# Patient Record
Sex: MALE | Race: WHITE | NOT HISPANIC OR LATINO | Employment: OTHER | URBAN - NONMETROPOLITAN AREA
[De-identification: names, ages, dates, MRNs, and addresses within clinical notes are randomized per-mention and may not be internally consistent; named-entity substitution may affect disease eponyms.]

---

## 2017-01-23 ENCOUNTER — TRANSCRIBE ORDERS (OUTPATIENT)
Dept: ADMINISTRATIVE | Facility: HOSPITAL | Age: 77
End: 2017-01-23

## 2017-01-23 ENCOUNTER — OFFICE VISIT (OUTPATIENT)
Dept: PULMONOLOGY | Facility: CLINIC | Age: 77
End: 2017-01-23

## 2017-01-23 ENCOUNTER — ANTICOAGULATION VISIT (OUTPATIENT)
Dept: CARDIOLOGY | Facility: CLINIC | Age: 77
End: 2017-01-23

## 2017-01-23 ENCOUNTER — LAB (OUTPATIENT)
Dept: LAB | Facility: HOSPITAL | Age: 77
End: 2017-01-23
Attending: INTERNAL MEDICINE

## 2017-01-23 VITALS
DIASTOLIC BLOOD PRESSURE: 60 MMHG | HEART RATE: 62 BPM | BODY MASS INDEX: 25.9 KG/M2 | OXYGEN SATURATION: 87 % | RESPIRATION RATE: 20 BRPM | SYSTOLIC BLOOD PRESSURE: 124 MMHG | WEIGHT: 185 LBS | HEIGHT: 71 IN

## 2017-01-23 DIAGNOSIS — I25.119 ATHEROSCLEROSIS OF NATIVE CORONARY ARTERY OF NATIVE HEART WITH ANGINA PECTORIS (HCC): Primary | ICD-10-CM

## 2017-01-23 DIAGNOSIS — I25.119 ATHEROSCLEROSIS OF NATIVE CORONARY ARTERY OF NATIVE HEART WITH ANGINA PECTORIS (HCC): ICD-10-CM

## 2017-01-23 DIAGNOSIS — I71.20 THORACIC AORTIC ANEURYSM WITHOUT RUPTURE (HCC): ICD-10-CM

## 2017-01-23 DIAGNOSIS — R06.02 SHORTNESS OF BREATH: Primary | ICD-10-CM

## 2017-01-23 DIAGNOSIS — J44.1 ACUTE EXACERBATION OF CHRONIC OBSTRUCTIVE PULMONARY DISEASE (COPD) (HCC): ICD-10-CM

## 2017-01-23 DIAGNOSIS — I48.0 PAROXYSMAL ATRIAL FIBRILLATION (HCC): ICD-10-CM

## 2017-01-23 DIAGNOSIS — J44.9 CHRONIC OBSTRUCTIVE PULMONARY DISEASE, UNSPECIFIED COPD TYPE (HCC): ICD-10-CM

## 2017-01-23 DIAGNOSIS — R09.02 HYPOXIA: ICD-10-CM

## 2017-01-23 LAB
INR PPP: 3.04 (ref 0.9–1.1)
PROTHROMBIN TIME: 33.3 SECONDS (ref 9.3–12.1)

## 2017-01-23 PROCEDURE — 96372 THER/PROPH/DIAG INJ SC/IM: CPT | Performed by: INTERNAL MEDICINE

## 2017-01-23 PROCEDURE — 36415 COLL VENOUS BLD VENIPUNCTURE: CPT

## 2017-01-23 PROCEDURE — 85610 PROTHROMBIN TIME: CPT | Performed by: INTERNAL MEDICINE

## 2017-01-23 PROCEDURE — 99214 OFFICE O/P EST MOD 30 MIN: CPT | Performed by: INTERNAL MEDICINE

## 2017-01-23 RX ORDER — METHYLPREDNISOLONE ACETATE 80 MG/ML
80 INJECTION, SUSPENSION INTRA-ARTICULAR; INTRALESIONAL; INTRAMUSCULAR; SOFT TISSUE ONCE
Status: COMPLETED | OUTPATIENT
Start: 2017-01-23 | End: 2017-01-23

## 2017-01-23 RX ORDER — AMOXICILLIN AND CLAVULANATE POTASSIUM 875; 125 MG/1; MG/1
1 TABLET, FILM COATED ORAL 2 TIMES DAILY
Qty: 10 TABLET | Refills: 0 | Status: SHIPPED | OUTPATIENT
Start: 2017-01-23 | End: 2017-01-28

## 2017-01-23 RX ORDER — ALBUTEROL SULFATE 1.25 MG/3ML
1 SOLUTION RESPIRATORY (INHALATION) EVERY 6 HOURS PRN
Qty: 120 VIAL | Refills: 5 | Status: SHIPPED | OUTPATIENT
Start: 2017-01-23 | End: 2017-08-09

## 2017-01-23 RX ORDER — BUDESONIDE AND FORMOTEROL FUMARATE DIHYDRATE 80; 4.5 UG/1; UG/1
2 AEROSOL RESPIRATORY (INHALATION)
Qty: 1 INHALER | Refills: 3 | Status: SHIPPED | OUTPATIENT
Start: 2017-01-23 | End: 2017-03-28 | Stop reason: SDUPTHER

## 2017-01-23 RX ADMIN — METHYLPREDNISOLONE ACETATE 80 MG: 80 INJECTION, SUSPENSION INTRA-ARTICULAR; INTRALESIONAL; INTRAMUSCULAR; SOFT TISSUE at 15:59

## 2017-01-23 NOTE — MR AVS SNAPSHOT
Alessandro Mendiola   1/23/2017 2:30 PM   Office Visit    Dept Phone:  [phone]   Encounter #:  [id number]    Provider:  Anna Wilson MD   Department:  [facility] PULMONARY CRITICAL CARE AND SLEEP                Your Full Care Plan              Today's Medication Changes          These changes are accurate as of: 1/23/17  3:22 PM.  If you have any questions, ask your nurse or doctor.               New Medication(s)Ordered:     amoxicillin-clavulanate 875-125 MG per tablet   Commonly known as:  AUGMENTIN   Take 1 tablet by mouth 2 (Two) Times a Day for 5 days.   Started by:  Anna Wilson MD       ipratropium-albuterol  MCG/ACT inhaler   Commonly known as:  COMBIVENT RESPIMAT   Inhale 1 puff 4 (Four) Times a Day As Needed for wheezing or shortness of air.   Started by:  Anna Wilson MD         Medication(s)that have changed:     * VENTOLIN  (90 BASE) MCG/ACT inhaler   Generic drug:  albuterol   INHALE TWO PUFFS BY MOUTH EVERY 4 TO 6 HOURS AS NEEDED   What changed:  Another medication with the same name was changed. Make sure you understand how and when to take each.   Changed by:  Rio Acuna DO       * albuterol 108 (90 BASE) MCG/ACT inhaler   Commonly known as:  VENTOLIN HFA   Inhale 2 puffs every 4 (four) hours as needed for wheezing.   What changed:  Another medication with the same name was changed. Make sure you understand how and when to take each.   Changed by:  Karen Oates MA       * albuterol 1.25 MG/3ML nebulizer solution   Commonly known as:  ACCUNEB   Take 3 mL by nebulization Every 6 (Six) Hours As Needed for wheezing.   What changed:  See the new instructions.   Changed by:  Anna Wilson MD       * Notice:  This list has 3 medication(s) that are the same as other medications prescribed for you. Read the directions carefully, and ask your doctor or other care provider to review them with you.      Stop taking  medication(s)listed here:     cefuroxime 500 MG tablet   Commonly known as:  CEFTIN   Stopped by:  Anna Wilson MD                Where to Get Your Medications      These medications were sent to [facility] [address] [facility], [place] - [address] - [phone]  - [phone] FX  [address] [place] [address]     Phone:  [phone]     aclidinium bromide 400 MCG/ACT aerosol powder  powder for inhalation    albuterol 1.25 MG/3ML nebulizer solution    amoxicillin-clavulanate 875-125 MG per tablet    budesonide-formoterol 80-4.5 MCG/ACT inhaler         You can get these medications from any pharmacy     Bring a paper prescription for each of these medications     ipratropium-albuterol  MCG/ACT inhaler                  Your Updated Medication List          This list is accurate as of: 1/23/17  3:22 PM.  Always use your most recent med list.                aclidinium bromide 400 MCG/ACT aerosol powder  powder for inhalation   Commonly known as:  TUDORZA PRESSAIR   Inhale 1 puff 2 (Two) Times a Day.       amoxicillin-clavulanate 875-125 MG per tablet   Commonly known as:  AUGMENTIN   Take 1 tablet by mouth 2 (Two) Times a Day for 5 days.       aspirin 81 MG tablet   Take 1 tablet by mouth daily.       azelastine 0.1 % nasal spray   Commonly known as:  ASTELIN   2 sprays into each nostril 2 (Two) Times a Day. Use in each nostril as directed       budesonide-formoterol 80-4.5 MCG/ACT inhaler   Commonly known as:  SYMBICORT   Inhale 2 puffs 2 (Two) Times a Day.       diltiaZEM  MG 24 hr capsule   Commonly known as:  CARTIA XT   Take 1 capsule by mouth Daily.       dronedarone 400 MG tablet   Commonly known as:  MULTAQ   Take 1 tablet by mouth 2 (two) times a day.       ipratropium-albuterol  MCG/ACT inhaler   Commonly known as:  COMBIVENT RESPIMAT   Inhale 1 puff 4 (Four) Times a Day As Needed for wheezing or shortness of air.       MUCINEX 600 MG 12 hr tablet   Generic drug:  guaiFENesin        MULTIPLE VITAMIN PO       niacin 500 MG tablet       OXYGEN-HELIUM IN       PredniSONE 10 MG (21) tablet pack   Commonly known as:  DELTASONE   TAKE AS DIRECTED       sertraline 50 MG tablet   Commonly known as:  ZOLOFT   Take 1 tablet by mouth Daily.       * VENTOLIN  (90 BASE) MCG/ACT inhaler   Generic drug:  albuterol   INHALE TWO PUFFS BY MOUTH EVERY 4 TO 6 HOURS AS NEEDED       * albuterol 108 (90 BASE) MCG/ACT inhaler   Commonly known as:  VENTOLIN HFA   Inhale 2 puffs every 4 (four) hours as needed for wheezing.       * albuterol 1.25 MG/3ML nebulizer solution   Commonly known as:  ACCUNEB   Take 3 mL by nebulization Every 6 (Six) Hours As Needed for wheezing.       vitamin D 65136 UNITS capsule capsule   Commonly known as:  ERGOCALCIFEROL   Take 1 capsule by mouth every 7 days.       * warfarin 4 MG tablet   Commonly known as:  COUMADIN       * warfarin 1 MG tablet   Commonly known as:  COUMADIN   Take 1 tablet by mouth daily.       * warfarin 5 MG tablet   Commonly known as:  COUMADIN   Take 1 tablet by mouth daily.       * Notice:  This list has 6 medication(s) that are the same as other medications prescribed for you. Read the directions carefully, and ask your doctor or other care provider to review them with you.            We Performed the Following     Oxygen Therapy       You Were Diagnosed With        Codes Comments    Shortness of breath    -  Primary ICD-10-CM: R06.02  ICD-9-CM: 786.05     Chronic obstructive pulmonary disease, unspecified COPD type     ICD-10-CM: J44.9  ICD-9-CM: 496     Hypoxia     ICD-10-CM: R09.02  ICD-9-CM: 799.02     Acute exacerbation of chronic obstructive pulmonary disease (COPD)     ICD-10-CM: J44.1  ICD-9-CM: 491.21       Medications to be Given to You by a Medical Professional     Due       Frequency    1/23/2017 methylPREDNISolone acetate (DEPO-medrol) injection 80 mg  Once      Instructions     None    Patient Instructions History      Upcoming Appointments      Visit Type Date Time Department    FOLLOW UP 2017  2:30 PM [facility]    PFT 2017  1:00 PM  [facility]    LAB 2017  2:15 PM  [facility]    FOLLOW UP 3/28/2017  2:00 PM [facility]    PFT 3/28/2017  1:00 PM [facility]    FOLLOW UP 2017  2:00 PM [facility]    FOLLOW UP 2017  1:15 PM [facility]      [employer] Signup     [facility] [employer] allows you to send messages to your doctor, view your test results, renew your prescriptions, schedule appointments, and more. To sign up, go to [employer] and click on the Sign Up Now link in the New User? box. Enter your [employer] Activation Code exactly as it appears below along with the last four digits of your Social Security Number and your Date of Birth () to complete the sign-up process. If you do not sign up before the expiration date, you must request a new code.    [employer] Activation Code: [email or web address]  Expires: 2017  3:22 PM    If you have questions, you can email [facility] [facility], [email or web address] or call [phone] to talk to our [employer] staff. Remember, [employer] is NOT to be used for urgent needs. For medical emergencies, dial 911.               Other Info from Your Visit           Your Appointments     Mar 28, 2017  1:00 PM EDT   PFT with [facility] - PFT [facility] PULMONARY CRITICAL CARE AND SLEEP (--)    [address] [facility] [address]  [address]  [facility] [place] [address] [phone]            Mar 28, 2017  2:00 PM EDT   Follow Up with DAVID Domingo   [facility] PULMONARY CRITICAL CARE AND SLEEP (--)    [address]  [address]  [facility] [place] [address]   [phone]           Arrive 15 minutes prior to appointment.            2017  2:00 PM EDT   Follow Up with Mikey Obrien MD   [facility] CARDIOLOGY (--)    [address]  [id number] [phone]           Arrive 15 minutes prior to appointment.            Jun 05, 2017  1:15 PM EDT   Follow Up with Rio Acuna DO   [facility] PRIMARY CARE (--)    [address] [place] [address] [phone]           Arrive 15 minutes prior to appointment.              Allergies     No Known Allergies      Reason for Visit     Follow-up           Vital Signs     Blood Pressure Pulse Respirations Height Weight Oxygen Saturation    124/60 62 20 71\" (180.3 cm) 185 lb (83.9 kg) 87%    Body Mass Index Smoking Status                25.8 kg/m2 Former Smoker          Problems and Diagnoses Noted     Chronic airway obstruction    Shortness of breath    Decreased oxygen supply        Acute exacerbation of chronic obstructive pulmonary disease (COPD)            "

## 2017-01-23 NOTE — PROGRESS NOTES
"Chief Complaint   Patient presents with   • Follow-up         Subjective   Alessandro Mendiola is a 76 y.o. male.     History of Present Illness     Patient here for follow-up of COPD.     He complains of productive cough of green mucus for the last 2 weeks. He denies fever or chills. He is using the rescue inhaler more often.    He continues Tudorza and Symbicort. He feels like the Tudorza has helped a lot.     He can not tell a difference in symptoms with the nasal spray.       The following portions of the patient's history were reviewed and updated as appropriate: allergies, current medications, past family history, past medical history, past social history and past surgical history.    Review of Systems   HENT: Negative for sinus pressure, sneezing and sore throat.    Respiratory: Positive for cough, shortness of breath and wheezing. Negative for chest tightness.        Objective   Visit Vitals   • /60   • Pulse 62   • Resp 20   • Ht 71\" (180.3 cm)   • Wt 185 lb (83.9 kg)   • SpO2 (!) 87%  Comment: on room air at rest   • BMI 25.8 kg/m2   O2 sat: 94% on 2 LPM.    Physical Exam   Constitutional: He is oriented to person, place, and time. He appears well-developed and well-nourished.   HENT:   Head: Normocephalic and atraumatic.   Dentures.   Eyes: EOM are normal.   Neck: Neck supple.   Cardiovascular: Normal rate and regular rhythm.    Pulmonary/Chest:   Effort was somewhat labored  Somewhat hyperresonant to percussion  Somewhat decreased A/E without wheezing noted.  On oxygen.   Musculoskeletal: He exhibits no edema.   Neurological: He is alert and oriented to person, place, and time.   Skin: Skin is warm and dry.   Psychiatric: He has a normal mood and affect.   Vitals reviewed.        Assessment/Plan   Alessandro was seen today for follow-up.    Diagnoses and all orders for this visit:    Shortness of breath  -     Pulmonary Function Test; Future    Chronic obstructive pulmonary disease, unspecified COPD " type  -     Pulmonary Function Test; Future    Hypoxia  -     Oxygen Therapy    Acute exacerbation of chronic obstructive pulmonary disease (COPD)  -     methylPREDNISolone acetate (DEPO-medrol) injection 80 mg; Inject 1 mL into the shoulder, thigh, or buttocks 1 (One) Time.    Other orders  -     amoxicillin-clavulanate (AUGMENTIN) 875-125 MG per tablet; Take 1 tablet by mouth 2 (Two) Times a Day for 5 days.  -     aclidinium bromide (TUDORZA PRESSAIR) 400 MCG/ACT aerosol powder  powder for inhalation; Inhale 1 puff 2 (Two) Times a Day.  -     albuterol (ACCUNEB) 1.25 MG/3ML nebulizer solution; Take 3 mL by nebulization Every 6 (Six) Hours As Needed for wheezing.  -     ipratropium-albuterol (COMBIVENT RESPIMAT)  MCG/ACT inhaler; Inhale 1 puff 4 (Four) Times a Day As Needed for wheezing or shortness of air.  -     budesonide-formoterol (SYMBICORT) 80-4.5 MCG/ACT inhaler; Inhale 2 puffs 2 (Two) Times a Day.         Return in about 2 months (around 3/23/2017) for Recheck, For Katia.    DISCUSSION (if any):  For the symptoms of acute exacerbation of COPD, I have prescribed intramuscular steroids.    Patient will be prescribed Augmentin.    Side effects have been discussed in detail.    Patient was asked to report to the emergency room if symptoms do not improve.    All other medications will remain the same.    Will try to arrange for POC, as he is having trouble with his portable tanks not lasting as long, since he needs O2 24/7.    Reviewed ABG, he does not need bipap yet.    PFT's at follow-up.    Errors in dictation may reflect use of voice recognition software and not all errors in transcription may have been detected prior to signing    This document was electronically signed by Anna Wilson MD January 23, 2017  3:17 PM

## 2017-01-23 NOTE — LETTER
"January 25, 2017     Rio Acuna DO  [address]  [place] [address]    Patient: Alessandro Mendiola   YOB: 1940   Date of Visit: 1/23/2017       Dear Dr. Armand DO:    Alessandro Mendiola was in my office today. Below is a copy of my note.    If you have questions, please do not hesitate to call me. I look forward to following Alessandro along with you.         Sincerely,        Anna Wilson MD        CC: No Recipients    Chief Complaint   Patient presents with   • Follow-up         Subjective   Alessandro Mendiola is a 76 y.o. male.     History of Present Illness     Patient here for follow-up of COPD.     He complains of productive cough of green mucus for the last 2 weeks. He denies fever or chills. He is using the rescue inhaler more often.    He continues Tudorza and Symbicort. He feels like the Tudorza has helped a lot.     He can not tell a difference in symptoms with the nasal spray.       The following portions of the patient's history were reviewed and updated as appropriate: allergies, current medications, past family history, past medical history, past social history and past surgical history.    Review of Systems   HENT: Negative for sinus pressure, sneezing and sore throat.    Respiratory: Positive for cough, shortness of breath and wheezing. Negative for chest tightness.        Objective   Visit Vitals   • /60   • Pulse 62   • Resp 20   • Ht 71\" (180.3 cm)   • Wt 185 lb (83.9 kg)   • SpO2 (!) 87%  Comment: on room air at rest   • BMI 25.8 kg/m2   O2 sat: 94% on 2 LPM.    Physical Exam   Constitutional: He is oriented to person, place, and time. He appears well-developed and well-nourished.   HENT:   Head: Normocephalic and atraumatic.   Dentures.   Eyes: EOM are normal.   Neck: Neck supple.   Cardiovascular: Normal rate and regular rhythm.    Pulmonary/Chest:   Effort was somewhat labored  Somewhat hyperresonant to percussion  Somewhat decreased A/E without wheezing " noted.  On oxygen.   Musculoskeletal: He exhibits no edema.   Neurological: He is alert and oriented to person, place, and time.   Skin: Skin is warm and dry.   Psychiatric: He has a normal mood and affect.   Vitals reviewed.        Assessment/Plan   Alessandro was seen today for follow-up.    Diagnoses and all orders for this visit:    Shortness of breath  -     Pulmonary Function Test; Future    Chronic obstructive pulmonary disease, unspecified COPD type  -     Pulmonary Function Test; Future    Hypoxia  -     Oxygen Therapy    Acute exacerbation of chronic obstructive pulmonary disease (COPD)  -     methylPREDNISolone acetate (DEPO-medrol) injection 80 mg; Inject 1 mL into the shoulder, thigh, or buttocks 1 (One) Time.    Other orders  -     amoxicillin-clavulanate (AUGMENTIN) 875-125 MG per tablet; Take 1 tablet by mouth 2 (Two) Times a Day for 5 days.  -     aclidinium bromide (TUDORZA PRESSAIR) 400 MCG/ACT aerosol powder  powder for inhalation; Inhale 1 puff 2 (Two) Times a Day.  -     albuterol (ACCUNEB) 1.25 MG/3ML nebulizer solution; Take 3 mL by nebulization Every 6 (Six) Hours As Needed for wheezing.  -     ipratropium-albuterol (COMBIVENT RESPIMAT)  MCG/ACT inhaler; Inhale 1 puff 4 (Four) Times a Day As Needed for wheezing or shortness of air.  -     budesonide-formoterol (SYMBICORT) 80-4.5 MCG/ACT inhaler; Inhale 2 puffs 2 (Two) Times a Day.         Return in about 2 months (around 3/23/2017) for Recheck, For Katia.    DISCUSSION (if any):  For the symptoms of acute exacerbation of COPD, I have prescribed intramuscular steroids.    Patient will be prescribed Augmentin.    Side effects have been discussed in detail.    Patient was asked to report to the emergency room if symptoms do not improve.    All other medications will remain the same.    Will try to arrange for POC, as he is having trouble with his portable tanks not lasting as long, since he needs O2 24/7.    Reviewed ABG, he does not need  bipap yet.    PFT's at follow-up.    Errors in dictation may reflect use of voice recognition software and not all errors in transcription may have been detected prior to signing    This document was electronically signed by Anna Wilson MD January 23, 2017  3:17 PM

## 2017-02-02 ENCOUNTER — LAB (OUTPATIENT)
Dept: LAB | Facility: HOSPITAL | Age: 77
End: 2017-02-02
Attending: INTERNAL MEDICINE

## 2017-02-02 ENCOUNTER — ANTICOAGULATION VISIT (OUTPATIENT)
Dept: CARDIOLOGY | Facility: CLINIC | Age: 77
End: 2017-02-02

## 2017-02-02 DIAGNOSIS — I71.20 THORACIC AORTIC ANEURYSM WITHOUT RUPTURE (HCC): ICD-10-CM

## 2017-02-02 DIAGNOSIS — I48.0 PAROXYSMAL ATRIAL FIBRILLATION (HCC): ICD-10-CM

## 2017-02-02 DIAGNOSIS — I25.119 ATHEROSCLEROSIS OF NATIVE CORONARY ARTERY OF NATIVE HEART WITH ANGINA PECTORIS (HCC): ICD-10-CM

## 2017-02-02 LAB
INR PPP: 2.66 (ref 0.9–1.1)
PROTHROMBIN TIME: 29.2 SECONDS (ref 9.3–12.1)

## 2017-02-02 PROCEDURE — 36415 COLL VENOUS BLD VENIPUNCTURE: CPT

## 2017-02-02 PROCEDURE — 85610 PROTHROMBIN TIME: CPT | Performed by: INTERNAL MEDICINE

## 2017-03-07 ENCOUNTER — LAB (OUTPATIENT)
Dept: LAB | Facility: HOSPITAL | Age: 77
End: 2017-03-07
Attending: INTERNAL MEDICINE

## 2017-03-07 ENCOUNTER — ANTICOAGULATION VISIT (OUTPATIENT)
Dept: CARDIOLOGY | Facility: CLINIC | Age: 77
End: 2017-03-07

## 2017-03-07 DIAGNOSIS — I25.119 ATHEROSCLEROSIS OF NATIVE CORONARY ARTERY OF NATIVE HEART WITH ANGINA PECTORIS (HCC): ICD-10-CM

## 2017-03-07 DIAGNOSIS — I48.0 PAROXYSMAL ATRIAL FIBRILLATION (HCC): ICD-10-CM

## 2017-03-07 DIAGNOSIS — I71.20 THORACIC AORTIC ANEURYSM WITHOUT RUPTURE (HCC): ICD-10-CM

## 2017-03-07 LAB
INR PPP: 2.68 (ref 0.9–1.1)
PROTHROMBIN TIME: 29.4 SECONDS (ref 9.3–12.1)

## 2017-03-07 PROCEDURE — 85610 PROTHROMBIN TIME: CPT | Performed by: INTERNAL MEDICINE

## 2017-03-07 PROCEDURE — 36415 COLL VENOUS BLD VENIPUNCTURE: CPT

## 2017-03-08 NOTE — TELEPHONE ENCOUNTER
NDC# needed to be changed instead of needing a PA for the medication.    copay $60.00 for 90 day supply.

## 2017-03-10 RX ORDER — WARFARIN SODIUM 1 MG/1
TABLET ORAL
Qty: 30 TABLET | Refills: 0 | OUTPATIENT
Start: 2017-03-10

## 2017-03-10 RX ORDER — WARFARIN SODIUM 5 MG/1
5 TABLET ORAL
Qty: 90 TABLET | Refills: 3 | Status: SHIPPED | OUTPATIENT
Start: 2017-03-10 | End: 2017-10-10 | Stop reason: SDUPTHER

## 2017-03-10 RX ORDER — WARFARIN SODIUM 5 MG/1
TABLET ORAL
Qty: 90 TABLET | Refills: 1 | Status: SHIPPED | OUTPATIENT
Start: 2017-03-10 | End: 2017-03-10 | Stop reason: SDUPTHER

## 2017-03-10 RX ORDER — WARFARIN SODIUM 1 MG/1
1 TABLET ORAL
Qty: 30 TABLET | Refills: 11 | OUTPATIENT
Start: 2017-03-10 | End: 2018-03-16

## 2017-03-28 ENCOUNTER — PROCEDURE VISIT (OUTPATIENT)
Dept: PULMONOLOGY | Facility: CLINIC | Age: 77
End: 2017-03-28

## 2017-03-28 ENCOUNTER — OFFICE VISIT (OUTPATIENT)
Dept: PULMONOLOGY | Facility: CLINIC | Age: 77
End: 2017-03-28

## 2017-03-28 VITALS
HEART RATE: 61 BPM | BODY MASS INDEX: 24.78 KG/M2 | HEIGHT: 71 IN | RESPIRATION RATE: 18 BRPM | WEIGHT: 177 LBS | SYSTOLIC BLOOD PRESSURE: 130 MMHG | DIASTOLIC BLOOD PRESSURE: 64 MMHG | OXYGEN SATURATION: 95 %

## 2017-03-28 DIAGNOSIS — J30.9 ALLERGIC RHINITIS, UNSPECIFIED ALLERGIC RHINITIS TRIGGER, UNSPECIFIED RHINITIS SEASONALITY: ICD-10-CM

## 2017-03-28 DIAGNOSIS — J44.9 CHRONIC OBSTRUCTIVE PULMONARY DISEASE, UNSPECIFIED COPD TYPE (HCC): ICD-10-CM

## 2017-03-28 DIAGNOSIS — R06.02 SHORTNESS OF BREATH: ICD-10-CM

## 2017-03-28 DIAGNOSIS — J44.9 CHRONIC OBSTRUCTIVE PULMONARY DISEASE, UNSPECIFIED COPD TYPE (HCC): Primary | ICD-10-CM

## 2017-03-28 DIAGNOSIS — R09.02 HYPOXIA: ICD-10-CM

## 2017-03-28 PROCEDURE — 99214 OFFICE O/P EST MOD 30 MIN: CPT | Performed by: NURSE PRACTITIONER

## 2017-03-28 PROCEDURE — 94060 EVALUATION OF WHEEZING: CPT | Performed by: INTERNAL MEDICINE

## 2017-03-28 PROCEDURE — 94729 DIFFUSING CAPACITY: CPT | Performed by: INTERNAL MEDICINE

## 2017-03-28 PROCEDURE — 94726 PLETHYSMOGRAPHY LUNG VOLUMES: CPT | Performed by: INTERNAL MEDICINE

## 2017-03-28 RX ORDER — BUDESONIDE AND FORMOTEROL FUMARATE DIHYDRATE 80; 4.5 UG/1; UG/1
2 AEROSOL RESPIRATORY (INHALATION)
Qty: 1 INHALER | Refills: 3 | Status: SHIPPED | OUTPATIENT
Start: 2017-03-28 | End: 2017-07-03 | Stop reason: SDUPTHER

## 2017-03-28 RX ORDER — ALBUTEROL SULFATE 90 UG/1
2 AEROSOL, METERED RESPIRATORY (INHALATION) EVERY 4 HOURS PRN
Qty: 1 INHALER | Refills: 5 | Status: SHIPPED | OUTPATIENT
Start: 2017-03-28 | End: 2017-08-09 | Stop reason: SDUPTHER

## 2017-03-28 NOTE — PROGRESS NOTES
"Chief Complaint   Patient presents with   • Follow-up   • COPD         Subjective   Alessandro Mendiola is a 77 y.o. male.     History of Present Illness   The patient is here for follow-up of COPD, hypoxia, allergic rhinitis, and PFT's today.    His breathing is \"okay\" at this time. He has an occasional productive cough.    He continues to use Tudorza and feels like it helps greatly as well as Symbicort BID.   He continues to have exertional shortness of breath. He uses the albuterol inhaler 4-5 times a day sometimes. His wife states any exertion makes him short of breath.    He is on oxygen continuous and would complains that the portable tanks run out when he is traveling.    The following portions of the patient's history were reviewed and updated as appropriate: allergies, current medications, past family history, past medical history, past social history and past surgical history.    Review of Systems   HENT: Positive for congestion and sinus pressure.    Respiratory: Positive for shortness of breath.    All other systems reviewed and are negative.      Objective   Visit Vitals   • /64   • Pulse 61   • Resp 18   • Ht 71\" (180.3 cm)   • Wt 177 lb (80.3 kg)   • SpO2 95%   • BMI 24.69 kg/m2   87%  On room air  95% on 2-3 LPM    Physical Exam   Constitutional: He is oriented to person, place, and time. He appears well-developed.   HENT:   Head: Normocephalic and atraumatic.   Dentures.   Eyes: EOM are normal.   Neck: Neck supple.   Cardiovascular: Normal rate and regular rhythm.    Pulmonary/Chest: Effort normal. No respiratory distress.   Somewhat decreased A/E without wheezing noted.  On oxygen.   Musculoskeletal: He exhibits no edema.   Gait normal.   Neurological: He is alert and oriented to person, place, and time.   Skin: Skin is warm and dry.   Psychiatric: He has a normal mood and affect.   Vitals reviewed.          Assessment/Plan   Alessandro was seen today for follow-up and copd.    Diagnoses and all " orders for this visit:    Chronic obstructive pulmonary disease, unspecified COPD type  Comments:  Severe   Orders:  -     Oxygen Therapy    Shortness of breath    Hypoxia  -     Oxygen Therapy    Allergic rhinitis, unspecified allergic rhinitis trigger, unspecified rhinitis seasonality    Other orders  -     aclidinium bromide (TUDORZA PRESSAIR) 400 MCG/ACT aerosol powder  powder for inhalation; Inhale 1 puff 2 (Two) Times a Day.  -     budesonide-formoterol (SYMBICORT) 80-4.5 MCG/ACT inhaler; Inhale 2 puffs 2 (Two) Times a Day.  -     albuterol (VENTOLIN HFA) 108 (90 BASE) MCG/ACT inhaler; Inhale 2 puffs Every 4 (Four) Hours As Needed for Wheezing.           Return in about 3 months (around 6/28/2017) for Recheck, For Dr. Wilson.    DISCUSSION (if any):  Reviewed and discussed PFT's obtained today which show severe obstruction with air trapping and a decreased diffusion capacity.     Continue Tudorza and Symbicort as well as albuterol inhaler as needed.     I will send as order for a portable oxygen concentrator since he is on oxygen continuously and he runs out on the portable tanks.     Continue to use Astelin as needed.     Errors in dictation may reflect use of voice recognition software and not all errors in transcription may have been detected prior to signing    This document was electronically signed by DAVID Domingo   March 28, 2017  4:24 PM

## 2017-04-13 ENCOUNTER — LAB (OUTPATIENT)
Dept: LAB | Facility: HOSPITAL | Age: 77
End: 2017-04-13
Attending: INTERNAL MEDICINE

## 2017-04-13 DIAGNOSIS — I71.20 THORACIC AORTIC ANEURYSM WITHOUT RUPTURE (HCC): ICD-10-CM

## 2017-04-13 DIAGNOSIS — I25.119 ATHEROSCLEROSIS OF NATIVE CORONARY ARTERY OF NATIVE HEART WITH ANGINA PECTORIS (HCC): ICD-10-CM

## 2017-04-13 LAB
INR PPP: 2.68 (ref 0.9–1.1)
PROTHROMBIN TIME: 29.4 SECONDS (ref 9.3–12.1)

## 2017-04-13 PROCEDURE — 36415 COLL VENOUS BLD VENIPUNCTURE: CPT

## 2017-04-13 PROCEDURE — 85610 PROTHROMBIN TIME: CPT

## 2017-04-14 ENCOUNTER — ANTICOAGULATION VISIT (OUTPATIENT)
Dept: CARDIOLOGY | Facility: CLINIC | Age: 77
End: 2017-04-14

## 2017-04-14 DIAGNOSIS — F39 MOOD DISORDER (HCC): ICD-10-CM

## 2017-04-14 DIAGNOSIS — I48.0 PAROXYSMAL ATRIAL FIBRILLATION (HCC): ICD-10-CM

## 2017-04-27 ENCOUNTER — OFFICE VISIT (OUTPATIENT)
Dept: CARDIOLOGY | Facility: CLINIC | Age: 77
End: 2017-04-27

## 2017-04-27 VITALS
OXYGEN SATURATION: 93 % | WEIGHT: 191 LBS | HEART RATE: 63 BPM | SYSTOLIC BLOOD PRESSURE: 132 MMHG | HEIGHT: 71 IN | RESPIRATION RATE: 18 BRPM | BODY MASS INDEX: 26.74 KG/M2 | DIASTOLIC BLOOD PRESSURE: 58 MMHG

## 2017-04-27 DIAGNOSIS — E78.2 MIXED HYPERLIPIDEMIA: ICD-10-CM

## 2017-04-27 DIAGNOSIS — I48.0 PAROXYSMAL ATRIAL FIBRILLATION (HCC): ICD-10-CM

## 2017-04-27 DIAGNOSIS — J40 BRONCHITIS: Primary | ICD-10-CM

## 2017-04-27 DIAGNOSIS — I10 ESSENTIAL HYPERTENSION: ICD-10-CM

## 2017-04-27 DIAGNOSIS — I25.10 CORONARY ARTERY DISEASE INVOLVING NATIVE CORONARY ARTERY OF NATIVE HEART WITHOUT ANGINA PECTORIS: ICD-10-CM

## 2017-04-27 PROCEDURE — 99214 OFFICE O/P EST MOD 30 MIN: CPT | Performed by: INTERNAL MEDICINE

## 2017-04-27 PROCEDURE — 93288 INTERROG EVL PM/LDLS PM IP: CPT | Performed by: INTERNAL MEDICINE

## 2017-04-27 RX ORDER — AMOXICILLIN AND CLAVULANATE POTASSIUM 875; 125 MG/1; MG/1
1 TABLET, FILM COATED ORAL EVERY 12 HOURS SCHEDULED
Status: DISCONTINUED | OUTPATIENT
Start: 2017-04-27 | End: 2017-04-28

## 2017-04-27 NOTE — PROGRESS NOTES
[facility] at [facility]  Office Progress Note  Alessandro Mendiola  1940  [phone]      Visit Date: 10/27/2016    PCP: Rio Acuna,   [address] [address]    IDENTIFICATION: A 77 y.o. male from [facility].    Chief Complaint   Patient presents with   • Follow-up     6 MONTH    • Pacemaker Check   • Atrial Fibrillation   • Dizziness   • Palpitations       PROBLEM LIST:   CAD      2008 BMS to RCA- Charisse      2012 SE wnl  HL      statin intolerant  PAF/ sss      st joan ppm gen change 3/15 MGR  COPD- occas steroids  ASC Ao Aneurysm T Rushing follows      2012 4.8 CM  HTN    Allergies  No Known Allergies    Current Medications    Current Outpatient Prescriptions:   •  aclidinium bromide (TUDORZA PRESSAIR) 400 MCG/ACT aerosol powder  powder for inhalation, Inhale 1 puff 2 (Two) Times a Day., Disp: 1 each, Rfl: 5  •  albuterol (ACCUNEB) 1.25 MG/3ML nebulizer solution, Take 3 mL by nebulization Every 6 (Six) Hours As Needed for wheezing., Disp: 120 vial, Rfl: 5  •  albuterol (VENTOLIN HFA) 108 (90 BASE) MCG/ACT inhaler, Inhale 2 puffs Every 4 (Four) Hours As Needed for Wheezing., Disp: 1 inhaler, Rfl: 5  •  aspirin 81 MG tablet, Take 1 tablet by mouth daily., Disp: 30 tablet, Rfl: 5  •  azelastine (ASTELIN) 0.1 % nasal spray, 2 sprays into each nostril 2 (Two) Times a Day. Use in each nostril as directed, Disp: 30 mL, Rfl: 12  •  budesonide-formoterol (SYMBICORT) 80-4.5 MCG/ACT inhaler, Inhale 2 puffs 2 (Two) Times a Day., Disp: 1 inhaler, Rfl: 3  •  diltiaZEM CD (CARTIA XT) 240 MG 24 hr capsule, Take 1 capsule by mouth Daily., Disp: 90 capsule, Rfl: 3  •  dronedarone (MULTAQ) 400 MG tablet, Take 1 tablet by mouth 2 (two) times a day., Disp: 180 tablet, Rfl: 3  •  guaiFENesin (MUCINEX) 600 MG 12 hr tablet, Take  by mouth every 12 (twelve) hours., Disp: , Rfl:   •  ipratropium-albuterol (COMBIVENT RESPIMAT)  MCG/ACT inhaler, Inhale 1 puff 4 (Four) Times a Day As  "Needed for wheezing or shortness of air., Disp: 1 g, Rfl: 5  •  MULTIPLE VITAMIN PO, Take  by mouth., Disp: , Rfl:   •  niacin 500 MG tablet, Take  by mouth 2 (two) times a day., Disp: , Rfl:   •  OXYGEN-HELIUM IN, Oxygen; Patient Sig: Oxygen patient is to get home oxygen through  company of his choice at 2L/Min, as well as portable oxygen at the same rate.; 1; 0; 22-Mar-2016; Active, Disp: , Rfl:   •  sertraline (ZOLOFT) 50 MG tablet, Take 1 tablet by mouth Daily., Disp: 90 tablet, Rfl: 3  •  VENTOLIN  (90 BASE) MCG/ACT inhaler, INHALE TWO PUFFS BY MOUTH EVERY 4 TO 6 HOURS AS NEEDED, Disp: 1 inhaler, Rfl: 5  •  vitamin D (ERGOCALCIFEROL) 52514 UNITS capsule capsule, Take 1 capsule by mouth every 7 days., Disp: 12 capsule, Rfl: 3  •  warfarin (COUMADIN) 1 MG tablet, Take 1 tablet by mouth Daily., Disp: 30 tablet, Rfl: 11  •  warfarin (COUMADIN) 4 MG tablet, Take  by mouth daily., Disp: , Rfl:   •  warfarin (COUMADIN) 5 MG tablet, Take 1 tablet by mouth Daily., Disp: 90 tablet, Rfl: 3      History of Present Illness   Patient presents in routine follow up. Has been doing well with the exception of shortness of breath and lack of energy. Fatigued all the time and activities worsen.Pt denies any chest pain,   , orthopnea, PND, palpitations, lower extremity edema.  Increased dyspnea w productive cough and low grade temp.  He is utilizing O2 more over the last week.    ROS:  All systems have been reviewed and are negative with the exception of those mentioned in the HPI.    OBJECTIVE:  Vitals:    04/27/17 1409 04/27/17 1411   BP: 140/52 132/58   BP Location: Right arm Right arm   Patient Position: Sitting Standing   Cuff Size: Adult Adult   Pulse: 63    Resp: 18    SpO2: 93%    Weight: 191 lb (86.6 kg)    Height: 71\" (180.3 cm)      Physical Exam   Constitutional: He appears well-developed and well-nourished.   Neck: Normal range of motion. Neck supple. No hepatojugular reflux and no JVD present. Carotid bruit is " not present. No tracheal deviation present. No thyromegaly present.   Cardiovascular: Normal rate, regular rhythm, S1 normal, S2 normal, intact distal pulses and normal pulses.  PMI is not displaced.  Exam reveals no gallop, no distant heart sounds, no friction rub, no midsystolic click and no opening snap.    No murmur heard.  Pulses:       Radial pulses are 2+ on the right side, and 2+ on the left side.        Dorsalis pedis pulses are 2+ on the right side, and 2+ on the left side.        Posterior tibial pulses are 2+ on the right side, and 2+ on the left side.   Pulmonary/Chest: Effort normal. He has no wheezes. He has rales.   Abdominal: Soft. Bowel sounds are normal. He exhibits no mass. There is no tenderness. There is no guarding.   Musculoskeletal: He exhibits edema.       Diagnostic Data:  Procedures  Device interrogation in office today: Dual-chamber PPM with acceptable threshold and impedance with a estimated longevity of 9.8 years and battery voltage of 2.99 V.  Atrial pacing 96% the time, ventricular pacing 1.5%.  Less than 1% AMS which longest was 6 hours on 09/15/2016.  One episode of nonsustained ventricular tachycardia with a duration of 15 beats.  PAT ×8 less than 5 seconds.  Normal functioning no changes.    ASSESSMENT:   Diagnosis Plan   1. Bronchitis     2. Coronary artery disease involving native coronary artery of native heart without angina pectoris     3. Mixed hyperlipidemia     4. Paroxysmal atrial fibrillation     5. Essential hypertension         PLAN:  Dyspnea with rhonchi will empirically treat with Augmentin if no improvement prepped following weekend he is to contact Dr. Acuna to discuss potential need for Dosepak of steroids.    CAD post remote PCI with no anginal equivalent preserved LV function continued medical management    Paroxysmal A. fib anticoagulated and rhythm controlled with multiple back samples afforded in the office today    Hypertension controlled on current  regimen

## 2017-04-28 RX ORDER — AMOXICILLIN AND CLAVULANATE POTASSIUM 875; 125 MG/1; MG/1
1 TABLET, FILM COATED ORAL 2 TIMES DAILY
Qty: 14 TABLET | Refills: 0 | Status: SHIPPED | OUTPATIENT
Start: 2017-04-28 | End: 2017-07-03

## 2017-05-23 ENCOUNTER — ANTICOAGULATION VISIT (OUTPATIENT)
Dept: CARDIOLOGY | Facility: CLINIC | Age: 77
End: 2017-05-23

## 2017-05-23 ENCOUNTER — LAB (OUTPATIENT)
Dept: LAB | Facility: HOSPITAL | Age: 77
End: 2017-05-23
Attending: INTERNAL MEDICINE

## 2017-05-23 DIAGNOSIS — I71.20 THORACIC AORTIC ANEURYSM WITHOUT RUPTURE (HCC): ICD-10-CM

## 2017-05-23 DIAGNOSIS — I25.119 ATHEROSCLEROSIS OF NATIVE CORONARY ARTERY OF NATIVE HEART WITH ANGINA PECTORIS (HCC): ICD-10-CM

## 2017-05-23 DIAGNOSIS — I48.0 PAROXYSMAL ATRIAL FIBRILLATION (HCC): ICD-10-CM

## 2017-05-23 LAB
INR PPP: 3.42 (ref 0.9–1.1)
PROTHROMBIN TIME: 37.5 SECONDS (ref 9.3–12.1)

## 2017-05-23 PROCEDURE — 85610 PROTHROMBIN TIME: CPT | Performed by: INTERNAL MEDICINE

## 2017-05-23 PROCEDURE — 36415 COLL VENOUS BLD VENIPUNCTURE: CPT

## 2017-06-05 ENCOUNTER — OFFICE VISIT (OUTPATIENT)
Dept: INTERNAL MEDICINE | Facility: CLINIC | Age: 77
End: 2017-06-05

## 2017-06-05 VITALS
OXYGEN SATURATION: 91 % | WEIGHT: 187 LBS | DIASTOLIC BLOOD PRESSURE: 70 MMHG | HEART RATE: 63 BPM | BODY MASS INDEX: 26.18 KG/M2 | HEIGHT: 71 IN | SYSTOLIC BLOOD PRESSURE: 140 MMHG

## 2017-06-05 DIAGNOSIS — N39.3 SI (STRESS INCONTINENCE), MALE: ICD-10-CM

## 2017-06-05 DIAGNOSIS — J41.8 MIXED SIMPLE AND MUCOPURULENT CHRONIC BRONCHITIS (HCC): ICD-10-CM

## 2017-06-05 DIAGNOSIS — B35.4 TINEA CORPORIS: Primary | ICD-10-CM

## 2017-06-05 PROCEDURE — 99213 OFFICE O/P EST LOW 20 MIN: CPT | Performed by: FAMILY MEDICINE

## 2017-06-05 RX ORDER — TAMSULOSIN HYDROCHLORIDE 0.4 MG/1
1 CAPSULE ORAL NIGHTLY
Qty: 30 CAPSULE | Refills: 11 | Status: SHIPPED | OUTPATIENT
Start: 2017-06-05

## 2017-06-05 RX ORDER — CEFUROXIME AXETIL 500 MG/1
500 TABLET ORAL 2 TIMES DAILY
Qty: 20 TABLET | Refills: 0 | Status: SHIPPED | OUTPATIENT
Start: 2017-06-05 | End: 2017-07-03

## 2017-06-05 RX ORDER — CLOTRIMAZOLE 1 %
CREAM (GRAM) TOPICAL 2 TIMES DAILY
Qty: 45 G | Refills: 3 | Status: SHIPPED | OUTPATIENT
Start: 2017-06-05 | End: 2018-08-01 | Stop reason: HOSPADM

## 2017-06-05 RX ORDER — FUROSEMIDE 20 MG/1
20 TABLET ORAL DAILY
Qty: 3 TABLET | Refills: 0 | Status: SHIPPED | OUTPATIENT
Start: 2017-06-05 | End: 2017-06-08

## 2017-06-05 RX ORDER — PREDNISONE 10 MG/1
TABLET ORAL
Qty: 21 TABLET | Refills: 0 | Status: SHIPPED | OUTPATIENT
Start: 2017-06-05 | End: 2017-07-17 | Stop reason: SDUPTHER

## 2017-06-05 NOTE — PROGRESS NOTES
Subjective   Aspen Mendiola is a 77 y.o. male.     History of Present Illness   Aspen HAS had a spot on his right shoulder that has been red, flaky anditches a lot.  He has tried all the OTC anti-itch and hydrocortisone creams.  He has noticed it off and on for about a year.    His breathing has been getting worse over the past 2 weeks.  He's fine around the house lethargic.  He has his SOA get much worse, and gets very tired easily with any activity.    The following portions of the patient's history were reviewed and updated as appropriate: allergies, current medications, past social history and problem list.    Review of Systems   Constitutional: Negative for appetite change, chills, fatigue, fever and unexpected weight change.   HENT: Negative for congestion, ear pain, hearing loss, nosebleeds, postnasal drip, rhinorrhea, sore throat, tinnitus and trouble swallowing.    Eyes: Negative for photophobia, discharge and visual disturbance.   Respiratory: Positive for cough and shortness of breath. Negative for chest tightness and wheezing.    Cardiovascular: Negative for chest pain, palpitations and leg swelling.   Gastrointestinal: Negative for abdominal distention, abdominal pain, blood in stool, constipation, diarrhea, nausea and vomiting.   Endocrine: Negative for cold intolerance, heat intolerance, polydipsia, polyphagia and polyuria.   Musculoskeletal: Negative for arthralgias, back pain, joint swelling, myalgias, neck pain and neck stiffness.   Skin: Negative for color change, pallor, rash and wound.        Lesion on back.     Allergic/Immunologic: Negative for environmental allergies, food allergies and immunocompromised state.   Neurological: Negative for dizziness, tremors, seizures, weakness, numbness and headaches.   Hematological: Negative for adenopathy. Does not bruise/bleed easily.   Psychiatric/Behavioral: Negative for agitation, behavioral problems, confusion, hallucinations, self-injury and  "suicidal ideas. The patient is not nervous/anxious.        Objective   /70  Pulse 63  Ht 71\" (180.3 cm)  Wt 187 lb (84.8 kg)  SpO2 91% Comment: 2LPM - AFTER WAITING A FEW MIN  BMI 26.08 kg/m2  Physical Exam   Constitutional: He is oriented to person, place, and time. He appears well-developed and well-nourished. No distress.   HENT:   Head: Normocephalic and atraumatic.   Right Ear: External ear normal.   Left Ear: External ear normal.   Nose: Nose normal.   Mouth/Throat: Oropharynx is clear and moist.   Eyes: Conjunctivae and EOM are normal. Pupils are equal, round, and reactive to light. Right eye exhibits no discharge. Left eye exhibits no discharge. No scleral icterus.   Neck: Normal range of motion. Neck supple. No JVD present. No tracheal deviation present. No thyromegaly present.   Cardiovascular: Normal rate, regular rhythm, normal heart sounds and intact distal pulses.  Exam reveals no gallop and no friction rub.    No murmur heard.  Pulmonary/Chest: Effort normal and breath sounds normal. No stridor. No respiratory distress. He has no wheezes. He has no rales. He exhibits no tenderness.   Decreased flow throughout. No crackles noted. Expiratory wheezes are noted.   Abdominal: Soft. Bowel sounds are normal. He exhibits no distension and no mass. There is no tenderness. There is no rebound and no guarding. No hernia.   Musculoskeletal: Normal range of motion. He exhibits no edema, tenderness or deformity.   Lymphadenopathy:     He has no cervical adenopathy.   Neurological: He is alert and oriented to person, place, and time. He has normal reflexes. He displays normal reflexes. No cranial nerve deficit. He exhibits normal muscle tone.   Skin: Skin is warm and dry. No rash noted. No erythema. No pallor.   Tinea corporis on back   Psychiatric: He has a normal mood and affect. His behavior is normal. Judgment normal.       Assessment/Plan   Problem List Items Addressed This Visit        Respiratory "    COPD (chronic obstructive pulmonary disease)    Relevant Medications    predniSONE (DELTASONE) 10 MG tablet    cefuroxime (CEFTIN) 500 MG tablet    furosemide (LASIX) 20 MG tablet       Musculoskeletal and Integument    Tinea corporis - Primary    Relevant Medications    clotrimazole (LOTRIMIN) 1 % cream        Fu in a month. Sooner if needed.

## 2017-07-03 ENCOUNTER — HOSPITAL ENCOUNTER (OUTPATIENT)
Dept: GENERAL RADIOLOGY | Facility: HOSPITAL | Age: 77
Discharge: HOME | End: 2017-07-03
Attending: INTERNAL MEDICINE | Admitting: INTERNAL MEDICINE

## 2017-07-03 ENCOUNTER — LAB (OUTPATIENT)
Dept: LAB | Facility: HOSPITAL | Age: 77
End: 2017-07-03
Attending: INTERNAL MEDICINE

## 2017-07-03 ENCOUNTER — ANTICOAGULATION VISIT (OUTPATIENT)
Dept: CARDIOLOGY | Facility: CLINIC | Age: 77
End: 2017-07-03

## 2017-07-03 ENCOUNTER — OFFICE VISIT (OUTPATIENT)
Dept: PULMONOLOGY | Facility: CLINIC | Age: 77
End: 2017-07-03

## 2017-07-03 VITALS
WEIGHT: 194.4 LBS | SYSTOLIC BLOOD PRESSURE: 98 MMHG | HEART RATE: 66 BPM | RESPIRATION RATE: 18 BRPM | BODY MASS INDEX: 27.22 KG/M2 | OXYGEN SATURATION: 82 % | HEIGHT: 71 IN | DIASTOLIC BLOOD PRESSURE: 68 MMHG

## 2017-07-03 DIAGNOSIS — J44.9 CHRONIC OBSTRUCTIVE PULMONARY DISEASE, UNSPECIFIED COPD TYPE (HCC): ICD-10-CM

## 2017-07-03 DIAGNOSIS — I48.0 PAROXYSMAL ATRIAL FIBRILLATION (HCC): ICD-10-CM

## 2017-07-03 DIAGNOSIS — R09.02 HYPOXIA: ICD-10-CM

## 2017-07-03 DIAGNOSIS — I25.119 ATHEROSCLEROSIS OF NATIVE CORONARY ARTERY OF NATIVE HEART WITH ANGINA PECTORIS (HCC): ICD-10-CM

## 2017-07-03 DIAGNOSIS — R06.02 SHORTNESS OF BREATH: Primary | ICD-10-CM

## 2017-07-03 DIAGNOSIS — R06.02 SHORTNESS OF BREATH: ICD-10-CM

## 2017-07-03 DIAGNOSIS — I71.20 THORACIC AORTIC ANEURYSM WITHOUT RUPTURE (HCC): ICD-10-CM

## 2017-07-03 DIAGNOSIS — R25.1 OCCASIONAL TREMORS: ICD-10-CM

## 2017-07-03 LAB
INR PPP: 2.8 (ref 0.9–1.1)
PROTHROMBIN TIME: 30.7 SECONDS (ref 9.3–12.1)

## 2017-07-03 PROCEDURE — 71020 HC CHEST PA AND LATERAL: CPT

## 2017-07-03 PROCEDURE — 85610 PROTHROMBIN TIME: CPT | Performed by: INTERNAL MEDICINE

## 2017-07-03 PROCEDURE — 99214 OFFICE O/P EST MOD 30 MIN: CPT | Performed by: INTERNAL MEDICINE

## 2017-07-03 PROCEDURE — 36415 COLL VENOUS BLD VENIPUNCTURE: CPT

## 2017-07-03 RX ORDER — PREDNISONE 20 MG/1
TABLET ORAL
Qty: 10 TABLET | Refills: 0 | Status: SHIPPED | OUTPATIENT
Start: 2017-07-03 | End: 2017-07-17

## 2017-07-03 RX ORDER — PRIMIDONE 50 MG/1
25 TABLET ORAL NIGHTLY
Qty: 30 TABLET | Refills: 2 | Status: SHIPPED | OUTPATIENT
Start: 2017-07-03 | End: 2018-03-14 | Stop reason: SDUPTHER

## 2017-07-03 RX ORDER — PRIMIDONE 250 MG/1
250 TABLET ORAL 2 TIMES DAILY
Qty: 60 TABLET | Refills: 5 | Status: SHIPPED | OUTPATIENT
Start: 2017-07-03 | End: 2017-07-03 | Stop reason: SDUPTHER

## 2017-07-03 RX ORDER — BUDESONIDE AND FORMOTEROL FUMARATE DIHYDRATE 80; 4.5 UG/1; UG/1
2 AEROSOL RESPIRATORY (INHALATION)
Qty: 1 INHALER | Refills: 5 | Status: SHIPPED | OUTPATIENT
Start: 2017-07-03 | End: 2017-08-09 | Stop reason: SDUPTHER

## 2017-07-03 NOTE — PROGRESS NOTES
"Chief Complaint   Patient presents with   • Follow-up   • Shortness of Breath         Subjective   Alessandro Mendiola is a 77 y.o. male.     History of Present Illness   Patient comes today for follow up of shortness of breath. Patient says that the symptoms have been worsening since the last clinic visit.     Patient does not report any emergency room visits or recent exacerbations.  He was however, recently treated by his primary care provider with antibiotics and steroid for possible COPD exacerbation.    He reported only marginal improvement in symptoms with that treatment.    Patient is not using Tudorza, as prescribed. Exercise tolerance has also progressively worsened.    He is using oxygen as prescribed, 24/7 to good effect.  He does however, struggle to keep his tanks last long.    He is also complaining of essential tremors which are especially bothersome when he tries to do any activity including taking a bath.    The following portions of the patient's history were reviewed and updated as appropriate: allergies, current medications, past family history, past medical history, past social history and past surgical history.    Review of Systems   HENT: Positive for rhinorrhea. Negative for sinus pressure, sneezing and sore throat.    Respiratory: Positive for cough, shortness of breath and wheezing.        Objective   Visit Vitals   • BP 98/68   • Pulse 66   • Resp 18   • Ht 71\" (180.3 cm)   • Wt 194 lb 6.4 oz (88.2 kg)   • SpO2 (!) 82%  Comment: ON ROOM AIR   • BMI 27.11 kg/m2   O2: 96 ON 3LPM    Physical Exam   Constitutional: He is oriented to person, place, and time. He appears well-developed and well-nourished.   HENT:   Head: Atraumatic.   Dentures noted.   Eyes: EOM are normal.   Neck: Neck supple.   Cardiovascular: Normal rate and regular rhythm.    PPM noted.   Pulmonary/Chest: Effort normal. No respiratory distress.   Somewhat hyperresonant to percussion  Somewhat decreased A/E with out wheezing " noted.   Musculoskeletal: He exhibits no edema.   Neurological: He is alert and oriented to person, place, and time.   Skin: Skin is warm and dry.   Psychiatric: He has a normal mood and affect.   Vitals reviewed.      Assessment/Plan   Alessandro was seen today for follow-up and shortness of breath.    Diagnoses and all orders for this visit:    Shortness of breath  -     XR Chest PA & Lateral; Future    Chronic obstructive pulmonary disease, unspecified COPD type  -     XR Chest PA & Lateral; Future  -     Oxygen Therapy    Hypoxia  -     Oxygen Therapy    Occasional tremors    Other orders  -     Discontinue: primidone (MYSOLINE) 250 MG tablet; Take 1 tablet by mouth 2 (Two) Times a Day.  -     aclidinium bromide (TUDORZA PRESSAIR) 400 MCG/ACT aerosol powder  powder for inhalation; Inhale 1 puff 2 (Two) Times a Day.  -     budesonide-formoterol (SYMBICORT) 80-4.5 MCG/ACT inhaler; Inhale 2 puffs 2 (Two) Times a Day.  -     predniSONE (DELTASONE) 20 MG tablet; Take 2 tablets daily for 5 days.  -     primidone (MYSOLINE) 50 MG tablet; Take 0.5 tablets by mouth Every Night.         Return in about 5 weeks (around 8/7/2017) for Recheck, For Katia.    DISCUSSION (if any):  I once again reviewed his PFTs with him and told him that he definitely has severe COPD.    For his occasional tremors have started him on primidone 25 mg daily at bedtime it can be increased later on.  I also asked him to inform his primary care provider.    As far as his recent worsening is concerned, it could be multifactorial including the fact that he is not using his Tudorza as prescribed.  There also seems to be ongoing exacerbation which has not completely responded and therefore I have started him on prednisone 40 mg for 5 days.    I will also go ahead and order a chest x-ray to rule out any possibility of pneumonia or other etiologies.    Purse lip breathing techniques were also discussed.    The patient was advised to continue oxygen, since  there has been a good response since the patient is using it as prescribed.    He may benefit from portable oxygen concentrator, especially since he is having trouble with his cylinders not lasting as long.      Errors in dictation may reflect use of voice recognition software and not all errors in transcription may have been detected prior to signing    This document was electronically signed by Anna Wilson MD July 3, 2017  4:22 PM

## 2017-07-17 ENCOUNTER — OFFICE VISIT (OUTPATIENT)
Dept: INTERNAL MEDICINE | Facility: CLINIC | Age: 77
End: 2017-07-17

## 2017-07-17 VITALS
DIASTOLIC BLOOD PRESSURE: 60 MMHG | OXYGEN SATURATION: 91 % | WEIGHT: 189 LBS | SYSTOLIC BLOOD PRESSURE: 110 MMHG | BODY MASS INDEX: 26.46 KG/M2 | HEART RATE: 60 BPM | HEIGHT: 71 IN

## 2017-07-17 DIAGNOSIS — L98.9 SKIN LESION OF BACK: Primary | ICD-10-CM

## 2017-07-17 DIAGNOSIS — J41.8 MIXED SIMPLE AND MUCOPURULENT CHRONIC BRONCHITIS (HCC): ICD-10-CM

## 2017-07-17 PROCEDURE — 99213 OFFICE O/P EST LOW 20 MIN: CPT | Performed by: FAMILY MEDICINE

## 2017-07-17 RX ORDER — PREDNISONE 10 MG/1
10 TABLET ORAL DAILY
Qty: 30 TABLET | Refills: 1 | Status: SHIPPED | OUTPATIENT
Start: 2017-07-17 | End: 2017-07-18 | Stop reason: SDUPTHER

## 2017-07-17 NOTE — PROGRESS NOTES
Subjective   Alessandro Mendiola is a 77 y.o. male.     History of Present Illness   After last month, Alessandro took lasix for 3 days.  He then stopped it.  He's not currently taking any prednisone now.  He stopped all prednisone from me or Dr. Wilson about 4-5 days ago.  He's doing well with resting, using oxygen ,and not exhausting his efforts.  He was started on primidone for his tremors.  He states that it helped his tremors a lot.        The following portions of the patient's history were reviewed and updated as appropriate: allergies, current medications, past social history and problem list.    Review of Systems   Constitutional: Negative for appetite change, chills, fatigue, fever and unexpected weight change.   HENT: Negative for congestion, ear pain, hearing loss, nosebleeds, postnasal drip, rhinorrhea, sore throat, tinnitus and trouble swallowing.    Eyes: Negative for photophobia, discharge and visual disturbance.   Respiratory: Positive for shortness of breath and wheezing. Negative for cough and chest tightness.    Cardiovascular: Negative for chest pain, palpitations and leg swelling.   Gastrointestinal: Negative for abdominal distention, abdominal pain, blood in stool, constipation, diarrhea, nausea and vomiting.   Endocrine: Negative for cold intolerance, heat intolerance, polydipsia, polyphagia and polyuria.   Musculoskeletal: Negative for arthralgias, back pain, joint swelling, myalgias, neck pain and neck stiffness.   Skin: Positive for wound. Negative for color change, pallor and rash.   Allergic/Immunologic: Negative for environmental allergies, food allergies and immunocompromised state.   Neurological: Negative for dizziness, tremors, seizures, weakness, numbness and headaches.   Hematological: Negative for adenopathy. Does not bruise/bleed easily.   Psychiatric/Behavioral: Negative for agitation, behavioral problems, confusion, hallucinations, self-injury and suicidal ideas. The patient is not  "nervous/anxious.        Objective   /60  Pulse 60  Ht 71\" (180.3 cm)  Wt 189 lb (85.7 kg)  SpO2 91% Comment: 3LPM  BMI 26.36 kg/m2  Physical Exam   Constitutional: He is oriented to person, place, and time. He appears well-developed and well-nourished. No distress.   HENT:   Head: Normocephalic and atraumatic.   Right Ear: External ear normal.   Left Ear: External ear normal.   Nose: Nose normal.   Mouth/Throat: Oropharynx is clear and moist.   Eyes: Conjunctivae and EOM are normal. Pupils are equal, round, and reactive to light. Right eye exhibits no discharge. Left eye exhibits no discharge. No scleral icterus.   Neck: Normal range of motion. Neck supple. No JVD present. No tracheal deviation present. No thyromegaly present.   Cardiovascular: Normal rate, regular rhythm, normal heart sounds and intact distal pulses.  Exam reveals no gallop and no friction rub.    No murmur heard.  Pulmonary/Chest: Effort normal and breath sounds normal. No stridor. No respiratory distress. He has no wheezes. He has no rales. He exhibits no tenderness.   Abdominal: Soft. Bowel sounds are normal. He exhibits no distension and no mass. There is no tenderness. There is no rebound and no guarding. No hernia.   Musculoskeletal: Normal range of motion. He exhibits no edema, tenderness or deformity.   Lymphadenopathy:     He has no cervical adenopathy.   Neurological: He is alert and oriented to person, place, and time. He has normal reflexes. He displays normal reflexes. No cranial nerve deficit. He exhibits normal muscle tone.   Skin: Skin is warm and dry. No rash noted. No erythema. No pallor.   AK vs other on right shoulder blade.   Psychiatric: He has a normal mood and affect. His behavior is normal. Judgment normal.       Assessment/Plan   Problem List Items Addressed This Visit        Respiratory    COPD (chronic obstructive pulmonary disease)    Relevant Medications    predniSONE (DELTASONE) 10 MG tablet      Other " Visit Diagnoses     Skin lesion of back    -  Primary    Relevant Orders    Ambulatory Referral to Dermatology        Fu in a month to see how he's doing on the prednisone.

## 2017-07-18 ENCOUNTER — TELEPHONE (OUTPATIENT)
Dept: INTERNAL MEDICINE | Facility: CLINIC | Age: 77
End: 2017-07-18

## 2017-07-18 DIAGNOSIS — J41.8 MIXED SIMPLE AND MUCOPURULENT CHRONIC BRONCHITIS (HCC): ICD-10-CM

## 2017-07-18 RX ORDER — PREDNISONE 10 MG/1
10 TABLET ORAL DAILY
Qty: 30 TABLET | Refills: 1 | Status: SHIPPED | OUTPATIENT
Start: 2017-07-18 | End: 2017-07-26 | Stop reason: SDUPTHER

## 2017-07-18 NOTE — TELEPHONE ENCOUNTER
BRIAN IS UNDER THE IMPRESSION THAT HE WAS TO TAKE 10 MG PREDNISONE EVERYDAY.    IS THAT HOW YOU WANT HIM TO TAKE IT OR DOES HE NEED THE TAPER DOSE

## 2017-07-26 ENCOUNTER — OFFICE VISIT (OUTPATIENT)
Dept: INTERNAL MEDICINE | Facility: CLINIC | Age: 77
End: 2017-07-26

## 2017-07-26 VITALS
OXYGEN SATURATION: 88 % | SYSTOLIC BLOOD PRESSURE: 110 MMHG | WEIGHT: 190 LBS | HEART RATE: 60 BPM | HEIGHT: 71 IN | BODY MASS INDEX: 26.6 KG/M2 | DIASTOLIC BLOOD PRESSURE: 60 MMHG

## 2017-07-26 DIAGNOSIS — E55.9 VITAMIN D DEFICIENCY: Primary | ICD-10-CM

## 2017-07-26 DIAGNOSIS — J41.8 MIXED SIMPLE AND MUCOPURULENT CHRONIC BRONCHITIS (HCC): ICD-10-CM

## 2017-07-26 DIAGNOSIS — Z79.899 ENCOUNTER FOR LONG-TERM CURRENT USE OF MEDICATION: ICD-10-CM

## 2017-07-26 LAB — 25(OH)D3+25(OH)D2 SERPL-MCNC: 33.1 NG/ML

## 2017-07-26 PROCEDURE — G0439 PPPS, SUBSEQ VISIT: HCPCS | Performed by: FAMILY MEDICINE

## 2017-07-26 PROCEDURE — 96160 PT-FOCUSED HLTH RISK ASSMT: CPT | Performed by: FAMILY MEDICINE

## 2017-07-26 RX ORDER — PREDNISONE 10 MG/1
10 TABLET ORAL DAILY
Qty: 30 TABLET | Refills: 5 | Status: SHIPPED | OUTPATIENT
Start: 2017-07-26 | End: 2017-11-15 | Stop reason: SDUPTHER

## 2017-07-26 NOTE — PROGRESS NOTES
QUICK REFERENCE INFORMATION:  The ABCs of the Annual Wellness Visit    Subsequent Medicare Wellness Visit    HEALTH RISK ASSESSMENT    1940    Recent Hospitalizations:  No hospitalization(s) within the last year..        Current Medical Providers:  Patient Care Team:  Rio Acuna DO as PCP - General  Rio Acuna DO as PCP - Family Medicine        Smoking Status:  History   Smoking Status   • Former Smoker   • Packs/day: 3.00   • Years: 45.00   • Quit date: 1/1/2008   Smokeless Tobacco   • Never Used       Alcohol Consumption:  History   Alcohol Use No       Depression Screen:   PHQ-9 Depression Screening 7/26/2017   Little interest or pleasure in doing things 0   Feeling down, depressed, or hopeless (No Data)   PHQ-9 Total Score 0       Health Habits and Functional and Cognitive Screening:  Functional & Cognitive Status 7/26/2017   Do you have difficulty preparing food and eating? No   Do you have difficulty bathing yourself? No   Do you have difficulty getting dressed? No   Do you have difficulty using the toilet? No   Do you have difficulty moving around from place to place? No   In the past year have you fallen or experienced a near fall? No   Do you need help using the phone?  No   Are you deaf or do you have serious difficulty hearing?  No   Do you need help with transportation? No   Do you need help shopping? No   Do you need help preparing meals?  No   Do you need help with housework?  No   Do you need help with laundry? No   Do you need help taking your medications? No       Health Habits  Current Diet: Well Balanced Diet  Dental Exam: Not up to date (DENTURE )  Eye Exam: Not up to date  Exercise (times per week): 0 times per week  Current Exercise Activities Include: None (FARM / YARD WORK )      Does the patient have evidence of cognitive impairment? Yes    Aspirin use counseling: Taking ASA appropriately as indicated      Recent Lab Results:  CMP:  Lab Results   Component Value Date    GLU  139 (H) 08/31/2015    BUN 11 11/15/2016    CREATININE 0.80 11/15/2016    EGFRIFNONA 94 11/15/2016    BCR 13.8 11/15/2016     11/15/2016    K 3.9 11/15/2016    CO2 39.0 (H) 11/15/2016    CALCIUM 9.1 11/15/2016    ALBUMIN 4.10 11/15/2016    LABIL2 1.4 11/15/2016    BILITOT 0.4 11/15/2016    ALKPHOS 120 (H) 11/15/2016    AST 28 11/15/2016    ALT 24 11/15/2016     Lipid Panel:  Lab Results   Component Value Date    CHOL 160 11/15/2016    TRIG 101 11/15/2016    HDL 49 11/15/2016    LDLDIRECT 94 11/15/2016     HbA1c:  Lab Results   Component Value Date    HGBA1C 5.8 03/24/2015       Visual Acuity:  No exam data present    Age-appropriate Screening Schedule:  Refer to the list below for future screening recommendations based on patient's age, sex and/or medical conditions. Orders for these recommended tests are listed in the plan section. The patient has been provided with a written plan.    Health Maintenance   Topic Date Due   • TDAP/TD VACCINES (1 - Tdap) 03/19/1959   • DIABETIC FOOT EXAM  05/06/2016   • DIABETIC EYE EXAM  05/06/2016   • URINE MICROALBUMIN  05/06/2016   • INFLUENZA VACCINE  09/01/2017   • PNEUMOCOCCAL VACCINES (65+ LOW/MEDIUM RISK) (2 of 2 - PPSV23) 11/07/2017   • LIPID PANEL  11/15/2017   • COLONOSCOPY  12/18/2023   • ZOSTER VACCINE  Completed        Subjective   History of Present Illness    Alessandro Mendiola is a 77 y.o. male who presents for an Subsequent Wellness Visit.    Alessandro had labs in November that showed elevated vitamin D at 84.  He is now only taking the supplement every 2 weeks.  He was placed on 10mg daily of prednisone last month. He has been breathing better since starting that, and states he's felt a bit better.  He is not diabetic, and he doesn't need the eye exam/foot exam.      The following portions of the patient's history were reviewed and updated as appropriate: allergies, current medications, past family history, past medical history, past social history, past surgical  history and problem list.    Outpatient Medications Prior to Visit   Medication Sig Dispense Refill   • albuterol (ACCUNEB) 1.25 MG/3ML nebulizer solution Take 3 mL by nebulization Every 6 (Six) Hours As Needed for wheezing. 120 vial 5   • albuterol (VENTOLIN HFA) 108 (90 BASE) MCG/ACT inhaler Inhale 2 puffs Every 4 (Four) Hours As Needed for Wheezing. 1 inhaler 5   • aspirin 81 MG tablet Take 1 tablet by mouth daily. 30 tablet 5   • budesonide-formoterol (SYMBICORT) 80-4.5 MCG/ACT inhaler Inhale 2 puffs 2 (Two) Times a Day. 1 inhaler 5   • clotrimazole (LOTRIMIN) 1 % cream Apply  topically 2 (Two) Times a Day. 45 g 3   • diltiaZEM CD (CARTIA XT) 240 MG 24 hr capsule Take 1 capsule by mouth Daily. 90 capsule 3   • dronedarone (MULTAQ) 400 MG tablet Take 1 tablet by mouth 2 (Two) Times a Day. 180 tablet 1   • guaiFENesin (MUCINEX) 600 MG 12 hr tablet Take  by mouth every 12 (twelve) hours.     • MULTIPLE VITAMIN PO Take  by mouth.     • niacin 500 MG tablet Take  by mouth 2 (two) times a day.     • OXYGEN-HELIUM IN Oxygen; Patient Sig: Oxygen patient is to get home oxygen through  company of his choice at 2L/Min, as well as portable oxygen at the same rate.; 1; 0; 22-Mar-2016; Active     • primidone (MYSOLINE) 50 MG tablet Take 0.5 tablets by mouth Every Night. 30 tablet 2   • sertraline (ZOLOFT) 50 MG tablet Take 1 tablet by mouth Daily. 90 tablet 3   • tamsulosin (FLOMAX) 0.4 MG capsule 24 hr capsule Take 1 capsule by mouth Every Night. 30 capsule 11   • Umeclidinium Bromide 62.5 MCG/INH aerosol powder  Inhale 1 puff Daily. 30 each 5   • VENTOLIN  (90 BASE) MCG/ACT inhaler INHALE TWO PUFFS BY MOUTH EVERY 4 TO 6 HOURS AS NEEDED 1 inhaler 5   • vitamin D (ERGOCALCIFEROL) 89326 UNITS capsule capsule Take 1 capsule by mouth every 7 days. 12 capsule 3   • warfarin (COUMADIN) 1 MG tablet Take 1 tablet by mouth Daily. 30 tablet 11   • warfarin (COUMADIN) 4 MG tablet Take  by mouth daily.     • warfarin (COUMADIN) 5  MG tablet Take 1 tablet by mouth Daily. 90 tablet 3   • predniSONE (DELTASONE) 10 MG tablet Take 1 tablet by mouth Daily. 30 tablet 1     No facility-administered medications prior to visit.        Patient Active Problem List   Diagnosis   • Acute upper respiratory infection   • Aneurysm of thoracic aorta   • Coronary arteriosclerosis in native artery   • Paroxysmal atrial fibrillation   • Cellulitis   • Chronic obstructive pulmonary disease   • Difficulty breathing   • Traumatic amputation of fingertip   • Hypercholesterolemia   • Hyperlipidemia   • Hypertension   • Left ventricular hypertrophy   • Mitral and aortic valve disease   • Nonvenomous insect bite of multiple sites   • Onychomycosis   • Pneumonia   • Pre-syncope   • Rib pain   • Shortness of breath   • Sick sinus syndrome   • Vitamin D deficiency   • On supplemental oxygen therapy   • Atherosclerotic heart disease   • Upper respiratory infection   • HX: long term anticoagulant use   • Body mass index (BMI) 20.0-20.9, adult   • COPD (chronic obstructive pulmonary disease)   • HTN (hypertension)   • Pulmonary nodule   • Blood thinned due to long-term anticoagulant use   • Mood disorder   • Need for vaccination   • Encounter for special screening examination for neoplasm of prostate   • Encounter for long-term current use of medication   • Health care maintenance   • Encounter for vitamin deficiency screening   • Post-nasal drip   • Tinea corporis       Advance Care Planning:  has NO advance directive - not interested in additional information    Identification of Risk Factors:  Risk factors include: polypharmacy.    Review of Systems   Constitutional: Negative for appetite change, chills, fatigue, fever and unexpected weight change.   HENT: Negative for congestion, ear pain, hearing loss, nosebleeds, postnasal drip, rhinorrhea, sore throat, tinnitus and trouble swallowing.    Eyes: Negative for photophobia, discharge and visual disturbance.   Respiratory:  Positive for shortness of breath. Negative for cough, chest tightness and wheezing.    Cardiovascular: Negative for chest pain, palpitations and leg swelling.   Gastrointestinal: Negative for abdominal distention, abdominal pain, blood in stool, constipation, diarrhea, nausea and vomiting.   Endocrine: Negative for cold intolerance, heat intolerance, polydipsia, polyphagia and polyuria.   Musculoskeletal: Negative for arthralgias, back pain, joint swelling, myalgias, neck pain and neck stiffness.   Skin: Negative for color change, pallor, rash and wound.   Allergic/Immunologic: Negative for environmental allergies, food allergies and immunocompromised state.   Neurological: Negative for dizziness, tremors, seizures, weakness, numbness and headaches.   Hematological: Negative for adenopathy. Does not bruise/bleed easily.   Psychiatric/Behavioral: Negative for agitation, behavioral problems, confusion, hallucinations, self-injury and suicidal ideas. The patient is not nervous/anxious.        Compared to one year ago, the patient feels his physical health is the same.  Compared to one year ago, the patient feels his mental health is the same.    Objective     Physical Exam   Constitutional: He is oriented to person, place, and time. He appears well-developed and well-nourished. No distress.   HENT:   Head: Normocephalic and atraumatic.   Right Ear: External ear normal.   Left Ear: External ear normal.   Nose: Nose normal.   Mouth/Throat: Oropharynx is clear and moist.   Eyes: Conjunctivae and EOM are normal. Pupils are equal, round, and reactive to light. Right eye exhibits no discharge. Left eye exhibits no discharge. No scleral icterus.   Neck: Normal range of motion. Neck supple. No JVD present. No tracheal deviation present. No thyromegaly present.   Cardiovascular: Normal rate, regular rhythm, normal heart sounds and intact distal pulses.  Exam reveals no gallop and no friction rub.    No murmur  "heard.  Pulmonary/Chest: Effort normal and breath sounds normal. No stridor. No respiratory distress. He has no wheezes. He has no rales. He exhibits no tenderness.   Abdominal: Soft. Bowel sounds are normal. He exhibits no distension and no mass. There is no tenderness. There is no rebound and no guarding. No hernia.   Musculoskeletal: Normal range of motion. He exhibits no edema, tenderness or deformity.   Lymphadenopathy:     He has no cervical adenopathy.   Neurological: He is alert and oriented to person, place, and time. He has normal reflexes. He displays normal reflexes. No cranial nerve deficit. He exhibits normal muscle tone.   Skin: Skin is warm and dry. No rash noted. No erythema. No pallor.   Psychiatric: He has a normal mood and affect. His behavior is normal. Judgment normal.       Vitals:    07/26/17 1020   BP: 110/60   Pulse: 60   SpO2: (!) 88%  Comment: 3LPM   Weight: 190 lb (86.2 kg)   Height: 71\" (180.3 cm)       Body mass index is 26.5 kg/(m^2).  Discussed the patient's BMI with him. The BMI is in the acceptable range.    Assessment/Plan   Patient Self-Management and Personalized Health Advice  The patient has been provided with information about: designing advance directives and preventive services including:   · Advance directive.    Visit Diagnoses:    ICD-10-CM ICD-9-CM   1. Vitamin D deficiency E55.9 268.9   2. Encounter for long-term current use of medication Z79.899 V58.69   3. Mixed simple and mucopurulent chronic bronchitis J41.8 491.1       Orders Placed This Encounter   Procedures   • Vitamin D 25 Hydroxy     Order Specific Question:   [facility] Has the patient fasted?     Answer:   Yes       Outpatient Encounter Prescriptions as of 7/26/2017   Medication Sig Dispense Refill   • albuterol (ACCUNEB) 1.25 MG/3ML nebulizer solution Take 3 mL by nebulization Every 6 (Six) Hours As Needed for wheezing. 120 vial 5   • albuterol (VENTOLIN HFA) 108 (90 BASE) MCG/ACT inhaler Inhale 2 puffs Every " 4 (Four) Hours As Needed for Wheezing. 1 inhaler 5   • aspirin 81 MG tablet Take 1 tablet by mouth daily. 30 tablet 5   • budesonide-formoterol (SYMBICORT) 80-4.5 MCG/ACT inhaler Inhale 2 puffs 2 (Two) Times a Day. 1 inhaler 5   • clotrimazole (LOTRIMIN) 1 % cream Apply  topically 2 (Two) Times a Day. 45 g 3   • diltiaZEM CD (CARTIA XT) 240 MG 24 hr capsule Take 1 capsule by mouth Daily. 90 capsule 3   • dronedarone (MULTAQ) 400 MG tablet Take 1 tablet by mouth 2 (Two) Times a Day. 180 tablet 1   • guaiFENesin (MUCINEX) 600 MG 12 hr tablet Take  by mouth every 12 (twelve) hours.     • MULTIPLE VITAMIN PO Take  by mouth.     • niacin 500 MG tablet Take  by mouth 2 (two) times a day.     • OXYGEN-HELIUM IN Oxygen; Patient Sig: Oxygen patient is to get home oxygen through  company of his choice at 2L/Min, as well as portable oxygen at the same rate.; 1; 0; 22-Mar-2016; Active     • predniSONE (DELTASONE) 10 MG tablet Take 1 tablet by mouth Daily. 30 tablet 5   • primidone (MYSOLINE) 50 MG tablet Take 0.5 tablets by mouth Every Night. 30 tablet 2   • sertraline (ZOLOFT) 50 MG tablet Take 1 tablet by mouth Daily. 90 tablet 3   • tamsulosin (FLOMAX) 0.4 MG capsule 24 hr capsule Take 1 capsule by mouth Every Night. 30 capsule 11   • Umeclidinium Bromide 62.5 MCG/INH aerosol powder  Inhale 1 puff Daily. 30 each 5   • VENTOLIN  (90 BASE) MCG/ACT inhaler INHALE TWO PUFFS BY MOUTH EVERY 4 TO 6 HOURS AS NEEDED 1 inhaler 5   • vitamin D (ERGOCALCIFEROL) 55193 UNITS capsule capsule Take 1 capsule by mouth every 7 days. 12 capsule 3   • warfarin (COUMADIN) 1 MG tablet Take 1 tablet by mouth Daily. 30 tablet 11   • warfarin (COUMADIN) 4 MG tablet Take  by mouth daily.     • warfarin (COUMADIN) 5 MG tablet Take 1 tablet by mouth Daily. 90 tablet 3   • [DISCONTINUED] predniSONE (DELTASONE) 10 MG tablet Take 1 tablet by mouth Daily. 30 tablet 1     No facility-administered encounter medications on file as of 7/26/2017.         Reviewed use of high risk medication in the elderly: yes  Reviewed for potential of harmful drug interactions in the elderly: yes    Follow Up:  Return in about 3 months (around 10/26/2017).     An After Visit Summary and PPPS with all of these plans were given to the patient.

## 2017-08-09 ENCOUNTER — OFFICE VISIT (OUTPATIENT)
Dept: SURGERY | Facility: CLINIC | Age: 77
End: 2017-08-09

## 2017-08-09 ENCOUNTER — OFFICE VISIT (OUTPATIENT)
Dept: PULMONOLOGY | Facility: CLINIC | Age: 77
End: 2017-08-09

## 2017-08-09 ENCOUNTER — CLINICAL SUPPORT NO REQUIREMENTS (OUTPATIENT)
Dept: CARDIOLOGY | Facility: CLINIC | Age: 77
End: 2017-08-09

## 2017-08-09 VITALS
HEIGHT: 71 IN | OXYGEN SATURATION: 83 % | SYSTOLIC BLOOD PRESSURE: 140 MMHG | DIASTOLIC BLOOD PRESSURE: 78 MMHG | WEIGHT: 192 LBS | TEMPERATURE: 97.7 F | HEART RATE: 66 BPM | BODY MASS INDEX: 26.88 KG/M2

## 2017-08-09 VITALS
SYSTOLIC BLOOD PRESSURE: 120 MMHG | HEART RATE: 63 BPM | OXYGEN SATURATION: 81 % | RESPIRATION RATE: 16 BRPM | DIASTOLIC BLOOD PRESSURE: 60 MMHG | WEIGHT: 182 LBS | BODY MASS INDEX: 25.48 KG/M2 | HEIGHT: 71 IN

## 2017-08-09 DIAGNOSIS — R25.1 OCCASIONAL TREMORS: ICD-10-CM

## 2017-08-09 DIAGNOSIS — D49.2 SKIN NEOPLASM: Primary | ICD-10-CM

## 2017-08-09 DIAGNOSIS — R06.02 SHORTNESS OF BREATH: Primary | ICD-10-CM

## 2017-08-09 DIAGNOSIS — R09.02 HYPOXIA: ICD-10-CM

## 2017-08-09 DIAGNOSIS — J44.9 CHRONIC OBSTRUCTIVE PULMONARY DISEASE, UNSPECIFIED COPD TYPE (HCC): ICD-10-CM

## 2017-08-09 DIAGNOSIS — I48.0 PAROXYSMAL ATRIAL FIBRILLATION (HCC): Primary | ICD-10-CM

## 2017-08-09 PROCEDURE — 99213 OFFICE O/P EST LOW 20 MIN: CPT | Performed by: SURGERY

## 2017-08-09 PROCEDURE — 93296 REM INTERROG EVL PM/IDS: CPT | Performed by: INTERNAL MEDICINE

## 2017-08-09 PROCEDURE — 99214 OFFICE O/P EST MOD 30 MIN: CPT | Performed by: NURSE PRACTITIONER

## 2017-08-09 PROCEDURE — 93294 REM INTERROG EVL PM/LDLS PM: CPT | Performed by: INTERNAL MEDICINE

## 2017-08-09 RX ORDER — FLUOROURACIL 50 MG/G
CREAM TOPICAL
Refills: 0 | Status: ON HOLD | COMMUNITY
Start: 2017-08-01 | End: 2018-07-31

## 2017-08-09 RX ORDER — BUDESONIDE AND FORMOTEROL FUMARATE DIHYDRATE 80; 4.5 UG/1; UG/1
2 AEROSOL RESPIRATORY (INHALATION)
Qty: 1 INHALER | Refills: 5 | Status: SHIPPED | OUTPATIENT
Start: 2017-08-09 | End: 2018-03-15 | Stop reason: SDUPTHER

## 2017-08-09 NOTE — PROGRESS NOTES
Patient: Alessandro Mendiola    YOB: 1940    Date: 08/09/2017    Primary Care Provider: Rio Acuna DO    Reason for Consultation: Lesion on his right shoulder    Chief complaint:   Chief Complaint   Patient presents with   • Skin Lesion     Evaluation of skin lesion on right shoulder       Subjective .     History of present illness:  I saw the patient in the office  today as a consultation for evaluation and treatment of a skin lesion on his right shoulder.  It has been there for 1-2 years and has gotten bigger recently.  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      Review of Systems   Constitutional: Negative for chills, fever and unexpected weight change.   HENT: Negative for trouble swallowing and voice change.    Eyes: Negative for visual disturbance.   Respiratory: Negative for apnea, cough, chest tightness, shortness of breath and wheezing.    Cardiovascular: Negative for chest pain, palpitations and leg swelling.   Gastrointestinal: Negative for abdominal distention, abdominal pain, anal bleeding, blood in stool, constipation, diarrhea, nausea, rectal pain and vomiting.   Endocrine: Negative for cold intolerance and heat intolerance.   Genitourinary: Negative for difficulty urinating, dysuria, flank pain, scrotal swelling and testicular pain.   Musculoskeletal: Negative for back pain, gait problem and joint swelling.   Skin: Negative for color change, rash and wound.   Neurological: Negative for dizziness, syncope, speech difficulty, weakness, numbness and headaches.   Hematological: Negative for adenopathy. Does not bruise/bleed easily.   Psychiatric/Behavioral: Negative for confusion. The patient is not nervous/anxious.        History:  Past Medical History:   Diagnosis Date   • Alcohol abuse    • Aneurysm of thoracic aorta    • Anxiety    • Ascending aortic aneurysm     4.8 to 4.9 cm    • Atherosclerotic heart disease    • Atrial fibrillation    •  Blood thinned due to long-term anticoagulant use    • Body mass index (BMI) 20.0-20.9, adult    • CAD (coronary artery disease)    • Cardiac pacemaker    • Cellulitis    • Chronic bronchitis    • Chronic cough    • COPD (chronic obstructive pulmonary disease)    • Depression    • Difficulty breathing    • Emphysema lung    • Encounter for long-term current use of medication    • Encounter for screening for malignant neoplasm of prostate    • Fatigue    • Fingertip amputation    • TAMIKO (generalized anxiety disorder)    • History of alcohol abuse    • History of cardiac catheterization    • History of chest x-ray 02/11/2014    Chronic interstitial changes seen throughout the lung fields w/ no radiographic evidence of acute parenchymal disease.No definite LT-sided rib fracture present.   • History of chest x-ray 01/10/2014    No acute cardiopulmonary process.Dual lead LT subclavian pacemaker is in place.Aortic ectasia. Hyperinflation of lungs, suggesting COPD   • History of chest x-ray 10/01/2012    Ill-defined RT middle lobe opacity which likley reflects a pneumonia. FU to radiographic resolution is recommended.   • History of Doppler echocardiogram    • History of echocardiogram 09/26/2013    EF 55-60%. Mod concentric LVH. Abnormal LT VT diastolic filling is observed, consistent w/ impaired relaxation.Mild MR/TR. Trace SD. RVSP 44 mmHg.   • History of stress test     ECG performed   • HTN (hypertension)    • HX: long term anticoagulant use     Blood thinned due to long-term anticoagulant use (V58.61) (Z79.01)   • Hypercholesterolemia    • LVH (left ventricular hypertrophy)    • Mitral and aortic valve disease    • Myocardial infarction    • Nonvenomous insect bite of multiple sites    • Onychomycosis    • Pacemaker at end of battery life    • Pneumonia    • Pneumothorax    • Pre-syncope    • Pulmonary nodule    • Rib pain on left side    • Sick sinus syndrome    • SOB (shortness of breath)    • Upper respiratory  infection    • Vitamin D deficiency        Past Surgical History:   Procedure Laterality Date   • AMPUTATION  08/31/2015    metacarpal and index finger   • CARDIAC PACEMAKER PLACEMENT  06/01/2008   • CATARACT EXTRACTION     • CORONARY STENT PLACEMENT      History of Cath Placement Of Stent 1  · Coronary stents       Family History   Problem Relation Age of Onset   • Coronary artery disease Other    • Diabetes Other    • Hypertension Other        Social History   Substance Use Topics   • Smoking status: Former Smoker     Packs/day: 3.00     Years: 45.00     Quit date: 1/1/2008   • Smokeless tobacco: Never Used   • Alcohol use No        Allergies:  No Known Allergies    Medications:    Current Outpatient Prescriptions:   •  aclidinium bromide (TUDORZA PRESSAIR) 400 MCG/ACT aerosol powder  powder for inhalation, Inhale 1 puff 2 (Two) Times a Day., Disp: 1 each, Rfl: 5  •  aspirin 81 MG tablet, Take 1 tablet by mouth daily., Disp: 30 tablet, Rfl: 5  •  budesonide-formoterol (SYMBICORT) 80-4.5 MCG/ACT inhaler, Inhale 2 puffs 2 (Two) Times a Day., Disp: 1 inhaler, Rfl: 5  •  clotrimazole (LOTRIMIN) 1 % cream, Apply  topically 2 (Two) Times a Day., Disp: 45 g, Rfl: 3  •  diltiaZEM CD (CARTIA XT) 240 MG 24 hr capsule, Take 1 capsule by mouth Daily., Disp: 90 capsule, Rfl: 3  •  dronedarone (MULTAQ) 400 MG tablet, Take 1 tablet by mouth 2 (Two) Times a Day., Disp: 180 tablet, Rfl: 1  •  fluorouracil (EFUDEX) 5 % cream, apply to affected area twice a day, Disp: , Rfl: 0  •  guaiFENesin (MUCINEX) 600 MG 12 hr tablet, Take  by mouth every 12 (twelve) hours., Disp: , Rfl:   •  ipratropium (ATROVENT) 0.02 % nebulizer solution, Take 2.5 mL by nebulization 4 (Four) Times a Day As Needed for Wheezing or Shortness of Air., Disp: 300 mL, Rfl: 11  •  MULTIPLE VITAMIN PO, Take  by mouth., Disp: , Rfl:   •  niacin 500 MG tablet, Take  by mouth 2 (two) times a day., Disp: , Rfl:   •  OXYGEN-HELIUM IN, Oxygen; Patient Sig: Oxygen patient  is to get home oxygen through  company of his choice at 2L/Min, as well as portable oxygen at the same rate.; 1; 0; 22-Mar-2016; Active, Disp: , Rfl:   •  predniSONE (DELTASONE) 10 MG tablet, Take 1 tablet by mouth Daily., Disp: 30 tablet, Rfl: 5  •  primidone (MYSOLINE) 50 MG tablet, Take 0.5 tablets by mouth Every Night., Disp: 30 tablet, Rfl: 2  •  sertraline (ZOLOFT) 50 MG tablet, Take 1 tablet by mouth Daily., Disp: 90 tablet, Rfl: 3  •  tamsulosin (FLOMAX) 0.4 MG capsule 24 hr capsule, Take 1 capsule by mouth Every Night., Disp: 30 capsule, Rfl: 11  •  VENTOLIN  (90 BASE) MCG/ACT inhaler, INHALE TWO PUFFS BY MOUTH EVERY 4 TO 6 HOURS AS NEEDED, Disp: 1 inhaler, Rfl: 5  •  vitamin D (ERGOCALCIFEROL) 85806 UNITS capsule capsule, Take 1 capsule by mouth every 7 days., Disp: 12 capsule, Rfl: 3  •  warfarin (COUMADIN) 1 MG tablet, Take 1 tablet by mouth Daily., Disp: 30 tablet, Rfl: 11  •  warfarin (COUMADIN) 4 MG tablet, Take  by mouth daily., Disp: , Rfl:   •  warfarin (COUMADIN) 5 MG tablet, Take 1 tablet by mouth Daily., Disp: 90 tablet, Rfl: 3    Objective     Vital Signs:   Temp:  [97.7 °F (36.5 °C)] 97.7 °F (36.5 °C)  Heart Rate:  [63-66] 66  Resp:  [16] 16  BP: (120-140)/(60-78) 140/78    Physical Exam:   General Appearance:    Alert, cooperative, in no acute distress   Head:    Normocephalic, without obvious abnormality, atraumatic   Eyes:            Lids and lashes normal, conjunctivae and sclerae normal, no   icterus, no pallor, corneas clear.   Ears:    Ears appear intact with no abnormalities noted   Throat:   No oral lesions, no thrush, oral mucosa moist   Neck:   No adenopathy, supple, trachea midline, no thyromegaly, no   carotid bruit.   Extremities:   Moves all extremities well, no edema, no cyanosis, no             Redness.   Pulses:   Pulses palpable and equal bilaterally   Skin:   No bleeding, bruising or rash. Lesion present posterior right shoulder, irregular in shape and color and  border, lesion present left temple region also irregular in shape and color and border   Lymph nodes:   No palpable adenopathy   Neurologic:   Cranial nerves 2 - 12 grossly intact, sensation intact.     Results Review:   None    Assessment/Plan     1. Skin neoplasm        I did have a detailed and extensive discussion with the patient in the hospital and they understand that they need to undergo excision of skin lesion right posterior shoulder and left temple. Full risks and benefits of operative versus nonoperative intervention were discussed with the patient and these include bleeding, infection and reccurrence. The patient understands, agrees, and wishes to proceed with the surgical treatment plan as mentioned above. The patient had no questions for me at the end of the discussion.       I discussed the patients findings and my recommendations with patient and consulting provider.    Electronically signed by Dagoberto Rasmussen MD  08/09/17  3:50 PM    Scribed for Dagoberto Rasmussen MD by Michelle Milligan. 8/9/2017  3:50 PM

## 2017-08-09 NOTE — PROGRESS NOTES
"Chief Complaint   Patient presents with   • Follow-up   • COPD   • Shortness of Breath         Subjective   Alessandro Mendiola is a 77 y.o. male.     History of Present Illness   The patient is here for follow-up of shortness of breath, COPD, and hypoxia.    He was having some increased shortness of breath and he was given prednisone for 1 week by his primary care provider. Prior to this he had taken a round of steroids as well.  He does not feel like the steroids helped his breathing very much.    He has increased shortness of breath with any activity. He does not use the nebulizers because they make him jittery. He has an albuterol inhaler that he uses often. This does not make him as jittery. He has some hand tremors anyway so the medication makes this worse.     He continues to use Symbicort twice a day. He has been using Tudorza and Incruse. His PCP had given him samples of Tudorza.     He continues to use oxygen 24/7.     The following portions of the patient's history were reviewed and updated as appropriate: allergies, current medications, past family history, past medical history, past social history and past surgical history.    Review of Systems   HENT: Negative for sinus pressure, sneezing and sore throat.    Respiratory: Positive for cough, shortness of breath and wheezing.        Objective   Visit Vitals   • /60   • Pulse 63   • Resp 16   • Ht 71\" (180.3 cm)   • Wt 182 lb (82.6 kg)   • SpO2 (!) 81%  Comment: on room air   • BMI 25.38 kg/m2   O2: 96 ON 3LPM  Physical Exam   Constitutional: He is oriented to person, place, and time. He appears well-developed.   HENT:   Head: Normocephalic and atraumatic.   Dentures.   Eyes: EOM are normal.   Neck: Neck supple.   Cardiovascular: Normal rate and regular rhythm.    Pulmonary/Chest: Effort normal. No respiratory distress.   Somewhat hyperresonant to percussion  Somewhat decreased A/E without wheezing noted.   Musculoskeletal: He exhibits no edema. "   Neurological: He is alert and oriented to person, place, and time.   Skin: Skin is warm and dry.   Psychiatric: He has a normal mood and affect.   Vitals reviewed.          Assessment/Plan   Alessandro was seen today for follow-up, copd and shortness of breath.    Diagnoses and all orders for this visit:    Shortness of breath    Chronic obstructive pulmonary disease, unspecified COPD type    Hypoxia    Occasional tremors    Other orders  -     Discontinue: ipratropium (ATROVENT) 0.02 % nebulizer solution; Take 2.5 mL by nebulization 4 (Four) Times a Day As Needed for Wheezing or Shortness of Air.  -     budesonide-formoterol (SYMBICORT) 80-4.5 MCG/ACT inhaler; Inhale 2 puffs 2 (Two) Times a Day.  -     aclidinium bromide (TUDORZA PRESSAIR) 400 MCG/ACT aerosol powder  powder for inhalation; Inhale 1 puff 2 (Two) Times a Day.           Return in about 3 months (around 11/9/2017) for Recheck, For Dr. Wilson.    DISCUSSION (if any):  I reviewed his PFT with him again. His FEV1 is 31% and diffusion capapcity is 29%.  Daliresp is an option since he is having worsening shortness of breath, however he is currently taking primidone and Daliresp is contraindicated in conjuction with Primidone. He has had 2 exacerbations with steroids and the steriods did not help his breathing much.     I reviewed his chest x-ray from July which shows bibasilar streak-like densities likely secondary to atelectasis versus scarring.    I will discontinue Incruse and he may continue Tudorza. Continue Symbicort and nebulized treatment.     I will change the nebulizer treatments to Atrovent secondary to the tremors.    Continue oxygen 24/7.     Errors in dictation may reflect use of voice recognition software and not all errors in transcription may have been detected prior to signing    This document was electronically signed by DAVID Slater August 17, 2017  2:57 PM

## 2017-08-16 ENCOUNTER — PROCEDURE VISIT (OUTPATIENT)
Dept: SURGERY | Facility: CLINIC | Age: 77
End: 2017-08-16

## 2017-08-16 VITALS
HEIGHT: 71 IN | DIASTOLIC BLOOD PRESSURE: 76 MMHG | TEMPERATURE: 98.4 F | SYSTOLIC BLOOD PRESSURE: 138 MMHG | OXYGEN SATURATION: 87 % | WEIGHT: 192 LBS | BODY MASS INDEX: 26.88 KG/M2 | HEART RATE: 68 BPM

## 2017-08-16 DIAGNOSIS — D49.2 SKIN NEOPLASM: Primary | ICD-10-CM

## 2017-08-16 PROCEDURE — 11424 EXC H-F-NK-SP B9+MARG 3.1-4: CPT | Performed by: SURGERY

## 2017-08-16 PROCEDURE — 12032 INTMD RPR S/A/T/EXT 2.6-7.5: CPT | Performed by: SURGERY

## 2017-08-16 NOTE — PROGRESS NOTES
Procedure:    I recommend excision. Procedure and the risks and benefits were explained including bleeding and infection. The patient understands these and wishes to proceed.     The patient was brought to the procedure room. Consent and time out were performed. The area was prepped and draped in the usual fashion. 1% lidocaine with epinephrine was infused locally. An ellyptical incision was made around the lesion. Full thickness excision was performed. The lesion size was 3.5 cm. The wound was closed in layers with interrupted simple vicryl and Nylon for the skin. Wound closure size was 4 cm. There were no complications and the patient tolerated the procedure well. Hemostasis was well controlled with pressure and there was minimal blood loss. Wound instructions were given.

## 2017-08-23 ENCOUNTER — OFFICE VISIT (OUTPATIENT)
Dept: SURGERY | Facility: CLINIC | Age: 77
End: 2017-08-23

## 2017-08-23 ENCOUNTER — TELEPHONE (OUTPATIENT)
Dept: INTERNAL MEDICINE | Facility: CLINIC | Age: 77
End: 2017-08-23

## 2017-08-23 VITALS
HEART RATE: 70 BPM | TEMPERATURE: 98.2 F | BODY MASS INDEX: 26.88 KG/M2 | HEIGHT: 71 IN | SYSTOLIC BLOOD PRESSURE: 136 MMHG | DIASTOLIC BLOOD PRESSURE: 76 MMHG | WEIGHT: 192 LBS

## 2017-08-23 DIAGNOSIS — D49.2 SKIN NEOPLASM: ICD-10-CM

## 2017-08-23 DIAGNOSIS — Z98.890 POST-OPERATIVE STATE: Primary | ICD-10-CM

## 2017-08-23 PROCEDURE — 12032 INTMD RPR S/A/T/EXT 2.6-7.5: CPT | Performed by: SURGERY

## 2017-08-23 PROCEDURE — 11604 EXC TR-EXT MAL+MARG 3.1-4 CM: CPT | Performed by: SURGERY

## 2017-08-23 NOTE — TELEPHONE ENCOUNTER
BISHNU GUERRIER [facility] LIZBETH MITCHELL CALLED ON BEHALF OF THE INS[employer]ANCE [employer]. HEART DISEASE WITH NO STATIN THERAPY. PLEASE ADVISE ON ANY MEDICATION CHANGE.

## 2017-08-23 NOTE — PROGRESS NOTES
Patient: Alessandro Mendiola    YOB: 1940    Date: 08/23/2017    Primary Care Provider: Rio Acuna DO    Reason for Consultation: Follow-up lesion excision    Chief Complaint:   Chief Complaint   Patient presents with   • Post-op     Post op scalp excision       History of present illness:  I saw the patient in the office today as a followup from their recent lesion excision, the pathology report did show seborrheic keratosis.  They state that they have done well and are having no problems.      Vital Signs   Temp:  [98.2 °F (36.8 °C)] 98.2 °F (36.8 °C)  Heart Rate:  [70] 70  BP: (136)/(76) 136/76    Physical Exam:   General Appearance:    Alert, cooperative, in no acute distress   Abdomen:     no masses, no organomegaly, soft non-tender, non-distended, no guarding, wounds are well healed   Chest:      Clear to ausculation       Assessment / Plan:    1. Post-operative state    2. Skin neoplasm        I did discuss the situation with the patient today in the office and they have done well from their recent lesion excision, I don't think that the patient needs any further intervention and I need to see them back only if they have further problems. Pathology report was reviewed with the patient in the office.    He also has a lesion on the right posterior shoulder and will need this excised.    Procedure:    I recommend excision. Procedure and the risks and benefits were explained including bleeding and infection. The patient understands these and wishes to proceed.     The patient was brought to the procedure room. Consent and time out were performed. The area was prepped and draped in the usual fashion. 1% lidocaine with epinephrine was infused locally. An ellyptical incision was made around the lesion. Full thickness excision was performed. The lesion size was 3.4 cm. The wound was closed in layers with interrupted simple vicryl and Nylon for the skin. Wound closure size was 4 cm. There were no  complications and the patient tolerated the procedure well. Hemostasis was well controlled with pressure and there was minimal blood loss. Wound instructions were given.     Electronically signed by Dagoberto Rasmussen MD  08/23/17      Scribed for Dagoberto Rasmussen MD by Michelle Milligan. 8/23/2017  1:30 PM

## 2017-08-31 ENCOUNTER — OFFICE VISIT (OUTPATIENT)
Dept: SURGERY | Facility: CLINIC | Age: 77
End: 2017-08-31

## 2017-08-31 VITALS
BODY MASS INDEX: 26.88 KG/M2 | HEART RATE: 78 BPM | WEIGHT: 192 LBS | TEMPERATURE: 98 F | DIASTOLIC BLOOD PRESSURE: 80 MMHG | HEIGHT: 71 IN | OXYGEN SATURATION: 92 % | SYSTOLIC BLOOD PRESSURE: 138 MMHG

## 2017-08-31 DIAGNOSIS — D49.2 SKIN NEOPLASM: Primary | ICD-10-CM

## 2017-08-31 PROCEDURE — 99024 POSTOP FOLLOW-UP VISIT: CPT | Performed by: SURGERY

## 2017-09-05 ENCOUNTER — OFFICE VISIT (OUTPATIENT)
Dept: SURGERY | Facility: CLINIC | Age: 77
End: 2017-09-05

## 2017-09-05 ENCOUNTER — ANTICOAGULATION VISIT (OUTPATIENT)
Dept: PHARMACY | Facility: HOSPITAL | Age: 77
End: 2017-09-05

## 2017-09-05 ENCOUNTER — LAB (OUTPATIENT)
Dept: LAB | Facility: HOSPITAL | Age: 77
End: 2017-09-05
Attending: INTERNAL MEDICINE

## 2017-09-05 ENCOUNTER — TELEPHONE (OUTPATIENT)
Dept: INTERNAL MEDICINE | Facility: CLINIC | Age: 77
End: 2017-09-05

## 2017-09-05 VITALS
TEMPERATURE: 98.2 F | HEART RATE: 80 BPM | OXYGEN SATURATION: 92 % | WEIGHT: 192 LBS | DIASTOLIC BLOOD PRESSURE: 86 MMHG | HEIGHT: 71 IN | SYSTOLIC BLOOD PRESSURE: 140 MMHG | BODY MASS INDEX: 26.88 KG/M2

## 2017-09-05 DIAGNOSIS — I71.20 THORACIC AORTIC ANEURYSM WITHOUT RUPTURE (HCC): ICD-10-CM

## 2017-09-05 DIAGNOSIS — D49.2 SKIN NEOPLASM: Primary | ICD-10-CM

## 2017-09-05 DIAGNOSIS — I48.0 PAROXYSMAL ATRIAL FIBRILLATION (HCC): ICD-10-CM

## 2017-09-05 DIAGNOSIS — I25.119 ATHEROSCLEROSIS OF NATIVE CORONARY ARTERY OF NATIVE HEART WITH ANGINA PECTORIS (HCC): ICD-10-CM

## 2017-09-05 DIAGNOSIS — J06.9 UPPER RESPIRATORY TRACT INFECTION, UNSPECIFIED TYPE: Primary | ICD-10-CM

## 2017-09-05 LAB
INR PPP: 4.32 (ref 0.9–1.1)
PROTHROMBIN TIME: 47.3 SECONDS (ref 9.3–12.1)

## 2017-09-05 PROCEDURE — 36415 COLL VENOUS BLD VENIPUNCTURE: CPT

## 2017-09-05 PROCEDURE — 99024 POSTOP FOLLOW-UP VISIT: CPT | Performed by: SURGERY

## 2017-09-05 PROCEDURE — 85610 PROTHROMBIN TIME: CPT | Performed by: INTERNAL MEDICINE

## 2017-09-05 RX ORDER — CEFUROXIME AXETIL 500 MG/1
500 TABLET ORAL 2 TIMES DAILY
Qty: 20 TABLET | Refills: 0 | Status: SHIPPED | OUTPATIENT
Start: 2017-09-05 | End: 2017-10-10

## 2017-09-05 NOTE — TELEPHONE ENCOUNTER
Patient's wife is calling to see if Dr. Acuna is willing to call in a prescription for Alessandro for a sinus infection. She said he's really congested and has some eye discharge.    She'd like a callback.

## 2017-09-05 NOTE — PROGRESS NOTES
Anticoagulation Clinic - Remote Progress Note  REMOTE LAB    Indication: afib  Referring Provider: Camille  Initial Warfarin Start Date: 7/1/2008  Goal INR: 2-3  Current Drug Interactions: aspirin, niacin, prednisone, dronedarone, sertraline,   CHADS-VASc: 3 (age, HTN)    Diet: stays away from [facility]  Alcohol: none  Tobacco: none  OTC Pain Medication: APAP PM most nights      INR History:  Date 9/5           Total Weekly Dose 35 mg           INR 4.32           Notes pred; start cefurox               Phone Interview:  Tablet Strength: pt has 5mg tablets and 1mg tablets  Current Maintenance Dose: 5mg daily  Patient Findings      Positives Change in medications, Hospital admission     Negatives Signs/symptoms of thrombosis, Signs/symptoms of bleeding, Laboratory test error suspected, Change in health, Change in alcohol use, Change in activity, Upcoming invasive procedure, Emergency department visit, Upcoming dental procedure, Missed doses, Extra doses, Change in diet/appetite, Bruising, Other complaints     Comments Mr. Mendiola had a recent surgery (precancerous spot taken off his back) -- was off his warfarin for a total of about 2 weeks, but he has been back on it for at least a week now. He started on cefuroxime on 9/5 x10 day course for chest congestion / sinus infection. He is also on prednisone 10mg daily (30 day supply) for COPD.        Patient Contact Info: [phone]  Lab Contact Info: TERA Ospina    Plan:  1. INR is supratherapeutic today. Given recent initiation of steroid + abx, instructed pt's wife to HOLD his warfarin tonight, then lower his dose ~11% to warfarin 5mg daily except 2.5mg MonFri.  2. Repeat INR on Tuesday 9/12.   3. Verbal information provided over the phone to Mrs. Mendiola. She RBV dosing instructions, expresses understanding, and has no further questions at this time.      Cynthia Ovalles RPH  9/5/2017  4:01 PM

## 2017-09-05 NOTE — PROGRESS NOTES
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  They state the drainage has improved.       Vital Signs        Physical Exam:   General Appearance:    Alert, cooperative, in no acute distress, wound clean dry without infection   Abdomen:     no masses, no organomegaly, soft non-tender, non-distended, no guarding, wounds are well healed   Chest:      Clear to ausculation       Assessment / Plan:    1. Skin neoplasm        I did discuss the situation with the patient today in the office and they have done well from their recent lesion excision, I don't think that the patient needs any further intervention and I need to see them back only if they have further problems. Pathology report was reviewed with the patient in the office.    Electronically signed by Dagoberto Rasmussen MD  09/06/17          Scribed for Dagoberto Rasmussen MD by Rhonda Cantu. 9/6/2017  5:17 PM

## 2017-09-06 ENCOUNTER — TELEPHONE (OUTPATIENT)
Dept: PHARMACY | Facility: HOSPITAL | Age: 77
End: 2017-09-06

## 2017-09-07 RX ORDER — ACETAMINOPHEN,DIPHENHYDRAMINE HCL 500; 25 MG/1; MG/1
1 TABLET, FILM COATED ORAL NIGHTLY PRN
COMMUNITY
End: 2018-08-01 | Stop reason: HOSPADM

## 2017-09-12 ENCOUNTER — LAB (OUTPATIENT)
Dept: LAB | Facility: HOSPITAL | Age: 77
End: 2017-09-12
Attending: INTERNAL MEDICINE

## 2017-09-12 ENCOUNTER — ANTICOAGULATION VISIT (OUTPATIENT)
Dept: PHARMACY | Facility: HOSPITAL | Age: 77
End: 2017-09-12

## 2017-09-12 ENCOUNTER — TELEPHONE (OUTPATIENT)
Dept: PHARMACY | Facility: HOSPITAL | Age: 77
End: 2017-09-12

## 2017-09-12 DIAGNOSIS — I71.20 THORACIC AORTIC ANEURYSM WITHOUT RUPTURE (HCC): ICD-10-CM

## 2017-09-12 DIAGNOSIS — I48.0 PAROXYSMAL ATRIAL FIBRILLATION (HCC): ICD-10-CM

## 2017-09-12 DIAGNOSIS — I25.119 ATHEROSCLEROSIS OF NATIVE CORONARY ARTERY OF NATIVE HEART WITH ANGINA PECTORIS (HCC): ICD-10-CM

## 2017-09-12 LAB
INR PPP: 2.6 (ref 0.9–1.1)
PROTHROMBIN TIME: 28.5 SECONDS (ref 9.3–12.1)

## 2017-09-12 PROCEDURE — 36415 COLL VENOUS BLD VENIPUNCTURE: CPT

## 2017-09-12 PROCEDURE — 85610 PROTHROMBIN TIME: CPT | Performed by: INTERNAL MEDICINE

## 2017-09-12 NOTE — PROGRESS NOTES
Anticoagulation Clinic - Remote Progress Note  REMOTE LAB    Indication: afib  Referring Provider: Camille  Initial Warfarin Start Date: 7/1/2008  Goal INR: 2-3  Current Drug Interactions: aspirin, niacin, prednisone, dronedarone, sertraline  CHADS-VASc: 3 (age, HTN)    Diet: stays away from [facility]  Alcohol: none  Tobacco: none  OTC Pain Medication: APAP PM most nights    INR History:  Date 9/5 9/12          Total Weekly Dose 35 mg 25 mg 30 mg         INR 4.32 2.6          Notes pred; start cefurox 1 held dose; pred, cef.              Phone Interview:  Tablet Strength: pt has 5mg tablets and 1mg tablets  Current Maintenance Dose: 5mg daily  Patient Findings      Negatives Signs/symptoms of thrombosis, Signs/symptoms of bleeding, Laboratory test error suspected, Change in health, Change in alcohol use, Change in activity, Upcoming invasive procedure, Emergency department visit, Upcoming dental procedure, Missed doses, Extra doses, Change in medications, Change in diet/appetite, Hospital admission, Bruising, Other complaints     Comments Mr. Mendiola has been taking his antibiotic once daily instead of twice daily 2/2 upset stomach -- he is taking it with food, has about 6 tablets left. Continues on the steroid for one month. Mrs. Mendiola held her 's warfarin dose last Thursday, as instructed.     Patient Contact Info: [phone]  Lab Contact Info: TERA Ospina    Plan:  1. INR is therapeutic following dose adjustment last week. Instructed Mrs. Mendiola to have her  continue warfarin 5mg daily except 2.5mg MonFri for now (steroid + finishing abx).  2. Repeat INR in 1 week.  3. Verbal information provided over the phone to Mrs. Mendiola. She RBV dosing instructions, expresses understanding, and has no further questions at this time.    Cynthia Ovalles RPH  9/12/2017  6:29 PM

## 2017-09-19 ENCOUNTER — LAB (OUTPATIENT)
Dept: LAB | Facility: HOSPITAL | Age: 77
End: 2017-09-19
Attending: INTERNAL MEDICINE

## 2017-09-19 ENCOUNTER — ANTICOAGULATION VISIT (OUTPATIENT)
Dept: PHARMACY | Facility: HOSPITAL | Age: 77
End: 2017-09-19

## 2017-09-19 DIAGNOSIS — I71.20 THORACIC AORTIC ANEURYSM WITHOUT RUPTURE (HCC): ICD-10-CM

## 2017-09-19 DIAGNOSIS — I25.119 ATHEROSCLEROSIS OF NATIVE CORONARY ARTERY OF NATIVE HEART WITH ANGINA PECTORIS (HCC): ICD-10-CM

## 2017-09-19 DIAGNOSIS — I48.0 PAROXYSMAL ATRIAL FIBRILLATION (HCC): ICD-10-CM

## 2017-09-19 LAB
INR PPP: 2.32 (ref 0.9–1.1)
PROTHROMBIN TIME: 25.4 SECONDS (ref 9.3–12.1)

## 2017-09-19 PROCEDURE — 85610 PROTHROMBIN TIME: CPT | Performed by: INTERNAL MEDICINE

## 2017-09-19 PROCEDURE — 36415 COLL VENOUS BLD VENIPUNCTURE: CPT

## 2017-09-19 NOTE — PROGRESS NOTES
Anticoagulation Clinic - Remote Progress Note  REMOTE LAB    Indication: afib  Referring Provider: Camille  Initial Warfarin Start Date: 7/1/2008  Goal INR: 2-3  Current Drug Interactions: aspirin, niacin, prednisone, dronedarone, sertraline  CHADS-VASc: 3 (age, HTN)    Diet: stays away from [facility]  Alcohol: none  Tobacco: none  OTC Pain Medication: APAP PM most nights    INR History:  Date 9/5 9/12 9/19         Total Weekly Dose 35 mg 25 mg 30 mg         INR 4.32 2.6 2.32         Notes pred; start cefurox 1 held dose; pred, cef. pred             Phone Interview:  Tablet Strength: pt has 5mg tablets and 1mg tablets  Current Maintenance Dose: 5mg daily  Patient Findings      Positives Change in medications, Change in diet/appetite     Negatives Signs/symptoms of thrombosis, Signs/symptoms of bleeding, Laboratory test error suspected, Change in health, Change in alcohol use, Change in activity, Upcoming invasive procedure, Emergency department visit, Upcoming dental procedure, Missed doses, Extra doses, Hospital admission, Bruising, Other complaints     Comments Patient finished antibiotics and is feeling better. Patient's appetite has decreased. Patient is still taking prednisone.      Patient Contact Info: [phone]  Lab Contact Info: TERA Ospina    Plan:  1. INR is therapeutic following dose adjustment last week. Given patient is still taking prednisone and appetite has decreased, instructed Mrs. Mendiola to have her  continue warfarin 5mg daily except 2.5mg MonFri for now.  2. Repeat INR in 1 week. Patient's wife voiced understanding for close monitoring- will likely need to increase patient's dose back to 5mg daily.  3. Verbal information provided over the phone to patient's wife. She RBV dosing instructions, expresses understanding, and has no further questions at this time.      Katharina Rios, PharmD  9/19/2017  3:56 PM       .

## 2017-09-27 ENCOUNTER — ANTICOAGULATION VISIT (OUTPATIENT)
Dept: PHARMACY | Facility: HOSPITAL | Age: 77
End: 2017-09-27

## 2017-09-27 ENCOUNTER — LAB (OUTPATIENT)
Dept: LAB | Facility: HOSPITAL | Age: 77
End: 2017-09-27
Attending: INTERNAL MEDICINE

## 2017-09-27 DIAGNOSIS — I25.119 ATHEROSCLEROSIS OF NATIVE CORONARY ARTERY OF NATIVE HEART WITH ANGINA PECTORIS (HCC): ICD-10-CM

## 2017-09-27 DIAGNOSIS — I48.0 PAROXYSMAL ATRIAL FIBRILLATION (HCC): ICD-10-CM

## 2017-09-27 DIAGNOSIS — I71.20 THORACIC AORTIC ANEURYSM WITHOUT RUPTURE (HCC): ICD-10-CM

## 2017-09-27 LAB
INR PPP: 2.38
INR PPP: 2.38 (ref 0.9–1.1)
PROTHROMBIN TIME: 26.1 SECONDS (ref 9.3–12.1)

## 2017-09-27 PROCEDURE — 85610 PROTHROMBIN TIME: CPT | Performed by: INTERNAL MEDICINE

## 2017-09-27 PROCEDURE — 36415 COLL VENOUS BLD VENIPUNCTURE: CPT

## 2017-09-27 NOTE — PROGRESS NOTES
Anticoagulation Clinic - Remote Progress Note  REMOTE LAB    Indication: afib  Referring Provider: Camille  Initial Warfarin Start Date: 7/1/2008  Goal INR: 2-3  Current Drug Interactions: aspirin, niacin, prednisone, dronedarone, sertraline  CHADS-VASc: 3 (age, HTN)    Diet: stays away from [facility]  Alcohol: none  Tobacco: none  OTC Pain Medication: APAP PM most nights    INR History:  Date 9/5 9/12 9/19 9/27        Total Weekly Dose 35 mg 25 mg 30 mg 30mg        INR 4.32 2.6 2.32 2.38        Notes pred; start cefurox 1 held dose; pred, cef. pred pred            Phone Interview:  Tablet Strength: pt has 5mg tablets and 1mg tablets  Current Maintenance Dose: 5mg daily  Patient Findings      Positives Change in medications     Negatives Signs/symptoms of thrombosis, Signs/symptoms of bleeding, Laboratory test error suspected, Change in health, Change in alcohol use, Change in activity, Upcoming invasive procedure, Emergency department visit, Upcoming dental procedure, Missed doses, Extra doses, Change in diet/appetite, Hospital admission, Bruising, Other complaints     Comments Spoke with patient's wife. Patient is still taking prednisone; will d/c on 10/11. Mrs. Mendiola states that his appetite is still decreased. Mrs. Mendiola has bought Ensure to help with decreased appetite. Discussed vit K content could cause INR to decrease. The patient hasn't started to drink it yet. Advised patient's wife to drink 1/2 cup- 1 cup daily 3-4 days prior to recheck INR.        Patient Contact Info: [phone]  Lab Contact Info: TERA Ospina    Plan:  1. INR is therapeutic. Given patient is still taking prednisone and appetite has decreased, instructed Mrs. Mendiola to have her  continue warfarin 5mg daily except 2.5mg MonFri for now.  2. Would like to repeat INR in one week, however patient's wife is not able and would like to recheck on 10/9. Patient's wife voiced understanding for close monitoring- will likely need to increase  patient's dose back to 5mg daily.  3. Verbal information provided over the phone to patient's wife. She RBV dosing instructions, expresses understanding, and has no further questions at this time.      Katharina Rios PharmMILTON  9/27/2017  2:07 PM       .

## 2017-10-10 ENCOUNTER — LAB (OUTPATIENT)
Dept: LAB | Facility: HOSPITAL | Age: 77
End: 2017-10-10
Attending: INTERNAL MEDICINE

## 2017-10-10 ENCOUNTER — ANTICOAGULATION VISIT (OUTPATIENT)
Dept: PHARMACY | Facility: HOSPITAL | Age: 77
End: 2017-10-10

## 2017-10-10 DIAGNOSIS — I48.0 PAROXYSMAL ATRIAL FIBRILLATION (HCC): ICD-10-CM

## 2017-10-10 DIAGNOSIS — I71.20 THORACIC AORTIC ANEURYSM WITHOUT RUPTURE (HCC): ICD-10-CM

## 2017-10-10 DIAGNOSIS — I48.91 ATRIAL FIBRILLATION, UNSPECIFIED TYPE (HCC): Primary | ICD-10-CM

## 2017-10-10 DIAGNOSIS — I25.119 ATHEROSCLEROSIS OF NATIVE CORONARY ARTERY OF NATIVE HEART WITH ANGINA PECTORIS (HCC): ICD-10-CM

## 2017-10-10 LAB
INR PPP: 1.66 (ref 0.9–1.1)
PROTHROMBIN TIME: 18.2 SECONDS (ref 9.3–12.1)

## 2017-10-10 PROCEDURE — 85610 PROTHROMBIN TIME: CPT | Performed by: INTERNAL MEDICINE

## 2017-10-10 PROCEDURE — 36415 COLL VENOUS BLD VENIPUNCTURE: CPT

## 2017-10-10 RX ORDER — WARFARIN SODIUM 5 MG/1
TABLET ORAL
Qty: 90 TABLET | Refills: 1 | OUTPATIENT
Start: 2017-10-10 | End: 2017-11-09 | Stop reason: SDDI

## 2017-10-10 RX ORDER — WARFARIN SODIUM 5 MG/1
TABLET ORAL
Qty: 90 TABLET | Refills: 3 | OUTPATIENT
Start: 2017-10-10 | End: 2017-10-10 | Stop reason: SDUPTHER

## 2017-10-10 NOTE — PROGRESS NOTES
Anticoagulation Clinic - Remote Progress Note  REMOTE LAB    Indication: afib  Referring Provider: Camille  Initial Warfarin Start Date: 7/1/2008  Goal INR: 2-3  Current Drug Interactions: aspirin, niacin, prednisone, dronedarone, sertraline  CHADS-VASc: 3 (age, HTN)    Diet: stays away from [facility]  Alcohol: none  Tobacco: none  OTC Pain Medication: APAP PM most nights    INR History:  Date 9/5 9/12 9/19 9/27 10/10       Total Weekly Dose 35 mg 25 mg 30 mg 30mg 30mg       INR 4.32 2.6 2.32 2.38 1.66       Notes pred; start cefurox 1 held dose; pred, cef. pred pred Ensure, pred         Phone Interview:  Tablet Strength: pt has 5mg tablets and 1mg tablets  Current Maintenance Dose: 5mg daily except 2.5mg MonFri  Patient Findings      Positives Change in diet/appetite     Negatives Signs/symptoms of thrombosis, Signs/symptoms of bleeding, Laboratory test error suspected, Change in health, Change in alcohol use, Change in activity, Upcoming invasive procedure, Emergency department visit, Upcoming dental procedure, Missed doses, Extra doses, Change in medications, Hospital admission, Bruising, Other complaints     Comments Drinking 1 Ensure per day. Continues steroid until tomorrow.      Patient Contact Info: [phone]  Lab Contact Info: TERA Ospina    Plan:  1. INR is subtherapeutic today, possibly related to added Ensure, and delayed effect of steroid. For now, instructed Mrs. Mendiola to increase her 's dose back to warfarin 5mg daily.  2. Repeat INR in 2 weeks. If INR changes significantly in this time, will consider 2.5mg one day per week thereafter.  3. Verbal information provided over the phone to patient's wife. She RBV dosing instructions, expresses understanding, and has no further questions at this time.    Ann Cowden Mayer, PharmD  10/10/2017  2:35 PM

## 2017-10-23 RX ORDER — ERGOCALCIFEROL 1.25 MG/1
CAPSULE ORAL
Qty: 12 CAPSULE | Refills: 3 | Status: SHIPPED | OUTPATIENT
Start: 2017-10-23 | End: 2019-01-01

## 2017-10-25 RX ORDER — ALBUTEROL SULFATE 90 UG/1
AEROSOL, METERED RESPIRATORY (INHALATION)
Qty: 1 INHALER | Refills: 2 | Status: SHIPPED | OUTPATIENT
Start: 2017-10-25 | End: 2018-02-09 | Stop reason: SDUPTHER

## 2017-10-27 ENCOUNTER — ANTICOAGULATION VISIT (OUTPATIENT)
Dept: PHARMACY | Facility: HOSPITAL | Age: 77
End: 2017-10-27

## 2017-10-27 ENCOUNTER — OFFICE VISIT (OUTPATIENT)
Dept: INTERNAL MEDICINE | Facility: CLINIC | Age: 77
End: 2017-10-27

## 2017-10-27 ENCOUNTER — LAB (OUTPATIENT)
Dept: LAB | Facility: HOSPITAL | Age: 77
End: 2017-10-27
Attending: INTERNAL MEDICINE

## 2017-10-27 VITALS
SYSTOLIC BLOOD PRESSURE: 100 MMHG | OXYGEN SATURATION: 93 % | BODY MASS INDEX: 26.18 KG/M2 | DIASTOLIC BLOOD PRESSURE: 60 MMHG | WEIGHT: 187 LBS | HEIGHT: 71 IN | HEART RATE: 59 BPM

## 2017-10-27 DIAGNOSIS — J41.8 MIXED SIMPLE AND MUCOPURULENT CHRONIC BRONCHITIS (HCC): ICD-10-CM

## 2017-10-27 DIAGNOSIS — I10 ESSENTIAL HYPERTENSION: ICD-10-CM

## 2017-10-27 DIAGNOSIS — E78.00 PURE HYPERCHOLESTEROLEMIA: ICD-10-CM

## 2017-10-27 DIAGNOSIS — Z99.81 ON SUPPLEMENTAL OXYGEN THERAPY: ICD-10-CM

## 2017-10-27 DIAGNOSIS — I48.0 PAROXYSMAL ATRIAL FIBRILLATION (HCC): ICD-10-CM

## 2017-10-27 DIAGNOSIS — E55.9 VITAMIN D DEFICIENCY: Primary | ICD-10-CM

## 2017-10-27 DIAGNOSIS — R35.1 NOCTURIA: ICD-10-CM

## 2017-10-27 DIAGNOSIS — Z79.899 ENCOUNTER FOR LONG-TERM CURRENT USE OF MEDICATION: ICD-10-CM

## 2017-10-27 DIAGNOSIS — I25.119 ATHEROSCLEROSIS OF NATIVE CORONARY ARTERY OF NATIVE HEART WITH ANGINA PECTORIS (HCC): ICD-10-CM

## 2017-10-27 DIAGNOSIS — I71.20 THORACIC AORTIC ANEURYSM WITHOUT RUPTURE (HCC): ICD-10-CM

## 2017-10-27 DIAGNOSIS — H10.9 CONJUNCTIVITIS OF RIGHT EYE, UNSPECIFIED CONJUNCTIVITIS TYPE: ICD-10-CM

## 2017-10-27 LAB
25(OH)D3 SERPL-MCNC: 52.2 NG/ML
ALBUMIN SERPL-MCNC: 4 G/DL (ref 3.5–5)
ALBUMIN/GLOB SERPL: 1.4 G/DL (ref 1–2)
ALP SERPL-CCNC: 108 U/L (ref 38–126)
ALT SERPL W P-5'-P-CCNC: 32 U/L (ref 13–69)
ANION GAP SERPL CALCULATED.3IONS-SCNC: 13.4 MMOL/L
AST SERPL-CCNC: 23 U/L (ref 15–46)
BACTERIA UR QL AUTO: ABNORMAL /HPF
BASOPHILS # BLD AUTO: 0.03 10*3/MM3 (ref 0–0.2)
BASOPHILS NFR BLD AUTO: 0.5 % (ref 0–2.5)
BILIRUB SERPL-MCNC: 0.2 MG/DL (ref 0.2–1.3)
BILIRUB UR QL STRIP: ABNORMAL
BUN BLD-MCNC: 9 MG/DL (ref 7–20)
BUN/CREAT SERPL: 10 (ref 6.3–21.9)
CALCIUM SPEC-SCNC: 8.8 MG/DL (ref 8.4–10.2)
CHLORIDE SERPL-SCNC: 101 MMOL/L (ref 98–107)
CHOLEST SERPL-MCNC: 145 MG/DL (ref 0–199)
CLARITY UR: CLEAR
CO2 SERPL-SCNC: 36 MMOL/L (ref 26–30)
COLOR UR: YELLOW
CREAT BLD-MCNC: 0.9 MG/DL (ref 0.6–1.3)
DEPRECATED RDW RBC AUTO: 43 FL (ref 37–54)
EOSINOPHIL # BLD AUTO: 0.09 10*3/MM3 (ref 0–0.7)
EOSINOPHIL NFR BLD AUTO: 1.5 % (ref 0–7)
ERYTHROCYTE [DISTWIDTH] IN BLOOD BY AUTOMATED COUNT: 13.7 % (ref 11.5–14.5)
GFR SERPL CREATININE-BSD FRML MDRD: 82 ML/MIN/1.73
GLOBULIN UR ELPH-MCNC: 2.8 GM/DL
GLUCOSE BLD-MCNC: 100 MG/DL (ref 74–98)
GLUCOSE UR STRIP-MCNC: NEGATIVE MG/DL
HBA1C MFR BLD: 6.1 % (ref 3–6)
HCT VFR BLD AUTO: 41.9 % (ref 42–52)
HDLC SERPL-MCNC: 40 MG/DL (ref 40–60)
HGB BLD-MCNC: 12.9 G/DL (ref 14–18)
HGB UR QL STRIP.AUTO: ABNORMAL
HYALINE CASTS UR QL AUTO: ABNORMAL /LPF
IMM GRANULOCYTES # BLD: 0.01 10*3/MM3 (ref 0–0.06)
IMM GRANULOCYTES NFR BLD: 0.2 % (ref 0–0.6)
INR PPP: 4.97 (ref 0.9–1.1)
KETONES UR QL STRIP: ABNORMAL
LDLC SERPL CALC-MCNC: 77 MG/DL (ref 0–99)
LDLC/HDLC SERPL: 1.92 {RATIO}
LEUKOCYTE ESTERASE UR QL STRIP.AUTO: NEGATIVE
LYMPHOCYTES # BLD AUTO: 1.29 10*3/MM3 (ref 0.6–3.4)
LYMPHOCYTES NFR BLD AUTO: 21.8 % (ref 10–50)
MCH RBC QN AUTO: 26.6 PG (ref 27–31)
MCHC RBC AUTO-ENTMCNC: 30.8 G/DL (ref 30–37)
MCV RBC AUTO: 86.4 FL (ref 80–94)
MONOCYTES # BLD AUTO: 0.47 10*3/MM3 (ref 0–0.9)
MONOCYTES NFR BLD AUTO: 7.9 % (ref 0–12)
MUCOUS THREADS URNS QL MICRO: ABNORMAL /HPF
NEUTROPHILS # BLD AUTO: 4.04 10*3/MM3 (ref 2–6.9)
NEUTROPHILS NFR BLD AUTO: 68.1 % (ref 37–80)
NITRITE UR QL STRIP: NEGATIVE
NRBC BLD MANUAL-RTO: 0 /100 WBC (ref 0–0)
PH UR STRIP.AUTO: 5.5 [PH] (ref 5–8)
PLATELET # BLD AUTO: 187 10*3/MM3 (ref 130–400)
PMV BLD AUTO: 9.5 FL (ref 6–12)
POTASSIUM BLD-SCNC: 4.4 MMOL/L (ref 3.5–5.1)
PROT SERPL-MCNC: 6.8 G/DL (ref 6.3–8.2)
PROT UR QL STRIP: ABNORMAL
PROTHROMBIN TIME: 54.5 SECONDS (ref 9.3–12.1)
PSA SERPL-MCNC: 2.6 NG/ML (ref 0.06–4)
RBC # BLD AUTO: 4.85 10*6/MM3 (ref 4.7–6.1)
RBC # UR: ABNORMAL /HPF
REF LAB TEST METHOD: ABNORMAL
SODIUM BLD-SCNC: 146 MMOL/L (ref 137–145)
SP GR UR STRIP: 1.02 (ref 1–1.03)
SQUAMOUS #/AREA URNS HPF: ABNORMAL /HPF
T4 FREE SERPL-MCNC: 0.88 NG/DL (ref 0.78–2.19)
TRIGL SERPL-MCNC: 141 MG/DL
TSH SERPL DL<=0.05 MIU/L-ACNC: 1.63 MIU/ML (ref 0.47–4.68)
UROBILINOGEN UR QL STRIP: ABNORMAL
VLDLC SERPL-MCNC: 28.2 MG/DL
WBC NRBC COR # BLD: 5.93 10*3/MM3 (ref 4.8–10.8)
WBC UR QL AUTO: ABNORMAL /HPF

## 2017-10-27 PROCEDURE — 36415 COLL VENOUS BLD VENIPUNCTURE: CPT | Performed by: FAMILY MEDICINE

## 2017-10-27 PROCEDURE — 85025 COMPLETE CBC W/AUTO DIFF WBC: CPT | Performed by: FAMILY MEDICINE

## 2017-10-27 PROCEDURE — 83036 HEMOGLOBIN GLYCOSYLATED A1C: CPT | Performed by: FAMILY MEDICINE

## 2017-10-27 PROCEDURE — 81001 URINALYSIS AUTO W/SCOPE: CPT | Performed by: FAMILY MEDICINE

## 2017-10-27 PROCEDURE — 84439 ASSAY OF FREE THYROXINE: CPT | Performed by: FAMILY MEDICINE

## 2017-10-27 PROCEDURE — 99214 OFFICE O/P EST MOD 30 MIN: CPT | Performed by: FAMILY MEDICINE

## 2017-10-27 PROCEDURE — 80061 LIPID PANEL: CPT | Performed by: FAMILY MEDICINE

## 2017-10-27 PROCEDURE — 82306 VITAMIN D 25 HYDROXY: CPT | Performed by: FAMILY MEDICINE

## 2017-10-27 PROCEDURE — 84443 ASSAY THYROID STIM HORMONE: CPT | Performed by: FAMILY MEDICINE

## 2017-10-27 PROCEDURE — 84153 ASSAY OF PSA TOTAL: CPT | Performed by: FAMILY MEDICINE

## 2017-10-27 PROCEDURE — 85610 PROTHROMBIN TIME: CPT | Performed by: INTERNAL MEDICINE

## 2017-10-27 PROCEDURE — 80053 COMPREHEN METABOLIC PANEL: CPT | Performed by: FAMILY MEDICINE

## 2017-10-27 RX ORDER — INFLUENZA A VIRUS A/MICHIGAN/45/2015 X-275 (H1N1) ANTIGEN (FORMALDEHYDE INACTIVATED), INFLUENZA A VIRUS A/SINGAPORE/INFIMH-16-0019/2016 IVR-186 (H3N2) ANTIGEN (FORMALDEHYDE INACTIVATED), AND INFLUENZA B VIRUS B/MARYLAND/15/2016 BX-69A (A B/COLORADO/6/2017-LIKE VIRUS) ANTIGEN (FORMALDEHYDE INACTIVATED) 60; 60; 60 UG/.5ML; UG/.5ML; UG/.5ML
INJECTION, SUSPENSION INTRAMUSCULAR
COMMUNITY
Start: 2017-10-08 | End: 2017-10-30

## 2017-10-27 RX ORDER — DILTIAZEM HYDROCHLORIDE 180 MG/1
180 CAPSULE, COATED, EXTENDED RELEASE ORAL DAILY
Qty: 90 CAPSULE | Refills: 3 | Status: ON HOLD | OUTPATIENT
Start: 2017-10-27 | End: 2019-01-01

## 2017-10-27 RX ORDER — GENTAMICIN SULFATE 3 MG/ML
2 SOLUTION/ DROPS OPHTHALMIC EVERY 4 HOURS
Qty: 15 ML | Refills: 2 | Status: SHIPPED | OUTPATIENT
Start: 2017-10-27 | End: 2017-11-17

## 2017-10-27 NOTE — PROGRESS NOTES
Subjective   Alessandro Mendiola is a 77 y.o. male.     History of Present Illness   Alessandro is due to have yearly labs.  He's on coumadin and his INR's are monitored by Dr. Obrien.  He takes vitamin D supplements that needs monitored as well. He asks for samples today of any of his meds, as he is in the donut hole.  He was given samples of symbicort and spiriva to use for home.  VSS. NAD.  He is not fasting today.  His BP has been dropping too low. He's on 240 mg daily of cartia.  He is fine with trying 180mg daily and seeing if allowing his BP to increase a bit would improve his fatigue and dizzyness and vertigo with ambulation.    He has a lot of watering in his right eye, with redness as well.        The following portions of the patient's history were reviewed and updated as appropriate: allergies, current medications, past social history and problem list.    Review of Systems   Constitutional: Negative for appetite change, chills, fatigue, fever and unexpected weight change.   HENT: Negative for congestion, ear pain, hearing loss, nosebleeds, postnasal drip, rhinorrhea, sore throat, tinnitus and trouble swallowing.    Eyes: Positive for discharge. Negative for photophobia and visual disturbance.   Respiratory: Negative for cough, chest tightness, shortness of breath and wheezing.    Cardiovascular: Negative for chest pain, palpitations and leg swelling.   Gastrointestinal: Negative for abdominal distention, abdominal pain, blood in stool, constipation, diarrhea, nausea and vomiting.   Endocrine: Negative for cold intolerance, heat intolerance, polydipsia, polyphagia and polyuria.   Musculoskeletal: Negative for arthralgias, back pain, joint swelling, myalgias, neck pain and neck stiffness.   Skin: Negative for color change, pallor, rash and wound.   Allergic/Immunologic: Negative for environmental allergies, food allergies and immunocompromised state.   Neurological: Negative for dizziness, tremors, seizures,  "weakness, numbness and headaches.   Hematological: Negative for adenopathy. Does not bruise/bleed easily.   Psychiatric/Behavioral: Negative for agitation, behavioral problems, confusion, hallucinations, self-injury and suicidal ideas. The patient is not nervous/anxious.        Objective   /60  Pulse 59  Ht 71\" (180.3 cm)  Wt 187 lb (84.8 kg)  SpO2 93% Comment: 3LPM  BMI 26.08 kg/m2  Physical Exam   Constitutional: He is oriented to person, place, and time. He appears well-developed and well-nourished. No distress.   HENT:   Head: Normocephalic and atraumatic.   Right Ear: External ear normal.   Left Ear: External ear normal.   Nose: Nose normal.   Mouth/Throat: Oropharynx is clear and moist.   Eyes: EOM are normal. Pupils are equal, round, and reactive to light. Right eye exhibits no discharge. Left eye exhibits no discharge. No scleral icterus.   Right eye with conjunctivitis.   Neck: Normal range of motion. Neck supple. No JVD present. No tracheal deviation present. No thyromegaly present.   Cardiovascular: Normal rate, regular rhythm, normal heart sounds and intact distal pulses.  Exam reveals no gallop and no friction rub.    No murmur heard.  Pulmonary/Chest: Effort normal and breath sounds normal. No stridor. No respiratory distress. He has no wheezes. He has no rales. He exhibits no tenderness.   Abdominal: Soft. Bowel sounds are normal. He exhibits no distension and no mass. There is no tenderness. There is no rebound and no guarding. No hernia.   Musculoskeletal: Normal range of motion. He exhibits no edema, tenderness or deformity.   Lymphadenopathy:     He has no cervical adenopathy.   Neurological: He is alert and oriented to person, place, and time. He has normal reflexes. He displays normal reflexes. No cranial nerve deficit. He exhibits normal muscle tone.   Skin: Skin is warm and dry. No rash noted. No erythema. No pallor.   Psychiatric: He has a normal mood and affect. His behavior is " normal. Judgment normal.       Assessment/Plan   Problem List Items Addressed This Visit        Cardiovascular and Mediastinum    Aneurysm of thoracic aorta    Relevant Orders    CT Chest With & Without Contrast    Paroxysmal atrial fibrillation    Relevant Medications    diltiaZEM CD (CARDIZEM CD) 180 MG 24 hr capsule    Other Relevant Orders    Lipid Panel    Hemoglobin A1c    Comprehensive Metabolic Panel    CBC & Differential    Urinalysis With / Microscopic If Indicated - Urine, Clean Catch    T4, Free    TSH    Vitamin D 25 Hydroxy    PSA    Hyperlipidemia    Relevant Orders    Lipid Panel    Hemoglobin A1c    Comprehensive Metabolic Panel    CBC & Differential    Urinalysis With / Microscopic If Indicated - Urine, Clean Catch    T4, Free    TSH    Vitamin D 25 Hydroxy    PSA    Hypertension    Relevant Medications    diltiaZEM CD (CARDIZEM CD) 180 MG 24 hr capsule    Other Relevant Orders    Lipid Panel    Hemoglobin A1c    Comprehensive Metabolic Panel    CBC & Differential    Urinalysis With / Microscopic If Indicated - Urine, Clean Catch    T4, Free    TSH    Vitamin D 25 Hydroxy    PSA       Respiratory    COPD (chronic obstructive pulmonary disease)    Relevant Orders    Lipid Panel    Hemoglobin A1c    Comprehensive Metabolic Panel    CBC & Differential    Urinalysis With / Microscopic If Indicated - Urine, Clean Catch    T4, Free    TSH    Vitamin D 25 Hydroxy    PSA       Digestive    Vitamin D deficiency - Primary    Relevant Orders    Lipid Panel    Hemoglobin A1c    Comprehensive Metabolic Panel    CBC & Differential    Urinalysis With / Microscopic If Indicated - Urine, Clean Catch    T4, Free    TSH    Vitamin D 25 Hydroxy    PSA       Genitourinary    Nocturia    Relevant Orders    Lipid Panel    Hemoglobin A1c    Comprehensive Metabolic Panel    CBC & Differential    Urinalysis With / Microscopic If Indicated - Urine, Clean Catch    T4, Free    TSH    Vitamin D 25 Hydroxy    PSA       Other     On supplemental oxygen therapy    Relevant Orders    Lipid Panel    Hemoglobin A1c    Comprehensive Metabolic Panel    CBC & Differential    Urinalysis With / Microscopic If Indicated - Urine, Clean Catch    T4, Free    TSH    Vitamin D 25 Hydroxy    PSA    Encounter for long-term current use of medication    Relevant Orders    Lipid Panel    Hemoglobin A1c    Comprehensive Metabolic Panel    CBC & Differential    Urinalysis With / Microscopic If Indicated - Urine, Clean Catch    T4, Free    TSH    Vitamin D 25 Hydroxy    PSA      Other Visit Diagnoses     Conjunctivitis of right eye, unspecified conjunctivitis type        Relevant Medications    gentamicin (GARAMYCIN) 0.3 % ophthalmic solution           fu in 3 months. Sooner if needed.

## 2017-10-27 NOTE — PROGRESS NOTES
Anticoagulation Clinic - Remote Progress Note  REMOTE LAB    Indication: afib  Referring Provider: Camille  Initial Warfarin Start Date: 7/1/2008  Goal INR: 2-3  Current Drug Interactions: aspirin, niacin, prednisone, dronedarone, sertraline  CHADS-VASc: 3 (age, HTN)    Diet: stays away from [facility]  Alcohol: none  Tobacco: none  OTC Pain Medication: APAP PM most nights    INR History:  Date 9/5 9/12 9/19 9/27 10/10 10/27      Total Weekly Dose 35 mg 25 mg 30 mg 30mg 30mg 35mg      INR 4.32 2.6 2.32 2.38 1.66 4.97      Notes pred; start cefurox 1 held dose; pred, cef. pred pred Ensure, pred Off pred and primidone        Phone Interview:  Tablet Strength: pt has 5mg tablets and 1mg tablets  Current Maintenance Dose: 5mg daily  Patient Findings      Positives Change in medications     Negatives Signs/symptoms of thrombosis, Signs/symptoms of bleeding, Laboratory test error suspected, Change in health, Change in alcohol use, Change in activity, Upcoming invasive procedure, Emergency department visit, Upcoming dental procedure, Missed doses, Extra doses, Change in diet/appetite, Hospital admission, Bruising, Other complaints     Comments Mr. Mendiola has not had prednisone or primidone in a couple weeks. Discussed with pt's wife potential DDIs with these medications and warfarin (primidone can decr. warfarin effectiveness, while prednisone can incr. or decr. effectiveness). He also started a new MVI (One A Day) last Monday -- again discussed DDIs (contains vitamin K + niacin). His wife denies recent episodes of bleeding or abnormal bruising.      Patient Contact Info: [phone]  Lab Contact Info:  Bhavesh    Plan:  1. INR was supratherapeutic on Friday. Pt received VM and held his warfarin dose FriSat, as instructed. For now, his wife will lower his dose back to warfarin 5mg daily except 2.5mg MonFri (14% decr.).  2. Repeat INR on 11/9 with cardiology appt in [place]. May consider transitioning Mr. Mendiola to Eliquis /  Amy, if Dr. Obrien is agreeable / if insurance will cover. Will follow up.  3. Verbal information provided over the phone to Mrs. Mendiola. She RBV dosing instructions, expresses understanding, and has no further questions at this time.    Ann Cowden Mayer, PharmD  10/27/2017  3:47 PM

## 2017-11-01 ENCOUNTER — TELEPHONE (OUTPATIENT)
Dept: INTERNAL MEDICINE | Facility: CLINIC | Age: 77
End: 2017-11-01

## 2017-11-01 NOTE — TELEPHONE ENCOUNTER
Madelyn called requesting a call back to discuss getting an excuse letter for patient to not have to go to jury duty.

## 2017-11-07 DIAGNOSIS — I71.60 THORACOABDOMINAL AORTIC ANEURYSM (TAAA) WITHOUT RUPTURE (HCC): Primary | ICD-10-CM

## 2017-11-09 ENCOUNTER — OFFICE VISIT (OUTPATIENT)
Dept: CARDIOLOGY | Facility: CLINIC | Age: 77
End: 2017-11-09

## 2017-11-09 ENCOUNTER — HOSPITAL ENCOUNTER (OUTPATIENT)
Dept: CT IMAGING | Facility: HOSPITAL | Age: 77
Discharge: HOME | End: 2017-11-09
Attending: FAMILY MEDICINE | Admitting: FAMILY MEDICINE

## 2017-11-09 VITALS
DIASTOLIC BLOOD PRESSURE: 52 MMHG | BODY MASS INDEX: 26.38 KG/M2 | SYSTOLIC BLOOD PRESSURE: 100 MMHG | HEIGHT: 71 IN | WEIGHT: 188.4 LBS | HEART RATE: 66 BPM

## 2017-11-09 DIAGNOSIS — I10 ESSENTIAL HYPERTENSION: ICD-10-CM

## 2017-11-09 DIAGNOSIS — I48.0 PAROXYSMAL ATRIAL FIBRILLATION (HCC): ICD-10-CM

## 2017-11-09 DIAGNOSIS — J43.1 PANLOBULAR EMPHYSEMA (HCC): ICD-10-CM

## 2017-11-09 DIAGNOSIS — I71.60 THORACOABDOMINAL AORTIC ANEURYSM (TAAA) WITHOUT RUPTURE (HCC): ICD-10-CM

## 2017-11-09 DIAGNOSIS — I25.10 CORONARY ARTERY DISEASE INVOLVING NATIVE CORONARY ARTERY OF NATIVE HEART WITHOUT ANGINA PECTORIS: Primary | ICD-10-CM

## 2017-11-09 PROCEDURE — 99214 OFFICE O/P EST MOD 30 MIN: CPT | Performed by: INTERNAL MEDICINE

## 2017-11-09 PROCEDURE — 71275 CT ANGIOGRAPHY CHEST: CPT

## 2017-11-09 PROCEDURE — 0 IOPAMIDOL PER 1 ML: Performed by: FAMILY MEDICINE

## 2017-11-09 RX ADMIN — IOPAMIDOL 95 ML: 755 INJECTION, SOLUTION INTRAVENOUS at 11:15

## 2017-11-09 NOTE — PROGRESS NOTES
[facility]  Office Progress Note  Alessandro Mendiola  1940  [phone]      Visit Date: 10/27/2016    PCP: Rio Acuna,   [address] [address]    IDENTIFICATION: A 77 y.o. male from [facility].    Chief Complaint   Patient presents with   • Follow-up   • Shortness of Breath   • Palpitations       PROBLEM LIST:   CAD      2008 BMS to RCA- Charisse      2012 SE wnl  HL      statin intolerant  PAF/ sss      st joan ppm gen change 3/15 MGR  COPD- occas steroids  ASC Ao Aneurysm T Rushing follows      2012 4.8 CM  HTN    Allergies  No Known Allergies    Current Medications    Current Outpatient Prescriptions:   •  aclidinium bromide (TUDORZA PRESSAIR) 400 MCG/ACT aerosol powder  powder for inhalation, Inhale 1 puff 2 (Two) Times a Day., Disp: 1 each, Rfl: 5  •  budesonide-formoterol (SYMBICORT) 80-4.5 MCG/ACT inhaler, Inhale 2 puffs 2 (Two) Times a Day., Disp: 1 inhaler, Rfl: 5  •  clotrimazole (LOTRIMIN) 1 % cream, Apply  topically 2 (Two) Times a Day., Disp: 45 g, Rfl: 3  •  diltiaZEM CD (CARDIZEM CD) 180 MG 24 hr capsule, Take 1 capsule by mouth Daily., Disp: 90 capsule, Rfl: 3  •  DiphenhydrAMINE HCl, Sleep, (ZZZQUIL PO), Take  by mouth At Night As Needed (if he does not take APAP PM)., Disp: , Rfl:   •  diphenhydrAMINE-acetaminophen (TYLENOL PM)  MG tablet per tablet, Take 1 tablet by mouth Every Night., Disp: , Rfl:   •  dronedarone (MULTAQ) 400 MG tablet, Take 1 tablet by mouth 2 (Two) Times a Day., Disp: 180 tablet, Rfl: 1  •  fluorouracil (EFUDEX) 5 % cream, apply to affected area twice a day, Disp: , Rfl: 0  •  gentamicin (GARAMYCIN) 0.3 % ophthalmic solution, Administer 2 drops to the right eye Every 4 (Four) Hours., Disp: 15 mL, Rfl: 2  •  guaiFENesin (MUCINEX) 600 MG 12 hr tablet, Take  by mouth every 12 (twelve) hours., Disp: , Rfl:   •  ipratropium (ATROVENT) 0.02 % nebulizer solution, Take 2.5 mL by nebulization 4 (Four) Times a Day As  Needed for Wheezing or Shortness of Air., Disp: 300 mL, Rfl: 11  •  MULTIPLE VITAMIN PO, Take  by mouth., Disp: , Rfl:   •  Multiple Vitamins-Minerals (ONE-A-DAY MENS HEALTH FORMULA PO), Take 1 tablet by mouth Daily., Disp: , Rfl:   •  niacin 500 MG tablet, Take  by mouth 2 (two) times a day., Disp: , Rfl:   •  OXYGEN-HELIUM IN, Oxygen; Patient Sig: Oxygen patient is to get home oxygen through  company of his choice at 2L/Min, as well as portable oxygen at the same rate.; 1; 0; 22-Mar-2016; Active, Disp: , Rfl:   •  predniSONE (DELTASONE) 10 MG tablet, Take 1 tablet by mouth Daily. (Patient taking differently: Take 10 mg by mouth Daily As Needed.), Disp: 30 tablet, Rfl: 5  •  primidone (MYSOLINE) 50 MG tablet, Take 0.5 tablets by mouth Every Night., Disp: 30 tablet, Rfl: 2  •  sertraline (ZOLOFT) 50 MG tablet, Take 1 tablet by mouth Daily., Disp: 90 tablet, Rfl: 3  •  tamsulosin (FLOMAX) 0.4 MG capsule 24 hr capsule, Take 1 capsule by mouth Every Night., Disp: 30 capsule, Rfl: 11  •  VENTOLIN  (90 Base) MCG/ACT inhaler, INHALE TWO PUFFS BY MOUTH EVERY 4 HOURS AS NEEDED FOR WHEEZING, Disp: 1 inhaler, Rfl: 2  •  vitamin D (ERGOCALCIFEROL) 16393 units capsule capsule, TAKE ONE CAPSULE BY MOUTH ONCE A WEEK, Disp: 12 capsule, Rfl: 3  •  warfarin (COUMADIN) 1 MG tablet, Take 1 tablet by mouth Daily., Disp: 30 tablet, Rfl: 11  •  warfarin (COUMADIN) 5 MG tablet, Take 1/2 to 1 tablet by mouth daily, as directed by the anticoagulation pharmacist, Disp: 90 tablet, Rfl: 1  No current facility-administered medications for this visit.       History of Present Illness   Patient presents in routine follow up.  Having significant issues with being compliant with his anticoagulation due to geographic inconvenience.  He is interested in transitioning to a Noac.   Fatigued all the time and activities worsen. Pt denies any chest pain,   , orthopnea, PND, palpitations, lower extremity .    ROS:  All systems have been reviewed and  "are negative with the exception of those mentioned in the HPI.    OBJECTIVE:  Vitals:    11/09/17 1329   BP: 100/52   BP Location: Right arm   Patient Position: Sitting   Pulse: 66   Weight: 188 lb 6.4 oz (85.5 kg)   Height: 71\" (180.3 cm)     Physical Exam   Constitutional: He appears well-developed and well-nourished.   Neck: Normal range of motion. Neck supple. No hepatojugular reflux and no JVD present. Carotid bruit is not present. No tracheal deviation present. No thyromegaly present.   Cardiovascular: Normal rate, regular rhythm, S1 normal, S2 normal, intact distal pulses and normal pulses.  PMI is not displaced.  Exam reveals no gallop, no distant heart sounds, no friction rub, no midsystolic click and no opening snap.    No murmur heard.  Pulses:       Radial pulses are 2+ on the right side, and 2+ on the left side.        Dorsalis pedis pulses are 2+ on the right side, and 2+ on the left side.        Posterior tibial pulses are 2+ on the right side, and 2+ on the left side.   Pulmonary/Chest: Effort normal. He has no wheezes. He has rales.   Abdominal: Soft. Bowel sounds are normal. He exhibits no mass. There is no tenderness. There is no guarding.   Musculoskeletal: He exhibits edema.       Diagnostic Data:  Procedures  Device interrogation in office today: Dual-chamber PPM with acceptable threshold and impedance with a estimated longevity of 9.8 years and battery voltage of 2.99 V.  Atrial pacing 96% the time, ventricular pacing 1.5%.  Less than 1% AMS which longest was 6 hours on 09/15/2016.  One episode of nonsustained ventricular tachycardia with a duration of 15 beats.  PAT ×8 less than 5 seconds.  Normal functioning no changes.    ASSESSMENT:   Diagnosis Plan   1. Coronary artery disease involving native coronary artery of native heart without angina pectoris     2. Essential hypertension     3. Panlobular emphysema     4. Paroxysmal atrial fibrillation         PLAN:  Dyspnea with rhonchi will " empirically treat with Augmentin if no improvement prepped following weekend he is to contact Dr. Acuna to discuss potential need for Dosepak of steroids.    CAD post remote PCI with no anginal equivalent preserved LV function continued medical management    Paroxysmal A. fib anticoagulated and rhythm controlled with multiplesamples afforded in the office today.  We'll transition the Xarelto 20 and discontinue warfarin hopefully this will not be cost prohibitive.  He was suggested to discontinue warfarin today and initiate Xarelto Saturday    Hypertension controlled on current regimen

## 2017-11-13 ENCOUNTER — TELEPHONE (OUTPATIENT)
Dept: CARDIOLOGY | Facility: CLINIC | Age: 77
End: 2017-11-13

## 2017-11-13 ENCOUNTER — TELEPHONE (OUTPATIENT)
Dept: INTERNAL MEDICINE | Facility: CLINIC | Age: 77
End: 2017-11-13

## 2017-11-13 NOTE — TELEPHONE ENCOUNTER
Patients wife called and stated the pharmacy informed them that the medication primidone can decrease the effectives of xarelto. Informed them I spoke with Dr Obrien and it is fine for patient to continue taking xarelto. Patient wife verbalized understanding.

## 2017-11-15 ENCOUNTER — TELEPHONE (OUTPATIENT)
Dept: INTERNAL MEDICINE | Facility: CLINIC | Age: 77
End: 2017-11-15

## 2017-11-15 ENCOUNTER — OFFICE VISIT (OUTPATIENT)
Dept: PULMONOLOGY | Facility: CLINIC | Age: 77
End: 2017-11-15

## 2017-11-15 VITALS
OXYGEN SATURATION: 92 % | HEART RATE: 73 BPM | WEIGHT: 187 LBS | HEIGHT: 71 IN | BODY MASS INDEX: 26.18 KG/M2 | SYSTOLIC BLOOD PRESSURE: 110 MMHG | RESPIRATION RATE: 19 BRPM | DIASTOLIC BLOOD PRESSURE: 60 MMHG

## 2017-11-15 DIAGNOSIS — R09.02 HYPOXIA: ICD-10-CM

## 2017-11-15 DIAGNOSIS — R06.02 SHORTNESS OF BREATH: Primary | ICD-10-CM

## 2017-11-15 DIAGNOSIS — R93.89 ABNORMAL CT OF THE CHEST: ICD-10-CM

## 2017-11-15 DIAGNOSIS — J44.1 ACUTE EXACERBATION OF CHRONIC OBSTRUCTIVE PULMONARY DISEASE (COPD) (HCC): ICD-10-CM

## 2017-11-15 DIAGNOSIS — J41.8 MIXED SIMPLE AND MUCOPURULENT CHRONIC BRONCHITIS (HCC): ICD-10-CM

## 2017-11-15 DIAGNOSIS — J44.9 CHRONIC OBSTRUCTIVE PULMONARY DISEASE, UNSPECIFIED COPD TYPE (HCC): ICD-10-CM

## 2017-11-15 PROCEDURE — 99214 OFFICE O/P EST MOD 30 MIN: CPT | Performed by: INTERNAL MEDICINE

## 2017-11-15 PROCEDURE — 96372 THER/PROPH/DIAG INJ SC/IM: CPT | Performed by: INTERNAL MEDICINE

## 2017-11-15 RX ORDER — METHYLPREDNISOLONE ACETATE 80 MG/ML
80 INJECTION, SUSPENSION INTRA-ARTICULAR; INTRALESIONAL; INTRAMUSCULAR; SOFT TISSUE ONCE
Status: COMPLETED | OUTPATIENT
Start: 2017-11-15 | End: 2017-11-15

## 2017-11-15 RX ORDER — AMOXICILLIN AND CLAVULANATE POTASSIUM 875; 125 MG/1; MG/1
1 TABLET, FILM COATED ORAL 2 TIMES DAILY
Qty: 20 TABLET | Refills: 0 | Status: SHIPPED | OUTPATIENT
Start: 2017-11-15 | End: 2017-11-25

## 2017-11-15 RX ORDER — PREDNISONE 10 MG/1
10 TABLET ORAL EVERY OTHER DAY
Qty: 15 TABLET | Refills: 3 | Status: ON HOLD | OUTPATIENT
Start: 2017-11-15 | End: 2018-08-01

## 2017-11-15 RX ADMIN — METHYLPREDNISOLONE ACETATE 80 MG: 80 INJECTION, SUSPENSION INTRA-ARTICULAR; INTRALESIONAL; INTRAMUSCULAR; SOFT TISSUE at 15:31

## 2017-11-15 NOTE — PROGRESS NOTES
"Chief Complaint   Patient presents with   • Follow-up   • Shortness of Breath         Subjective   Alessandro Mendiola is a 77 y.o. male.     History of Present Illness   Patient comes in today complaining of shortness of breath, cough and phlegm production which has been worse for the past few weeks. Patient is not complaining of subjective chills.     Patient is using rescue inhaler/nebulizer more than usual with some relief.    The patient is using oxygen on a regular basis.  He has noticed slight worsening in his allergies over the past 2 weeks as well.    The patient's exercise capacity remained limited and even at baseline, he continues to have occasional cough and phlegm production.  The patient says that this phlegm has remained brownish in color although it becomes thicker when his symptoms worsen.    The following portions of the patient's history were reviewed and updated as appropriate: allergies, current medications, past family history, past medical history, past social history and past surgical history.    Review of Systems   Constitutional: Positive for fatigue. Negative for chills and fever.   HENT: Positive for postnasal drip, rhinorrhea and sinus pressure. Negative for sneezing and sore throat.    Respiratory: Positive for cough and shortness of breath. Negative for chest tightness and wheezing.    Psychiatric/Behavioral: Positive for sleep disturbance.       Objective   Visit Vitals   • /60   • Pulse 73   • Resp 19   • Ht 71\" (180.3 cm)   • Wt 187 lb (84.8 kg)   • SpO2 92%   • BMI 26.08 kg/m2       Physical Exam   Constitutional: He is oriented to person, place, and time. He appears well-developed and well-nourished.   HENT:   Head: Normocephalic and atraumatic.   Dentures noted.    Eyes: EOM are normal.   Neck: Neck supple.   Cardiovascular: Normal rate and regular rhythm.    Pulmonary/Chest: Effort normal. No respiratory distress.   Somewhat hyperresonant to percussion  Decreased A/E with " wheezing noted.   Musculoskeletal: He exhibits no edema.   Neurological: He is alert and oriented to person, place, and time.   Skin: Skin is warm and dry.   Psychiatric: He has a normal mood and affect.   Vitals reviewed.          Assessment/Plan   Alessandro was seen today for follow-up and shortness of breath.    Diagnoses and all orders for this visit:    Shortness of breath  -     XR Chest PA & Lateral; Future    Chronic obstructive pulmonary disease, unspecified COPD type    Hypoxia    Acute exacerbation of chronic obstructive pulmonary disease (COPD)  -     methylPREDNISolone acetate (DEPO-medrol) injection 80 mg; Inject 1 mL into the shoulder, thigh, or buttocks 1 (One) Time.  -     XR Chest PA & Lateral; Future    Mixed simple and mucopurulent chronic bronchitis  -     predniSONE (DELTASONE) 10 MG tablet; Take 1 tablet by mouth Every Other Day.    Abnormal CT of the chest  -     XR Chest PA & Lateral; Future    Other orders  -     amoxicillin-clavulanate (AUGMENTIN) 875-125 MG per tablet; Take 1 tablet by mouth 2 (Two) Times a Day for 10 days.           Return in about 4 months (around 3/15/2018) for Recheck, Imaging study, For Katia.    DISCUSSION (if any):  For the symptoms of acute exacerbation of chronic bronchitis, I have prescribed intramuscular steroids.    Patient will be prescribed Augmentin.    Side effects have been discussed in detail    Patient was asked to report to the emergency room if symptoms do not improve    All other medications will remain the same    I have reviewed his last CT scan, which was done a few days ago as requested by his primary care provider. It definitely shows streaky opacity in the right middle lobe along with scattered nodular density.    I told the patient did could be from at least mild radiological pneumonia.    I will schedule the patient to come back and do a chest x-ray upon his follow-up visit, especially since he is extremely hesitant travel during  wintertime.    If his chest x-ray continues to be abnormal, then a CT of the chest will be considered.    Due to baseline symptoms and the fact that he continues to have issues, I have decided to put him on a trial of prednisone 10 mg every other day.    The patient was advised to continue oxygen, since there has been at least somewhat decent response since the patient is using it as prescribed.      Dictated utilizing Dragon dictation.    This document was electronically signed by Anna Wilson MD November 15, 2017  2:38 PM

## 2017-11-17 RX ORDER — CIPROFLOXACIN HYDROCHLORIDE 3.5 MG/ML
1 SOLUTION/ DROPS TOPICAL
Qty: 10 ML | Refills: 5 | Status: SHIPPED | OUTPATIENT
Start: 2017-11-17 | End: 2018-08-01 | Stop reason: HOSPADM

## 2017-11-30 ENCOUNTER — TELEPHONE (OUTPATIENT)
Dept: CARDIOLOGY | Facility: CLINIC | Age: 77
End: 2017-11-30

## 2017-11-30 NOTE — TELEPHONE ENCOUNTER
Patient wife called about CT scan informed her that Dr Acuna ordered that test and to f/u with his office about test results. She verbalized understanding.

## 2017-12-21 ENCOUNTER — APPOINTMENT (OUTPATIENT)
Dept: CT IMAGING | Facility: HOSPITAL | Age: 77
End: 2017-12-21
Attending: EMERGENCY MEDICINE

## 2017-12-21 ENCOUNTER — APPOINTMENT (OUTPATIENT)
Dept: GENERAL RADIOLOGY | Facility: HOSPITAL | Age: 77
End: 2017-12-21

## 2017-12-21 ENCOUNTER — HOSPITAL ENCOUNTER (EMERGENCY)
Facility: HOSPITAL | Age: 77
Discharge: HOME | End: 2017-12-21
Attending: EMERGENCY MEDICINE | Admitting: EMERGENCY MEDICINE

## 2017-12-21 VITALS
HEART RATE: 60 BPM | BODY MASS INDEX: 26.48 KG/M2 | OXYGEN SATURATION: 93 % | DIASTOLIC BLOOD PRESSURE: 53 MMHG | HEIGHT: 70 IN | RESPIRATION RATE: 19 BRPM | SYSTOLIC BLOOD PRESSURE: 119 MMHG | TEMPERATURE: 98 F | WEIGHT: 185 LBS

## 2017-12-21 DIAGNOSIS — J18.9 PNEUMONIA OF RIGHT LUNG DUE TO INFECTIOUS ORGANISM, UNSPECIFIED PART OF LUNG: Primary | ICD-10-CM

## 2017-12-21 LAB
ALBUMIN SERPL-MCNC: 4.1 G/DL (ref 3.5–5)
ALBUMIN/GLOB SERPL: 1.4 G/DL (ref 1–2)
ALP SERPL-CCNC: 129 U/L (ref 38–126)
ALT SERPL W P-5'-P-CCNC: 26 U/L (ref 13–69)
ANION GAP SERPL CALCULATED.3IONS-SCNC: 14.9 MMOL/L
ARTERIAL PATENCY WRIST A: POSITIVE
AST SERPL-CCNC: 17 U/L (ref 15–46)
BASE EXCESS BLDA CALC-SCNC: 3.6 MMOL/L
BASOPHILS # BLD AUTO: 0.02 10*3/MM3 (ref 0–0.2)
BASOPHILS NFR BLD AUTO: 0.2 % (ref 0–2.5)
BDY SITE: ABNORMAL
BILIRUB SERPL-MCNC: 0.6 MG/DL (ref 0.2–1.3)
BUN BLD-MCNC: 19 MG/DL (ref 7–20)
BUN/CREAT SERPL: 19 (ref 6.3–21.9)
CALCIUM SPEC-SCNC: 9.1 MG/DL (ref 8.4–10.2)
CHLORIDE SERPL-SCNC: 97 MMOL/L (ref 98–107)
CO2 SERPL-SCNC: 30 MMOL/L (ref 26–30)
COHGB MFR BLD: 1.1 %
CREAT BLD-MCNC: 1 MG/DL (ref 0.6–1.3)
DEPRECATED RDW RBC AUTO: 46.4 FL (ref 37–54)
EOSINOPHIL # BLD AUTO: 0 10*3/MM3 (ref 0–0.7)
EOSINOPHIL NFR BLD AUTO: 0 % (ref 0–7)
ERYTHROCYTE [DISTWIDTH] IN BLOOD BY AUTOMATED COUNT: 14.5 % (ref 11.5–14.5)
FLUAV AG NPH QL: NEGATIVE
FLUBV AG NPH QL IA: NEGATIVE
GFR SERPL CREATININE-BSD FRML MDRD: 73 ML/MIN/1.73
GLOBULIN UR ELPH-MCNC: 2.9 GM/DL
GLUCOSE BLD-MCNC: 365 MG/DL (ref 74–98)
HCO3 BLDA-SCNC: 28.4 MMOL/L (ref 22–28)
HCT VFR BLD AUTO: 42.1 % (ref 42–52)
HGB BLD-MCNC: 13.2 G/DL (ref 14–18)
HGB BLDA-MCNC: 12.5 G/DL (ref 12–18)
HOLD SPECIMEN: NORMAL
HOLD SPECIMEN: NORMAL
HOROWITZ INDEX BLD+IHG-RTO: 40 %
IMM GRANULOCYTES # BLD: 0.07 10*3/MM3 (ref 0–0.06)
IMM GRANULOCYTES NFR BLD: 0.6 % (ref 0–0.6)
LYMPHOCYTES # BLD AUTO: 0.41 10*3/MM3 (ref 0.6–3.4)
LYMPHOCYTES NFR BLD AUTO: 3.6 % (ref 10–50)
MCH RBC QN AUTO: 27.5 PG (ref 27–31)
MCHC RBC AUTO-ENTMCNC: 31.4 G/DL (ref 30–37)
MCV RBC AUTO: 87.7 FL (ref 80–94)
METHGB BLD QL: 1 %
MODALITY: ABNORMAL
MONOCYTES # BLD AUTO: 0.71 10*3/MM3 (ref 0–0.9)
MONOCYTES NFR BLD AUTO: 6.2 % (ref 0–12)
NEUTROPHILS # BLD AUTO: 10.22 10*3/MM3 (ref 2–6.9)
NEUTROPHILS NFR BLD AUTO: 89.4 % (ref 37–80)
NRBC BLD MANUAL-RTO: 0 /100 WBC (ref 0–0)
NT-PROBNP SERPL-MCNC: 241 PG/ML (ref 0–450)
OXYHGB MFR BLDV: 95 % (ref 94–99)
PCO2 BLDA: 45.8 MM HG (ref 35–45)
PH BLDA: 7.4 PH UNITS (ref 7.3–7.5)
PLATELET # BLD AUTO: 156 10*3/MM3 (ref 130–400)
PMV BLD AUTO: 10.1 FL (ref 6–12)
PO2 BLDA: 86.7 MM HG (ref 75–100)
POTASSIUM BLD-SCNC: 3.9 MMOL/L (ref 3.5–5.1)
PROT SERPL-MCNC: 7 G/DL (ref 6.3–8.2)
RBC # BLD AUTO: 4.8 10*6/MM3 (ref 4.7–6.1)
SAO2 % BLDCOA: 97 %
SODIUM BLD-SCNC: 138 MMOL/L (ref 137–145)
TROPONIN I SERPL-MCNC: <0.012 NG/ML (ref 0–0.03)
WBC NRBC COR # BLD: 11.43 10*3/MM3 (ref 4.8–10.8)
WHOLE BLOOD HOLD SPECIMEN: NORMAL
WHOLE BLOOD HOLD SPECIMEN: NORMAL

## 2017-12-21 PROCEDURE — 25010000002 METHYLPREDNISOLONE PER 125 MG: Performed by: EMERGENCY MEDICINE

## 2017-12-21 PROCEDURE — 82805 BLOOD GASES W/O2 SATURATION: CPT

## 2017-12-21 PROCEDURE — 25010000002 AZITHROMYCIN: Performed by: EMERGENCY MEDICINE

## 2017-12-21 PROCEDURE — 83050 HGB METHEMOGLOBIN QUAN: CPT

## 2017-12-21 PROCEDURE — 94640 AIRWAY INHALATION TREATMENT: CPT

## 2017-12-21 PROCEDURE — 96375 TX/PRO/DX INJ NEW DRUG ADDON: CPT

## 2017-12-21 PROCEDURE — 82375 ASSAY CARBOXYHB QUANT: CPT

## 2017-12-21 PROCEDURE — 87804 INFLUENZA ASSAY W/OPTIC: CPT

## 2017-12-21 PROCEDURE — 96365 THER/PROPH/DIAG IV INF INIT: CPT

## 2017-12-21 PROCEDURE — 80053 COMPREHEN METABOLIC PANEL: CPT

## 2017-12-21 PROCEDURE — 71020 HC CHEST PA AND LATERAL: CPT

## 2017-12-21 PROCEDURE — 36600 WITHDRAWAL OF ARTERIAL BLOOD: CPT

## 2017-12-21 PROCEDURE — 85025 COMPLETE CBC W/AUTO DIFF WBC: CPT

## 2017-12-21 PROCEDURE — 84484 ASSAY OF TROPONIN QUANT: CPT

## 2017-12-21 PROCEDURE — 93005 ELECTROCARDIOGRAM TRACING: CPT

## 2017-12-21 PROCEDURE — 25010000002 CEFTRIAXONE IN SWFI 1 GRAM/10ML IV PUSH SYRINGE (SIMPLE): Performed by: EMERGENCY MEDICINE

## 2017-12-21 PROCEDURE — 99284 EMERGENCY DEPT VISIT MOD MDM: CPT

## 2017-12-21 PROCEDURE — 83880 ASSAY OF NATRIURETIC PEPTIDE: CPT

## 2017-12-21 RX ORDER — PREDNISONE 20 MG/1
60 TABLET ORAL DAILY
Qty: 15 TABLET | Refills: 0 | Status: SHIPPED | OUTPATIENT
Start: 2017-12-21 | End: 2017-12-26

## 2017-12-21 RX ORDER — DOXYCYCLINE 100 MG/1
100 CAPSULE ORAL 2 TIMES DAILY
Qty: 20 CAPSULE | Refills: 0 | Status: SHIPPED | OUTPATIENT
Start: 2017-12-21 | End: 2017-12-31

## 2017-12-21 RX ORDER — CEFUROXIME AXETIL 500 MG/1
500 TABLET ORAL 2 TIMES DAILY
Qty: 20 TABLET | Refills: 0 | Status: SHIPPED | OUTPATIENT
Start: 2017-12-21 | End: 2017-12-31

## 2017-12-21 RX ORDER — METHYLPREDNISOLONE SODIUM SUCCINATE 125 MG/2ML
125 INJECTION, POWDER, LYOPHILIZED, FOR SOLUTION INTRAMUSCULAR; INTRAVENOUS ONCE
Status: COMPLETED | OUTPATIENT
Start: 2017-12-21 | End: 2017-12-21

## 2017-12-21 RX ORDER — SODIUM CHLORIDE 0.9 % (FLUSH) 0.9 %
10 SYRINGE (ML) INJECTION AS NEEDED
Status: DISCONTINUED | OUTPATIENT
Start: 2017-12-21 | End: 2017-12-21 | Stop reason: HOSPADM

## 2017-12-21 RX ORDER — HYDROCODONE BITARTRATE AND ACETAMINOPHEN 5; 325 MG/1; MG/1
1 TABLET ORAL EVERY 6 HOURS PRN
Qty: 8 TABLET | Refills: 0 | Status: SHIPPED | OUTPATIENT
Start: 2017-12-21 | End: 2018-12-12

## 2017-12-21 RX ORDER — IPRATROPIUM BROMIDE AND ALBUTEROL SULFATE 2.5; .5 MG/3ML; MG/3ML
9 SOLUTION RESPIRATORY (INHALATION) ONCE
Status: COMPLETED | OUTPATIENT
Start: 2017-12-21 | End: 2017-12-21

## 2017-12-21 RX ADMIN — Medication 10 ML: at 11:28

## 2017-12-21 RX ADMIN — AZITHROMYCIN 500 MG: 500 INJECTION, POWDER, LYOPHILIZED, FOR SOLUTION INTRAVENOUS at 13:23

## 2017-12-21 RX ADMIN — CEFTRIAXONE SODIUM 1 G: 1 INJECTION, POWDER, FOR SOLUTION INTRAMUSCULAR; INTRAVENOUS at 13:15

## 2017-12-21 RX ADMIN — METHYLPREDNISOLONE SODIUM SUCCINATE 125 MG: 125 INJECTION, POWDER, FOR SOLUTION INTRAMUSCULAR; INTRAVENOUS at 11:28

## 2017-12-21 RX ADMIN — IPRATROPIUM BROMIDE AND ALBUTEROL SULFATE 9 ML: .5; 3 SOLUTION RESPIRATORY (INHALATION) at 11:30

## 2017-12-21 NOTE — ED PROVIDER NOTES
Subjective   HPI Comments: 77-year-old male presenting with multiple complaints.  He states that several weeks ago he strained his right shoulder all starting a chainsaw.  Has had ongoing pain from that.  The last few days the pain has been radiating into his chest and right back.  The last couple days he has had increased shortness of breath and cough productive of thick sputum that is blood streaked.  There've been no fevers, nausea, vomiting, abdominal pain.  No sick contacts that he knows of.  Has been a few months since he has been on steroids or antibiotics.  He is on 5 L of oxygen via nasal cannula.      Review of Systems   Constitutional: Negative for chills and fever.   HENT: Negative for congestion, rhinorrhea and sore throat.    Eyes: Negative for pain.   Respiratory: Positive for cough and shortness of breath.    Cardiovascular: Negative for chest pain, palpitations and leg swelling.   Gastrointestinal: Negative for abdominal pain, diarrhea, nausea and vomiting.   Genitourinary: Negative for dysuria.   Musculoskeletal: Positive for arthralgias.   Skin: Negative for rash.   Neurological: Negative for weakness and numbness.   Psychiatric/Behavioral: Negative for behavioral problems.       Past Medical History:   Diagnosis Date   • Alcohol abuse    • Aneurysm of thoracic aorta    • Anxiety    • Ascending aortic aneurysm     4.8 to 4.9 cm    • Atherosclerotic heart disease    • Atrial fibrillation    • Blood thinned due to long-term anticoagulant use    • Body mass index (BMI) 20.0-20.9, adult    • CAD (coronary artery disease)    • Cancer     skin   • Cardiac pacemaker    • Cellulitis    • Chronic bronchitis    • Chronic cough    • COPD (chronic obstructive pulmonary disease)    • Depression    • Difficulty breathing    • Emphysema lung    • Encounter for long-term current use of medication    • Encounter for screening for malignant neoplasm of prostate    • Fatigue    • Fingertip amputation    • TAMIKO  (generalized anxiety disorder)    • History of alcohol abuse    • History of cardiac catheterization    • History of chest x-ray 02/11/2014    Chronic interstitial changes seen throughout the lung fields w/ no radiographic evidence of acute parenchymal disease.No definite LT-sided rib fracture present.   • History of chest x-ray 01/10/2014    No acute cardiopulmonary process.Dual lead LT subclavian pacemaker is in place.Aortic ectasia. Hyperinflation of lungs, suggesting COPD   • History of chest x-ray 10/01/2012    Ill-defined RT middle lobe opacity which likley reflects a pneumonia. FU to radiographic resolution is recommended.   • History of Doppler echocardiogram    • History of echocardiogram 09/26/2013    EF 55-60%. Mod concentric LVH. Abnormal LT VT diastolic filling is observed, consistent w/ impaired relaxation.Mild MR/TR. Trace NM. RVSP 44 mmHg.   • History of stress test     ECG performed   • HTN (hypertension)    • HX: long term anticoagulant use     Blood thinned due to long-term anticoagulant use (V58.61) (Z79.01)   • Hypercholesterolemia    • LVH (left ventricular hypertrophy)    • Mitral and aortic valve disease    • Myocardial infarction    • Nonvenomous insect bite of multiple sites    • Onychomycosis    • Pacemaker at end of battery life    • Pneumonia    • Pneumothorax    • Pre-syncope    • Pulmonary nodule    • Rib pain on left side    • Sick sinus syndrome    • SOB (shortness of breath)    • Upper respiratory infection    • Vitamin D deficiency        No Known Allergies    Past Surgical History:   Procedure Laterality Date   • AMPUTATION  08/31/2015    metacarpal and index finger   • CARDIAC PACEMAKER PLACEMENT  06/01/2008   • CATARACT EXTRACTION     • CORONARY STENT PLACEMENT      History of Cath Placement Of Stent 1  · Coronary stents   • SKIN CANCER EXCISION         Family History   Problem Relation Age of Onset   • Coronary artery disease Other    • Diabetes Other    • Hypertension Other         Social History     Social History   • Marital status:      Spouse name: N/A   • Number of children: N/A   • Years of education: N/A     Social History Main Topics   • Smoking status: Former Smoker     Packs/day: 3.00     Years: 45.00     Quit date: 1/1/2008   • Smokeless tobacco: Never Used   • Alcohol use No   • Drug use: No   • Sexual activity: Defer     Other Topics Concern   • None     Social History Narrative           Objective   Physical Exam   Constitutional: He is oriented to person, place, and time. No distress.   Chronically ill-appearing   HENT:   Head: Normocephalic and atraumatic.   Right Ear: External ear normal.   Left Ear: External ear normal.   Nose: Nose normal.   Mouth/Throat: Oropharynx is clear and moist.   Eyes: Conjunctivae and EOM are normal. Pupils are equal, round, and reactive to light.   Neck: Normal range of motion. Neck supple.   Cardiovascular: Normal rate, regular rhythm, normal heart sounds and intact distal pulses.    Pulmonary/Chest:   Mild tachypnea, prolonged expiration, scattered wheezes   Abdominal: Soft. Bowel sounds are normal. He exhibits no distension. There is no tenderness. There is no rebound and no guarding.   Musculoskeletal: Normal range of motion. He exhibits no edema, tenderness or deformity.   Neurological: He is alert and oriented to person, place, and time.   Skin: Skin is warm and dry. No rash noted.   Psychiatric: He has a normal mood and affect. His behavior is normal.   Nursing note and vitals reviewed.      Procedures         ED Course  ED Course                  MDM  Number of Diagnoses or Management Options  Pneumonia of right lung due to infectious organism, unspecified part of lung:   Diagnosis management comments: 77-year-old male with multiple complaints.  Chronically ill-appearing elderly man in no distress with exam as above.  He does have some wheezing, tachypnea.  He is likely having a flareup of his COPD.  Given the pain on his right  side as well as the hemoptysis concern for PE versus malignancy.  We'll check labs, CT scan, treat with nebulizer, steroids.  Disposition pending workup.    DDX: COPD, pneumonia, bronchitis, PE, malignancy, ACS    EKG: Paced rhythm    Lab work is unremarkable.  Blood gas is reassuring.  Chest x-ray shows right-sided pneumonia, I think this would explain all of his symptoms.  He is remained stable in his home oxygen, and there have been some desaturations documented though these were all with poor pleth.  His PSI is risk class III, I have offered and recommended admission, he would really like to be home for the holidays.  Given he has remained stable and his workup is otherwise unremarkable this is a reasonable plan.  We will treat with antibiotics, steroids.  Encouraged him to return immediately for any new or worsening symptoms.  Patient and wife are comfortable with and understanding of the plan.       Amount and/or Complexity of Data Reviewed  Decide to obtain previous medical records or to obtain history from someone other than the patient: yes        Final diagnoses:   Pneumonia of right lung due to infectious organism, unspecified part of lung            Hossein Starks MD  12/21/17 [id number]

## 2018-01-09 RX ORDER — DRONEDARONE 400 MG/1
TABLET, FILM COATED ORAL
Qty: 180 TABLET | Refills: 1 | Status: SHIPPED | OUTPATIENT
Start: 2018-01-09 | End: 2018-07-17 | Stop reason: SDUPTHER

## 2018-02-09 RX ORDER — ALBUTEROL SULFATE 90 UG/1
AEROSOL, METERED RESPIRATORY (INHALATION)
Qty: 18 G | Refills: 2 | Status: SHIPPED | OUTPATIENT
Start: 2018-02-09 | End: 2018-08-30 | Stop reason: SDUPTHER

## 2018-03-13 ENCOUNTER — CLINICAL SUPPORT NO REQUIREMENTS (OUTPATIENT)
Dept: CARDIOLOGY | Facility: CLINIC | Age: 78
End: 2018-03-13

## 2018-03-13 DIAGNOSIS — I48.0 PAROXYSMAL ATRIAL FIBRILLATION (HCC): ICD-10-CM

## 2018-03-13 DIAGNOSIS — I49.5 SICK SINUS SYNDROME (HCC): ICD-10-CM

## 2018-03-13 PROCEDURE — 93294 REM INTERROG EVL PM/LDLS PM: CPT | Performed by: INTERNAL MEDICINE

## 2018-03-13 PROCEDURE — 93296 REM INTERROG EVL PM/IDS: CPT | Performed by: INTERNAL MEDICINE

## 2018-03-14 RX ORDER — PRIMIDONE 50 MG/1
TABLET ORAL
Qty: 30 TABLET | Refills: 2 | Status: SHIPPED | OUTPATIENT
Start: 2018-03-14 | End: 2018-03-15

## 2018-03-15 ENCOUNTER — OFFICE VISIT (OUTPATIENT)
Dept: PULMONOLOGY | Facility: CLINIC | Age: 78
End: 2018-03-15

## 2018-03-15 ENCOUNTER — HOSPITAL ENCOUNTER (OUTPATIENT)
Dept: GENERAL RADIOLOGY | Facility: HOSPITAL | Age: 78
Discharge: HOME | End: 2018-03-15
Attending: INTERNAL MEDICINE | Admitting: INTERNAL MEDICINE

## 2018-03-15 VITALS
OXYGEN SATURATION: 86 % | RESPIRATION RATE: 18 BRPM | DIASTOLIC BLOOD PRESSURE: 74 MMHG | BODY MASS INDEX: 26.63 KG/M2 | SYSTOLIC BLOOD PRESSURE: 116 MMHG | HEIGHT: 70 IN | HEART RATE: 53 BPM | WEIGHT: 186 LBS

## 2018-03-15 DIAGNOSIS — R93.89 ABNORMAL CT OF THE CHEST: ICD-10-CM

## 2018-03-15 DIAGNOSIS — J44.1 ACUTE EXACERBATION OF CHRONIC OBSTRUCTIVE PULMONARY DISEASE (COPD) (HCC): ICD-10-CM

## 2018-03-15 DIAGNOSIS — R09.02 HYPOXIA: ICD-10-CM

## 2018-03-15 DIAGNOSIS — J44.9 CHRONIC OBSTRUCTIVE PULMONARY DISEASE, UNSPECIFIED COPD TYPE (HCC): ICD-10-CM

## 2018-03-15 DIAGNOSIS — R06.02 SHORTNESS OF BREATH: ICD-10-CM

## 2018-03-15 DIAGNOSIS — R06.02 SHORTNESS OF BREATH: Primary | ICD-10-CM

## 2018-03-15 PROCEDURE — 71046 X-RAY EXAM CHEST 2 VIEWS: CPT

## 2018-03-15 PROCEDURE — 99214 OFFICE O/P EST MOD 30 MIN: CPT | Performed by: NURSE PRACTITIONER

## 2018-03-15 RX ORDER — PRIMIDONE 50 MG/1
50 TABLET ORAL DAILY
Qty: 0.5 TABLET | Refills: 4 | Status: SHIPPED | OUTPATIENT
Start: 2018-03-15 | End: 2018-06-25

## 2018-03-15 RX ORDER — BUDESONIDE AND FORMOTEROL FUMARATE DIHYDRATE 80; 4.5 UG/1; UG/1
2 AEROSOL RESPIRATORY (INHALATION)
Qty: 1 INHALER | Refills: 5 | Status: SHIPPED | OUTPATIENT
Start: 2018-03-15 | End: 2018-06-25 | Stop reason: SDUPTHER

## 2018-03-15 NOTE — PROGRESS NOTES
"Chief Complaint   Patient presents with   • Follow-up   • COPD   • Shortness of Breath         Subjective   Alessandro Mendiola is a 77 y.o. male.     History of Present Illness   The patient comes in today for follow-up of shortness of breath, COPD, and hypoxia.    He continues to be somewhat short of breath and he has a nonproductive cough.  He finished a round of antibiotics about 2 weeks ago by his PCP for congestion.  He was also given oral steroids which he is still taking but he states he is not sure if he can tell a difference.    He reports his PCP gave him a prescription of 10 mg prednisone and instructed him to take this medication every day.    He continues to use Symbicort and Tudorza as directed.  He uses his rescue inhaler 5-6 times a day and he has a nebulizer at home but does not use the nebulizer.    He also had a chest x-ray just prior to his visit today.    The following portions of the patient's history were reviewed and updated as appropriate: allergies, current medications, past family history, past medical history, past social history and past surgical history.    Review of Systems   HENT: Positive for rhinorrhea. Negative for sinus pressure, sneezing and sore throat.    Respiratory: Positive for shortness of breath. Negative for cough and wheezing.        Objective   Visit Vitals  /74   Pulse 53   Resp 18   Ht 177.8 cm (70\")   Wt 84.4 kg (186 lb)   SpO2 (!) 86% Comment: room air (rest)   BMI 26.69 kg/m²   O2:  95 % on 4LPM  Physical Exam   Constitutional: He is oriented to person, place, and time. He appears well-developed and well-nourished.   HENT:   Head: Normocephalic and atraumatic.   Dentures   Eyes: EOM are normal.   Neck: Neck supple.   Cardiovascular: Normal rate and regular rhythm.    Pulmonary/Chest: Effort normal. No respiratory distress.   Somewhat hyperresonant to percussion  Somewhat decreased A/E without wheezing noted.   Musculoskeletal: He exhibits no edema.   Gait slowed. "   Neurological: He is alert and oriented to person, place, and time.   Skin: Skin is warm and dry.   Psychiatric: He has a normal mood and affect.   Vitals reviewed.          Assessment/Plan   Alessandro was seen today for follow-up, copd and shortness of breath.    Diagnoses and all orders for this visit:    Shortness of breath    Chronic obstructive pulmonary disease, unspecified COPD type    Hypoxia    Abnormal CT of the chest    Other orders  -     budesonide-formoterol (SYMBICORT) 80-4.5 MCG/ACT inhaler; Inhale 2 puffs 2 (Two) Times a Day.  -     aclidinium bromide (TUDORZA PRESSAIR) 400 MCG/ACT aerosol powder  powder for inhalation; Inhale 1 puff 2 (Two) Times a Day.  -     primidone (MYSOLINE) 50 MG tablet; Take 1 tablet by mouth Daily.           Return in about 3 months (around 6/15/2018) for Recheck, For Dr. Wilson.    DISCUSSION (if any):   I reviewed the chest x-ray images from today with the patient.  It appears that the right basilar pneumonia from December 21, 2017 has resolved and there is currently no acute process.  He does have underlying emphysema.    Since this chest x-ray did not show any unresolved issues I will not reorder a CT of the chest at this time.      I have asked him to continue Symbicort and Tudorza as directed.     I did not make any changes to the 10 mg prednisone that he is taking on a daily basis that was prescribed by his PCP.  He has only been taking this regimen for a couple of weeks so we will see if he feels any benefit from the steroids.      Dictated utilizing Dragon dictation.    This document was electronically signed by DAVID Slater March 15, 2018  1:51 PM

## 2018-03-21 ENCOUNTER — CLINICAL SUPPORT NO REQUIREMENTS (OUTPATIENT)
Dept: CARDIOLOGY | Facility: CLINIC | Age: 78
End: 2018-03-21

## 2018-03-21 DIAGNOSIS — I49.5 SICK SINUS SYNDROME (HCC): Primary | ICD-10-CM

## 2018-04-06 ENCOUNTER — TELEPHONE (OUTPATIENT)
Dept: CARDIOLOGY | Facility: CLINIC | Age: 78
End: 2018-04-06

## 2018-04-06 NOTE — TELEPHONE ENCOUNTER
Patient wife called and stated that patient was not feeling well and is sob and feels like he could collapse. Advised he if he is feeling as if he is going to collapse he needs to go to ER. Patients wife stated he has felt this way for a couple weeks. Informed her if he doesn't want to go to ER see if he can see PCP today as he may be out of rhythm or have increased fluid. She will call pcp and call back if they can not get patient into see him.

## 2018-04-16 RX ORDER — IPRATROPIUM/ALBUTEROL SULFATE 20-100 MCG
MIST INHALER (GRAM) INHALATION
Qty: 4 G | Refills: 5 | Status: SHIPPED | OUTPATIENT
Start: 2018-04-16 | End: 2019-01-01

## 2018-04-28 ENCOUNTER — HOSPITAL ENCOUNTER (EMERGENCY)
Facility: HOSPITAL | Age: 78
Discharge: HOME | End: 2018-04-28
Attending: EMERGENCY MEDICINE | Admitting: EMERGENCY MEDICINE

## 2018-04-28 ENCOUNTER — APPOINTMENT (OUTPATIENT)
Dept: GENERAL RADIOLOGY | Facility: HOSPITAL | Age: 78
End: 2018-04-28

## 2018-04-28 VITALS
HEART RATE: 60 BPM | BODY MASS INDEX: 25.2 KG/M2 | DIASTOLIC BLOOD PRESSURE: 45 MMHG | WEIGHT: 180 LBS | OXYGEN SATURATION: 97 % | TEMPERATURE: 98 F | RESPIRATION RATE: 18 BRPM | HEIGHT: 71 IN | SYSTOLIC BLOOD PRESSURE: 114 MMHG

## 2018-04-28 DIAGNOSIS — J18.9 PNEUMONIA OF LEFT LOWER LOBE DUE TO INFECTIOUS ORGANISM: Primary | ICD-10-CM

## 2018-04-28 LAB
ALBUMIN SERPL-MCNC: 3.7 G/DL (ref 3.5–5)
ALBUMIN/GLOB SERPL: 1.2 G/DL (ref 1–2)
ALP SERPL-CCNC: 103 U/L (ref 38–126)
ALT SERPL W P-5'-P-CCNC: 27 U/L (ref 13–69)
ANION GAP SERPL CALCULATED.3IONS-SCNC: 13.7 MMOL/L (ref 10–20)
ARTERIAL PATENCY WRIST A: POSITIVE
AST SERPL-CCNC: 17 U/L (ref 15–46)
BASE EXCESS BLDA CALC-SCNC: 8 MMOL/L
BASOPHILS # BLD AUTO: 0.04 10*3/MM3 (ref 0–0.2)
BASOPHILS NFR BLD AUTO: 0.3 % (ref 0–2.5)
BDY SITE: ABNORMAL
BILIRUB SERPL-MCNC: 0.5 MG/DL (ref 0.2–1.3)
BUN BLD-MCNC: 13 MG/DL (ref 7–20)
BUN/CREAT SERPL: 16.3 (ref 6.3–21.9)
CALCIUM SPEC-SCNC: 8.9 MG/DL (ref 8.4–10.2)
CHLORIDE SERPL-SCNC: 99 MMOL/L (ref 98–107)
CO2 SERPL-SCNC: 36 MMOL/L (ref 26–30)
COHGB MFR BLD: 0.6 %
CREAT BLD-MCNC: 0.8 MG/DL (ref 0.6–1.3)
DEPRECATED RDW RBC AUTO: 42.2 FL (ref 37–54)
EOSINOPHIL # BLD AUTO: 0.04 10*3/MM3 (ref 0–0.7)
EOSINOPHIL NFR BLD AUTO: 0.3 % (ref 0–7)
ERYTHROCYTE [DISTWIDTH] IN BLOOD BY AUTOMATED COUNT: 13.1 % (ref 11.5–14.5)
GFR SERPL CREATININE-BSD FRML MDRD: 94 ML/MIN/1.73
GLOBULIN UR ELPH-MCNC: 3.1 GM/DL
GLUCOSE BLD-MCNC: 124 MG/DL (ref 74–98)
HCO3 BLDA-SCNC: 32.9 MMOL/L (ref 22–28)
HCT VFR BLD AUTO: 38.6 % (ref 42–52)
HGB BLD-MCNC: 11.9 G/DL (ref 14–18)
HGB BLDA-MCNC: 11.7 G/DL (ref 12–18)
HOLD SPECIMEN: NORMAL
HOLD SPECIMEN: NORMAL
HOROWITZ INDEX BLD+IHG-RTO: 40 %
IMM GRANULOCYTES # BLD: 0.05 10*3/MM3 (ref 0–0.06)
IMM GRANULOCYTES NFR BLD: 0.4 % (ref 0–0.6)
LYMPHOCYTES # BLD AUTO: 1.17 10*3/MM3 (ref 0.6–3.4)
LYMPHOCYTES NFR BLD AUTO: 9.6 % (ref 10–50)
MCH RBC QN AUTO: 27 PG (ref 27–31)
MCHC RBC AUTO-ENTMCNC: 30.8 G/DL (ref 30–37)
MCV RBC AUTO: 87.7 FL (ref 80–94)
METHGB BLD QL: 1.1 %
MODALITY: ABNORMAL
MONOCYTES # BLD AUTO: 1.14 10*3/MM3 (ref 0–0.9)
MONOCYTES NFR BLD AUTO: 9.4 % (ref 0–12)
NEUTROPHILS # BLD AUTO: 9.73 10*3/MM3 (ref 2–6.9)
NEUTROPHILS NFR BLD AUTO: 80 % (ref 37–80)
NRBC BLD MANUAL-RTO: 0 /100 WBC (ref 0–0)
NT-PROBNP SERPL-MCNC: 270 PG/ML (ref 0–450)
OXYHGB MFR BLDV: 97.6 % (ref 94–99)
PCO2 BLDA: 53.7 MM HG (ref 35–45)
PCO2 TEMP ADJ BLD: ABNORMAL MM HG (ref 35–48)
PH BLDA: 7.4 PH UNITS (ref 7.3–7.5)
PH, TEMP CORRECTED: ABNORMAL PH UNITS
PLATELET # BLD AUTO: 155 10*3/MM3 (ref 130–400)
PMV BLD AUTO: 9.5 FL (ref 6–12)
PO2 BLDA: 137 MM HG (ref 75–100)
PO2 TEMP ADJ BLD: ABNORMAL MM HG (ref 83–108)
POTASSIUM BLD-SCNC: 3.7 MMOL/L (ref 3.5–5.1)
PROT SERPL-MCNC: 6.8 G/DL (ref 6.3–8.2)
RBC # BLD AUTO: 4.4 10*6/MM3 (ref 4.7–6.1)
SAO2 % BLDCOA: 99.3 %
SODIUM BLD-SCNC: 145 MMOL/L (ref 137–145)
TROPONIN I SERPL-MCNC: <0.012 NG/ML (ref 0–0.03)
WBC NRBC COR # BLD: 12.17 10*3/MM3 (ref 4.8–10.8)
WHOLE BLOOD HOLD SPECIMEN: NORMAL
WHOLE BLOOD HOLD SPECIMEN: NORMAL

## 2018-04-28 PROCEDURE — 94640 AIRWAY INHALATION TREATMENT: CPT

## 2018-04-28 PROCEDURE — 85025 COMPLETE CBC W/AUTO DIFF WBC: CPT

## 2018-04-28 PROCEDURE — 80053 COMPREHEN METABOLIC PANEL: CPT

## 2018-04-28 PROCEDURE — 99283 EMERGENCY DEPT VISIT LOW MDM: CPT

## 2018-04-28 PROCEDURE — 82375 ASSAY CARBOXYHB QUANT: CPT

## 2018-04-28 PROCEDURE — 25010000002 METHYLPREDNISOLONE PER 125 MG: Performed by: EMERGENCY MEDICINE

## 2018-04-28 PROCEDURE — 36600 WITHDRAWAL OF ARTERIAL BLOOD: CPT

## 2018-04-28 PROCEDURE — 96365 THER/PROPH/DIAG IV INF INIT: CPT

## 2018-04-28 PROCEDURE — 25010000002 MAGNESIUM SULFATE 2 GM/50ML SOLUTION: Performed by: EMERGENCY MEDICINE

## 2018-04-28 PROCEDURE — 84484 ASSAY OF TROPONIN QUANT: CPT

## 2018-04-28 PROCEDURE — 93005 ELECTROCARDIOGRAM TRACING: CPT

## 2018-04-28 PROCEDURE — 83050 HGB METHEMOGLOBIN QUAN: CPT

## 2018-04-28 PROCEDURE — 96375 TX/PRO/DX INJ NEW DRUG ADDON: CPT

## 2018-04-28 PROCEDURE — 71046 X-RAY EXAM CHEST 2 VIEWS: CPT

## 2018-04-28 PROCEDURE — 83880 ASSAY OF NATRIURETIC PEPTIDE: CPT

## 2018-04-28 PROCEDURE — 94799 UNLISTED PULMONARY SVC/PX: CPT

## 2018-04-28 PROCEDURE — 82805 BLOOD GASES W/O2 SATURATION: CPT

## 2018-04-28 RX ORDER — MAGNESIUM SULFATE HEPTAHYDRATE 40 MG/ML
2 INJECTION, SOLUTION INTRAVENOUS ONCE
Status: COMPLETED | OUTPATIENT
Start: 2018-04-28 | End: 2018-04-28

## 2018-04-28 RX ORDER — METHYLPREDNISOLONE SODIUM SUCCINATE 125 MG/2ML
125 INJECTION, POWDER, LYOPHILIZED, FOR SOLUTION INTRAMUSCULAR; INTRAVENOUS ONCE
Status: COMPLETED | OUTPATIENT
Start: 2018-04-28 | End: 2018-04-28

## 2018-04-28 RX ORDER — LEVOFLOXACIN 750 MG/1
750 TABLET ORAL DAILY
Qty: 5 TABLET | Refills: 0 | Status: SHIPPED | OUTPATIENT
Start: 2018-04-28 | End: 2018-05-03

## 2018-04-28 RX ORDER — IPRATROPIUM BROMIDE AND ALBUTEROL SULFATE 2.5; .5 MG/3ML; MG/3ML
3 SOLUTION RESPIRATORY (INHALATION) ONCE
Status: COMPLETED | OUTPATIENT
Start: 2018-04-28 | End: 2018-04-28

## 2018-04-28 RX ORDER — SODIUM CHLORIDE 0.9 % (FLUSH) 0.9 %
10 SYRINGE (ML) INJECTION AS NEEDED
Status: DISCONTINUED | OUTPATIENT
Start: 2018-04-28 | End: 2018-04-28 | Stop reason: HOSPADM

## 2018-04-28 RX ADMIN — IPRATROPIUM BROMIDE AND ALBUTEROL SULFATE 6 ML: .5; 3 SOLUTION RESPIRATORY (INHALATION) at 12:56

## 2018-04-28 RX ADMIN — MAGNESIUM SULFATE IN WATER 2 G: 40 INJECTION, SOLUTION INTRAVENOUS at 13:12

## 2018-04-28 RX ADMIN — METHYLPREDNISOLONE SODIUM SUCCINATE 125 MG: 125 INJECTION, POWDER, FOR SOLUTION INTRAMUSCULAR; INTRAVENOUS at 13:11

## 2018-05-14 RX ORDER — ALBUTEROL SULFATE 90 UG/1
AEROSOL, METERED RESPIRATORY (INHALATION)
Qty: 18 G | Refills: 2 | Status: SHIPPED | OUTPATIENT
Start: 2018-05-14 | End: 2018-08-23 | Stop reason: SDUPTHER

## 2018-06-25 ENCOUNTER — OFFICE VISIT (OUTPATIENT)
Dept: PULMONOLOGY | Facility: CLINIC | Age: 78
End: 2018-06-25

## 2018-06-25 VITALS
BODY MASS INDEX: 25.23 KG/M2 | HEART RATE: 67 BPM | SYSTOLIC BLOOD PRESSURE: 116 MMHG | RESPIRATION RATE: 16 BRPM | WEIGHT: 180.2 LBS | DIASTOLIC BLOOD PRESSURE: 70 MMHG | HEIGHT: 71 IN | OXYGEN SATURATION: 85 %

## 2018-06-25 DIAGNOSIS — J44.9 CHRONIC OBSTRUCTIVE PULMONARY DISEASE, UNSPECIFIED COPD TYPE (HCC): ICD-10-CM

## 2018-06-25 DIAGNOSIS — R25.1 OCCASIONAL TREMORS: ICD-10-CM

## 2018-06-25 DIAGNOSIS — J96.11 CHRONIC RESPIRATORY FAILURE WITH HYPOXIA AND HYPERCAPNIA (HCC): ICD-10-CM

## 2018-06-25 DIAGNOSIS — J96.10 CHRONIC RESPIRATORY FAILURE (HCC): ICD-10-CM

## 2018-06-25 DIAGNOSIS — R06.02 SHORTNESS OF BREATH: Primary | ICD-10-CM

## 2018-06-25 DIAGNOSIS — R09.02 HYPOXIA: ICD-10-CM

## 2018-06-25 DIAGNOSIS — J96.12 CHRONIC RESPIRATORY FAILURE WITH HYPOXIA AND HYPERCAPNIA (HCC): ICD-10-CM

## 2018-06-25 PROCEDURE — 99214 OFFICE O/P EST MOD 30 MIN: CPT | Performed by: INTERNAL MEDICINE

## 2018-06-25 RX ORDER — BUDESONIDE AND FORMOTEROL FUMARATE DIHYDRATE 80; 4.5 UG/1; UG/1
2 AEROSOL RESPIRATORY (INHALATION)
Qty: 1 INHALER | Refills: 5 | Status: SHIPPED | OUTPATIENT
Start: 2018-06-25 | End: 2018-08-30 | Stop reason: SDUPTHER

## 2018-06-25 RX ORDER — ROFLUMILAST 250 UG/1
1 TABLET ORAL DAILY
Qty: 30 TABLET | Refills: 5 | Status: SHIPPED | OUTPATIENT
Start: 2018-06-25 | End: 2018-08-30

## 2018-06-25 RX ORDER — PRIMIDONE 50 MG/1
50 TABLET ORAL 2 TIMES DAILY
Qty: 60 TABLET | Refills: 5 | Status: SHIPPED | OUTPATIENT
Start: 2018-06-25 | End: 2019-01-01 | Stop reason: SDUPTHER

## 2018-06-25 NOTE — PROGRESS NOTES
"Chief Complaint   Patient presents with   • Follow-up   • Shortness of Breath         Subjective   Alessandro Mendiola is a 78 y.o. male.     History of Present Illness   Patient comes today for follow up of shortness of breath. Patient says that the symptoms have been worsening since the last clinic visit.     Patient does report emergency room visits and 2 other recent exacerbations.     Patient is using the inhalers and nebulizers, mostly as prescribed. Exercise tolerance has also progressively worsened. He is using Tudorza only 3-4 times per week but uses Symbicort upto 3-4 times per day.     The patient is using oxygen as prescribed, 24/7.    He continues to complain of tremors and was only using primidone once a day?.    The following portions of the patient's history were reviewed and updated as appropriate: allergies, current medications, past family history, past medical history, past social history and past surgical history.    Review of Systems   HENT: Negative for sinus pressure, sneezing and sore throat.    Respiratory: Positive for cough and shortness of breath. Negative for wheezing.        Objective   Visit Vitals  /70   Pulse 67   Resp 16   Ht 180.3 cm (70.98\")   Wt 81.7 kg (180 lb 3.2 oz)   SpO2 (!) 85% Comment: on room air (rest)   BMI 25.14 kg/m²   O2: 95 % on  3 lpm     Physical Exam   Constitutional: He is oriented to person, place, and time. He appears well-developed and well-nourished.   HENT:   Head: Atraumatic.   Dentures noted.    Eyes: EOM are normal.   Neck: Neck supple.   Cardiovascular: Normal rate and regular rhythm.    Pulmonary/Chest: Effort normal. No respiratory distress.   Somewhat hyperresonant to percussion  Somewhat decreased A/E with out wheezing noted.   Musculoskeletal: He exhibits no edema.   Neurological: He is alert and oriented to person, place, and time.   Skin: Skin is warm and dry.   Psychiatric: He has a normal mood and affect.   Vitals " reviewed.      Assessment/Plan   Alessandro was seen today for follow-up and shortness of breath.    Diagnoses and all orders for this visit:    Shortness of breath    Chronic obstructive pulmonary disease, unspecified COPD type    Occasional tremors    Hypoxia    Chronic respiratory failure with hypoxia and hypercapnia    Chronic respiratory failure    Other orders  -     primidone (MYSOLINE) 50 MG tablet; Take 1 tablet by mouth 2 (Two) Times a Day.  -     budesonide-formoterol (SYMBICORT) 80-4.5 MCG/ACT inhaler; Inhale 2 puffs 2 (Two) Times a Day.  -     aclidinium bromide (TUDORZA PRESSAIR) 400 MCG/ACT aerosol powder  powder for inhalation; Inhale 1 puff 2 (Two) Times a Day.  -     Roflumilast (DALIRESP) 250 MCG tablet; Take 1 tablet by mouth Daily.  -     DME NIPPV           Return in about 3 months (around 9/25/2018) for Recheck, For Katia.    DISCUSSION (if any):  I reviewed the patient's last PFTs from March 2017 which confirms severe COPD.  I also reviewed his last emergency room records that listed pneumonia and prescription of Levaquin.  ABG performed at that time showed 7.40/54/137    The patient is not using his Tudorza and I have asked him to start using it twice a day rather then 3-4 times a week?    He was advised to use Symbicort twice a day only and not more than that as it can cause increased side effects if more than twice a day dosing is used.    Since he has had at least 2-3 recent exacerbations of severe COPD, along with one to 2 episodes of pneumonia requiring antibiotics, I have added Daliresp 250 µg once daily.  Side effects were discussed in great detail.    Daliresp can be increased to 500, if he tolerates to 250 mg dose.    The patient will need to use the noninvasive ventilator for nocturnal and daytime use. Due to severity of the condition, BiPAP alone would be insufficient.  Severe COPD seems to be the primary cause of chronic respiratory failure in this patient requiring noninvasive  ventilator. There maybe serious detriment to the patient's health, including the possibility of recurrent exacerbations, worsening symptoms/respiratory status etc., if noninvasive ventilator is not used.    Vt: 450 ml; Rate: 14/min; PS min: 10; PS Max: 25; EPAP min: 6; EPAP Max: 14.  Max Pressure: 25.  3LPM oxygen bled in.  Heated humidifier.  Use during sleep.    Please send me compliance in 4-5 weeks  Adjustment can be made for patient comfort.  Minimum target tidal volume is 450 ml. This can be further adjusted for patient's comfort, but I would advise against providing less than 400 mls.    Repeat ABG can be considered upon follow-up visit.    I've asked him to use primidone 50 mg twice daily rather than once a day for tremors and also to discuss other treatment options with his primary care provider.        Dictated utilizing Dragon dictation.    This document was electronically signed by Anna Wilson MD June 25, 2018  4:07 PM

## 2018-06-26 ENCOUNTER — CLINICAL SUPPORT NO REQUIREMENTS (OUTPATIENT)
Dept: CARDIOLOGY | Facility: CLINIC | Age: 78
End: 2018-06-26

## 2018-06-26 DIAGNOSIS — I48.0 PAROXYSMAL ATRIAL FIBRILLATION (HCC): ICD-10-CM

## 2018-06-26 DIAGNOSIS — I49.5 SICK SINUS SYNDROME (HCC): ICD-10-CM

## 2018-06-26 PROCEDURE — 93296 REM INTERROG EVL PM/IDS: CPT | Performed by: INTERNAL MEDICINE

## 2018-06-26 PROCEDURE — 93294 REM INTERROG EVL PM/LDLS PM: CPT | Performed by: INTERNAL MEDICINE

## 2018-07-17 RX ORDER — DRONEDARONE 400 MG/1
TABLET, FILM COATED ORAL
Qty: 180 TABLET | Refills: 1 | Status: SHIPPED | OUTPATIENT
Start: 2018-07-17 | End: 2019-01-01 | Stop reason: SDUPTHER

## 2018-07-30 ENCOUNTER — HOSPITAL ENCOUNTER (INPATIENT)
Facility: HOSPITAL | Age: 78
LOS: 2 days | Discharge: HOME | End: 2018-08-01
Attending: STUDENT IN AN ORGANIZED HEALTH CARE EDUCATION/TRAINING PROGRAM | Admitting: INTERNAL MEDICINE

## 2018-07-30 ENCOUNTER — APPOINTMENT (OUTPATIENT)
Dept: GENERAL RADIOLOGY | Facility: HOSPITAL | Age: 78
End: 2018-07-30

## 2018-07-30 DIAGNOSIS — R06.03 RESPIRATORY DISTRESS: ICD-10-CM

## 2018-07-30 DIAGNOSIS — R09.02 HYPOXIA: ICD-10-CM

## 2018-07-30 DIAGNOSIS — J18.9 PNEUMONIA OF RIGHT MIDDLE LOBE DUE TO INFECTIOUS ORGANISM: Primary | ICD-10-CM

## 2018-07-30 DIAGNOSIS — J41.8 MIXED SIMPLE AND MUCOPURULENT CHRONIC BRONCHITIS (HCC): ICD-10-CM

## 2018-07-30 LAB
ALBUMIN SERPL-MCNC: 3.8 G/DL (ref 3.5–5)
ALBUMIN/GLOB SERPL: 1.3 G/DL (ref 1–2)
ALP SERPL-CCNC: 125 U/L (ref 38–126)
ALT SERPL W P-5'-P-CCNC: 28 U/L (ref 13–69)
ANION GAP SERPL CALCULATED.3IONS-SCNC: 11.5 MMOL/L (ref 10–20)
ARTERIAL PATENCY WRIST A: ABNORMAL
AST SERPL-CCNC: 19 U/L (ref 15–46)
ATMOSPHERIC PRESS: 734 MMHG
BASE EXCESS BLDA CALC-SCNC: 6.2 MMOL/L (ref 0–2)
BASOPHILS # BLD AUTO: 0.03 10*3/MM3 (ref 0–0.2)
BASOPHILS NFR BLD AUTO: 0.2 % (ref 0–2.5)
BDY SITE: ABNORMAL
BILIRUB SERPL-MCNC: 0.4 MG/DL (ref 0.2–1.3)
BUN BLD-MCNC: 14 MG/DL (ref 7–20)
BUN/CREAT SERPL: 20 (ref 6.3–21.9)
CALCIUM SPEC-SCNC: 9.2 MG/DL (ref 8.4–10.2)
CHLORIDE SERPL-SCNC: 94 MMOL/L (ref 98–107)
CO2 SERPL-SCNC: 34 MMOL/L (ref 26–30)
COHGB MFR BLD: 0.9 % (ref 0–2)
CREAT BLD-MCNC: 0.7 MG/DL (ref 0.6–1.3)
D-LACTATE SERPL-SCNC: 1.3 MMOL/L (ref 0.5–2)
DEPRECATED RDW RBC AUTO: 42.7 FL (ref 37–54)
EOSINOPHIL # BLD AUTO: 0.01 10*3/MM3 (ref 0–0.7)
EOSINOPHIL NFR BLD AUTO: 0.1 % (ref 0–7)
ERYTHROCYTE [DISTWIDTH] IN BLOOD BY AUTOMATED COUNT: 13.9 % (ref 11.5–14.5)
GFR SERPL CREATININE-BSD FRML MDRD: 110 ML/MIN/1.73
GLOBULIN UR ELPH-MCNC: 2.9 GM/DL
GLUCOSE BLD-MCNC: 358 MG/DL (ref 74–98)
GLUCOSE BLDC GLUCOMTR-MCNC: 310 MG/DL (ref 70–130)
HCO3 BLDA-SCNC: 32.4 MMOL/L (ref 22–28)
HCT VFR BLD AUTO: 38 % (ref 42–52)
HCT VFR BLD CALC: 36 %
HGB BLD-MCNC: 11.6 G/DL (ref 14–18)
HGB BLDA-MCNC: 11.7 G/DL (ref 12–18)
HOLD SPECIMEN: NORMAL
HOLD SPECIMEN: NORMAL
HOROWITZ INDEX BLD+IHG-RTO: 40 %
IMM GRANULOCYTES # BLD: 0.1 10*3/MM3 (ref 0–0.06)
IMM GRANULOCYTES NFR BLD: 0.6 % (ref 0–0.6)
LYMPHOCYTES # BLD AUTO: 0.61 10*3/MM3 (ref 0.6–3.4)
LYMPHOCYTES NFR BLD AUTO: 3.7 % (ref 10–50)
Lab: ABNORMAL
MCH RBC QN AUTO: 25.6 PG (ref 27–31)
MCHC RBC AUTO-ENTMCNC: 30.5 G/DL (ref 30–37)
MCV RBC AUTO: 83.9 FL (ref 80–94)
METHGB BLD QL: 1 % (ref 0–1.5)
MODALITY: ABNORMAL
MONOCYTES # BLD AUTO: 1.39 10*3/MM3 (ref 0–0.9)
MONOCYTES NFR BLD AUTO: 8.5 % (ref 0–12)
NEUTROPHILS # BLD AUTO: 14.14 10*3/MM3 (ref 2–6.9)
NEUTROPHILS NFR BLD AUTO: 86.9 % (ref 37–80)
NRBC BLD MANUAL-RTO: 0 /100 WBC (ref 0–0)
NT-PROBNP SERPL-MCNC: 352 PG/ML (ref 0–450)
OXYHGB MFR BLDV: 93.9 % (ref 94–99)
PCO2 BLDA: 53.3 MM HG (ref 35–45)
PCO2 TEMP ADJ BLD: ABNORMAL MM HG (ref 35–48)
PH BLDA: 7.39 PH UNITS (ref 7.3–7.5)
PH, TEMP CORRECTED: ABNORMAL PH UNITS
PLATELET # BLD AUTO: 228 10*3/MM3 (ref 130–400)
PMV BLD AUTO: 10.8 FL (ref 6–12)
PO2 BLDA: 79.3 MM HG (ref 75–100)
PO2 TEMP ADJ BLD: ABNORMAL MM HG (ref 83–108)
POTASSIUM BLD-SCNC: 3.5 MMOL/L (ref 3.5–5.1)
PROT SERPL-MCNC: 6.7 G/DL (ref 6.3–8.2)
RBC # BLD AUTO: 4.53 10*6/MM3 (ref 4.7–6.1)
SAO2 % BLDCOA: 95.7 % (ref 94–100)
SODIUM BLD-SCNC: 136 MMOL/L (ref 137–145)
TROPONIN I SERPL-MCNC: <0.012 NG/ML (ref 0–0.03)
TROPONIN I SERPL-MCNC: <0.012 NG/ML (ref 0–0.03)
VENTILATOR MODE: ABNORMAL
WBC NRBC COR # BLD: 16.28 10*3/MM3 (ref 4.8–10.8)
WHOLE BLOOD HOLD SPECIMEN: NORMAL
WHOLE BLOOD HOLD SPECIMEN: NORMAL

## 2018-07-30 PROCEDURE — 71045 X-RAY EXAM CHEST 1 VIEW: CPT

## 2018-07-30 PROCEDURE — 84484 ASSAY OF TROPONIN QUANT: CPT | Performed by: INTERNAL MEDICINE

## 2018-07-30 PROCEDURE — 82375 ASSAY CARBOXYHB QUANT: CPT

## 2018-07-30 PROCEDURE — 82805 BLOOD GASES W/O2 SATURATION: CPT

## 2018-07-30 PROCEDURE — 84484 ASSAY OF TROPONIN QUANT: CPT

## 2018-07-30 PROCEDURE — 25810000003 SODIUM CHLORIDE 0.9 % WITH KCL 20 MEQ 20-0.9 MEQ/L-% SOLUTION: Performed by: INTERNAL MEDICINE

## 2018-07-30 PROCEDURE — 94799 UNLISTED PULMONARY SVC/PX: CPT

## 2018-07-30 PROCEDURE — 36600 WITHDRAWAL OF ARTERIAL BLOOD: CPT

## 2018-07-30 PROCEDURE — G0378 HOSPITAL OBSERVATION PER HR: HCPCS

## 2018-07-30 PROCEDURE — 94760 N-INVAS EAR/PLS OXIMETRY 1: CPT

## 2018-07-30 PROCEDURE — 99223 1ST HOSP IP/OBS HIGH 75: CPT | Performed by: INTERNAL MEDICINE

## 2018-07-30 PROCEDURE — 83050 HGB METHEMOGLOBIN QUAN: CPT

## 2018-07-30 PROCEDURE — 80053 COMPREHEN METABOLIC PANEL: CPT

## 2018-07-30 PROCEDURE — 83605 ASSAY OF LACTIC ACID: CPT | Performed by: INTERNAL MEDICINE

## 2018-07-30 PROCEDURE — 83880 ASSAY OF NATRIURETIC PEPTIDE: CPT

## 2018-07-30 PROCEDURE — 25010000002 CEFTRIAXONE PER 250 MG: Performed by: PHYSICIAN ASSISTANT

## 2018-07-30 PROCEDURE — 85025 COMPLETE CBC W/AUTO DIFF WBC: CPT

## 2018-07-30 PROCEDURE — 94640 AIRWAY INHALATION TREATMENT: CPT

## 2018-07-30 PROCEDURE — 93005 ELECTROCARDIOGRAM TRACING: CPT

## 2018-07-30 PROCEDURE — 25010000002 AZITHROMYCIN: Performed by: PHYSICIAN ASSISTANT

## 2018-07-30 PROCEDURE — 82962 GLUCOSE BLOOD TEST: CPT

## 2018-07-30 PROCEDURE — 87040 BLOOD CULTURE FOR BACTERIA: CPT | Performed by: PHYSICIAN ASSISTANT

## 2018-07-30 PROCEDURE — 99284 EMERGENCY DEPT VISIT MOD MDM: CPT

## 2018-07-30 PROCEDURE — 25010000002 METHYLPREDNISOLONE PER 40 MG: Performed by: INTERNAL MEDICINE

## 2018-07-30 RX ORDER — PRIMIDONE 50 MG/1
50 TABLET ORAL 2 TIMES DAILY
Status: DISCONTINUED | OUTPATIENT
Start: 2018-07-30 | End: 2018-08-01 | Stop reason: HOSPADM

## 2018-07-30 RX ORDER — HYDROCODONE BITARTRATE AND ACETAMINOPHEN 5; 325 MG/1; MG/1
1 TABLET ORAL EVERY 6 HOURS PRN
Status: DISCONTINUED | OUTPATIENT
Start: 2018-07-30 | End: 2018-08-01 | Stop reason: HOSPADM

## 2018-07-30 RX ORDER — GUAIFENESIN 600 MG/1
1200 TABLET, EXTENDED RELEASE ORAL EVERY 12 HOURS SCHEDULED
Status: DISCONTINUED | OUTPATIENT
Start: 2018-07-30 | End: 2018-08-01 | Stop reason: HOSPADM

## 2018-07-30 RX ORDER — DILTIAZEM HYDROCHLORIDE 180 MG/1
180 CAPSULE, COATED, EXTENDED RELEASE ORAL DAILY
Status: DISCONTINUED | OUTPATIENT
Start: 2018-07-31 | End: 2018-08-01 | Stop reason: HOSPADM

## 2018-07-30 RX ORDER — SODIUM CHLORIDE 0.9 % (FLUSH) 0.9 %
1-10 SYRINGE (ML) INJECTION AS NEEDED
Status: DISCONTINUED | OUTPATIENT
Start: 2018-07-30 | End: 2018-08-01 | Stop reason: HOSPADM

## 2018-07-30 RX ORDER — IPRATROPIUM BROMIDE AND ALBUTEROL SULFATE 2.5; .5 MG/3ML; MG/3ML
3 SOLUTION RESPIRATORY (INHALATION) ONCE
Status: COMPLETED | OUTPATIENT
Start: 2018-07-30 | End: 2018-07-30

## 2018-07-30 RX ORDER — PROMETHAZINE HYDROCHLORIDE 25 MG/ML
12.5 INJECTION, SOLUTION INTRAMUSCULAR; INTRAVENOUS EVERY 6 HOURS PRN
Status: DISCONTINUED | OUTPATIENT
Start: 2018-07-30 | End: 2018-08-01 | Stop reason: HOSPADM

## 2018-07-30 RX ORDER — DOCUSATE SODIUM 100 MG/1
100 CAPSULE, LIQUID FILLED ORAL 2 TIMES DAILY
Status: DISCONTINUED | OUTPATIENT
Start: 2018-07-30 | End: 2018-08-01 | Stop reason: HOSPADM

## 2018-07-30 RX ORDER — SODIUM CHLORIDE 0.9 % (FLUSH) 0.9 %
10 SYRINGE (ML) INJECTION AS NEEDED
Status: DISCONTINUED | OUTPATIENT
Start: 2018-07-30 | End: 2018-08-01 | Stop reason: HOSPADM

## 2018-07-30 RX ORDER — ACETAMINOPHEN 325 MG/1
650 TABLET ORAL EVERY 4 HOURS PRN
Status: DISCONTINUED | OUTPATIENT
Start: 2018-07-30 | End: 2018-08-01 | Stop reason: HOSPADM

## 2018-07-30 RX ORDER — MUPIROCIN CALCIUM 20 MG/G
CREAM TOPICAL EVERY 12 HOURS SCHEDULED
Status: DISCONTINUED | OUTPATIENT
Start: 2018-07-30 | End: 2018-08-01 | Stop reason: HOSPADM

## 2018-07-30 RX ORDER — SODIUM CHLORIDE AND POTASSIUM CHLORIDE 150; 900 MG/100ML; MG/100ML
100 INJECTION, SOLUTION INTRAVENOUS CONTINUOUS
Status: DISCONTINUED | OUTPATIENT
Start: 2018-07-30 | End: 2018-07-31

## 2018-07-30 RX ORDER — BISACODYL 10 MG
10 SUPPOSITORY, RECTAL RECTAL DAILY PRN
Status: DISCONTINUED | OUTPATIENT
Start: 2018-07-30 | End: 2018-08-01 | Stop reason: HOSPADM

## 2018-07-30 RX ORDER — MULTIPLE VITAMINS W/ MINERALS TAB 9MG-400MCG
1 TAB ORAL DAILY
Status: DISCONTINUED | OUTPATIENT
Start: 2018-07-31 | End: 2018-08-01 | Stop reason: HOSPADM

## 2018-07-30 RX ORDER — IPRATROPIUM BROMIDE AND ALBUTEROL SULFATE 2.5; .5 MG/3ML; MG/3ML
3 SOLUTION RESPIRATORY (INHALATION)
Status: DISCONTINUED | OUTPATIENT
Start: 2018-07-30 | End: 2018-08-01 | Stop reason: HOSPADM

## 2018-07-30 RX ORDER — SODIUM CHLORIDE FOR INHALATION 3 %
VIAL, NEBULIZER (ML) INHALATION
Status: COMPLETED
Start: 2018-07-30 | End: 2018-07-30

## 2018-07-30 RX ORDER — METHYLPREDNISOLONE SODIUM SUCCINATE 40 MG/ML
40 INJECTION, POWDER, LYOPHILIZED, FOR SOLUTION INTRAMUSCULAR; INTRAVENOUS EVERY 8 HOURS
Status: DISCONTINUED | OUTPATIENT
Start: 2018-07-30 | End: 2018-07-31

## 2018-07-30 RX ORDER — TAMSULOSIN HYDROCHLORIDE 0.4 MG/1
0.4 CAPSULE ORAL NIGHTLY
Status: DISCONTINUED | OUTPATIENT
Start: 2018-07-31 | End: 2018-08-01 | Stop reason: HOSPADM

## 2018-07-30 RX ADMIN — GUAIFENESIN 1200 MG: 600 TABLET, EXTENDED RELEASE ORAL at 20:25

## 2018-07-30 RX ADMIN — DRONEDARONE 400 MG: 400 TABLET, FILM COATED ORAL at 20:25

## 2018-07-30 RX ADMIN — IPRATROPIUM BROMIDE AND ALBUTEROL SULFATE 3 ML: .5; 3 SOLUTION RESPIRATORY (INHALATION) at 19:35

## 2018-07-30 RX ADMIN — METHYLPREDNISOLONE SODIUM SUCCINATE 40 MG: 40 INJECTION, POWDER, FOR SOLUTION INTRAMUSCULAR; INTRAVENOUS at 18:16

## 2018-07-30 RX ADMIN — PRIMIDONE 50 MG: 50 TABLET ORAL at 20:25

## 2018-07-30 RX ADMIN — SODIUM CHLORIDE 500 ML: 9 INJECTION, SOLUTION INTRAVENOUS at 16:25

## 2018-07-30 RX ADMIN — IPRATROPIUM BROMIDE AND ALBUTEROL SULFATE 3 ML: .5; 3 SOLUTION RESPIRATORY (INHALATION) at 14:52

## 2018-07-30 RX ADMIN — SODIUM CHLORIDE AND POTASSIUM CHLORIDE 100 ML/HR: 9; 1.49 INJECTION, SOLUTION INTRAVENOUS at 18:16

## 2018-07-30 RX ADMIN — DOXYCYCLINE 100 MG: 100 INJECTION, POWDER, LYOPHILIZED, FOR SOLUTION INTRAVENOUS at 18:16

## 2018-07-30 RX ADMIN — SODIUM CHLORIDE SOLN NEBU 3% 4 ML: 3 NEBU SOLN at 19:35

## 2018-07-30 RX ADMIN — CEFTRIAXONE 2 G: 2 INJECTION, SOLUTION INTRAVENOUS at 16:01

## 2018-07-30 RX ADMIN — RIVAROXABAN 20 MG: 10 TABLET, FILM COATED ORAL at 18:17

## 2018-07-30 RX ADMIN — MUPIROCIN 1 APPLICATION: 2 CREAM TOPICAL at 20:26

## 2018-07-30 RX ADMIN — AZITHROMYCIN 500 MG: 500 INJECTION, POWDER, LYOPHILIZED, FOR SOLUTION INTRAVENOUS at 16:26

## 2018-07-31 LAB
ALBUMIN SERPL-MCNC: 3.4 G/DL (ref 3.5–5)
ALBUMIN/GLOB SERPL: 1.2 G/DL (ref 1–2)
ALP SERPL-CCNC: 111 U/L (ref 38–126)
ALT SERPL W P-5'-P-CCNC: 27 U/L (ref 13–69)
ANION GAP SERPL CALCULATED.3IONS-SCNC: 8.8 MMOL/L (ref 10–20)
ARTERIAL PATENCY WRIST A: ABNORMAL
AST SERPL-CCNC: 18 U/L (ref 15–46)
ATMOSPHERIC PRESS: 732 MMHG
BASE EXCESS BLDA CALC-SCNC: 6.5 MMOL/L (ref 0–2)
BASOPHILS # BLD AUTO: 0.01 10*3/MM3 (ref 0–0.2)
BASOPHILS NFR BLD AUTO: 0.1 % (ref 0–2.5)
BDY SITE: ABNORMAL
BILIRUB SERPL-MCNC: 0.2 MG/DL (ref 0.2–1.3)
BUN BLD-MCNC: 14 MG/DL (ref 7–20)
BUN/CREAT SERPL: 23.3 (ref 6.3–21.9)
CALCIUM SPEC-SCNC: 8.9 MG/DL (ref 8.4–10.2)
CHLORIDE SERPL-SCNC: 100 MMOL/L (ref 98–107)
CO2 SERPL-SCNC: 34 MMOL/L (ref 26–30)
COHGB MFR BLD: 1.2 % (ref 0–2)
CREAT BLD-MCNC: 0.6 MG/DL (ref 0.6–1.3)
DEPRECATED RDW RBC AUTO: 42.6 FL (ref 37–54)
EOSINOPHIL # BLD AUTO: 0 10*3/MM3 (ref 0–0.7)
EOSINOPHIL NFR BLD AUTO: 0 % (ref 0–7)
ERYTHROCYTE [DISTWIDTH] IN BLOOD BY AUTOMATED COUNT: 14 % (ref 11.5–14.5)
GAS FLOW AIRWAY: 5 LPM
GFR SERPL CREATININE-BSD FRML MDRD: 131 ML/MIN/1.73
GLOBULIN UR ELPH-MCNC: 2.9 GM/DL
GLUCOSE BLD-MCNC: 235 MG/DL (ref 74–98)
HCO3 BLDA-SCNC: 32.8 MMOL/L (ref 22–28)
HCT VFR BLD AUTO: 33.8 % (ref 42–52)
HCT VFR BLD CALC: 34 %
HGB BLD-MCNC: 10.5 G/DL (ref 14–18)
HGB BLDA-MCNC: 11.1 G/DL (ref 12–18)
HOROWITZ INDEX BLD+IHG-RTO: 40 %
IMM GRANULOCYTES # BLD: 0.08 10*3/MM3 (ref 0–0.06)
IMM GRANULOCYTES NFR BLD: 0.7 % (ref 0–0.6)
LYMPHOCYTES # BLD AUTO: 0.34 10*3/MM3 (ref 0.6–3.4)
LYMPHOCYTES NFR BLD AUTO: 3 % (ref 10–50)
Lab: ABNORMAL
MAGNESIUM SERPL-MCNC: 1.8 MG/DL (ref 1.6–2.3)
MCH RBC QN AUTO: 25.9 PG (ref 27–31)
MCHC RBC AUTO-ENTMCNC: 31.1 G/DL (ref 30–37)
MCV RBC AUTO: 83.3 FL (ref 80–94)
METHGB BLD QL: 1.2 % (ref 0–1.5)
MODALITY: ABNORMAL
MONOCYTES # BLD AUTO: 0.23 10*3/MM3 (ref 0–0.9)
MONOCYTES NFR BLD AUTO: 2 % (ref 0–12)
NEUTROPHILS # BLD AUTO: 10.74 10*3/MM3 (ref 2–6.9)
NEUTROPHILS NFR BLD AUTO: 94.2 % (ref 37–80)
NRBC BLD MANUAL-RTO: 0 /100 WBC (ref 0–0)
OXYHGB MFR BLDV: 93.4 % (ref 94–99)
PCO2 BLDA: 54.9 MM HG (ref 35–45)
PCO2 TEMP ADJ BLD: ABNORMAL MM HG (ref 35–48)
PH BLDA: 7.38 PH UNITS (ref 7.3–7.5)
PH, TEMP CORRECTED: ABNORMAL PH UNITS
PLATELET # BLD AUTO: 209 10*3/MM3 (ref 130–400)
PMV BLD AUTO: 11.2 FL (ref 6–12)
PO2 BLDA: 77.3 MM HG (ref 75–100)
PO2 TEMP ADJ BLD: ABNORMAL MM HG (ref 83–108)
POTASSIUM BLD-SCNC: 4.8 MMOL/L (ref 3.5–5.1)
PROT SERPL-MCNC: 6.3 G/DL (ref 6.3–8.2)
RBC # BLD AUTO: 4.06 10*6/MM3 (ref 4.7–6.1)
SAO2 % BLDCOA: 95.7 % (ref 94–100)
SODIUM BLD-SCNC: 138 MMOL/L (ref 137–145)
TROPONIN I SERPL-MCNC: <0.012 NG/ML (ref 0–0.03)
TROPONIN I SERPL-MCNC: <0.012 NG/ML (ref 0–0.03)
VENTILATOR MODE: ABNORMAL
WBC NRBC COR # BLD: 11.4 10*3/MM3 (ref 4.8–10.8)

## 2018-07-31 PROCEDURE — 94799 UNLISTED PULMONARY SVC/PX: CPT

## 2018-07-31 PROCEDURE — 80053 COMPREHEN METABOLIC PANEL: CPT | Performed by: INTERNAL MEDICINE

## 2018-07-31 PROCEDURE — 83735 ASSAY OF MAGNESIUM: CPT | Performed by: INTERNAL MEDICINE

## 2018-07-31 PROCEDURE — 25010000002 METHYLPREDNISOLONE PER 40 MG: Performed by: INTERNAL MEDICINE

## 2018-07-31 PROCEDURE — 36600 WITHDRAWAL OF ARTERIAL BLOOD: CPT

## 2018-07-31 PROCEDURE — 63710000001 DIPHENHYDRAMINE PER 50 MG: Performed by: FAMILY MEDICINE

## 2018-07-31 PROCEDURE — 87070 CULTURE OTHR SPECIMN AEROBIC: CPT | Performed by: INTERNAL MEDICINE

## 2018-07-31 PROCEDURE — 25010000002 METHYLPREDNISOLONE PER 80 MG: Performed by: INTERNAL MEDICINE

## 2018-07-31 PROCEDURE — 82375 ASSAY CARBOXYHB QUANT: CPT

## 2018-07-31 PROCEDURE — 87449 NOS EACH ORGANISM AG IA: CPT | Performed by: INTERNAL MEDICINE

## 2018-07-31 PROCEDURE — 84484 ASSAY OF TROPONIN QUANT: CPT | Performed by: INTERNAL MEDICINE

## 2018-07-31 PROCEDURE — 82805 BLOOD GASES W/O2 SATURATION: CPT

## 2018-07-31 PROCEDURE — 83050 HGB METHEMOGLOBIN QUAN: CPT

## 2018-07-31 PROCEDURE — 87205 SMEAR GRAM STAIN: CPT | Performed by: INTERNAL MEDICINE

## 2018-07-31 PROCEDURE — 86738 MYCOPLASMA ANTIBODY: CPT | Performed by: INTERNAL MEDICINE

## 2018-07-31 PROCEDURE — 99233 SBSQ HOSP IP/OBS HIGH 50: CPT | Performed by: INTERNAL MEDICINE

## 2018-07-31 PROCEDURE — 94660 CPAP INITIATION&MGMT: CPT

## 2018-07-31 PROCEDURE — 25010000002 PIPERACILLIN SOD-TAZOBACTAM PER 1 G: Performed by: INTERNAL MEDICINE

## 2018-07-31 PROCEDURE — 87899 AGENT NOS ASSAY W/OPTIC: CPT | Performed by: INTERNAL MEDICINE

## 2018-07-31 PROCEDURE — 99223 1ST HOSP IP/OBS HIGH 75: CPT | Performed by: INTERNAL MEDICINE

## 2018-07-31 PROCEDURE — 85025 COMPLETE CBC W/AUTO DIFF WBC: CPT | Performed by: INTERNAL MEDICINE

## 2018-07-31 RX ORDER — SODIUM CHLORIDE FOR INHALATION 3 %
4 VIAL, NEBULIZER (ML) INHALATION ONCE
Status: DISCONTINUED | OUTPATIENT
Start: 2018-07-30 | End: 2018-08-01 | Stop reason: HOSPADM

## 2018-07-31 RX ORDER — DIPHENHYDRAMINE HCL 25 MG
25 CAPSULE ORAL NIGHTLY PRN
Status: DISCONTINUED | OUTPATIENT
Start: 2018-07-31 | End: 2018-08-01 | Stop reason: HOSPADM

## 2018-07-31 RX ORDER — METHYLPREDNISOLONE ACETATE 80 MG/ML
80 INJECTION, SUSPENSION INTRA-ARTICULAR; INTRALESIONAL; INTRAMUSCULAR; SOFT TISSUE ONCE
Status: COMPLETED | OUTPATIENT
Start: 2018-07-31 | End: 2018-07-31

## 2018-07-31 RX ORDER — CARVEDILOL 6.25 MG/1
6.25 TABLET ORAL EVERY 12 HOURS SCHEDULED
Status: DISCONTINUED | OUTPATIENT
Start: 2018-07-31 | End: 2018-08-01 | Stop reason: HOSPADM

## 2018-07-31 RX ADMIN — METHYLPREDNISOLONE SODIUM SUCCINATE 40 MG: 40 INJECTION, POWDER, FOR SOLUTION INTRAMUSCULAR; INTRAVENOUS at 09:07

## 2018-07-31 RX ADMIN — VITAMIN D, TAB 1000IU (100/BT) 1000 UNITS: 25 TAB at 08:58

## 2018-07-31 RX ADMIN — SERTRALINE HYDROCHLORIDE 50 MG: 50 TABLET ORAL at 08:58

## 2018-07-31 RX ADMIN — MULTIPLE VITAMINS W/ MINERALS TAB 1 TABLET: TAB at 08:58

## 2018-07-31 RX ADMIN — DRONEDARONE 400 MG: 400 TABLET, FILM COATED ORAL at 08:58

## 2018-07-31 RX ADMIN — IPRATROPIUM BROMIDE AND ALBUTEROL SULFATE 3 ML: .5; 3 SOLUTION RESPIRATORY (INHALATION) at 01:08

## 2018-07-31 RX ADMIN — TAZOBACTAM SODIUM AND PIPERACILLIN SODIUM 3.38 G: 375; 3 INJECTION, SOLUTION INTRAVENOUS at 17:00

## 2018-07-31 RX ADMIN — TAZOBACTAM SODIUM AND PIPERACILLIN SODIUM 3.38 G: 375; 3 INJECTION, SOLUTION INTRAVENOUS at 10:04

## 2018-07-31 RX ADMIN — METHYLPREDNISOLONE ACETATE 80 MG: 80 INJECTION, SUSPENSION INTRA-ARTICULAR; INTRALESIONAL; INTRAMUSCULAR; SOFT TISSUE at 11:05

## 2018-07-31 RX ADMIN — ACETAMINOPHEN 650 MG: 325 TABLET, FILM COATED ORAL at 01:16

## 2018-07-31 RX ADMIN — DILTIAZEM HYDROCHLORIDE 180 MG: 180 CAPSULE, COATED, EXTENDED RELEASE ORAL at 08:58

## 2018-07-31 RX ADMIN — GUAIFENESIN 1200 MG: 600 TABLET, EXTENDED RELEASE ORAL at 08:58

## 2018-07-31 RX ADMIN — IPRATROPIUM BROMIDE AND ALBUTEROL SULFATE 3 ML: .5; 3 SOLUTION RESPIRATORY (INHALATION) at 06:55

## 2018-07-31 RX ADMIN — DOXYCYCLINE 100 MG: 100 INJECTION, POWDER, LYOPHILIZED, FOR SOLUTION INTRAVENOUS at 06:26

## 2018-07-31 RX ADMIN — CARVEDILOL 6.25 MG: 6.25 TABLET, FILM COATED ORAL at 21:57

## 2018-07-31 RX ADMIN — CARVEDILOL 6.25 MG: 6.25 TABLET, FILM COATED ORAL at 08:58

## 2018-07-31 RX ADMIN — MUPIROCIN: 2 CREAM TOPICAL at 09:07

## 2018-07-31 RX ADMIN — PRIMIDONE 50 MG: 50 TABLET ORAL at 08:58

## 2018-07-31 RX ADMIN — TAMSULOSIN HYDROCHLORIDE 0.4 MG: 0.4 CAPSULE ORAL at 21:57

## 2018-07-31 RX ADMIN — RIVAROXABAN 20 MG: 10 TABLET, FILM COATED ORAL at 17:07

## 2018-07-31 RX ADMIN — PRIMIDONE 50 MG: 50 TABLET ORAL at 21:57

## 2018-07-31 RX ADMIN — DRONEDARONE 400 MG: 400 TABLET, FILM COATED ORAL at 21:57

## 2018-07-31 RX ADMIN — DIPHENHYDRAMINE HYDROCHLORIDE 25 MG: 25 CAPSULE ORAL at 08:58

## 2018-07-31 RX ADMIN — IPRATROPIUM BROMIDE AND ALBUTEROL SULFATE 3 ML: .5; 3 SOLUTION RESPIRATORY (INHALATION) at 18:59

## 2018-07-31 RX ADMIN — MUPIROCIN: 2 CREAM TOPICAL at 21:56

## 2018-07-31 RX ADMIN — GUAIFENESIN 1200 MG: 600 TABLET, EXTENDED RELEASE ORAL at 21:57

## 2018-07-31 RX ADMIN — METHYLPREDNISOLONE SODIUM SUCCINATE 40 MG: 40 INJECTION, POWDER, FOR SOLUTION INTRAMUSCULAR; INTRAVENOUS at 01:16

## 2018-07-31 RX ADMIN — DIPHENHYDRAMINE HYDROCHLORIDE 25 MG: 25 CAPSULE ORAL at 01:16

## 2018-07-31 RX ADMIN — Medication 10 ML: at 01:17

## 2018-07-31 RX ADMIN — DOCUSATE SODIUM 100 MG: 100 CAPSULE, LIQUID FILLED ORAL at 08:58

## 2018-07-31 RX ADMIN — IPRATROPIUM BROMIDE AND ALBUTEROL SULFATE 3 ML: .5; 3 SOLUTION RESPIRATORY (INHALATION) at 13:15

## 2018-08-01 VITALS
SYSTOLIC BLOOD PRESSURE: 107 MMHG | OXYGEN SATURATION: 96 % | DIASTOLIC BLOOD PRESSURE: 68 MMHG | TEMPERATURE: 98 F | BODY MASS INDEX: 26.28 KG/M2 | WEIGHT: 187.7 LBS | HEIGHT: 71 IN | HEART RATE: 61 BPM | RESPIRATION RATE: 18 BRPM

## 2018-08-01 PROBLEM — J18.9 PNEUMONIA OF RIGHT MIDDLE LOBE DUE TO INFECTIOUS ORGANISM: Status: RESOLVED | Noted: 2018-07-30 | Resolved: 2018-08-01

## 2018-08-01 LAB
ALBUMIN SERPL-MCNC: 3.2 G/DL (ref 3.5–5)
ANION GAP SERPL CALCULATED.3IONS-SCNC: 9.2 MMOL/L (ref 10–20)
BASOPHILS # BLD AUTO: 0.02 10*3/MM3 (ref 0–0.2)
BASOPHILS NFR BLD AUTO: 0.2 % (ref 0–2.5)
BUN BLD-MCNC: 18 MG/DL (ref 7–20)
BUN/CREAT SERPL: 25.7 (ref 6.3–21.9)
CALCIUM SPEC-SCNC: 8.9 MG/DL (ref 8.4–10.2)
CHLORIDE SERPL-SCNC: 99 MMOL/L (ref 98–107)
CO2 SERPL-SCNC: 36 MMOL/L (ref 26–30)
CREAT BLD-MCNC: 0.7 MG/DL (ref 0.6–1.3)
DEPRECATED RDW RBC AUTO: 42.9 FL (ref 37–54)
EOSINOPHIL # BLD AUTO: 0 10*3/MM3 (ref 0–0.7)
EOSINOPHIL NFR BLD AUTO: 0 % (ref 0–7)
ERYTHROCYTE [DISTWIDTH] IN BLOOD BY AUTOMATED COUNT: 13.7 % (ref 11.5–14.5)
GFR SERPL CREATININE-BSD FRML MDRD: 110 ML/MIN/1.73
GLUCOSE BLD-MCNC: 150 MG/DL (ref 74–98)
HCT VFR BLD AUTO: 34.7 % (ref 42–52)
HGB BLD-MCNC: 10.5 G/DL (ref 14–18)
IMM GRANULOCYTES # BLD: 0.13 10*3/MM3 (ref 0–0.06)
IMM GRANULOCYTES NFR BLD: 1.1 % (ref 0–0.6)
LYMPHOCYTES # BLD AUTO: 0.77 10*3/MM3 (ref 0.6–3.4)
LYMPHOCYTES NFR BLD AUTO: 6.5 % (ref 10–50)
MAGNESIUM SERPL-MCNC: 2 MG/DL (ref 1.6–2.3)
MCH RBC QN AUTO: 25.7 PG (ref 27–31)
MCHC RBC AUTO-ENTMCNC: 30.3 G/DL (ref 30–37)
MCV RBC AUTO: 85 FL (ref 80–94)
MONOCYTES # BLD AUTO: 0.95 10*3/MM3 (ref 0–0.9)
MONOCYTES NFR BLD AUTO: 8 % (ref 0–12)
NEUTROPHILS # BLD AUTO: 10.02 10*3/MM3 (ref 2–6.9)
NEUTROPHILS NFR BLD AUTO: 84.2 % (ref 37–80)
NRBC BLD MANUAL-RTO: 0 /100 WBC (ref 0–0)
PHOSPHATE SERPL-MCNC: 3.7 MG/DL (ref 2.5–4.5)
PLATELET # BLD AUTO: 227 10*3/MM3 (ref 130–400)
PMV BLD AUTO: 10.1 FL (ref 6–12)
POTASSIUM BLD-SCNC: 4.2 MMOL/L (ref 3.5–5.1)
RBC # BLD AUTO: 4.08 10*6/MM3 (ref 4.7–6.1)
SODIUM BLD-SCNC: 140 MMOL/L (ref 137–145)
WBC NRBC COR # BLD: 11.89 10*3/MM3 (ref 4.8–10.8)

## 2018-08-01 PROCEDURE — 94799 UNLISTED PULMONARY SVC/PX: CPT

## 2018-08-01 PROCEDURE — 94760 N-INVAS EAR/PLS OXIMETRY 1: CPT

## 2018-08-01 PROCEDURE — 25010000002 PIPERACILLIN SOD-TAZOBACTAM PER 1 G: Performed by: INTERNAL MEDICINE

## 2018-08-01 PROCEDURE — 85025 COMPLETE CBC W/AUTO DIFF WBC: CPT | Performed by: INTERNAL MEDICINE

## 2018-08-01 PROCEDURE — 80069 RENAL FUNCTION PANEL: CPT | Performed by: INTERNAL MEDICINE

## 2018-08-01 PROCEDURE — 83735 ASSAY OF MAGNESIUM: CPT | Performed by: INTERNAL MEDICINE

## 2018-08-01 PROCEDURE — 99238 HOSP IP/OBS DSCHRG MGMT 30/<: CPT | Performed by: INTERNAL MEDICINE

## 2018-08-01 RX ORDER — CARVEDILOL 6.25 MG/1
6.25 TABLET ORAL EVERY 12 HOURS SCHEDULED
Qty: 60 TABLET | Refills: 0 | Status: SHIPPED | OUTPATIENT
Start: 2018-08-01 | End: 2018-12-12

## 2018-08-01 RX ORDER — PREDNISONE 10 MG/1
10 TABLET ORAL DAILY
Qty: 30 TABLET | Refills: 0 | Status: SHIPPED | OUTPATIENT
Start: 2018-08-01 | End: 2018-12-12

## 2018-08-01 RX ORDER — MULTIPLE VITAMINS W/ MINERALS TAB 9MG-400MCG
1 TAB ORAL DAILY
Qty: 30 EACH | Refills: 0 | Status: SHIPPED | OUTPATIENT
Start: 2018-08-02 | End: 2018-08-09

## 2018-08-01 RX ORDER — AMOXICILLIN AND CLAVULANATE POTASSIUM 875; 125 MG/1; MG/1
1 TABLET, FILM COATED ORAL EVERY 12 HOURS SCHEDULED
Qty: 14 TABLET | Refills: 0 | Status: SHIPPED | OUTPATIENT
Start: 2018-08-01 | End: 2018-08-09

## 2018-08-01 RX ORDER — GUAIFENESIN 600 MG/1
600 TABLET, EXTENDED RELEASE ORAL EVERY 12 HOURS SCHEDULED
Qty: 10 TABLET | Refills: 0 | Status: SHIPPED | OUTPATIENT
Start: 2018-08-01

## 2018-08-01 RX ADMIN — IPRATROPIUM BROMIDE AND ALBUTEROL SULFATE 3 ML: .5; 3 SOLUTION RESPIRATORY (INHALATION) at 00:42

## 2018-08-01 RX ADMIN — DRONEDARONE 400 MG: 400 TABLET, FILM COATED ORAL at 08:45

## 2018-08-01 RX ADMIN — TAZOBACTAM SODIUM AND PIPERACILLIN SODIUM 3.38 G: 375; 3 INJECTION, SOLUTION INTRAVENOUS at 08:49

## 2018-08-01 RX ADMIN — CARVEDILOL 6.25 MG: 6.25 TABLET, FILM COATED ORAL at 08:47

## 2018-08-01 RX ADMIN — MUPIROCIN: 2 CREAM TOPICAL at 08:48

## 2018-08-01 RX ADMIN — MULTIPLE VITAMINS W/ MINERALS TAB 1 TABLET: TAB at 08:45

## 2018-08-01 RX ADMIN — DILTIAZEM HYDROCHLORIDE 180 MG: 180 CAPSULE, COATED, EXTENDED RELEASE ORAL at 08:46

## 2018-08-01 RX ADMIN — SERTRALINE HYDROCHLORIDE 50 MG: 50 TABLET ORAL at 08:48

## 2018-08-01 RX ADMIN — PRIMIDONE 50 MG: 50 TABLET ORAL at 08:47

## 2018-08-01 RX ADMIN — VITAMIN D, TAB 1000IU (100/BT) 1000 UNITS: 25 TAB at 08:47

## 2018-08-01 RX ADMIN — IPRATROPIUM BROMIDE AND ALBUTEROL SULFATE 3 ML: .5; 3 SOLUTION RESPIRATORY (INHALATION) at 07:07

## 2018-08-01 RX ADMIN — DOCUSATE SODIUM 100 MG: 100 CAPSULE, LIQUID FILLED ORAL at 08:46

## 2018-08-01 RX ADMIN — TAZOBACTAM SODIUM AND PIPERACILLIN SODIUM 3.38 G: 375; 3 INJECTION, SOLUTION INTRAVENOUS at 00:00

## 2018-08-01 RX ADMIN — GUAIFENESIN 1200 MG: 600 TABLET, EXTENDED RELEASE ORAL at 08:46

## 2018-08-01 NOTE — DISCHARGE SUMMARY
[facility]   DISCHARGE SUMMARY      Name:  Alessandro Mendiola   Age:  78 y.o.  Sex:  male  :  1940  MRN:  [id number]   Visit Number:  [id number]  Primary Care Physician:  Rio Acuna DO  Date of Discharge:  2018  Admission Date:  2018      Discharge Diagnosis:     1.  Acute right-sided pneumonia, present on admission.  2.  COPD exacerbation   3.  Chronic respiratory failure with hypercapnia  4.  Thoracic aortic aneurysm 4.6 cm in size  5.  Paroxysmal atrial fibrillation with pacemaker in place.  On Xarelto.  6.  Essential hypertension  7.  History of coronary artery disease with stent in the past  8.  Abrasion on left upper arm from fall  9.  Possible reaction to doxycycline    Active Hospital Problems    Diagnosis Date Noted   No active problems to display.      Resolved Hospital Problems    Diagnosis Date Noted Date Resolved   • Pneumonia of right middle lobe due to infectious organism (CMS/HCC) [J18.1] 2018         Presenting Problem/History of Present Illness:    Pneumonia of right middle lobe due to infectious organism (CMS/HCC) [J18.1]  Pneumonia of right middle lobe due to infectious organism (CMS/HCC) [J18.1]         Hospital Course:    History of present illness: Patient is a 78-year-old  male with history coronary artery disease, thoracic aortic aneurysm measuring 4.6 cm, paroxysmal A. fib, COPD, hyperlipidemia, essential hypertension who comes in with a two-week history of progressive shortness of breath.  He had been recently prescribed a BiPAP at home for Dr. Wilson which he has been using.  He claims the first time that he used it water poured on his face in the morning, and he has had progressive trouble breathing ever since then.  He did take a steroid Dosepak without any relief.  He has a cough which is productive with yellow sputum.  He denies any fevers or chills.  Denies chest pressure, nausea, vomiting, diarrhea.  He rates  "his shortness of breath is severe to the point where he needed to come in today.  Nothing was making this better.  Movement was making it worse.  He is usually on 5 L of oxygen at home.  Chest x-ray was done in the emergency room showing a right-sided pneumonia.  He was placed on Rocephin and doxycycline and admitted.   His WBC in the emergency room was noted to be 16.28.     Patient states that he did get a dose of doxycycline, and then felt short of breath and tachypnea Like \"he was going to die\".  He does not want to take that medication anymore.  Zithromax is contraindicated due to the fact that the patient is on Multaq.  He was changed over to Zosyn.   Patient states that the shortness of breath and tachypnea went away after about an hour.  He denies any history of previous anxiety attacks.    His WBC count has improved down to 11.40.  He denies any chest pressure, nausea, vomiting, or pain at present.  He still has some dyspnea on exertion.  Respiratory cultures are pending.  Blood cultures have been negative thus far.    Dr. Wilson did see him yesterday, and felt that he could be discharged home today with close follow-up as an outpatient.  Patient claims he is back to baseline today.  We will change him over to Augmentin as well as a prednisone taper.  Offered him to stay another day, patient refuses.  He understands the risk that he could return to the ER with worsening shortness of breath.  We will also recommend that he wear his BiPAP at home.  He'll be discharged home today.  Follow up with his PCP in one week as well as Dr. Wilson in one week.  He should have a follow-up chest x-ray in one month.    Will follow up on sputum culture.    Procedures Performed:           Consults:     Consults     Date and Time Order Name Status Description    7/30/2018 1566 Inpatient Pulmonology Consult            Pertinent Test Results:     Lab Results (And felt like)     Procedure Component Value Units Date/Time    Renal " Function Panel [[id number]]  (Abnormal) Collected:  08/01/18 0528    Specimen:  Blood Updated:  08/01/18 0558     Glucose 150 (H) mg/dL      BUN 18 mg/dL      Creatinine 0.70 mg/dL      Sodium 140 mmol/L      Potassium 4.2 mmol/L      Chloride 99 mmol/L      CO2 36.0 (H) mmol/L      Calcium 8.9 mg/dL      Albumin 3.20 (L) g/dL      Phosphorus 3.7 mg/dL      Anion Gap 9.2 (L) mmol/L      BUN/Creatinine Ratio 25.7 (H)     eGFR Non African Amer 110 mL/min/1.73     Narrative:       The MDRD GFR formula is only valid for adults with stable renal function between ages 18 and 70.    Magnesium [[id number]]  (Normal) Collected:  08/01/18 0528    Specimen:  Blood Updated:  08/01/18 0558     Magnesium 2.0 mg/dL     CBC & Differential [[id number]] Collected:  08/01/18 0528    Specimen:  Blood Updated:  08/01/18 0552    Narrative:       The following orders were created for panel order CBC & Differential.  Procedure                               Abnormality         Status                     ---------                               -----------         ------                     CBC Auto Differential[[id number]]        Abnormal            Final result                 Please view results for these tests on the individual orders.    CBC Auto Differential [[id number]]  (Abnormal) Collected:  08/01/18 0528    Specimen:  Blood Updated:  08/01/18 0552     WBC 11.89 (H) 10*3/mm3      RBC 4.08 (L) 10*6/mm3      Hemoglobin 10.5 (L) g/dL      Hematocrit 34.7 (L) %      MCV 85.0 fL      MCH 25.7 (L) pg      MCHC 30.3 g/dL      RDW 13.7 %      RDW-SD 42.9 fl      MPV 10.1 fL      Platelets 227 10*3/mm3      Neutrophil % 84.2 (H) %      Lymphocyte % 6.5 (L) %      Monocyte % 8.0 %      Eosinophil % 0.0 %      Basophil % 0.2 %      Immature Grans % 1.1 (H) %      Neutrophils, Absolute 10.02 (H) 10*3/mm3      Lymphocytes, Absolute 0.77 10*3/mm3      Monocytes, Absolute 0.95 (H) 10*3/mm3      Eosinophils, Absolute 0.00 10*3/mm3      Basophils,  Absolute 0.02 10*3/mm3      Immature Grans, Absolute 0.13 (H) 10*3/mm3      nRBC 0.0 /100 WBC     Blood Culture - Blood, Blood, Venous Line [[id number]]  (Normal) Collected:  07/30/18 1536    Specimen:  Blood from Arm, Right Updated:  07/31/18 1615     Blood Culture No growth at 24 hours    Blood Culture - Blood, Blood, Venous Line [[id number]]  (Normal) Collected:  07/30/18 1538    Specimen:  Blood from Arm, Left Updated:  07/31/18 1615     Blood Culture No growth at 24 hours    Respiratory Culture - Sputum, Cough [[id number]] Collected:  07/31/18 0814    Specimen:  Sputum from Cough Updated:  07/31/18 1016     Gram Stain Result Greater than 20 WBCs per low power field      Less than 10 Epithelial cells per low power field      Few (2+) Gram positive cocci in pairs, chains and clusters      Few (2+) Gram positive bacilli    S. Pneumo Ag Urine or CSF - Urine, Urine, Clean Catch [[id number]] Collected:  07/31/18 0812    Specimen:  Urine from Urine, Clean Catch Updated:  07/31/18 0829    Legionella Antigen, Urine - Urine, Urine, Clean Catch [[id number]] Collected:  07/31/18 0812    Specimen:  Urine from Urine, Clean Catch Updated:  07/31/18 0829    Mycoplasma Pneu. IgG / IgM Antibodies [[id number]] Collected:  07/31/18 0814    Specimen:  Blood Updated:  07/31/18 0815    Blood Gas, Arterial With Co-Ox [[id number]]  (Abnormal) Collected:  07/31/18 0703    Specimen:  Arterial Blood Updated:  07/31/18 0704     Site Right Brachial     Jerry's Test N/A     pH, Arterial 7.385 pH units      pCO2, Arterial 54.9 (H) mm Hg      pO2, Arterial 77.3 mm Hg      HCO3, Arterial 32.8 (H) mmol/L      Base Excess, Arterial 6.5 (H) mmol/L      O2 Saturation, Arterial 95.7 %      Hemoglobin, Blood Gas 11.1 (L) g/dL      Hematocrit, Blood Gas 34.0 %      Oxyhemoglobin 93.4 (L) %      Methemoglobin 1.20 %      Carboxyhemoglobin 1.2 %      Barometric Pressure for Blood Gas 732 mmHg      Modality Nasal Cannula     FIO2 40 %      Flow Rate 5.0 lpm       Ventilator Mode NA     Collected by bg     pH, Temp Corrected -- pH Units      pCO2, Temperature Corrected -- mm Hg      pO2, Temperature Corrected -- mm Hg     Comprehensive Metabolic Panel [[id number]]  (Abnormal) Collected:  07/31/18 0522    Specimen:  Blood Updated:  07/31/18 0648     Glucose 235 (H) mg/dL      Comment: Glucose >180, Hemoglobin A1C recommended.        BUN 14 mg/dL      Creatinine 0.60 mg/dL      Sodium 138 mmol/L      Potassium 4.8 mmol/L      Chloride 100 mmol/L      CO2 34.0 (H) mmol/L      Calcium 8.9 mg/dL      Total Protein 6.3 g/dL      Albumin 3.40 (L) g/dL      ALT (SGPT) 27 U/L      AST (SGOT) 18 U/L      Alkaline Phosphatase 111 U/L      Total Bilirubin 0.2 mg/dL      eGFR Non African Amer 131 mL/min/1.73      Globulin 2.9 gm/dL      A/G Ratio 1.2 g/dL      BUN/Creatinine Ratio 23.3 (H)     Anion Gap 8.8 (L) mmol/L     Narrative:       The MDRD GFR formula is only valid for adults with stable renal function between ages 18 and 70.    Troponin [[id number]]  (Normal) Collected:  07/31/18 0522    Specimen:  Blood Updated:  07/31/18 0647     Troponin I <0.012 ng/mL     Narrative:       Normal Patient Upper Reference Limit (URL) (99th Percentile)=0.03 ng/mL   Non-AMI Illness Reference Limit=0.03-0.11 ng/mL   AMI Confirmation=0.12 ng/mL and above    Magnesium [[id number]]  (Normal) Collected:  07/31/18 0522    Specimen:  Blood Updated:  07/31/18 0646     Magnesium 1.8 mg/dL     CBC Auto Differential [[id number]]  (Abnormal) Collected:  07/31/18 0522    Specimen:  Blood Updated:  07/31/18 0623     WBC 11.40 (H) 10*3/mm3      RBC 4.06 (L) 10*6/mm3      Hemoglobin 10.5 (L) g/dL      Hematocrit 33.8 (L) %      MCV 83.3 fL      MCH 25.9 (L) pg      MCHC 31.1 g/dL      RDW 14.0 %      RDW-SD 42.6 fl      MPV 11.2 fL      Platelets 209 10*3/mm3      Neutrophil % 94.2 (H) %      Lymphocyte % 3.0 (L) %      Monocyte % 2.0 %      Eosinophil % 0.0 %      Basophil % 0.1 %      Immature Grans % 0.7 (H) %       Neutrophils, Absolute 10.74 (H) 10*3/mm3      Lymphocytes, Absolute 0.34 (L) 10*3/mm3      Monocytes, Absolute 0.23 10*3/mm3      Eosinophils, Absolute 0.00 10*3/mm3      Basophils, Absolute 0.01 10*3/mm3      Immature Grans, Absolute 0.08 (H) 10*3/mm3      nRBC 0.0 /100 WBC     Troponin [[id number]]  (Normal) Collected:  07/30/18 2342    Specimen:  Blood Updated:  07/31/18 0024     Troponin I <0.012 ng/mL     Narrative:       Normal Patient Upper Reference Limit (URL) (99th Percentile)=0.03 ng/mL   Non-AMI Illness Reference Limit=0.03-0.11 ng/mL   AMI Confirmation=0.12 ng/mL and above    POC Glucose Once [[id number]]  (Abnormal) Collected:  07/30/18 1957    Specimen:  Blood Updated:  07/30/18 2021     Glucose 310 (H) mg/dL      Comment: Serial Number: [id number]:  [id number]       Troponin [[id number]]  (Normal) Collected:  07/30/18 1739    Specimen:  Blood Updated:  07/30/18 1814     Troponin I <0.012 ng/mL     Narrative:       Normal Patient Upper Reference Limit (URL) (99th Percentile)=0.03 ng/mL   Non-AMI Illness Reference Limit=0.03-0.11 ng/mL   AMI Confirmation=0.12 ng/mL and above    Lactic Acid, Plasma [[id number]]  (Normal) Collected:  07/30/18 1739    Specimen:  Blood Updated:  07/30/18 1802     Lactate 1.3 mmol/L     [place] Draw [[id number]] Collected:  07/30/18 1435    Specimen:  Blood Updated:  07/30/18 1545    Narrative:       The following orders were created for panel order [place] Draw.  Procedure                               Abnormality         Status                     ---------                               -----------         ------                     Light Blue Top[[id number]]                                   Final result               Lavender Top[[id number]]                                     Final result               Gold Top - SST[[id number]]                                   Final result               Green Top (No Gel)[[id number]]                               Final result                  Please view results for these tests on the individual orders.    Green Top (No Gel) [[id number]] Collected:  07/30/18 1435    Specimen:  Blood Updated:  07/30/18 1545     Extra Tube Hold for add-ons.     Comment: Auto resulted.       Lavender Top [[id number]] Collected:  07/30/18 1435    Specimen:  Blood Updated:  07/30/18 1545     Extra Tube hold for add-on     Comment: Auto resulted       Light Blue Top [[id number]] Collected:  07/30/18 1435    Specimen:  Blood Updated:  07/30/18 1545     Extra Tube hold for add-on     Comment: Auto resulted       Gold Top - SST [[id number]] Collected:  07/30/18 1435    Specimen:  Blood Updated:  07/30/18 1545     Extra Tube Hold for add-ons.     Comment: Auto resulted.       Comprehensive Metabolic Panel [[id number]]  (Abnormal) Collected:  07/30/18 1435    Specimen:  Blood Updated:  07/30/18 1523     Glucose 358 (C) mg/dL      BUN 14 mg/dL      Creatinine 0.70 mg/dL      Sodium 136 (L) mmol/L      Potassium 3.5 mmol/L      Chloride 94 (L) mmol/L      CO2 34.0 (H) mmol/L      Calcium 9.2 mg/dL      Total Protein 6.7 g/dL      Albumin 3.80 g/dL      ALT (SGPT) 28 U/L      AST (SGOT) 19 U/L      Alkaline Phosphatase 125 U/L      Total Bilirubin 0.4 mg/dL      eGFR Non African Amer 110 mL/min/1.73      Globulin 2.9 gm/dL      A/G Ratio 1.3 g/dL      BUN/Creatinine Ratio 20.0     Anion Gap 11.5 mmol/L     Narrative:       The MDRD GFR formula is only valid for adults with stable renal function between ages 18 and 70.    BNP [[id number]]  (Normal) Collected:  07/30/18 1435    Specimen:  Blood Updated:  07/30/18 1521     proBNP 352.0 pg/mL     Troponin [[id number]]  (Normal) Collected:  07/30/18 1435    Specimen:  Blood Updated:  07/30/18 1521     Troponin I <0.012 ng/mL     Narrative:       Normal Patient Upper Reference Limit (URL) (99th Percentile)=0.03 ng/mL   Non-AMI Illness Reference Limit=0.03-0.11 ng/mL   AMI Confirmation=0.12 ng/mL and above    Blood Gas, Arterial With Co-Ox  [[id number]]  (Abnormal) Collected:  07/30/18 1503    Specimen:  Arterial Blood Updated:  07/30/18 1503     Site Left Brachial     Jerry's Test N/A     pH, Arterial 7.393 pH units      pCO2, Arterial 53.3 (H) mm Hg      pO2, Arterial 79.3 mm Hg      HCO3, Arterial 32.4 (H) mmol/L      Base Excess, Arterial 6.2 (H) mmol/L      O2 Saturation, Arterial 95.7 %      Hemoglobin, Blood Gas 11.7 (L) g/dL      Hematocrit, Blood Gas 36.0 %      Oxyhemoglobin 93.9 (L) %      Methemoglobin 1.00 %      Carboxyhemoglobin 0.9 %      Barometric Pressure for Blood Gas 734 mmHg      Modality N/A     FIO2 40 %      Ventilator Mode NA     Collected by [id number]     pH, Temp Corrected -- pH Units      pCO2, Temperature Corrected -- mm Hg      pO2, Temperature Corrected -- mm Hg     CBC & Differential [[id number]] Collected:  07/30/18 1435    Specimen:  Blood Updated:  07/30/18 1452    Narrative:       The following orders were created for panel order CBC & Differential.  Procedure                               Abnormality         Status                     ---------                               -----------         ------                     CBC Auto Differential[[id number]]        Abnormal            Final result                 Please view results for these tests on the individual orders.    CBC Auto Differential [[id number]]  (Abnormal) Collected:  07/30/18 1435    Specimen:  Blood Updated:  07/30/18 1452     WBC 16.28 (H) 10*3/mm3      RBC 4.53 (L) 10*6/mm3      Hemoglobin 11.6 (L) g/dL      Hematocrit 38.0 (L) %      MCV 83.9 fL      MCH 25.6 (L) pg      MCHC 30.5 g/dL      RDW 13.9 %      RDW-SD 42.7 fl      MPV 10.8 fL      Platelets 228 10*3/mm3      Neutrophil % 86.9 (H) %      Lymphocyte % 3.7 (L) %      Monocyte % 8.5 %      Eosinophil % 0.1 %      Basophil % 0.2 %      Immature Grans % 0.6 %      Neutrophils, Absolute 14.14 (H) 10*3/mm3      Lymphocytes, Absolute 0.61 10*3/mm3      Monocytes, Absolute 1.39 (H) 10*3/mm3       "Eosinophils, Absolute 0.01 10*3/mm3      Basophils, Absolute 0.03 10*3/mm3      Immature Grans, Absolute 0.10 (H) 10*3/mm3      nRBC 0.0 /100 WBC           Imaging Results (all)     Procedure Component Value Units Date/Time    XR Chest 1 View [[id number]] Collected:  07/30/18 1532     Updated:  07/30/18 1535    Narrative:       PROCEDURE: XR CHEST 1 VW-     HISTORY: SOA  triage protocol     COMPARISON: April 28, 2018.     FINDINGS: A left subclavian pacemaker is in place. The heart is  enlarged. The mediastinum is unremarkable. There are emphysematous  changes to the lungs. There is airspace disease in the right mid and  lower lung field consistent with a pneumonia. No effusions are evident.  There is no pneumothorax.  There are no acute osseous abnormalities.           Impression:       Right lung airspace disease consistent with a pneumonia.     Continued followup is recommended.     This report was finalized on 7/30/2018 3:33 PM by Leslie Luu M.D..          Condition on Discharge:      Stable    Vital Signs:    /68   Pulse 61   Temp 98 °F (36.7 °C) (Oral)   Resp 18   Ht 180.3 cm (71\")   Wt 85.1 kg (187 lb 11.2 oz)   SpO2 96%   BMI 26.18 kg/m²     Physical Exam:      General Appearance:    Alert, cooperative, in no acute distress   Head:    Normocephalic, without obvious abnormality, atraumatic   Eyes:            Lids and lashes normal, conjunctivae and sclerae normal, no   icterus, no pallor, corneas clear, PERRLA   Ears:    Ears appear intact with no abnormalities noted   Throat:   No oral lesions, no thrush, oral mucosa moist   Neck:   No adenopathy, supple, trachea midline, no thyromegaly, no   carotid bruit, no JVD   Back:     No kyphosis present, no scoliosis present, no skin lesions,      erythema or scars, no tenderness to percussion or                   palpation,   range of motion normal   Lungs:     Mild expiratory wheeze bilaterally.  Mild use of abdominal muscles to breathe.      " Heart:    Regular rhythm and normal rate, normal S1 and S2, no            murmur, no gallop, no rub, no click   Chest Wall:    No abnormalities observed   Abdomen:     Normal bowel sounds, no masses, no organomegaly, soft        non-tender, non-distended, no guarding, no rebound                tenderness   Rectal:     Deferred   Extremities:   Moves all extremities well, no edema, no cyanosis, no             redness   Pulses:   Pulses palpable and equal bilaterally   Skin:   No bleeding, bruising or rash   Lymph nodes:   No palpable adenopathy   Neurologic:   Cranial nerves 2 - 12 grossly intact, sensation intact, DTR       present and equal bilaterally       Discharge Disposition:    Home or Self Care    Discharge Medication:       Discharge Medications      New Medications      Instructions Start Date   amoxicillin-clavulanate 875-125 MG per tablet  Commonly known as:  AUGMENTIN   1 tablet, Oral, Every 12 Hours Scheduled      carvedilol 6.25 MG tablet  Commonly known as:  COREG   6.25 mg, Oral, Every 12 Hours Scheduled      HYDROcod Polst-CPM Polst ER 10-8 MG/5ML ER suspension  Commonly known as:  TUSSIONEX PENNKINETIC   5 mL, Oral, Every 12 Hours PRN      mupirocin 2 % cream  Commonly known as:  BACTROBAN   Topical, Every 12 Hours Scheduled, Apply to left arm bid         Changes to Medications      Instructions Start Date   guaiFENesin 600 MG 12 hr tablet  Commonly known as:  MUCINEX  What changed:  · when to take this  · reasons to take this   600 mg, Oral, Every 12 Hours Scheduled      predniSONE 10 MG tablet  Commonly known as:  DELTASONE  What changed:  · when to take this  · additional instructions   10 mg, Oral, Daily, Take 4 for 3 days, then 3 for 3 days, then 2 for 3 days, then 1 for 3 days, then stop         Continue These Medications      Instructions Start Date   aclidinium bromide 400 MCG/ACT aerosol powder  powder for inhalation  Commonly known as:  TUDORZA PRESSAIR   1 puff, Inhalation, 2 Times  Daily - RT      ALLERGY RELIEF EYE DROPS OP   1 drop, Ophthalmic, 2 Times Daily PRN      budesonide-formoterol 80-4.5 MCG/ACT inhaler  Commonly known as:  SYMBICORT   2 puffs, Inhalation, 2 Times Daily - RT      COMBIVENT RESPIMAT  MCG/ACT inhaler  Generic drug:  ipratropium-albuterol   INHALE ONE PUFF BY MOUTH 4 TIMES DAILY AS NEEDED FOR WHEEZING OR  SHORTNESS  OF  AIR      diltiaZEM  MG 24 hr capsule  Commonly known as:  CARTIA XT   180 mg, Oral, Daily      HYDROcodone-acetaminophen 5-325 MG per tablet  Commonly known as:  NORCO   1 tablet, Oral, Every 6 Hours PRN      ipratropium 0.02 % nebulizer solution  Commonly known as:  ATROVENT   500 mcg, Nebulization, 4 Times Daily PRN      MULTAQ 400 MG tablet  Generic drug:  dronedarone   TAKE ONE TABLET BY MOUTH TWICE DAILY      multivitamin with minerals tablet tablet   1 tablet, Oral, Daily      niacin 500 MG tablet   500 mg, Oral, Daily      OXYGEN-HELIUM IN   Oxygen; Patient Sig: Oxygen patient is to get home oxygen through  company of his choice at 2L/Min, as well as portable oxygen at the same rate.; 1; 0; 22-Mar-2016; Active      primidone 50 MG tablet  Commonly known as:  MYSOLINE   50 mg, Oral, 2 Times Daily      rivaroxaban 20 MG tablet  Commonly known as:  XARELTO   20 mg, Oral, Daily      Roflumilast 250 MCG tablet  Commonly known as:  DALIRESP   1 tablet, Oral, Daily      sertraline 50 MG tablet  Commonly known as:  ZOLOFT   50 mg, Oral, Daily      tamsulosin 0.4 MG capsule 24 hr capsule  Commonly known as:  FLOMAX   1 capsule, Oral, Nightly      VENTOLIN  (90 Base) MCG/ACT inhaler  Generic drug:  albuterol   INHALE TWO PUFFS BY MOUTH EVERY 4 HOURS AS NEEDED FOR WHEEZING      VENTOLIN  (90 Base) MCG/ACT inhaler  Generic drug:  albuterol   INHALE TWO PUFFS BY MOUTH EVERY 4 HOURS AS NEEDED FOR WHEEZING      vitamin D 53479 units capsule capsule  Commonly known as:  ERGOCALCIFEROL   TAKE ONE CAPSULE BY MOUTH ONCE A WEEK      ZZZQUIL  PO   Oral, Nightly PRN         Stop These Medications    ciprofloxacin 0.3 % ophthalmic solution  Commonly known as:  CILOXAN     clotrimazole 1 % cream  Commonly known as:  LOTRIMIN     diphenhydrAMINE-acetaminophen  MG tablet per tablet  Commonly known as:  TYLENOL PM     MEDROL (WILD) PO            Discharge Diet:     Diet Instructions     Diet: Regular       Discharge Diet:  Regular          Activity at Discharge:     Activity Instructions     Activity as Tolerated             Follow-up Appointments:    Future Appointments  Date Time Provider Department Center   8/9/2018 10:15 AM Mikey Obrien MD [facility] None   8/30/2018 2:00 PM DAVID Domingo [facility] None     Additional Instructions for the Follow-ups that You Need to Schedule     Discharge Follow-up with PCP    As directed      Currently Documented PCP:  Rio Acnua DO  PCP Phone Number:  [phone]    Follow Up Details:  1 week         Discharge Follow-up with Specified Provider: dr wilson; 1 Week    As directed      To:  dr wilson    Follow Up:  1 Week         XR Chest 2 View    Aug 06, 2018      Results to Dr. Wilson    Exam reason:  Follow-up pneumonia.         XR Chest PA & Lateral    Aug 27, 2018      To be done on the same day as follow up with our office.    Exam reason:  PNA               Test Results Pending at Discharge:     Order Current Status    Legionella Antigen, Urine - Urine, Urine, Clean Catch In process    Mycoplasma Pneu. IgG / IgM Antibodies In process    S. Pneumo Ag Urine or CSF - Urine, Urine, Clean Catch In process    Blood Culture - Blood, Blood, Venous Line Preliminary result    Blood Culture - Blood, Blood, Venous Line Preliminary result    Respiratory Culture - Sputum, Cough Preliminary result             Garfield Boston DO  08/01/18  10:59 AM

## 2018-08-01 NOTE — PLAN OF CARE
Problem: Patient Care Overview  Goal: Plan of Care Review  Outcome: Ongoing (interventions implemented as appropriate)   08/01/18 0434   Coping/Psychosocial   Plan of Care Reviewed With patient;spouse   Plan of Care Review   Progress no change   OTHER   Outcome Summary No acute events overnight. patient was able to rest more tonight however refused to use the Bipap. Continue to monitor.      Goal: Discharge Needs Assessment  Outcome: Ongoing (interventions implemented as appropriate)      Problem: Pneumonia (Adult)  Goal: Signs and Symptoms of Listed Potential Problems Will be Absent, Minimized or Managed (Pneumonia)  Outcome: Ongoing (interventions implemented as appropriate)      Problem: Hospitalized Acutely Ill Older (Adult)  Goal: Signs and Symptoms of Listed Potential Problems Will be Absent, Minimized or Managed (Hospitalized Acutely Ill Older)  Outcome: Ongoing (interventions implemented as appropriate)      Problem: Fall Risk (Adult)  Goal: Absence of Fall  Outcome: Ongoing (interventions implemented as appropriate)      Problem: Skin Injury Risk (Adult)  Goal: Skin Health and Integrity  Outcome: Ongoing (interventions implemented as appropriate)      Problem: NPPV/CPAP (Adult)  Goal: Signs and Symptoms of Listed Potential Problems Will be Absent, Minimized or Managed (NPPV/CPAP)  Outcome: Ongoing (interventions implemented as appropriate)

## 2018-08-02 ENCOUNTER — READMISSION MANAGEMENT (OUTPATIENT)
Dept: CALL CENTER | Facility: HOSPITAL | Age: 78
End: 2018-08-02

## 2018-08-02 LAB
BACTERIA FLD CULT: NORMAL
BACTERIA SPEC RESP CULT: NORMAL
GRAM STN SPEC: NORMAL
L PNEUMO1 AG UR QL IA: NEGATIVE
Lab: NORMAL
M PNEUMONIAE IGG ABS: <100 U/ML (ref 0–99)
M PNEUMONIAE IGM ABS: <770 U/ML (ref 0–769)
ORGANISM ID: NORMAL
S PNEUM AG SPEC QL LA: NEGATIVE
SPECIMEN SOURCE: NORMAL

## 2018-08-02 NOTE — OUTREACH NOTE
Prep Survey      Responses   Facility patient discharged from?  [facility]   Is patient eligible?  Yes   Discharge diagnosis  Acute right-sided pneumonia, present on admission,   COPD exacerbation    Does the patient have one of the following disease processes/diagnoses(primary or secondary)?  COPD/Pneumonia   Does the patient have Home health ordered?  No   Is there a DME ordered?  No   Prep survey completed?  Yes          Nisa Dias RN

## 2018-08-03 ENCOUNTER — READMISSION MANAGEMENT (OUTPATIENT)
Dept: CALL CENTER | Facility: HOSPITAL | Age: 78
End: 2018-08-03

## 2018-08-03 NOTE — OUTREACH NOTE
COPD/PN Week 1 Survey      Responses   Facility patient discharged from?  [facility]   Does the patient have one of the following disease processes/diagnoses(primary or secondary)?  COPD/Pneumonia   Is there a successful TCM telephone encounter documented?  No   Was the primary reason for admission:  Pneumonia [Pneumonia caused exacerbation of COPD]   Week 1 attempt successful?  Yes   Call start time  1114   Call end time  1124   Is patient permission given to speak with other caregiver?  Yes   List who call center can speak with  Pt wife, Jesus Hall reviewed with patient/caregiver?  Yes   Is the patient having any side effects they believe may be caused by any medication additions or changes?  No   Does the patient have all medications ordered at discharge?  Yes   Is the patient taking all medications as directed (includes completed medication regime)?  Yes   Does the patient have a primary care provider?   Yes   Does the patient have an appointment with their PCP or pulmonologist within 7 days of discharge?  Greater than 7 days   Comments regarding PCP  Dr. Obrien on 8/9/18.   What is preventing the patient from scheduling follow up appointments within 7 days of discharge?  Earlier appointment not available   Nursing Interventions  Verified appointment date/time/provider   Has the patient kept scheduled appointments due by today?  N/A   Has home health visited the patient within 72 hours of discharge?  N/A   Psychosocial issues?  No   Nursing interventions  Reviewed instructions with patient   What is the patient's perception of their health status since discharge?  Improving   Are the patient's immunizations up to date?   Yes   Is the patient/caregiver able to teach back the hierarchy of who to call/visit for symptoms/problems? PCP, Specialist, Home health nurse, Urgent Care, ED, 911  Yes   Patient reports what zone on this call?  Green Zone   Green Zone  Reports doing well, Usual amount of phlegm/mucus without  difficulty coughing up, Usual activity and exercise level   Green Zone interventions:  Use oxygen as prescribed   Is the patient/caregiver able to teach back signs and symptoms of worsening condition:  Fever/chills, Shortness of breath, Chest pain   Is the patient/caregiver able to teach back importance of completing antibiotic course of treatment?  Yes   Week 1 call completed?  Yes          Yunier Lee RN

## 2018-08-04 LAB
BACTERIA SPEC AEROBE CULT: NORMAL
BACTERIA SPEC AEROBE CULT: NORMAL

## 2018-08-09 ENCOUNTER — OFFICE VISIT (OUTPATIENT)
Dept: CARDIOLOGY | Facility: CLINIC | Age: 78
End: 2018-08-09

## 2018-08-09 VITALS
SYSTOLIC BLOOD PRESSURE: 116 MMHG | DIASTOLIC BLOOD PRESSURE: 40 MMHG | HEART RATE: 65 BPM | BODY MASS INDEX: 25.93 KG/M2 | HEIGHT: 71 IN | WEIGHT: 185.2 LBS

## 2018-08-09 DIAGNOSIS — I25.10 CORONARY ARTERY DISEASE INVOLVING NATIVE CORONARY ARTERY OF NATIVE HEART WITHOUT ANGINA PECTORIS: ICD-10-CM

## 2018-08-09 DIAGNOSIS — I48.0 PAROXYSMAL ATRIAL FIBRILLATION (HCC): ICD-10-CM

## 2018-08-09 DIAGNOSIS — R06.09 DOE (DYSPNEA ON EXERTION): Primary | ICD-10-CM

## 2018-08-09 PROCEDURE — 99214 OFFICE O/P EST MOD 30 MIN: CPT | Performed by: INTERNAL MEDICINE

## 2018-08-09 NOTE — PROGRESS NOTES
[facility]  Office Progress Note  Alessandro Mendiola  1940  [phone]      Visit Date: 8/9/2018      PCP: [facility],   [address] [place] [address] [place] [id number]    IDENTIFICATION: A 78 y.o. male from [facility].    Chief Complaint   Patient presents with   • Coronary Artery Disease       PROBLEM LIST:   CAD      2008 BMS to RCA- [facility]      2012 SE wnl  HL      statin intolerant  PAF/ sss      st joan ppm gen change 3/15 MGR  End stage COPD- occas steroids/ 5 L O2  Pneumonia -recurrent- [facility]  ASC Ao Aneurysm T Rushing follows      2012 4.8 CM  HTN    Allergies  Allergies   Allergen Reactions   • Doxycycline Shortness Of Breath       Current Medications    Current Outpatient Prescriptions:   •  aclidinium bromide (TUDORZA PRESSAIR) 400 MCG/ACT aerosol powder  powder for inhalation, Inhale 1 puff 2 (Two) Times a Day., Disp: 1 each, Rfl: 5  •  budesonide-formoterol (SYMBICORT) 80-4.5 MCG/ACT inhaler, Inhale 2 puffs 2 (Two) Times a Day., Disp: 1 inhaler, Rfl: 5  •  carvedilol (COREG) 6.25 MG tablet, Take 1 tablet by mouth Every 12 (Twelve) Hours., Disp: 60 tablet, Rfl: 0  •  COMBIVENT RESPIMAT  MCG/ACT inhaler, INHALE ONE PUFF BY MOUTH 4 TIMES DAILY AS NEEDED FOR WHEEZING OR  SHORTNESS  OF  AIR, Disp: 4 g, Rfl: 5  •  DiphenhydrAMINE HCl, Sleep, (ZZZQUIL PO), Take  by mouth At Night As Needed (if he does not take APAP PM)., Disp: , Rfl:   •  guaiFENesin (MUCINEX) 600 MG 12 hr tablet, Take 1 tablet by mouth Every 12 (Twelve) Hours., Disp: 10 tablet, Rfl: 0  •  HYDROcod Polst-CPM Polst ER (TUSSIONEX PENNKINETIC) 10-8 MG/5ML ER suspension, Take  1 teaspoonful (5mL) by mouth Every 12 (Twelve) Hours As Needed for Cough, Disp: 115 mL, Rfl: 0  •  HYDROcodone-acetaminophen (NORCO) 5-325 MG per tablet, Take 1 tablet by mouth Every 6 (Six) Hours As Needed for Moderate Pain  or Severe Pain ., Disp: 8 tablet, Rfl: 0  •  ipratropium (ATROVENT) 0.02 % nebulizer solution, Take 2.5 mL by  nebulization 4 (Four) Times a Day As Needed for Wheezing or Shortness of Air., Disp: 300 mL, Rfl: 11  •  MULTAQ 400 MG tablet, TAKE ONE TABLET BY MOUTH TWICE DAILY, Disp: 180 tablet, Rfl: 1  •  mupirocin (BACTROBAN) 2 % ointment, Apply topically to left arm as directed Every 12 (Twelve) Hours., Disp: 22 g, Rfl: 0  •  niacin 500 MG tablet, Take 500 mg by mouth Daily., Disp: , Rfl:   •  OXYGEN-HELIUM IN, Oxygen; Patient Sig: Oxygen patient is to get home oxygen through  company of his choice at 2L/Min, as well as portable oxygen at the same rate.; 1; 0; 22-Mar-2016; Active, Disp: , Rfl:   •  predniSONE (DELTASONE) 10 MG tablet, Take 4 tabs by mouth daily for 3 days, then 3 tabs daily for 3 days, then 2 tabs daily for 3 days, then 1 tab daily for 3 days, then stop, Disp: 30 tablet, Rfl: 0  •  primidone (MYSOLINE) 50 MG tablet, Take 1 tablet by mouth 2 (Two) Times a Day., Disp: 60 tablet, Rfl: 5  •  rivaroxaban (XARELTO) 20 MG tablet, Take 1 tablet by mouth Daily., Disp: 30 tablet, Rfl: 12  •  Roflumilast (DALIRESP) 250 MCG tablet, Take 1 tablet by mouth Daily., Disp: 30 tablet, Rfl: 5  •  sertraline (ZOLOFT) 50 MG tablet, Take 1 tablet by mouth Daily., Disp: 90 tablet, Rfl: 3  •  tamsulosin (FLOMAX) 0.4 MG capsule 24 hr capsule, Take 1 capsule by mouth Every Night., Disp: 30 capsule, Rfl: 11  •  Tetrahydrozoline-Zn Sulfate (ALLERGY RELIEF EYE DROPS OP), Apply 1 drop to eye(s) as directed by provider 2 (Two) Times a Day As Needed., Disp: , Rfl:   •  VENTOLIN  (90 Base) MCG/ACT inhaler, INHALE TWO PUFFS BY MOUTH EVERY 4 HOURS AS NEEDED FOR WHEEZING, Disp: 18 g, Rfl: 2  •  VENTOLIN  (90 Base) MCG/ACT inhaler, INHALE TWO PUFFS BY MOUTH EVERY 4 HOURS AS NEEDED FOR WHEEZING, Disp: 18 g, Rfl: 2  •  vitamin D (ERGOCALCIFEROL) 67293 units capsule capsule, TAKE ONE CAPSULE BY MOUTH ONCE A WEEK, Disp: 12 capsule, Rfl: 3  •  diltiaZEM CD (CARDIZEM CD) 180 MG 24 hr capsule, Take 1 capsule by mouth Daily., Disp: 90  "capsule, Rfl: 3      History of Present Illness   Patient presents in  follow up.  Recent [facility] admission due to pneumonitis Fatigued all the time and activities worsen. Pt denies any chest pain,   , orthopnea, PND, palpitations, lower extremity .  5 L O2 24/7.    ROS:  All systems have been reviewed and are negative with the exception of those mentioned in the HPI.    OBJECTIVE:  Vitals:    08/09/18 0956   BP: 116/40   BP Location: Right arm   Patient Position: Sitting   Pulse: 65   Weight: 84 kg (185 lb 3.2 oz)   Height: 180.3 cm (71\")     Physical Exam   Constitutional: He appears well-developed and well-nourished.   Neck: Normal range of motion. Neck supple. No hepatojugular reflux and no JVD present. Carotid bruit is not present. No tracheal deviation present. No thyromegaly present.   Cardiovascular: Normal rate, regular rhythm, S1 normal, S2 normal, intact distal pulses and normal pulses.  PMI is not displaced.  Exam reveals no gallop, no distant heart sounds, no friction rub, no midsystolic click and no opening snap.    No murmur heard.  Pulses:       Radial pulses are 2+ on the right side, and 2+ on the left side.        Dorsalis pedis pulses are 2+ on the right side, and 2+ on the left side.        Posterior tibial pulses are 2+ on the right side, and 2+ on the left side.   Pulmonary/Chest: Effort normal. He has wheezes. He has rales.   Abdominal: Soft. Bowel sounds are normal. He exhibits no mass. There is no tenderness. There is no guarding.   Musculoskeletal: He exhibits edema.       Diagnostic Data:  Procedures  Device interrogation in office today: Dual-chamber PPM with acceptable threshold and impedance with a estimated longevity of 9 years and battery voltage of 2.98 V.  Atrial pacing 96% the time, ventricular pacing 1.5%.  Less than 1% AMS which longest was 6 hours on 09/15/2016.  .  Normal functioning no changes.    ASSESSMENT:   Diagnosis Plan   1. SANCHES (dyspnea on exertion)     2. Coronary artery " disease involving native coronary artery of native heart without angina pectoris     3. Paroxysmal atrial fibrillation (CMS/HCC)         PLAN:  Dyspnea -O2 dep COPD    CAD post remote PCI with no anginal equivalent preserved LV function continued medical management    Paroxysmal A. fib anticoagulated     Hypertension controlled on current regimen

## 2018-08-10 ENCOUNTER — READMISSION MANAGEMENT (OUTPATIENT)
Dept: CALL CENTER | Facility: HOSPITAL | Age: 78
End: 2018-08-10

## 2018-08-10 NOTE — OUTREACH NOTE
COPD/PN Week 2 Survey      Responses   Facility patient discharged from?  [facility]   Does the patient have one of the following disease processes/diagnoses(primary or secondary)?  COPD/Pneumonia   Was the primary reason for admission:  Pneumonia   Week 2 attempt successful?  Yes   Call start time  1305   Call end time  1309   Discharge diagnosis  Acute right-sided pneumonia, present on admission,   COPD exacerbation    Is patient permission given to speak with other caregiver?  Yes   List who call center can speak with  Pt wife, Jesus Diazs reviewed with patient/caregiver?  Yes   Is the patient having any side effects they believe may be caused by any medication additions or changes?  No   Does the patient have all medications ordered at discharge?  Yes   Is the patient taking all medications as directed (includes completed medication regime)?  Yes   Comments regarding appointments  Seen by cardiology 08/09   Does the patient have a primary care provider?   Yes   Does the patient have an appointment with their PCP or pulmonologist within 7 days of discharge?  Greater than 7 days   Comments regarding PCP  Dr. Obrien on 8/9/18.   What is preventing the patient from scheduling follow up appointments within 7 days of discharge?  Earlier appointment not available   Nursing Interventions  Verified appointment date/time/provider   Has the patient kept scheduled appointments due by today?  Yes   Has home health visited the patient within 72 hours of discharge?  N/A   Psychosocial issues?  No   Did the patient receive a copy of their discharge instructions?  Yes   Nursing interventions  Reviewed instructions with patient, Referred to HIM, Educated on MyChart   What is the patient's perception of their health status since discharge?  Improving   Nursing Interventions  Nurse provided patient education   Are the patient's immunizations up to date?   Yes   Is the patient/caregiver able to teach back the hierarchy of who to  call/visit for symptoms/problems? PCP, Specialist, Home health nurse, Urgent Care, ED, 911  Yes   Is the patient able to teach back COPD zones?  Yes   Nursing interventions  Education provided on various zones   Patient reports what zone on this call?  Green Zone   Green Zone  Reports doing well, Breathing without shortness of breath, Usual activity and exercise level, Usual amount of phlegm/mucus without difficulty coughing up, Sleeping well, Appetite is good   Green Zone interventions:  Use oxygen as prescribed   Is the patient/caregiver able to teach back signs and symptoms of worsening condition:  Fever/chills, Shortness of breath, Chest pain   Is the patient/caregiver able to teach back importance of completing antibiotic course of treatment?  Yes   Week 2 call completed?  Yes          Andrés Arriaga RN

## 2018-08-20 ENCOUNTER — READMISSION MANAGEMENT (OUTPATIENT)
Dept: CALL CENTER | Facility: HOSPITAL | Age: 78
End: 2018-08-20

## 2018-08-20 NOTE — OUTREACH NOTE
COPD/PN Week 3 Survey      Responses   Facility patient discharged from?  [facility]   Does the patient have one of the following disease processes/diagnoses(primary or secondary)?  COPD/Pneumonia   Was the primary reason for admission:  Pneumonia   Week 3 attempt successful?  Yes   Call start time  1615   Call end time  1621   Discharge diagnosis  Acute right-sided pneumonia, present on admission,   COPD exacerbation    Is patient permission given to speak with other caregiver?  Yes   List who call center can speak with  Pt wife, Jesus Diazs reviewed with patient/caregiver?  Yes   Is the patient having any side effects they believe may be caused by any medication additions or changes?  No   Does the patient have all medications ordered at discharge?  Yes   Is the patient taking all medications as directed (includes completed medication regime)?  Yes   Comments regarding appointments  Seen by cardiology 08/09   Does the patient have a primary care provider?   Yes   Does the patient have an appointment with their PCP or pulmonologist within 7 days of discharge?  Greater than 7 days   Comments regarding PCP  Dr. Obrien on 8/9/18.   What is preventing the patient from scheduling follow up appointments within 7 days of discharge?  Earlier appointment not available   Nursing Interventions  Verified appointment date/time/provider   Has the patient kept scheduled appointments due by today?  Yes   Did the patient receive a copy of their discharge instructions?  Yes   Nursing interventions  Reviewed instructions with patient   What is the patient's perception of their health status since discharge?  Improving   Nursing Interventions  Nurse provided patient education   Are the patient's immunizations up to date?   Yes   Is the patient/caregiver able to teach back the hierarchy of who to call/visit for symptoms/problems? PCP, Specialist, Home health nurse, Urgent Care, ED, 911  Yes   Is the patient able to teach back COPD  zones?  Yes   Nursing interventions  Education provided on various zones   Patient reports what zone on this call?  Green Zone   Green Zone interventions:  Use oxygen as prescribed, Take daily medications   Is the patient/caregiver able to teach back signs and symptoms of worsening condition:  Fever/chills, Shortness of breath, Chest pain   Is the patient/caregiver able to teach back importance of completing antibiotic course of treatment?  Yes   Week 3 call completed?  Yes          Mariposa Lester RN

## 2018-08-24 RX ORDER — ALBUTEROL SULFATE 90 UG/1
AEROSOL, METERED RESPIRATORY (INHALATION)
Qty: 18 G | Refills: 2 | Status: SHIPPED | OUTPATIENT
Start: 2018-08-24 | End: 2018-08-30 | Stop reason: SDUPTHER

## 2018-08-30 ENCOUNTER — READMISSION MANAGEMENT (OUTPATIENT)
Dept: CALL CENTER | Facility: HOSPITAL | Age: 78
End: 2018-08-30

## 2018-08-30 ENCOUNTER — HOSPITAL ENCOUNTER (OUTPATIENT)
Dept: GENERAL RADIOLOGY | Facility: HOSPITAL | Age: 78
Discharge: HOME | End: 2018-08-30
Attending: INTERNAL MEDICINE | Admitting: INTERNAL MEDICINE

## 2018-08-30 ENCOUNTER — OFFICE VISIT (OUTPATIENT)
Dept: PULMONOLOGY | Facility: CLINIC | Age: 78
End: 2018-08-30

## 2018-08-30 VITALS
HEART RATE: 73 BPM | DIASTOLIC BLOOD PRESSURE: 50 MMHG | BODY MASS INDEX: 26.46 KG/M2 | HEIGHT: 71 IN | SYSTOLIC BLOOD PRESSURE: 110 MMHG | OXYGEN SATURATION: 88 % | RESPIRATION RATE: 18 BRPM | WEIGHT: 189 LBS

## 2018-08-30 DIAGNOSIS — R06.02 SHORTNESS OF BREATH: Primary | ICD-10-CM

## 2018-08-30 DIAGNOSIS — J44.9 CHRONIC OBSTRUCTIVE PULMONARY DISEASE, UNSPECIFIED COPD TYPE (HCC): ICD-10-CM

## 2018-08-30 DIAGNOSIS — J96.12 CHRONIC RESPIRATORY FAILURE WITH HYPOXIA AND HYPERCAPNIA (HCC): ICD-10-CM

## 2018-08-30 DIAGNOSIS — J96.11 CHRONIC RESPIRATORY FAILURE WITH HYPOXIA AND HYPERCAPNIA (HCC): ICD-10-CM

## 2018-08-30 DIAGNOSIS — J18.9 PNEUMONIA OF RIGHT MIDDLE LOBE DUE TO INFECTIOUS ORGANISM: ICD-10-CM

## 2018-08-30 PROCEDURE — 71046 X-RAY EXAM CHEST 2 VIEWS: CPT

## 2018-08-30 PROCEDURE — 99214 OFFICE O/P EST MOD 30 MIN: CPT | Performed by: NURSE PRACTITIONER

## 2018-08-30 RX ORDER — BUDESONIDE AND FORMOTEROL FUMARATE DIHYDRATE 80; 4.5 UG/1; UG/1
2 AEROSOL RESPIRATORY (INHALATION)
Qty: 3 INHALER | Refills: 1 | Status: SHIPPED | OUTPATIENT
Start: 2018-08-30 | End: 2019-01-01 | Stop reason: SDUPTHER

## 2018-08-30 RX ORDER — ALBUTEROL SULFATE 90 UG/1
2 AEROSOL, METERED RESPIRATORY (INHALATION) EVERY 6 HOURS PRN
Qty: 3 INHALER | Refills: 1 | Status: SHIPPED | OUTPATIENT
Start: 2018-08-30 | End: 2018-12-12

## 2018-08-30 RX ORDER — HYDROCHLOROTHIAZIDE 25 MG/1
25 TABLET ORAL DAILY
COMMUNITY
Start: 2018-08-24 | End: 2019-01-01 | Stop reason: SDUPTHER

## 2018-08-30 RX ORDER — DILTIAZEM HYDROCHLORIDE 120 MG/1
CAPSULE, EXTENDED RELEASE ORAL
Status: ON HOLD | COMMUNITY
Start: 2018-08-24 | End: 2019-01-01

## 2018-08-30 RX ORDER — PREDNISONE 10 MG/1
TABLET ORAL
COMMUNITY
Start: 2018-07-24 | End: 2018-12-12

## 2018-08-30 RX ORDER — ROFLUMILAST 500 UG/1
500 TABLET ORAL DAILY
Qty: 90 TABLET | Refills: 1 | Status: SHIPPED | OUTPATIENT
Start: 2018-08-30 | End: 2019-01-01 | Stop reason: SDUPTHER

## 2018-08-30 RX ORDER — AZELASTINE 1 MG/ML
SPRAY, METERED NASAL
COMMUNITY
Start: 2018-08-24 | End: 2019-01-01 | Stop reason: SDUPTHER

## 2018-08-30 NOTE — OUTREACH NOTE
COPD/PN Week 4 Survey      Responses   Facility patient discharged from?  [facility]   Does the patient have one of the following disease processes/diagnoses(primary or secondary)?  COPD/Pneumonia   Was the primary reason for admission:  Pneumonia   Week 4 attempt successful?  No          Mya Cope RN

## 2018-09-24 ENCOUNTER — TELEPHONE (OUTPATIENT)
Dept: PHARMACY | Facility: HOSPITAL | Age: 78
End: 2018-09-24

## 2018-09-24 NOTE — TELEPHONE ENCOUNTER
Dr. Obrien,    Alessandro Mendiola continues use of Xarelto and denies any questions or concerns at this time. If you would like Alessandro Mendiola to continue to be followed in our clinic, we would be happy to continue to work with him to provide care. We have instructed  And Mrs. Mendiola to call us with any questions and ensure labwork is completed at least twice annually (understanding dose adjustments may be necessary with changes in renal function). Most recent Scr 0.7 on 8/1/2018. Unless instructed otherwise, we will discharge Alessandro Mendiola from our services.    Thank you for allowing us to participate in this patient's care.    Sincerely,  [facility]  [phone]

## 2018-10-19 DIAGNOSIS — R93.89 ABNORMAL CHEST X-RAY: Primary | ICD-10-CM

## 2018-10-24 ENCOUNTER — CLINICAL SUPPORT NO REQUIREMENTS (OUTPATIENT)
Dept: CARDIOLOGY | Facility: CLINIC | Age: 78
End: 2018-10-24

## 2018-10-24 DIAGNOSIS — I49.5 SICK SINUS SYNDROME (HCC): ICD-10-CM

## 2018-10-24 DIAGNOSIS — I48.0 PAROXYSMAL ATRIAL FIBRILLATION (HCC): ICD-10-CM

## 2018-10-24 PROCEDURE — 93294 REM INTERROG EVL PM/LDLS PM: CPT | Performed by: INTERNAL MEDICINE

## 2018-10-24 PROCEDURE — 93296 REM INTERROG EVL PM/IDS: CPT | Performed by: INTERNAL MEDICINE

## 2018-12-07 ENCOUNTER — HOSPITAL ENCOUNTER (OUTPATIENT)
Dept: GENERAL RADIOLOGY | Facility: HOSPITAL | Age: 78
Discharge: HOME | End: 2018-12-07
Attending: INTERNAL MEDICINE | Admitting: INTERNAL MEDICINE

## 2018-12-07 DIAGNOSIS — R93.89 ABNORMAL CHEST X-RAY: ICD-10-CM

## 2018-12-07 PROCEDURE — 71046 X-RAY EXAM CHEST 2 VIEWS: CPT

## 2018-12-12 ENCOUNTER — OFFICE VISIT (OUTPATIENT)
Dept: PULMONOLOGY | Facility: CLINIC | Age: 78
End: 2018-12-12

## 2018-12-12 VITALS
DIASTOLIC BLOOD PRESSURE: 78 MMHG | HEIGHT: 71 IN | BODY MASS INDEX: 26.73 KG/M2 | OXYGEN SATURATION: 90 % | WEIGHT: 190.9 LBS | HEART RATE: 73 BPM | SYSTOLIC BLOOD PRESSURE: 106 MMHG

## 2018-12-12 DIAGNOSIS — H10.13 ALLERGIC CONJUNCTIVITIS OF BOTH EYES: ICD-10-CM

## 2018-12-12 DIAGNOSIS — J96.12 CHRONIC RESPIRATORY FAILURE WITH HYPOXIA AND HYPERCAPNIA (HCC): ICD-10-CM

## 2018-12-12 DIAGNOSIS — J96.11 CHRONIC RESPIRATORY FAILURE WITH HYPOXIA AND HYPERCAPNIA (HCC): ICD-10-CM

## 2018-12-12 DIAGNOSIS — R09.02 HYPOXIA: ICD-10-CM

## 2018-12-12 DIAGNOSIS — R06.02 SHORTNESS OF BREATH: Primary | ICD-10-CM

## 2018-12-12 DIAGNOSIS — R93.89 ABNORMAL CHEST X-RAY: ICD-10-CM

## 2018-12-12 DIAGNOSIS — J30.89 OTHER ALLERGIC RHINITIS: ICD-10-CM

## 2018-12-12 DIAGNOSIS — G47.33 OSA (OBSTRUCTIVE SLEEP APNEA): ICD-10-CM

## 2018-12-12 DIAGNOSIS — J44.9 CHRONIC OBSTRUCTIVE PULMONARY DISEASE, UNSPECIFIED COPD TYPE (HCC): Primary | ICD-10-CM

## 2018-12-12 DIAGNOSIS — J44.9 CHRONIC OBSTRUCTIVE PULMONARY DISEASE, UNSPECIFIED COPD TYPE (HCC): ICD-10-CM

## 2018-12-12 PROCEDURE — 99214 OFFICE O/P EST MOD 30 MIN: CPT | Performed by: INTERNAL MEDICINE

## 2018-12-12 RX ORDER — OLOPATADINE HYDROCHLORIDE 1 MG/ML
1 SOLUTION/ DROPS OPHTHALMIC 2 TIMES DAILY
Qty: 1 EACH | Refills: 2 | Status: ON HOLD | OUTPATIENT
Start: 2018-12-12 | End: 2019-01-01

## 2018-12-12 RX ORDER — CEFUROXIME AXETIL 500 MG/1
TABLET ORAL
COMMUNITY
Start: 2018-11-14 | End: 2019-01-01

## 2018-12-12 NOTE — PROGRESS NOTES
"Chief Complaint   Patient presents with   • Follow-up     SOA, COPD       Subjective   Alessandro Mendiola is a 78 y.o. male.     History of Present Illness   Patient comes today for follow up of shortness of breath, abnormal chest x-ray, hypoxia, chronic respiratory failure and COPD.     Symptoms have been stable since the last clinic visit. Patient reports no recent exacerbations.     Patient is using medications, as prescribed. Exercise tolerance has also remained very limited and despite using all his medications he continues to feel short of breath throughout the day.     Patient is using oxygen 24/7.    The patient's symptoms of allergic rhinitis under decent control.    The patient is complaining of dryness and redness in his eyes especially since the weather change.    The patient continued to struggle to use his noninvasive ventilation device mostly because he \"slobbering\" and cannot to the full facemask.    He does however, feel that he feels better the next morning after using the device.       The following portions of the patient's history were reviewed and updated as appropriate: allergies, current medications, past family history, past medical history, past social history and past surgical history.    Review of Systems   Constitutional: Negative for chills and fever.   HENT: Negative for sinus pressure, sneezing and sore throat.    Respiratory: Positive for cough and shortness of breath. Negative for wheezing.        Objective   Visit Vitals  /78 (BP Location: Right arm, Patient Position: Sitting, Cuff Size: Adult)   Pulse 73   Ht 180.3 cm (70.98\")   Wt 86.6 kg (190 lb 14.4 oz)   SpO2 90% Comment: resting with room oxygen   BMI 26.64 kg/m²   O2 sat on RA: 84%.  O2 sat on 3LPD: 92%.    Physical Exam   Constitutional: He is oriented to person, place, and time. He appears well-developed and well-nourished.   HENT:   Head: Normocephalic and atraumatic.   Dentures noted.   Eyes: EOM are normal.   Neck: " "Neck supple.   Cardiovascular: Normal rate and regular rhythm.   Pulmonary/Chest: Effort normal. No respiratory distress.   Hyperresonant to percussion  Decreased A/E with out wheezing noted.   Musculoskeletal: He exhibits no edema.   Neurological: He is alert and oriented to person, place, and time.   Skin: Skin is warm and dry.   Psychiatric: He has a normal mood and affect.   Vitals reviewed.      Assessment/Plan   Alessandro was seen today for follow-up.    Diagnoses and all orders for this visit:    Shortness of breath    Abnormal chest x-ray    Chronic obstructive pulmonary disease, unspecified COPD type (CMS/HCC)  -     BIPAP / CPAP Adjustment    Chronic respiratory failure with hypoxia and hypercapnia (CMS/HCC)  -     BIPAP / CPAP Adjustment    Hypoxia  -     BIPAP / CPAP Adjustment    Other allergic rhinitis    Allergic conjunctivitis of both eyes    Other orders  -     olopatadine (PATANOL) 0.1 % ophthalmic solution; Administer 1 drop to both eyes 2 (Two) Times a Day.         Return in about 5 months (around 5/12/2019) for Recheck, Overbook.    DISCUSSION (if any):  Due to his continued issues with noninvasive ventilation device, mostly related to \"slobbering\", I feel he will benefit from a nasal mask and I have asked the [employer] company to please provide patient with a Nasal Mask (Possibly will need XL) since he is having issues with FFM for his NIV device.     The patient's COPD seems to be somewhat poorly controlled but he is on maximal therapy.    I have asked the patient to add nebulized medications on a daily basis at 12 noon and 5 PM.    I have prescribed Patanol eyedrops for him to use and instructed him to inform his primary care provider if these do not relieve symptoms of dry eyes and redness in his eyes.    The patient was advised to continue oxygen 24/7, since patient has noticed improvement in some symptoms since using it as prescribed.    I have made no changes to the medications.    He was advised " to continue azelastine for when necessary purposes.      Dictated utilizing Dragon dictation.    This document was electronically signed by Anna Wilson MD December 12, 2018  12:05 PM

## 2019-01-01 ENCOUNTER — APPOINTMENT (OUTPATIENT)
Dept: GENERAL RADIOLOGY | Facility: HOSPITAL | Age: 79
End: 2019-01-01

## 2019-01-01 ENCOUNTER — CLINICAL SUPPORT NO REQUIREMENTS (OUTPATIENT)
Dept: CARDIOLOGY | Facility: CLINIC | Age: 79
End: 2019-01-01

## 2019-01-01 ENCOUNTER — OFFICE VISIT (OUTPATIENT)
Dept: INTERNAL MEDICINE | Facility: CLINIC | Age: 79
End: 2019-01-01

## 2019-01-01 ENCOUNTER — READMISSION MANAGEMENT (OUTPATIENT)
Dept: CALL CENTER | Facility: HOSPITAL | Age: 79
End: 2019-01-01

## 2019-01-01 ENCOUNTER — OFFICE VISIT (OUTPATIENT)
Dept: CARDIOLOGY | Facility: CLINIC | Age: 79
End: 2019-01-01

## 2019-01-01 ENCOUNTER — TRANSITIONAL CARE MANAGEMENT TELEPHONE ENCOUNTER (OUTPATIENT)
Dept: INTERNAL MEDICINE | Facility: CLINIC | Age: 79
End: 2019-01-01

## 2019-01-01 ENCOUNTER — TELEPHONE (OUTPATIENT)
Dept: INTERNAL MEDICINE | Facility: CLINIC | Age: 79
End: 2019-01-01

## 2019-01-01 ENCOUNTER — HOSPITAL ENCOUNTER (EMERGENCY)
Facility: HOSPITAL | Age: 79
Discharge: HOME | End: 2019-12-11
Attending: EMERGENCY MEDICINE | Admitting: EMERGENCY MEDICINE

## 2019-01-01 ENCOUNTER — HOSPITAL ENCOUNTER (INPATIENT)
Facility: HOSPITAL | Age: 79
LOS: 1 days | Discharge: HOME | End: 2019-09-15
Attending: EMERGENCY MEDICINE | Admitting: INTERNAL MEDICINE

## 2019-01-01 ENCOUNTER — HOSPITAL ENCOUNTER (OUTPATIENT)
Dept: CT IMAGING | Facility: HOSPITAL | Age: 79
Discharge: HOME | End: 2019-12-09
Admitting: INTERNAL MEDICINE

## 2019-01-01 ENCOUNTER — OFFICE VISIT (OUTPATIENT)
Dept: PULMONOLOGY | Facility: CLINIC | Age: 79
End: 2019-01-01

## 2019-01-01 ENCOUNTER — HOSPITAL ENCOUNTER (OUTPATIENT)
Dept: CT IMAGING | Facility: HOSPITAL | Age: 79
End: 2019-01-01

## 2019-01-01 ENCOUNTER — HOSPITAL ENCOUNTER (OUTPATIENT)
Dept: CT IMAGING | Facility: HOSPITAL | Age: 79
Discharge: HOME | End: 2019-02-25
Admitting: INTERNAL MEDICINE

## 2019-01-01 VITALS
SYSTOLIC BLOOD PRESSURE: 119 MMHG | WEIGHT: 174 LBS | HEIGHT: 71 IN | OXYGEN SATURATION: 95 % | BODY MASS INDEX: 24.36 KG/M2 | TEMPERATURE: 97.8 F | HEART RATE: 73 BPM | DIASTOLIC BLOOD PRESSURE: 59 MMHG

## 2019-01-01 VITALS
WEIGHT: 182 LBS | BODY MASS INDEX: 25.9 KG/M2 | OXYGEN SATURATION: 96 % | HEIGHT: 71 IN | HEART RATE: 65 BPM | SYSTOLIC BLOOD PRESSURE: 124 MMHG | HEART RATE: 66 BPM | DIASTOLIC BLOOD PRESSURE: 56 MMHG | HEIGHT: 71 IN | WEIGHT: 185 LBS | DIASTOLIC BLOOD PRESSURE: 50 MMHG | OXYGEN SATURATION: 99 % | TEMPERATURE: 98 F | BODY MASS INDEX: 25.48 KG/M2 | SYSTOLIC BLOOD PRESSURE: 141 MMHG

## 2019-01-01 VITALS
SYSTOLIC BLOOD PRESSURE: 116 MMHG | BODY MASS INDEX: 23.26 KG/M2 | DIASTOLIC BLOOD PRESSURE: 57 MMHG | TEMPERATURE: 98.3 F | HEART RATE: 73 BPM | HEIGHT: 71 IN | OXYGEN SATURATION: 96 % | WEIGHT: 166.12 LBS

## 2019-01-01 VITALS
HEIGHT: 71 IN | SYSTOLIC BLOOD PRESSURE: 134 MMHG | TEMPERATURE: 98.2 F | OXYGEN SATURATION: 96 % | BODY MASS INDEX: 23.84 KG/M2 | DIASTOLIC BLOOD PRESSURE: 80 MMHG | RESPIRATION RATE: 18 BRPM | WEIGHT: 170.3 LBS | HEART RATE: 64 BPM

## 2019-01-01 VITALS
BODY MASS INDEX: 25.2 KG/M2 | SYSTOLIC BLOOD PRESSURE: 118 MMHG | HEART RATE: 60 BPM | RESPIRATION RATE: 18 BRPM | OXYGEN SATURATION: 89 % | DIASTOLIC BLOOD PRESSURE: 70 MMHG | WEIGHT: 180 LBS | HEIGHT: 71 IN

## 2019-01-01 VITALS
HEIGHT: 71 IN | SYSTOLIC BLOOD PRESSURE: 126 MMHG | OXYGEN SATURATION: 99 % | WEIGHT: 177.8 LBS | TEMPERATURE: 97.9 F | BODY MASS INDEX: 24.89 KG/M2 | DIASTOLIC BLOOD PRESSURE: 54 MMHG | HEART RATE: 61 BPM

## 2019-01-01 VITALS
BODY MASS INDEX: 23.38 KG/M2 | SYSTOLIC BLOOD PRESSURE: 120 MMHG | DIASTOLIC BLOOD PRESSURE: 60 MMHG | OXYGEN SATURATION: 97 % | WEIGHT: 167 LBS | HEIGHT: 71 IN | HEART RATE: 80 BPM

## 2019-01-01 VITALS
RESPIRATION RATE: 20 BRPM | DIASTOLIC BLOOD PRESSURE: 56 MMHG | SYSTOLIC BLOOD PRESSURE: 95 MMHG | TEMPERATURE: 98.5 F | HEART RATE: 83 BPM | OXYGEN SATURATION: 96 % | HEIGHT: 71 IN | WEIGHT: 165 LBS | BODY MASS INDEX: 23.1 KG/M2

## 2019-01-01 VITALS
RESPIRATION RATE: 18 BRPM | BODY MASS INDEX: 23.8 KG/M2 | WEIGHT: 170 LBS | HEIGHT: 71 IN | HEART RATE: 69 BPM | SYSTOLIC BLOOD PRESSURE: 110 MMHG | DIASTOLIC BLOOD PRESSURE: 58 MMHG | OXYGEN SATURATION: 91 %

## 2019-01-01 DIAGNOSIS — J30.89 OTHER ALLERGIC RHINITIS: ICD-10-CM

## 2019-01-01 DIAGNOSIS — R09.02 HYPOXIA: ICD-10-CM

## 2019-01-01 DIAGNOSIS — I49.5 SICK SINUS SYNDROME (HCC): Primary | ICD-10-CM

## 2019-01-01 DIAGNOSIS — I48.0 PAROXYSMAL ATRIAL FIBRILLATION (HCC): ICD-10-CM

## 2019-01-01 DIAGNOSIS — J96.11 CHRONIC RESPIRATORY FAILURE WITH HYPOXIA AND HYPERCAPNIA (HCC): ICD-10-CM

## 2019-01-01 DIAGNOSIS — J96.12 CHRONIC RESPIRATORY FAILURE WITH HYPOXIA AND HYPERCAPNIA (HCC): ICD-10-CM

## 2019-01-01 DIAGNOSIS — I49.5 SICK SINUS SYNDROME (HCC): ICD-10-CM

## 2019-01-01 DIAGNOSIS — I25.10 CORONARY ARTERY DISEASE INVOLVING NATIVE CORONARY ARTERY OF NATIVE HEART WITHOUT ANGINA PECTORIS: ICD-10-CM

## 2019-01-01 DIAGNOSIS — I48.0 PAROXYSMAL ATRIAL FIBRILLATION (HCC): Primary | ICD-10-CM

## 2019-01-01 DIAGNOSIS — J44.1 COPD EXACERBATION (HCC): ICD-10-CM

## 2019-01-01 DIAGNOSIS — J43.1 PANLOBULAR EMPHYSEMA (HCC): Primary | ICD-10-CM

## 2019-01-01 DIAGNOSIS — I73.9 PVD (PERIPHERAL VASCULAR DISEASE) WITH CLAUDICATION (HCC): Primary | ICD-10-CM

## 2019-01-01 DIAGNOSIS — R53.83 OTHER FATIGUE: ICD-10-CM

## 2019-01-01 DIAGNOSIS — I71.20 THORACIC AORTIC ANEURYSM WITHOUT RUPTURE (HCC): ICD-10-CM

## 2019-01-01 DIAGNOSIS — R06.02 SHORTNESS OF BREATH: ICD-10-CM

## 2019-01-01 DIAGNOSIS — R00.2 PALPITATIONS: Primary | ICD-10-CM

## 2019-01-01 DIAGNOSIS — J44.9 CHRONIC OBSTRUCTIVE PULMONARY DISEASE, UNSPECIFIED COPD TYPE (HCC): ICD-10-CM

## 2019-01-01 DIAGNOSIS — I25.10 CORONARY ARTERIOSCLEROSIS IN NATIVE ARTERY: ICD-10-CM

## 2019-01-01 DIAGNOSIS — R06.02 SHORTNESS OF BREATH: Primary | ICD-10-CM

## 2019-01-01 DIAGNOSIS — I10 ESSENTIAL HYPERTENSION: ICD-10-CM

## 2019-01-01 DIAGNOSIS — H57.89 REDNESS OR DISCHARGE OF EYE: Primary | ICD-10-CM

## 2019-01-01 DIAGNOSIS — R73.9 HYPERGLYCEMIA: ICD-10-CM

## 2019-01-01 DIAGNOSIS — I77.1 STRICTURE OF ARTERY (HCC): ICD-10-CM

## 2019-01-01 DIAGNOSIS — F39 MOOD DISORDER (HCC): ICD-10-CM

## 2019-01-01 DIAGNOSIS — J41.0 SIMPLE CHRONIC BRONCHITIS (HCC): ICD-10-CM

## 2019-01-01 DIAGNOSIS — G47.33 OSA (OBSTRUCTIVE SLEEP APNEA): ICD-10-CM

## 2019-01-01 DIAGNOSIS — J18.9 PNEUMONIA OF RIGHT LOWER LOBE DUE TO INFECTIOUS ORGANISM: ICD-10-CM

## 2019-01-01 DIAGNOSIS — I73.9 PAD (PERIPHERAL ARTERY DISEASE) (HCC): ICD-10-CM

## 2019-01-01 DIAGNOSIS — J18.9 PNEUMONIA OF RIGHT LOWER LOBE DUE TO INFECTIOUS ORGANISM: Primary | ICD-10-CM

## 2019-01-01 DIAGNOSIS — J44.9 CHRONIC OBSTRUCTIVE PULMONARY DISEASE, UNSPECIFIED COPD TYPE (HCC): Primary | ICD-10-CM

## 2019-01-01 DIAGNOSIS — I71.20 THORACIC AORTIC ANEURYSM WITHOUT RUPTURE (HCC): Primary | ICD-10-CM

## 2019-01-01 DIAGNOSIS — Z23 NEED FOR IMMUNIZATION AGAINST INFLUENZA: ICD-10-CM

## 2019-01-01 DIAGNOSIS — E87.6 HYPOKALEMIA: ICD-10-CM

## 2019-01-01 DIAGNOSIS — J41.8 MIXED SIMPLE AND MUCOPURULENT CHRONIC BRONCHITIS (HCC): Primary | ICD-10-CM

## 2019-01-01 DIAGNOSIS — E55.9 VITAMIN D DEFICIENCY: ICD-10-CM

## 2019-01-01 LAB
25(OH)D3+25(OH)D2 SERPL-MCNC: 82 NG/ML
A-A DO2: ABNORMAL
ALBUMIN SERPL-MCNC: 3.6 G/DL (ref 3.5–5.2)
ALBUMIN SERPL-MCNC: 3.7 G/DL (ref 3.5–5.2)
ALBUMIN SERPL-MCNC: 3.9 G/DL (ref 3.5–5.2)
ALBUMIN SERPL-MCNC: 4.1 G/DL (ref 3.5–5)
ALBUMIN/GLOB SERPL: 1 G/DL
ALBUMIN/GLOB SERPL: 1.1 G/DL
ALBUMIN/GLOB SERPL: 1.1 G/DL
ALBUMIN/GLOB SERPL: 1.6 G/DL (ref 1–2)
ALP SERPL-CCNC: 109 U/L (ref 38–126)
ALP SERPL-CCNC: 114 U/L (ref 39–117)
ALP SERPL-CCNC: 124 U/L (ref 39–117)
ALP SERPL-CCNC: 128 U/L (ref 39–117)
ALT SERPL W P-5'-P-CCNC: 12 U/L (ref 1–41)
ALT SERPL W P-5'-P-CCNC: 16 U/L (ref 1–41)
ALT SERPL W P-5'-P-CCNC: 17 U/L (ref 1–41)
ALT SERPL-CCNC: 26 U/L (ref 13–69)
ANION GAP SERPL CALCULATED.3IONS-SCNC: 14.4 MMOL/L (ref 5–15)
ANION GAP SERPL CALCULATED.3IONS-SCNC: 16.5 MMOL/L (ref 5–15)
ANION GAP SERPL CALCULATED.3IONS-SCNC: 16.9 MMOL/L (ref 5–15)
ANISOCYTOSIS BLD QL: NORMAL
ARTERIAL PATENCY WRIST A: POSITIVE
AST SERPL-CCNC: 14 U/L (ref 1–40)
AST SERPL-CCNC: 14 U/L (ref 1–40)
AST SERPL-CCNC: 19 U/L (ref 15–46)
AST SERPL-CCNC: 20 U/L (ref 1–40)
ATMOSPHERIC PRESS: 741 MMHG
BACTERIA SPEC AEROBE CULT: NORMAL
BACTERIA SPEC AEROBE CULT: NORMAL
BASE EXCESS BLDA CALC-SCNC: 6.1 MMOL/L (ref 0–2)
BASOPHILS # BLD AUTO: 0.03 10*3/MM3 (ref 0–0.2)
BASOPHILS # BLD AUTO: 0.04 10*3/MM3 (ref 0–0.2)
BASOPHILS NFR BLD AUTO: 0.4 % (ref 0–1.5)
BASOPHILS NFR BLD AUTO: 0.5 % (ref 0–1.5)
BDY SITE: ABNORMAL
BILIRUB SERPL-MCNC: 0.3 MG/DL (ref 0.2–1.2)
BILIRUB SERPL-MCNC: 0.3 MG/DL (ref 0.2–1.3)
BUN BLD-MCNC: 12 MG/DL (ref 8–23)
BUN BLD-MCNC: 12 MG/DL (ref 8–23)
BUN BLD-MCNC: 13 MG/DL (ref 8–23)
BUN SERPL-MCNC: 13 MG/DL (ref 7–20)
BUN/CREAT SERPL: 13 (ref 7–25)
BUN/CREAT SERPL: 14.4 (ref 6.3–21.9)
BUN/CREAT SERPL: 14.5 (ref 7–25)
BUN/CREAT SERPL: 16.9 (ref 7–25)
CALCIUM SERPL-MCNC: 9.2 MG/DL (ref 8.4–10.2)
CALCIUM SPEC-SCNC: 8.7 MG/DL (ref 8.6–10.5)
CALCIUM SPEC-SCNC: 9.1 MG/DL (ref 8.6–10.5)
CALCIUM SPEC-SCNC: 9.6 MG/DL (ref 8.6–10.5)
CHLORIDE SERPL-SCNC: 100 MMOL/L (ref 98–107)
CHLORIDE SERPL-SCNC: 94 MMOL/L (ref 98–107)
CHLORIDE SERPL-SCNC: 95 MMOL/L (ref 98–107)
CHLORIDE SERPL-SCNC: 97 MMOL/L (ref 98–107)
CO2 SERPL-SCNC: 26.5 MMOL/L (ref 22–29)
CO2 SERPL-SCNC: 29.1 MMOL/L (ref 22–29)
CO2 SERPL-SCNC: 29.6 MMOL/L (ref 22–29)
CO2 SERPL-SCNC: 30 MMOL/L (ref 26–30)
COHGB MFR BLD: 0.8 % (ref 0–2)
CREAT BLD-MCNC: 0.77 MG/DL (ref 0.76–1.27)
CREAT BLD-MCNC: 0.83 MG/DL (ref 0.76–1.27)
CREAT BLD-MCNC: 0.92 MG/DL (ref 0.76–1.27)
CREAT SERPL-MCNC: 0.9 MG/DL (ref 0.6–1.3)
DEPRECATED RDW RBC AUTO: 39.1 FL (ref 37–54)
DEPRECATED RDW RBC AUTO: 43.2 FL (ref 37–54)
DEPRECATED RDW RBC AUTO: 45.1 FL (ref 37–54)
EOSINOPHIL # BLD AUTO: 0.01 10*3/MM3 (ref 0–0.4)
EOSINOPHIL # BLD AUTO: 0.04 10*3/MM3 (ref 0–0.4)
EOSINOPHIL NFR BLD AUTO: 0.1 % (ref 0.3–6.2)
EOSINOPHIL NFR BLD AUTO: 0.5 % (ref 0.3–6.2)
ERYTHROCYTE [DISTWIDTH] IN BLOOD BY AUTOMATED COUNT: 14.2 % (ref 12.3–15.4)
ERYTHROCYTE [DISTWIDTH] IN BLOOD BY AUTOMATED COUNT: 14.3 % (ref 11.5–14.5)
ERYTHROCYTE [DISTWIDTH] IN BLOOD BY AUTOMATED COUNT: 14.4 % (ref 12.3–15.4)
ERYTHROCYTE [DISTWIDTH] IN BLOOD BY AUTOMATED COUNT: 14.8 % (ref 12.3–15.4)
GAS FLOW AIRWAY: 5 LPM
GFR SERPL CREATININE-BSD FRML MDRD: 80 ML/MIN/1.73
GFR SERPL CREATININE-BSD FRML MDRD: 90 ML/MIN/1.73
GFR SERPL CREATININE-BSD FRML MDRD: 98 ML/MIN/1.73
GLOBULIN SER CALC-MCNC: 2.6 GM/DL
GLOBULIN UR ELPH-MCNC: 3.5 GM/DL
GLOBULIN UR ELPH-MCNC: 3.6 GM/DL
GLOBULIN UR ELPH-MCNC: 3.7 GM/DL
GLUCOSE BLD-MCNC: 232 MG/DL (ref 65–99)
GLUCOSE BLD-MCNC: 246 MG/DL (ref 65–99)
GLUCOSE BLD-MCNC: 292 MG/DL (ref 65–99)
GLUCOSE SERPL-MCNC: 83 MG/DL (ref 74–98)
HBA1C MFR BLD: 6.2 %
HCO3 BLDA-SCNC: 32.3 MMOL/L (ref 22–28)
HCT VFR BLD AUTO: 31.9 % (ref 37.5–51)
HCT VFR BLD AUTO: 33.2 % (ref 37.5–51)
HCT VFR BLD AUTO: 35.5 % (ref 37.5–51)
HCT VFR BLD AUTO: 38.9 % (ref 42–52)
HCT VFR BLD CALC: 31.8 %
HGB BLD-MCNC: 10.1 G/DL (ref 13–17.7)
HGB BLD-MCNC: 10.5 G/DL (ref 13–17.7)
HGB BLD-MCNC: 11.9 G/DL (ref 14–18)
HGB BLD-MCNC: 9.6 G/DL (ref 13–17.7)
HGB BLDA-MCNC: 10.4 G/DL (ref 12–18)
HOLD SPECIMEN: NORMAL
HOROWITZ INDEX BLD+IHG-RTO: 40 %
HYPOCHROMIA BLD QL: NORMAL
IMM GRANULOCYTES # BLD AUTO: 0.02 10*3/MM3 (ref 0–0.05)
IMM GRANULOCYTES # BLD AUTO: 0.03 10*3/MM3 (ref 0–0.05)
IMM GRANULOCYTES NFR BLD AUTO: 0.3 % (ref 0–0.5)
IMM GRANULOCYTES NFR BLD AUTO: 0.3 % (ref 0–0.5)
LDLC SERPL DIRECT ASSAY-MCNC: 89 MG/DL (ref 0–99)
LYMPHOCYTES # BLD AUTO: 0.71 10*3/MM3 (ref 0.7–3.1)
LYMPHOCYTES # BLD AUTO: 1.14 10*3/MM3 (ref 0.7–3.1)
LYMPHOCYTES NFR BLD AUTO: 12.9 % (ref 19.6–45.3)
LYMPHOCYTES NFR BLD AUTO: 9.7 % (ref 19.6–45.3)
MCH RBC QN AUTO: 23 PG (ref 26.6–33)
MCH RBC QN AUTO: 24.8 PG (ref 26.6–33)
MCH RBC QN AUTO: 25.1 PG (ref 26.6–33)
MCH RBC QN AUTO: 25.6 PG (ref 27–31)
MCHC RBC AUTO-ENTMCNC: 29.6 G/DL (ref 31.5–35.7)
MCHC RBC AUTO-ENTMCNC: 30.1 G/DL (ref 31.5–35.7)
MCHC RBC AUTO-ENTMCNC: 30.4 G/DL (ref 31.5–35.7)
MCHC RBC AUTO-ENTMCNC: 30.6 G/DL (ref 30–37)
MCV RBC AUTO: 76.3 FL (ref 79–97)
MCV RBC AUTO: 82.4 FL (ref 79–97)
MCV RBC AUTO: 83.7 FL (ref 79–97)
MCV RBC AUTO: 83.8 FL (ref 80–94)
METHGB BLD QL: 1 % (ref 0–1.5)
MODALITY: ABNORMAL
MONOCYTES # BLD AUTO: 0.69 10*3/MM3 (ref 0.1–0.9)
MONOCYTES # BLD AUTO: 0.88 10*3/MM3 (ref 0.1–0.9)
MONOCYTES NFR BLD AUTO: 10 % (ref 5–12)
MONOCYTES NFR BLD AUTO: 9.5 % (ref 5–12)
NEUTROPHILS # BLD AUTO: 5.8 10*3/MM3 (ref 1.7–7)
NEUTROPHILS # BLD AUTO: 6.73 10*3/MM3 (ref 1.7–7)
NEUTROPHILS NFR BLD AUTO: 76.2 % (ref 42.7–76)
NEUTROPHILS NFR BLD AUTO: 79.6 % (ref 42.7–76)
NOTE: ABNORMAL
NRBC BLD AUTO-RTO: 0 /100 WBC (ref 0–0.2)
NRBC BLD AUTO-RTO: 0 /100 WBC (ref 0–0.2)
NT-PROBNP SERPL-MCNC: 2239 PG/ML (ref 5–1800)
NT-PROBNP SERPL-MCNC: 451.1 PG/ML (ref 5–1800)
OXYHGB MFR BLDV: 97.2 % (ref 94–99)
PCO2 BLDA: 54.6 MM HG (ref 35–45)
PCO2 TEMP ADJ BLD: ABNORMAL MM[HG]
PH BLDA: 7.38 PH UNITS (ref 7.3–7.5)
PH, TEMP CORRECTED: ABNORMAL
PLATELET # BLD AUTO: 173 10*3/MM3 (ref 140–450)
PLATELET # BLD AUTO: 182 10*3/MM3 (ref 140–450)
PLATELET # BLD AUTO: 213 10*3/MM3 (ref 140–450)
PLATELET # BLD AUTO: 224 10*3/MM3 (ref 130–400)
PMV BLD AUTO: 10.8 FL (ref 6–12)
PMV BLD AUTO: 10.8 FL (ref 6–12)
PMV BLD AUTO: 9.7 FL (ref 6–12)
PO2 BLDA: 124 MM HG (ref 75–100)
PO2 TEMP ADJ BLD: ABNORMAL MM[HG]
POTASSIUM BLD-SCNC: 3 MMOL/L (ref 3.5–5.2)
POTASSIUM BLD-SCNC: 3.5 MMOL/L (ref 3.5–5.2)
POTASSIUM BLD-SCNC: 4.2 MMOL/L (ref 3.5–5.2)
POTASSIUM SERPL-SCNC: 4.1 MMOL/L (ref 3.5–5.1)
PROT SERPL-MCNC: 6.7 G/DL (ref 6.3–8.2)
PROT SERPL-MCNC: 7.2 G/DL (ref 6–8.5)
PROT SERPL-MCNC: 7.3 G/DL (ref 6–8.5)
PROT SERPL-MCNC: 7.5 G/DL (ref 6–8.5)
RBC # BLD AUTO: 4.03 10*6/MM3 (ref 4.14–5.8)
RBC # BLD AUTO: 4.18 10*6/MM3 (ref 4.14–5.8)
RBC # BLD AUTO: 4.24 10*6/MM3 (ref 4.14–5.8)
RBC # BLD AUTO: 4.64 10*6/MM3 (ref 4.7–6.1)
SAO2 % BLDCOA: 99 % (ref 94–100)
SMALL PLATELETS BLD QL SMEAR: ADEQUATE
SODIUM BLD-SCNC: 138 MMOL/L (ref 136–145)
SODIUM BLD-SCNC: 140 MMOL/L (ref 136–145)
SODIUM BLD-SCNC: 141 MMOL/L (ref 136–145)
SODIUM SERPL-SCNC: 139 MMOL/L (ref 137–145)
T4 FREE SERPL-MCNC: 0.91 NG/DL (ref 0.78–2.19)
TROPONIN T SERPL-MCNC: <0.01 NG/ML (ref 0–0.03)
TROPONIN T SERPL-MCNC: <0.01 NG/ML (ref 0–0.03)
TSH SERPL DL<=0.005 MIU/L-ACNC: 2.27 MIU/ML (ref 0.47–4.68)
VENTILATOR MODE: ABNORMAL
WBC # BLD AUTO: 7.14 10*3/MM3 (ref 4.8–10.8)
WBC MORPH BLD: NORMAL
WBC NRBC COR # BLD: 4.42 10*3/MM3 (ref 3.4–10.8)
WBC NRBC COR # BLD: 7.29 10*3/MM3 (ref 3.4–10.8)
WBC NRBC COR # BLD: 8.83 10*3/MM3 (ref 3.4–10.8)
WHOLE BLOOD HOLD SPECIMEN: NORMAL

## 2019-01-01 PROCEDURE — 99495 TRANSJ CARE MGMT MOD F2F 14D: CPT | Performed by: INTERNAL MEDICINE

## 2019-01-01 PROCEDURE — 84484 ASSAY OF TROPONIN QUANT: CPT | Performed by: EMERGENCY MEDICINE

## 2019-01-01 PROCEDURE — 82375 ASSAY CARBOXYHB QUANT: CPT

## 2019-01-01 PROCEDURE — 71046 X-RAY EXAM CHEST 2 VIEWS: CPT

## 2019-01-01 PROCEDURE — 25010000002 CEFTRIAXONE SODIUM-DEXTROSE 1-3.74 GM-%(50ML) RECONSTITUTED SOLUTION: Performed by: PHYSICIAN ASSISTANT

## 2019-01-01 PROCEDURE — 93280 PM DEVICE PROGR EVAL DUAL: CPT | Performed by: INTERNAL MEDICINE

## 2019-01-01 PROCEDURE — 80053 COMPREHEN METABOLIC PANEL: CPT | Performed by: INTERNAL MEDICINE

## 2019-01-01 PROCEDURE — 85027 COMPLETE CBC AUTOMATED: CPT | Performed by: INTERNAL MEDICINE

## 2019-01-01 PROCEDURE — 25010000002 IOPAMIDOL 61 % SOLUTION: Performed by: INTERNAL MEDICINE

## 2019-01-01 PROCEDURE — 93294 REM INTERROG EVL PM/LDLS PM: CPT | Performed by: INTERNAL MEDICINE

## 2019-01-01 PROCEDURE — 82805 BLOOD GASES W/O2 SATURATION: CPT

## 2019-01-01 PROCEDURE — 83050 HGB METHEMOGLOBIN QUAN: CPT

## 2019-01-01 PROCEDURE — 93296 REM INTERROG EVL PM/IDS: CPT | Performed by: INTERNAL MEDICINE

## 2019-01-01 PROCEDURE — 96375 TX/PRO/DX INJ NEW DRUG ADDON: CPT

## 2019-01-01 PROCEDURE — 94799 UNLISTED PULMONARY SVC/PX: CPT

## 2019-01-01 PROCEDURE — 99397 PER PM REEVAL EST PAT 65+ YR: CPT | Performed by: INTERNAL MEDICINE

## 2019-01-01 PROCEDURE — 94640 AIRWAY INHALATION TREATMENT: CPT

## 2019-01-01 PROCEDURE — 63710000001 DIPHENHYDRAMINE PER 50 MG: Performed by: INTERNAL MEDICINE

## 2019-01-01 PROCEDURE — 96160 PT-FOCUSED HLTH RISK ASSMT: CPT | Performed by: INTERNAL MEDICINE

## 2019-01-01 PROCEDURE — 96374 THER/PROPH/DIAG INJ IV PUSH: CPT

## 2019-01-01 PROCEDURE — 25010000002 CEFTRIAXONE SODIUM-DEXTROSE 1-3.74 GM-%(50ML) RECONSTITUTED SOLUTION: Performed by: INTERNAL MEDICINE

## 2019-01-01 PROCEDURE — 99214 OFFICE O/P EST MOD 30 MIN: CPT | Performed by: NURSE PRACTITIONER

## 2019-01-01 PROCEDURE — 80053 COMPREHEN METABOLIC PANEL: CPT | Performed by: EMERGENCY MEDICINE

## 2019-01-01 PROCEDURE — 85007 BL SMEAR W/DIFF WBC COUNT: CPT | Performed by: EMERGENCY MEDICINE

## 2019-01-01 PROCEDURE — 99214 OFFICE O/P EST MOD 30 MIN: CPT | Performed by: INTERNAL MEDICINE

## 2019-01-01 PROCEDURE — 99284 EMERGENCY DEPT VISIT MOD MDM: CPT

## 2019-01-01 PROCEDURE — 83036 HEMOGLOBIN GLYCOSYLATED A1C: CPT | Performed by: INTERNAL MEDICINE

## 2019-01-01 PROCEDURE — G0008 ADMIN INFLUENZA VIRUS VAC: HCPCS | Performed by: INTERNAL MEDICINE

## 2019-01-01 PROCEDURE — 25010000002 DEXAMETHASONE PER 1 MG: Performed by: EMERGENCY MEDICINE

## 2019-01-01 PROCEDURE — 25010000002 AZITHROMYCIN: Performed by: INTERNAL MEDICINE

## 2019-01-01 PROCEDURE — 99238 HOSP IP/OBS DSCHRG MGMT 30/<: CPT | Performed by: INTERNAL MEDICINE

## 2019-01-01 PROCEDURE — 85025 COMPLETE CBC W/AUTO DIFF WBC: CPT | Performed by: EMERGENCY MEDICINE

## 2019-01-01 PROCEDURE — 71250 CT THORAX DX C-: CPT

## 2019-01-01 PROCEDURE — 99222 1ST HOSP IP/OBS MODERATE 55: CPT | Performed by: INTERNAL MEDICINE

## 2019-01-01 PROCEDURE — 36600 WITHDRAWAL OF ARTERIAL BLOOD: CPT

## 2019-01-01 PROCEDURE — 71260 CT THORAX DX C+: CPT

## 2019-01-01 PROCEDURE — 93005 ELECTROCARDIOGRAM TRACING: CPT | Performed by: EMERGENCY MEDICINE

## 2019-01-01 PROCEDURE — 25010000002 METHYLPREDNISOLONE PER 40 MG: Performed by: INTERNAL MEDICINE

## 2019-01-01 PROCEDURE — 25010000002 METHYLPREDNISOLONE PER 125 MG: Performed by: PHYSICIAN ASSISTANT

## 2019-01-01 PROCEDURE — 83880 ASSAY OF NATRIURETIC PEPTIDE: CPT | Performed by: EMERGENCY MEDICINE

## 2019-01-01 PROCEDURE — 87040 BLOOD CULTURE FOR BACTERIA: CPT | Performed by: PHYSICIAN ASSISTANT

## 2019-01-01 PROCEDURE — G0439 PPPS, SUBSEQ VISIT: HCPCS | Performed by: INTERNAL MEDICINE

## 2019-01-01 PROCEDURE — 25010000002 AZITHROMYCIN: Performed by: PHYSICIAN ASSISTANT

## 2019-01-01 PROCEDURE — 90653 IIV ADJUVANT VACCINE IM: CPT | Performed by: INTERNAL MEDICINE

## 2019-01-01 RX ORDER — BUDESONIDE AND FORMOTEROL FUMARATE DIHYDRATE 80; 4.5 UG/1; UG/1
2 AEROSOL RESPIRATORY (INHALATION) 2 TIMES DAILY
Status: DISCONTINUED | OUTPATIENT
Start: 2019-01-01 | End: 2019-01-01 | Stop reason: HOSPADM

## 2019-01-01 RX ORDER — CEFTRIAXONE 1 G/50ML
1 INJECTION, SOLUTION INTRAVENOUS EVERY 24 HOURS
Status: DISCONTINUED | OUTPATIENT
Start: 2019-01-01 | End: 2019-01-01 | Stop reason: HOSPADM

## 2019-01-01 RX ORDER — PRIMIDONE 50 MG/1
50 TABLET ORAL 2 TIMES DAILY
Qty: 60 TABLET | Refills: 5 | Status: SHIPPED | OUTPATIENT
Start: 2019-01-01

## 2019-01-01 RX ORDER — ALBUTEROL SULFATE 90 UG/1
AEROSOL, METERED RESPIRATORY (INHALATION)
Qty: 1 INHALER | Refills: 3 | Status: SHIPPED | OUTPATIENT
Start: 2019-01-01 | End: 2019-01-01 | Stop reason: SDUPTHER

## 2019-01-01 RX ORDER — IPRATROPIUM BROMIDE AND ALBUTEROL SULFATE 2.5; .5 MG/3ML; MG/3ML
3 SOLUTION RESPIRATORY (INHALATION)
Status: DISCONTINUED | OUTPATIENT
Start: 2019-01-01 | End: 2019-01-01 | Stop reason: HOSPADM

## 2019-01-01 RX ORDER — DILTIAZEM HYDROCHLORIDE 120 MG/1
120 CAPSULE, COATED, EXTENDED RELEASE ORAL
Status: DISCONTINUED | OUTPATIENT
Start: 2019-01-01 | End: 2019-01-01 | Stop reason: HOSPADM

## 2019-01-01 RX ORDER — IPRATROPIUM BROMIDE AND ALBUTEROL SULFATE 2.5; .5 MG/3ML; MG/3ML
3 SOLUTION RESPIRATORY (INHALATION) ONCE
Status: COMPLETED | OUTPATIENT
Start: 2019-01-01 | End: 2019-01-01

## 2019-01-01 RX ORDER — FLUOROMETHOLONE 0.1 %
SUSPENSION, DROPS(FINAL DOSAGE FORM)(ML) OPHTHALMIC (EYE)
Refills: 2 | COMMUNITY
Start: 2019-01-01

## 2019-01-01 RX ORDER — DIPHENHYDRAMINE HCL 25 MG
25 CAPSULE ORAL NIGHTLY PRN
Status: DISCONTINUED | OUTPATIENT
Start: 2019-01-01 | End: 2019-01-01 | Stop reason: HOSPADM

## 2019-01-01 RX ORDER — POTASSIUM CHLORIDE 750 MG/1
40 CAPSULE, EXTENDED RELEASE ORAL ONCE
Status: COMPLETED | OUTPATIENT
Start: 2019-01-01 | End: 2019-01-01

## 2019-01-01 RX ORDER — METHYLPREDNISOLONE SODIUM SUCCINATE 40 MG/ML
40 INJECTION, POWDER, LYOPHILIZED, FOR SOLUTION INTRAMUSCULAR; INTRAVENOUS EVERY 12 HOURS
Status: DISCONTINUED | OUTPATIENT
Start: 2019-01-01 | End: 2019-01-01 | Stop reason: HOSPADM

## 2019-01-01 RX ORDER — AZELASTINE 1 MG/ML
2 SPRAY, METERED NASAL 2 TIMES DAILY
Qty: 1 EACH | Refills: 3 | Status: SHIPPED | OUTPATIENT
Start: 2019-01-01

## 2019-01-01 RX ORDER — DILTIAZEM HYDROCHLORIDE 60 MG/1
120 TABLET, FILM COATED ORAL EVERY 8 HOURS SCHEDULED
Status: DISCONTINUED | OUTPATIENT
Start: 2019-01-01 | End: 2019-01-01

## 2019-01-01 RX ORDER — DILTIAZEM HYDROCHLORIDE 120 MG/1
120 TABLET, FILM COATED ORAL DAILY
COMMUNITY
End: 2019-01-01 | Stop reason: SDUPTHER

## 2019-01-01 RX ORDER — GUAIFENESIN 600 MG/1
600 TABLET, EXTENDED RELEASE ORAL EVERY 12 HOURS SCHEDULED
Status: DISCONTINUED | OUTPATIENT
Start: 2019-01-01 | End: 2019-01-01 | Stop reason: HOSPADM

## 2019-01-01 RX ORDER — TAMSULOSIN HYDROCHLORIDE 0.4 MG/1
0.4 CAPSULE ORAL NIGHTLY
Status: DISCONTINUED | OUTPATIENT
Start: 2019-01-01 | End: 2019-01-01 | Stop reason: HOSPADM

## 2019-01-01 RX ORDER — DEXAMETHASONE SODIUM PHOSPHATE 10 MG/ML
10 INJECTION INTRAMUSCULAR; INTRAVENOUS ONCE
Status: COMPLETED | OUTPATIENT
Start: 2019-01-01 | End: 2019-01-01

## 2019-01-01 RX ORDER — SODIUM CHLORIDE 0.9 % (FLUSH) 0.9 %
10 SYRINGE (ML) INJECTION AS NEEDED
Status: DISCONTINUED | OUTPATIENT
Start: 2019-01-01 | End: 2019-01-01 | Stop reason: HOSPADM

## 2019-01-01 RX ORDER — METHYLPREDNISOLONE SODIUM SUCCINATE 125 MG/2ML
125 INJECTION, POWDER, LYOPHILIZED, FOR SOLUTION INTRAMUSCULAR; INTRAVENOUS ONCE
Status: COMPLETED | OUTPATIENT
Start: 2019-01-01 | End: 2019-01-01

## 2019-01-01 RX ORDER — ACETAMINOPHEN 325 MG/1
325 TABLET ORAL NIGHTLY PRN
Status: DISCONTINUED | OUTPATIENT
Start: 2019-01-01 | End: 2019-01-01 | Stop reason: HOSPADM

## 2019-01-01 RX ORDER — DILTIAZEM HYDROCHLORIDE 60 MG/1
TABLET, FILM COATED ORAL
Qty: 1 INHALER | Refills: 1 | Status: SHIPPED | OUTPATIENT
Start: 2019-01-01 | End: 2019-01-01 | Stop reason: SDUPTHER

## 2019-01-01 RX ORDER — LEVOFLOXACIN 500 MG/1
500 TABLET, FILM COATED ORAL DAILY
Qty: 7 TABLET | Refills: 0 | Status: SHIPPED | OUTPATIENT
Start: 2019-01-01 | End: 2019-01-01 | Stop reason: SDUPTHER

## 2019-01-01 RX ORDER — AMOXICILLIN AND CLAVULANATE POTASSIUM 875; 125 MG/1; MG/1
1 TABLET, FILM COATED ORAL 2 TIMES DAILY
Qty: 10 TABLET | Refills: 0 | Status: SHIPPED | OUTPATIENT
Start: 2019-01-01 | End: 2019-01-01

## 2019-01-01 RX ORDER — LEVOFLOXACIN 500 MG/1
500 TABLET, FILM COATED ORAL DAILY
Qty: 7 TABLET | Refills: 0 | Status: SHIPPED | OUTPATIENT
Start: 2019-01-01 | End: 2019-01-01

## 2019-01-01 RX ORDER — OMEPRAZOLE 40 MG/1
40 CAPSULE, DELAYED RELEASE ORAL DAILY
Qty: 30 CAPSULE | Refills: 2 | Status: SHIPPED | OUTPATIENT
Start: 2019-01-01

## 2019-01-01 RX ORDER — HYDROCHLOROTHIAZIDE 25 MG/1
25 TABLET ORAL DAILY
Qty: 90 TABLET | Refills: 3 | Status: SHIPPED | OUTPATIENT
Start: 2019-01-01

## 2019-01-01 RX ORDER — BUDESONIDE AND FORMOTEROL FUMARATE DIHYDRATE 80; 4.5 UG/1; UG/1
2 AEROSOL RESPIRATORY (INHALATION) 2 TIMES DAILY
Qty: 3 INHALER | Refills: 1 | Status: SHIPPED | OUTPATIENT
Start: 2019-01-01

## 2019-01-01 RX ORDER — ALBUTEROL SULFATE 90 UG/1
AEROSOL, METERED RESPIRATORY (INHALATION)
COMMUNITY
Start: 2019-01-01 | End: 2019-01-01 | Stop reason: SDUPTHER

## 2019-01-01 RX ORDER — CEFTRIAXONE 1 G/50ML
1 INJECTION, SOLUTION INTRAVENOUS ONCE
Status: COMPLETED | OUTPATIENT
Start: 2019-01-01 | End: 2019-01-01

## 2019-01-01 RX ORDER — ALBUTEROL SULFATE 90 UG/1
2 AEROSOL, METERED RESPIRATORY (INHALATION) EVERY 6 HOURS PRN
Qty: 1 INHALER | Refills: 3 | Status: SHIPPED | OUTPATIENT
Start: 2019-01-01

## 2019-01-01 RX ORDER — ROFLUMILAST 500 UG/1
500 TABLET ORAL DAILY
Qty: 90 TABLET | Refills: 1 | Status: SHIPPED | OUTPATIENT
Start: 2019-01-01 | End: 2019-01-01 | Stop reason: SDUPTHER

## 2019-01-01 RX ORDER — ROFLUMILAST 500 UG/1
500 TABLET ORAL DAILY
Qty: 90 TABLET | Refills: 1 | Status: SHIPPED | OUTPATIENT
Start: 2019-01-01

## 2019-01-01 RX ORDER — DILTIAZEM HYDROCHLORIDE 5 MG/ML
10 INJECTION INTRAVENOUS ONCE
Status: COMPLETED | OUTPATIENT
Start: 2019-01-01 | End: 2019-01-01

## 2019-01-01 RX ORDER — DILTIAZEM HYDROCHLORIDE 120 MG/1
120 TABLET, FILM COATED ORAL DAILY
Qty: 90 TABLET | Refills: 3 | Status: SHIPPED | OUTPATIENT
Start: 2019-01-01

## 2019-01-01 RX ORDER — CYCLOSPORINE 0.5 MG/ML
EMULSION OPHTHALMIC
COMMUNITY
Start: 2019-01-01

## 2019-01-01 RX ADMIN — METHYLPREDNISOLONE SODIUM SUCCINATE 125 MG: 125 INJECTION, POWDER, FOR SOLUTION INTRAMUSCULAR; INTRAVENOUS at 12:22

## 2019-01-01 RX ADMIN — AZITHROMYCIN 500 MG: 500 INJECTION, POWDER, LYOPHILIZED, FOR SOLUTION INTRAVENOUS at 13:39

## 2019-01-01 RX ADMIN — GUAIFENESIN 600 MG: 600 TABLET, EXTENDED RELEASE ORAL at 08:30

## 2019-01-01 RX ADMIN — SODIUM CHLORIDE, PRESERVATIVE FREE 10 ML: 5 INJECTION INTRAVENOUS at 08:30

## 2019-01-01 RX ADMIN — IPRATROPIUM BROMIDE AND ALBUTEROL SULFATE 3 ML: .5; 3 SOLUTION RESPIRATORY (INHALATION) at 20:21

## 2019-01-01 RX ADMIN — IPRATROPIUM BROMIDE AND ALBUTEROL SULFATE 3 ML: .5; 3 SOLUTION RESPIRATORY (INHALATION) at 09:55

## 2019-01-01 RX ADMIN — DRONEDARONE 400 MG: 400 TABLET, FILM COATED ORAL at 21:43

## 2019-01-01 RX ADMIN — ACETAMINOPHEN 325 MG: 325 TABLET, FILM COATED ORAL at 23:59

## 2019-01-01 RX ADMIN — METHYLPREDNISOLONE SODIUM SUCCINATE 40 MG: 40 INJECTION, POWDER, FOR SOLUTION INTRAMUSCULAR; INTRAVENOUS at 00:00

## 2019-01-01 RX ADMIN — IOPAMIDOL 95 ML: 612 INJECTION, SOLUTION INTRAVENOUS at 13:12

## 2019-01-01 RX ADMIN — DILTIAZEM HYDROCHLORIDE 10 MG: 5 INJECTION INTRAVENOUS at 10:32

## 2019-01-01 RX ADMIN — DILTIAZEM HYDROCHLORIDE 120 MG: 60 TABLET, FILM COATED ORAL at 17:33

## 2019-01-01 RX ADMIN — TAMSULOSIN HYDROCHLORIDE 0.4 MG: 0.4 CAPSULE ORAL at 21:43

## 2019-01-01 RX ADMIN — IPRATROPIUM BROMIDE AND ALBUTEROL SULFATE 3 ML: .5; 3 SOLUTION RESPIRATORY (INHALATION) at 12:25

## 2019-01-01 RX ADMIN — DRONEDARONE 400 MG: 400 TABLET, FILM COATED ORAL at 08:30

## 2019-01-01 RX ADMIN — CEFTRIAXONE 1 G: 1 INJECTION, SOLUTION INTRAVENOUS at 13:12

## 2019-01-01 RX ADMIN — IPRATROPIUM BROMIDE AND ALBUTEROL SULFATE 3 ML: .5; 3 SOLUTION RESPIRATORY (INHALATION) at 07:35

## 2019-01-01 RX ADMIN — DIPHENHYDRAMINE HYDROCHLORIDE 25 MG: 25 CAPSULE ORAL at 23:59

## 2019-01-01 RX ADMIN — SERTRALINE HYDROCHLORIDE 50 MG: 50 TABLET ORAL at 08:30

## 2019-01-01 RX ADMIN — GUAIFENESIN 600 MG: 600 TABLET, EXTENDED RELEASE ORAL at 21:43

## 2019-01-01 RX ADMIN — AZITHROMYCIN 500 MG: 500 INJECTION, POWDER, LYOPHILIZED, FOR SOLUTION INTRAVENOUS at 12:40

## 2019-01-01 RX ADMIN — POTASSIUM CHLORIDE 40 MEQ: 750 CAPSULE, EXTENDED RELEASE ORAL at 11:31

## 2019-01-01 RX ADMIN — BUDESONIDE AND FORMOTEROL FUMARATE DIHYDRATE 2 PUFF: 80; 4.5 AEROSOL RESPIRATORY (INHALATION) at 20:22

## 2019-01-01 RX ADMIN — RIVAROXABAN 20 MG: 10 TABLET, FILM COATED ORAL at 17:33

## 2019-01-01 RX ADMIN — DEXAMETHASONE SODIUM PHOSPHATE 10 MG: 10 INJECTION INTRAMUSCULAR; INTRAVENOUS at 10:31

## 2019-01-01 RX ADMIN — CEFTRIAXONE 1 G: 1 INJECTION, SOLUTION INTRAVENOUS at 12:04

## 2019-01-01 RX ADMIN — METHYLPREDNISOLONE SODIUM SUCCINATE 40 MG: 40 INJECTION, POWDER, FOR SOLUTION INTRAMUSCULAR; INTRAVENOUS at 13:52

## 2019-01-01 RX ADMIN — BUDESONIDE AND FORMOTEROL FUMARATE DIHYDRATE 2 PUFF: 80; 4.5 AEROSOL RESPIRATORY (INHALATION) at 07:35

## 2019-02-18 PROBLEM — R35.1 NOCTURIA: Status: RESOLVED | Noted: 2017-10-27 | Resolved: 2019-01-01

## 2019-02-18 PROBLEM — B35.4 TINEA CORPORIS: Status: RESOLVED | Noted: 2017-06-05 | Resolved: 2019-01-01

## 2019-02-18 NOTE — PROGRESS NOTES
"Subjective  Alessandro Mendiola is a 78 y.o. male    HPI this my first encounter with Mr. Mendiola who has a long-standing history of coronary artery disease and COPD with a history of intension tremors on multiple medications. He is accompanied by his wife was giving us some of the history no recent exacerbation of his respiratory symptoms. He is on O2 24 hours a day  History of thoracic aortic aneurysm    The following portions of the patient's history were reviewed and updated as appropriate: allergies, current medications, past family history, past medical history, past social history, past surgical history, and problem list.     Review of Systems   Constitutional: Positive for fatigue. Negative for activity change, appetite change and fever.   HENT: Negative for congestion, ear discharge, ear pain and trouble swallowing.    Eyes: Negative for photophobia and visual disturbance.   Respiratory: Negative for cough and shortness of breath.    Cardiovascular: Negative for chest pain and palpitations.   Gastrointestinal: Negative for abdominal distention, abdominal pain, constipation, diarrhea, nausea and vomiting.   Endocrine: Negative.    Genitourinary: Negative for dysuria, hematuria and urgency.   Musculoskeletal: Positive for arthralgias and back pain. Negative for joint swelling and myalgias.   Skin: Negative for color change and rash.   Allergic/Immunologic: Negative.    Neurological: Negative for dizziness, weakness, light-headedness and headaches.   Hematological: Negative for adenopathy. Does not bruise/bleed easily.   Psychiatric/Behavioral: Positive for sleep disturbance. Negative for agitation, confusion and dysphoric mood. The patient is nervous/anxious.        Visit Vitals  /56 Comment: AUTOMATIC   Pulse 65   Temp 98 °F (36.7 °C) (Temporal)   Ht 180.3 cm (70.98\")   Wt 83.9 kg (185 lb)   SpO2 99% Comment: 5 LPM   BMI 25.82 kg/m²       Objective  Physical Exam   Constitutional: He is oriented to person, " place, and time. He appears well-developed and well-nourished. No distress.   HENT:   Nose: Nose normal.   Mouth/Throat: Oropharynx is clear and moist.   Eyes: Conjunctivae and EOM are normal. No scleral icterus.   Neck: No tracheal deviation present. No thyromegaly present.   Cardiovascular: Normal rate and regular rhythm. Exam reveals no friction rub.   No murmur heard.  Pulmonary/Chest: No respiratory distress. He has no wheezes. He has no rales.   On O2 via nasal canula   Abdominal: Soft. He exhibits no distension and no mass. There is no tenderness. There is no guarding.   Musculoskeletal: Normal range of motion. He exhibits no deformity.   Lymphadenopathy:     He has no cervical adenopathy.   Neurological: He is alert and oriented to person, place, and time. He has normal reflexes. No cranial nerve deficit. Coordination normal.   Skin: Skin is warm and dry. No rash noted. No erythema.   Psychiatric: He has a normal mood and affect. His behavior is normal. Judgment and thought content normal.       Diagnoses and all orders for this visit:    Thoracic aortic aneurysm without rupture (CMS/HCC) BP control okay is off tobacco repeat CT scan to monitor this    Essential hypertension stable with current meds and low-salt diet    Coronary arteriosclerosis in native artery stable angina free continue with dietary restrictions. Does not tolerate statins.  History of atrial fibrillation stable on multaq    Other orders  -     VENTOLIN  (90 Base) MCG/ACT inhaler;

## 2019-03-26 NOTE — TELEPHONE ENCOUNTER
Patients wife called asking for a referral to an eye specialist who can help her  is waking up with matted eyes every morning and they are holding a lot of infection.  They went to [facility] and they said they were not able to help.  They have tried several antibiotics and nothing has worked.  Any suggestions would be helpful.  Please give them a call when you get a chance.  Thank you.  Phone number verified.

## 2019-05-13 NOTE — PROGRESS NOTES
[facility] at [facility]  Office Progress Note  Alessandro Mendiola  1940  [phone]      Visit Date: 5/13/2019      PCP: Silvio Jacobson MD  [address] [address]    IDENTIFICATION: A 79 y.o. male from [facility].    Chief Complaint   Patient presents with   • Aneurysm of thoracic aorta       PROBLEM LIST:   CAD      2008 BMS to RCA- Charisse      2012 SE wnl  HL      statin intolerant  PAF/ sss      st joan ppm gen change 3/15 MGR  End stage COPD- occas steroids/ 5 L O2  Pneumonia -recurrent- [facility]  ASC Ao Aneurysm T Rushing follows      2012 4.8 CM  HTN    Allergies  Allergies   Allergen Reactions   • Doxycycline Shortness Of Breath       Current Medications    Current Outpatient Medications:   •  aclidinium bromide (TUDORZA PRESSAIR) 400 MCG/ACT aerosol powder  powder for inhalation, Inhale 1 puff 2 (Two) Times a Day., Disp: 3 each, Rfl: 1  •  azelastine (ASTELIN) 0.1 % nasal spray, , Disp: , Rfl:   •  COMBIVENT RESPIMAT  MCG/ACT inhaler, INHALE ONE PUFF BY MOUTH 4 TIMES DAILY AS NEEDED FOR WHEEZING OR  SHORTNESS  OF  AIR, Disp: 4 g, Rfl: 5  •  diltiaZEM CD (CARDIZEM CD) 180 MG 24 hr capsule, Take 1 capsule by mouth Daily., Disp: 90 capsule, Rfl: 3  •  DiphenhydrAMINE HCl, Sleep, (ZZZQUIL PO), Take  by mouth At Night As Needed (if he does not take APAP PM)., Disp: , Rfl:   •  guaiFENesin (MUCINEX) 600 MG 12 hr tablet, Take 1 tablet by mouth Every 12 (Twelve) Hours. (Patient taking differently: Take 600 mg by mouth As Needed.), Disp: 10 tablet, Rfl: 0  •  hydrochlorothiazide (HYDRODIURIL) 25 MG tablet, 25 mg Daily., Disp: , Rfl:   •  ipratropium (ATROVENT) 0.02 % nebulizer solution, Take 2.5 mL by nebulization 4 (Four) Times a Day As Needed for Wheezing or Shortness of Air., Disp: 300 mL, Rfl: 11  •  MULTAQ 400 MG tablet, TAKE ONE TABLET BY MOUTH TWICE DAILY, Disp: 180 tablet, Rfl: 1  •  olopatadine (PATANOL) 0.1 % ophthalmic solution, Administer 1 drop to both  "eyes 2 (Two) Times a Day., Disp: 1 each, Rfl: 2  •  OXYGEN-HELIUM IN, Oxygen; Patient Sig: Oxygen patient is to get home oxygen through  company of his choice at 2L/Min, as well as portable oxygen at the same rate.; 1; 0; 22-Mar-2016; Active, Disp: , Rfl:   •  primidone (MYSOLINE) 50 MG tablet, Take 1 tablet by mouth 2 (Two) Times a Day., Disp: 60 tablet, Rfl: 5  •  rivaroxaban (XARELTO) 20 MG tablet, Take 1 tablet by mouth Daily., Disp: 30 tablet, Rfl: 12  •  roflumilast (DALIRESP) 500 MCG tablet tablet, Take 1 tablet by mouth Daily., Disp: 90 tablet, Rfl: 1  •  sertraline (ZOLOFT) 50 MG tablet, Take 1 tablet by mouth Daily., Disp: 90 tablet, Rfl: 3  •  SYMBICORT 80-4.5 MCG/ACT inhaler, INHALE 2 PUFFS BY MOUTH TWICE DAILY, Disp: 1 inhaler, Rfl: 1  •  tamsulosin (FLOMAX) 0.4 MG capsule 24 hr capsule, Take 1 capsule by mouth Every Night., Disp: 30 capsule, Rfl: 11  •  Tetrahydrozoline-Zn Sulfate (ALLERGY RELIEF EYE DROPS OP), Apply 1 drop to eye(s) as directed by provider 2 (Two) Times a Day As Needed., Disp: , Rfl:   •  VENTOLIN  (90 Base) MCG/ACT inhaler, INHALE 2 PUFFS BY MOUTH EVERY 6 HOURS AS NEEDED FOR WHEEZING, Disp: 1 inhaler, Rfl: 3  •  DILT- MG 24 hr capsule, , Disp: , Rfl:       History of Present Illness   Patient presents in  follow up.  He has had significant fatigue since abrupt discontinuation of his glucocorticoids.  He stated that he was having extensive lower extremity swelling with such.  He did not state that there was any weaning process of his steroids after being on for over 6 months.  He is now been off for greater than 2 months he states  He remains on 5 L O2 24/7.    ROS:  All systems have been reviewed and are negative with the exception of those mentioned in the HPI.    OBJECTIVE:  Vitals:    05/13/19 1137   BP: 124/50   BP Location: Right arm   Patient Position: Sitting   Pulse: 66   SpO2: 96%   Weight: 82.6 kg (182 lb)   Height: 180.3 cm (71\")     Physical Exam "   Constitutional: He appears well-developed and well-nourished.   Neck: Normal range of motion. Neck supple. No hepatojugular reflux and no JVD present. Carotid bruit is not present. No tracheal deviation present. No thyromegaly present.   Cardiovascular: Normal rate, regular rhythm, S1 normal, S2 normal, intact distal pulses and normal pulses. PMI is not displaced. Exam reveals no gallop, no distant heart sounds, no friction rub, no midsystolic click and no opening snap.   No murmur heard.  Pulses:       Radial pulses are 2+ on the right side, and 2+ on the left side.        Dorsalis pedis pulses are 2+ on the right side, and 2+ on the left side.        Posterior tibial pulses are 2+ on the right side, and 2+ on the left side.   Pulmonary/Chest: Effort normal. He has wheezes. He has rales.   Abdominal: Soft. Bowel sounds are normal. He exhibits no mass. There is no tenderness. There is no guarding.   Musculoskeletal: He exhibits edema.       Diagnostic Data:  Procedures  Device interrogation in office today: Dual-chamber PPM with acceptable threshold and impedance with a estimated longevity of 9 years and battery voltage of 2.98 V.  Atrial pacing 96% the time, ventricular pacing 1.5%.  Less than 2% AMS which longest was 23 hrs.  .  Normal functioning no changes.    ASSESSMENT:   Diagnosis Plan   1. Panlobular emphysema (CMS/HCC)     2. Paroxysmal atrial fibrillation (CMS/HCC)     3. Coronary artery disease involving native coronary artery of native heart without angina pectoris     4. Essential hypertension         PLAN:   Dyspnea -O2 dep COPD    CAD post remote PCI with no anginal equivalent preserved LV function continued medical management    Paroxysmal A. fib anticoagulated     Hypertension controlled on current regimen    Patient wishes to rediscuss the need for low-dose chronic glucocorticoids and concern for possible adrenal insufficiency state with Dr. Jacobson and Katie

## 2019-06-17 PROBLEM — J96.11 CHRONIC RESPIRATORY FAILURE WITH HYPOXIA AND HYPERCAPNIA (HCC): Status: ACTIVE | Noted: 2019-01-01

## 2019-06-17 PROBLEM — J96.12 CHRONIC RESPIRATORY FAILURE WITH HYPOXIA AND HYPERCAPNIA (HCC): Status: ACTIVE | Noted: 2019-01-01

## 2019-06-17 NOTE — PATIENT INSTRUCTIONS
Medicare Wellness  Personal Prevention Plan of Service     Date of Office Visit:  2019  Encounter Provider:  iSlvio Jacobson MD  Place of Service:  [facility]  Patient Name: Alessandro Mendiola  :  1940    As part of the Medicare Wellness portion of your visit today, we are providing you with this personalized preventive plan of services (PPPS). This plan is based upon recommendations of the [place] Preventive Services Task Force (USPSTF) and the Advisory Committee on Immunization Practices (ACIP).    This lists the preventive care services that should be considered, and provides dates of when you are due. Items listed as completed are up-to-date and do not require any further intervention.    Health Maintenance   Topic Date Due   • DIABETIC FOOT EXAM  2016   • DIABETIC EYE EXAM  2016   • URINE MICROALBUMIN  2016   • HEMOGLOBIN A1C  2018   • TDAP/TD VACCINES (1 - Tdap) 2020 (Originally 3/19/1959)   • ZOSTER VACCINE (2 of 3) 2020 (Originally 2016)   • PNEUMOCOCCAL VACCINES (65+ LOW/MEDIUM RISK) (2 of 2 - PPSV23) 2029 (Originally 2017)   • INFLUENZA VACCINE  2019   • LIPID PANEL  2020   • LUNG CANCER SCREENING  2020   • MEDICARE ANNUAL WELLNESS  2020   • COLONOSCOPY  2023       No orders of the defined types were placed in this encounter.      No Follow-up on file.

## 2019-06-17 NOTE — PROGRESS NOTES
QUICK REFERENCE INFORMATION:  The ABCs of the Annual Wellness Visit    Initial Medicare Wellness Visit    HEALTH RISK ASSESSMENT  79-year-old male with severe COPD home O2 dependent has intention tremors, history of hypertension coming in for wellness visit  : 1940    Recent Hospitalizations:  No hospitalization(s) within the last year..  ccc      Current Medical Providers:  Patient Care Team:  Silvio Jacobson MD as PCP - General (Internal Medicine)  Rio Acuna DO as PCP - Family Medicine  Dagoberto Rasmussen MD as Consulting Physician (General Surgery)        Smoking Status:  Social History     Tobacco Use   Smoking Status Former Smoker   • Packs/day: 3.00   • Years: 45.00   • Pack years: 135.00   • Last attempt to quit: 2008   • Years since quittin.4   Smokeless Tobacco Never Used       Alcohol Consumption:  Social History     Substance and Sexual Activity   Alcohol Use No       Depression Screen:   PHQ-2/PHQ-9 Depression Screening 2019   Little interest or pleasure in doing things 0   Feeling down, depressed, or hopeless 1   Total Score 1       Health Habits and Functional and Cognitive Screening:  Functional & Cognitive Status 2019   Do you have difficulty preparing food and eating? No   Do you have difficulty bathing yourself, getting dressed or grooming yourself? No   Do you have difficulty using the toilet? No   Do you have difficulty moving around from place to place? No   Do you have trouble with steps or getting out of a bed or a chair? No   In the past year have you fallen or experienced a near fall? No   Current Diet Well Balanced Diet   Dental Exam Up to date   Eye Exam Up to date   Exercise (times per week) 0 times per week   Current Exercise Activities Include No Regular Exercise   Do you need help using the phone?  No   Are you deaf or do you have serious difficulty hearing?  No   Do you need help with transportation? No   Do you need help shopping? No   Do you need  help preparing meals?  No   Do you need help with housework?  No   Do you need help with laundry? No   Do you need help taking your medications? No   Do you need help managing money? No   Do you ever drive or ride in a car without wearing a seat belt? No   Have you felt unusual stress, anger or loneliness in the last month? No   Who do you live with? Spouse   If you need help, do you have trouble finding someone available to you? No   Have you been bothered in the last four weeks by sexual problems? No   Do you have difficulty concentrating, remembering or making decisions? No           Does the patient have evidence of cognitive impairment? No    Asiprin use counseling: Contraindicated from taking ASA      Recent Lab Results:    Lab Results   Component Value Date    GLU 83 02/18/2019     Lab Results   Component Value Date    HGBA1C 6.1 (H) 10/27/2017     Lab Results   Component Value Date    CHOL 145 10/27/2017    TRIG 141 10/27/2017    HDL 40 10/27/2017    VLDL 28.2 10/27/2017    LDLHDL 1.92 10/27/2017           Age-appropriate Screening Schedule:  Refer to the list below for future screening recommendations based on patient's age, sex and/or medical conditions. Orders for these recommended tests are listed in the plan section. The patient has been provided with a written plan.    Health Maintenance   Topic Date Due   • DIABETIC FOOT EXAM  05/03/2016   • DIABETIC EYE EXAM  05/03/2016   • URINE MICROALBUMIN  05/06/2016   • HEMOGLOBIN A1C  04/27/2018   • TDAP/TD VACCINES (1 - Tdap) 06/17/2020 (Originally 3/19/1959)   • ZOSTER VACCINE (2 of 3) 06/17/2020 (Originally 12/29/2016)   • PNEUMOCOCCAL VACCINES (65+ LOW/MEDIUM RISK) (2 of 2 - PPSV23) 06/17/2029 (Originally 11/7/2017)   • INFLUENZA VACCINE  08/01/2019   • LIPID PANEL  02/18/2020   • COLONOSCOPY  12/18/2023        Subjective   History of Present Illness    Alessandro Mendiola is a 79 y.o. male who presents for an Annual Wellness Visit.    The following portions of  the patient's history were reviewed and updated as appropriate: allergies, current medications, past family history, past medical history, past social history, past surgical history and problem list.    Outpatient Medications Prior to Visit   Medication Sig Dispense Refill   • aclidinium bromide (TUDORZA PRESSAIR) 400 MCG/ACT aerosol powder  powder for inhalation Inhale 1 puff 2 (Two) Times a Day. 3 each 1   • azelastine (ASTELIN) 0.1 % nasal spray      • COMBIVENT RESPIMAT  MCG/ACT inhaler INHALE ONE PUFF BY MOUTH 4 TIMES DAILY AS NEEDED FOR WHEEZING OR  SHORTNESS  OF  AIR 4 g 5   • DILT- MG 24 hr capsule      • diltiaZEM CD (CARDIZEM CD) 180 MG 24 hr capsule Take 1 capsule by mouth Daily. 90 capsule 3   • DiphenhydrAMINE HCl, Sleep, (ZZZQUIL PO) Take  by mouth At Night As Needed (if he does not take APAP PM).     • guaiFENesin (MUCINEX) 600 MG 12 hr tablet Take 1 tablet by mouth Every 12 (Twelve) Hours. (Patient taking differently: Take 600 mg by mouth As Needed.) 10 tablet 0   • hydrochlorothiazide (HYDRODIURIL) 25 MG tablet Take 1 tablet by mouth Daily. 90 tablet 3   • ipratropium (ATROVENT) 0.02 % nebulizer solution Take 2.5 mL by nebulization 4 (Four) Times a Day As Needed for Wheezing or Shortness of Air. 300 mL 11   • MULTAQ 400 MG tablet TAKE ONE TABLET BY MOUTH TWICE DAILY 180 tablet 1   • olopatadine (PATANOL) 0.1 % ophthalmic solution Administer 1 drop to both eyes 2 (Two) Times a Day. 1 each 2   • OXYGEN-HELIUM IN Oxygen; Patient Sig: Oxygen patient is to get home oxygen through  company of his choice at 2L/Min, as well as portable oxygen at the same rate.; 1; 0; 22-Mar-2016; Active     • primidone (MYSOLINE) 50 MG tablet Take 1 tablet by mouth 2 (Two) Times a Day. 60 tablet 5   • rivaroxaban (XARELTO) 20 MG tablet Take 1 tablet by mouth Daily. 30 tablet 12   • roflumilast (DALIRESP) 500 MCG tablet tablet Take 1 tablet by mouth Daily. 90 tablet 1   • SYMBICORT 80-4.5 MCG/ACT inhaler INHALE 2  PUFFS BY MOUTH TWICE DAILY 1 inhaler 1   • tamsulosin (FLOMAX) 0.4 MG capsule 24 hr capsule Take 1 capsule by mouth Every Night. 30 capsule 11   • Tetrahydrozoline-Zn Sulfate (ALLERGY RELIEF EYE DROPS OP) Apply 1 drop to eye(s) as directed by provider 2 (Two) Times a Day As Needed.     • VENTOLIN  (90 Base) MCG/ACT inhaler INHALE 2 PUFFS BY MOUTH EVERY 6 HOURS AS NEEDED FOR WHEEZING 1 inhaler 3   • sertraline (ZOLOFT) 50 MG tablet Take 1 tablet by mouth Daily. 90 tablet 3     No facility-administered medications prior to visit.        Patient Active Problem List   Diagnosis   • Aneurysm of thoracic aorta (CMS/HCC)   • Coronary arteriosclerosis in native artery   • Paroxysmal atrial fibrillation (CMS/HCC)   • Sick sinus syndrome (CMS/HCC)   • Vitamin D deficiency   • COPD (chronic obstructive pulmonary disease) (CMS/HCC)   • HTN (hypertension)   • Mood disorder (CMS/HCC)   • Chronic respiratory failure with hypoxia and hypercapnia (CMS/[facility])       Advance Care Planning:  Patient does not have an advance directive - information provided to the patient today    Identification of Risk Factors:  Risk factors include: cardiovascular risk, increased fall risk, hearing limitations and polypharmacy.    Review of Systems   Constitutional: Positive for fatigue. Negative for activity change, appetite change and fever.   HENT: Negative for congestion, ear discharge, ear pain and trouble swallowing.    Eyes: Negative for photophobia and visual disturbance.   Respiratory: Positive for cough and shortness of breath.    Cardiovascular: Negative for chest pain and palpitations.   Gastrointestinal: Negative for abdominal distention, abdominal pain, constipation, diarrhea, nausea and vomiting.   Endocrine: Negative.    Genitourinary: Negative for dysuria, hematuria and urgency.   Musculoskeletal: Positive for arthralgias and back pain. Negative for joint swelling and myalgias.   Skin: Negative for color change and rash.  "  Allergic/Immunologic: Negative.    Neurological: Negative for dizziness, weakness, light-headedness and headaches.   Hematological: Negative for adenopathy. Does not bruise/bleed easily.   Psychiatric/Behavioral: Positive for dysphoric mood and sleep disturbance. Negative for agitation and confusion. The patient is not nervous/anxious.        Compared to one year ago, the patient feels his physical health is the same.  Compared to one year ago, the patient feels his mental health is the same.    Objective     Physical Exam   Constitutional: He is oriented to person, place, and time. He appears well-developed and well-nourished. No distress.   HENT:   Nose: Nose normal.   Mouth/Throat: Oropharynx is clear and moist.   Eyes: Conjunctivae and EOM are normal. No scleral icterus.   Neck: No tracheal deviation present. No thyromegaly present.   Cardiovascular: Normal rate and regular rhythm. Exam reveals no friction rub.   No murmur heard.  Pulmonary/Chest: No respiratory distress. He has no wheezes. He has no rales.   On O2 via NC   Abdominal: Soft. He exhibits no distension and no mass. There is no tenderness. There is no guarding.   Musculoskeletal: Normal range of motion. He exhibits edema and tenderness. He exhibits no deformity.   Lymphadenopathy:     He has no cervical adenopathy.   Neurological: He is alert and oriented to person, place, and time. He has normal reflexes. No cranial nerve deficit. Coordination normal.   Skin: Skin is warm and dry. No rash noted. No erythema.   Psychiatric: He has a normal mood and affect. His behavior is normal. Judgment and thought content normal.       Vitals:    06/17/19 1423   BP: 126/54   Pulse: 61   Temp: 97.9 °F (36.6 °C)   TempSrc: Temporal   SpO2: 99%   Weight: 80.6 kg (177 lb 12.8 oz)   Height: 180.3 cm (71\")   PainSc: 0-No pain       Patient's Body mass index is 24.8 kg/m². BMI is within normal parameters. No follow-up required..      Assessment/Plan   Patient " Self-Management and Personalized Health Advice  The patient has been provided with information about: fall prevention and designing advance directives and preventive services including:   · Fall Risk assessment done, Fall Risk plan of care done, Urinary Incontinence assessment done.    Visit Diagnoses:    ICD-10-CM ICD-9-CM   1. Mixed simple and mucopurulent chronic bronchitis (CMS/HCC) continue with conservative measures home O2 neb treatments J41.8 491.1   2. Mood disorder (CMS/HCC) doing significantly better will give him a trial of tapering off sertraline F39 296.90   3. Chronic respiratory failure with hypoxia and hypercapnia (CMS/HCC) J96.11 518.83    J96.12 799.02     786.09   4. Sick sinus syndrome (CMS/HCC) stable status post pacemaker placement I49.5 427.81   5. Paroxysmal atrial fibrillation (CMS/HCC) stable rate control okay continue with anticoagulant therapy with Xarelto I48.0 427.31   6. Thoracic aortic aneurysm without rupture (CMS/[facility]) stable follow imaging studies I71.2 441.2       No orders of the defined types were placed in this encounter.      Outpatient Encounter Medications as of 6/17/2019   Medication Sig Dispense Refill   • aclidinium bromide (TUDORZA PRESSAIR) 400 MCG/ACT aerosol powder  powder for inhalation Inhale 1 puff 2 (Two) Times a Day. 3 each 1   • azelastine (ASTELIN) 0.1 % nasal spray      • COMBIVENT RESPIMAT  MCG/ACT inhaler INHALE ONE PUFF BY MOUTH 4 TIMES DAILY AS NEEDED FOR WHEEZING OR  SHORTNESS  OF  AIR 4 g 5   • DILT- MG 24 hr capsule      • diltiaZEM CD (CARDIZEM CD) 180 MG 24 hr capsule Take 1 capsule by mouth Daily. 90 capsule 3   • DiphenhydrAMINE HCl, Sleep, (ZZZQUIL PO) Take  by mouth At Night As Needed (if he does not take APAP PM).     • guaiFENesin (MUCINEX) 600 MG 12 hr tablet Take 1 tablet by mouth Every 12 (Twelve) Hours. (Patient taking differently: Take 600 mg by mouth As Needed.) 10 tablet 0   • hydrochlorothiazide (HYDRODIURIL) 25 MG tablet  Take 1 tablet by mouth Daily. 90 tablet 3   • ipratropium (ATROVENT) 0.02 % nebulizer solution Take 2.5 mL by nebulization 4 (Four) Times a Day As Needed for Wheezing or Shortness of Air. 300 mL 11   • MULTAQ 400 MG tablet TAKE ONE TABLET BY MOUTH TWICE DAILY 180 tablet 1   • olopatadine (PATANOL) 0.1 % ophthalmic solution Administer 1 drop to both eyes 2 (Two) Times a Day. 1 each 2   • OXYGEN-HELIUM IN Oxygen; Patient Sig: Oxygen patient is to get home oxygen through  company of his choice at 2L/Min, as well as portable oxygen at the same rate.; 1; 0; 22-Mar-2016; Active     • primidone (MYSOLINE) 50 MG tablet Take 1 tablet by mouth 2 (Two) Times a Day. 60 tablet 5   • rivaroxaban (XARELTO) 20 MG tablet Take 1 tablet by mouth Daily. 30 tablet 12   • roflumilast (DALIRESP) 500 MCG tablet tablet Take 1 tablet by mouth Daily. 90 tablet 1   • SYMBICORT 80-4.5 MCG/ACT inhaler INHALE 2 PUFFS BY MOUTH TWICE DAILY 1 inhaler 1   • tamsulosin (FLOMAX) 0.4 MG capsule 24 hr capsule Take 1 capsule by mouth Every Night. 30 capsule 11   • Tetrahydrozoline-Zn Sulfate (ALLERGY RELIEF EYE DROPS OP) Apply 1 drop to eye(s) as directed by provider 2 (Two) Times a Day As Needed.     • VENTOLIN  (90 Base) MCG/ACT inhaler INHALE 2 PUFFS BY MOUTH EVERY 6 HOURS AS NEEDED FOR WHEEZING 1 inhaler 3   • [DISCONTINUED] sertraline (ZOLOFT) 50 MG tablet Take 1 tablet by mouth Daily. 90 tablet 3     No facility-administered encounter medications on file as of 6/17/2019.        Reviewed use of high risk medication in the elderly: yes  Reviewed for potential of harmful drug interactions in the elderly: yes    Follow Up:  No Follow-up on file.     An After Visit Summary and PPPS with all of these plans were given to the patient.

## 2019-07-01 NOTE — PROGRESS NOTES
"Chief Complaint   Patient presents with   • Follow-up   • Shortness of Breath         Subjective   Alessandro Mendiola is a 79 y.o. male.     History of Present Illness   The patient comes in today for follow-up of COPD and chronic respiratory failure.    He states he has not been using the noninvasive ventilation unit at night because he is not able to get the mask adjusted.  He sleeps with the mask for 1 to 2 hours during the day but that is it.  He likes using the nasal mask and feels it is more comfortable.    He states the DME company has been to his house several times and has worked well with him in trying to get him using the noninvasive machine more often.    He is using Symbicort and Tudorza both twice a day.  He uses the rescue inhaler 4-5 times per day.    He continues to take Daliresp daily.    He also states that he had surgery to tighten his eyelids?  Secondary to dry eyes.  He complains that his eyes continue to water.    The following portions of the patient's history were reviewed and updated as appropriate: allergies, current medications, past family history, past medical history, past social history and past surgical history.    Review of Systems   HENT: Negative for sinus pressure, sneezing and sore throat.    Respiratory: Positive for cough, shortness of breath and wheezing.        Objective   Visit Vitals  /70   Pulse 60   Resp 18   Ht 180.3 cm (70.98\")   Wt 81.6 kg (180 lb)   SpO2 (!) 89% Comment: on room air (REST)   BMI 25.12 kg/m²   O2:96 % on 4lpm      Physical Exam   Constitutional: He is oriented to person, place, and time. He appears well-developed and well-nourished.   HENT:   Head: Atraumatic.   Eyes: EOM are normal.   Neck: Neck supple.   Cardiovascular: Normal rate and regular rhythm.   Pulmonary/Chest: Effort normal. No respiratory distress.   Somewhat decreased A/E without wheezing noted.   Musculoskeletal: He exhibits no edema.   Neurological: He is alert and oriented to " person, place, and time.   Skin: Skin is warm and dry.   Psychiatric: He has a normal mood and affect.   Vitals reviewed.            Assessment/Plan   Alessandro was seen today for follow-up and shortness of breath.    Diagnoses and all orders for this visit:    Chronic obstructive pulmonary disease, unspecified COPD type (CMS/HCC)    DANIELA (obstructive sleep apnea)    Chronic respiratory failure with hypoxia and hypercapnia (CMS/HCC)    Hypoxia    Other orders  -     aclidinium bromide (TUDORZA PRESSAIR) 400 MCG/ACT aerosol powder  powder for inhalation; Inhale 1 puff 2 (Two) Times a Day.  -     budesonide-formoterol (SYMBICORT) 80-4.5 MCG/ACT inhaler; Inhale 2 puffs 2 (Two) Times a Day.  -     roflumilast (DALIRESP) 500 MCG tablet tablet; Take 1 tablet by mouth Daily.  -     VENTOLIN  (90 Base) MCG/ACT inhaler; Inhale 2 puffs Every 6 (Six) Hours As Needed for Wheezing.           Return in about 18 weeks (around 11/4/2019) for Recheck, For Dr. Wilson.    DISCUSSION (if any):  I have asked him to continue using noninvasive ventilation as much as possible.  I have encouraged him to use it anytime he is sleeping even if that is during the day.    He should continue using oxygen 24/7.    He continues to have a significant amount of shortness of breath however overall his condition is stable.  I have asked him to continue using Symbicort and Tudorza as directed.  He should use his rescue inhaler as needed as well as the nebulizer.    He should continue taking Daliresp on a daily basis.    Dictated utilizing Dragon dictation.    This document was electronically signed by DAVID Slater July 1, 2019  1:43 PM

## 2019-09-10 NOTE — PROGRESS NOTES
Patient wife is calling requesting refills on Primidone and Daliresp. I have sent this to the provider to authorize as we are not permitted to refill the Primidone per protocol. Wife was informed it would be sent to provider and I would give her call back with response.

## 2019-09-14 PROBLEM — J96.21 ACUTE ON CHRONIC RESPIRATORY FAILURE WITH HYPOXIA (HCC): Status: ACTIVE | Noted: 2019-01-01

## 2019-09-14 PROBLEM — J18.9 PNEUMONIA OF RIGHT LOWER LOBE DUE TO INFECTIOUS ORGANISM: Status: ACTIVE | Noted: 2019-01-01

## 2019-09-14 NOTE — PLAN OF CARE
Problem: Patient Care Overview  Goal: Plan of Care Review  Outcome: Ongoing (interventions implemented as appropriate)   09/14/19 5436   Coping/Psychosocial   Plan of Care Reviewed With patient   Plan of Care Review   Progress no change

## 2019-09-14 NOTE — ED PROVIDER NOTES
Subjective   Patient is a 79-year-old male with a history of thoracic aorta aneurysm, paroxysmal atrial fibrillation and sick sinus syndrome with pacemaker, CAD with stent placement, COPD, and hypertension presenting to the ER for evaluation of shortness of breath.  Patient states that he began feeling progressively worsening shortness of breath on Tuesday 09/10/19 associated with a productive cough with white sputum.  He states he is felt intermittently nauseous but denies any abdominal pain or diarrhea.  He states he has been using his inhalers as prescribed.  He states he is on 5 L nasal cannula at all times at home.  He states his shortness of breath gets worse with exertion and walking.  He denies any headache, dizziness or syncope, chest pain, hemoptysis, abdominal pain, leg pain or swelling, fever or chills, or any other symptoms. Denies any recent hospitalizations or antibiotic or steroid use.            Review of Systems   Constitutional: Negative for chills and fever.   HENT: Negative.    Eyes: Negative.    Respiratory: Positive for cough and shortness of breath.    Cardiovascular: Negative for chest pain and leg swelling.   Gastrointestinal: Positive for nausea. Negative for abdominal pain, diarrhea and vomiting.   Genitourinary: Negative.    Musculoskeletal: Negative.    Skin: Negative.    Neurological: Negative.    Psychiatric/Behavioral: Negative.        Past Medical History:   Diagnosis Date   • Alcohol abuse    • Aneurysm of thoracic aorta (CMS/HCC)    • Anxiety    • Ascending aortic aneurysm (CMS/HCC)     4.8 to 4.9 cm    • Atherosclerotic heart disease    • Atrial fibrillation (CMS/HCC)    • Blood thinned due to long-term anticoagulant use    • Body mass index (BMI) 20.0-20.9, adult    • CAD (coronary artery disease)    • Cancer (CMS/HCC)     skin   • Cardiac pacemaker    • Cellulitis    • Chronic bronchitis (CMS/HCC)    • Chronic cough    • COPD (chronic obstructive pulmonary disease) (CMS/HCC)     • Depression    • Difficulty breathing    • Emphysema lung (CMS/HCC)    • Encounter for long-term current use of medication    • Encounter for screening for malignant neoplasm of prostate    • Fatigue    • Fingertip amputation    • TAMIKO (generalized anxiety disorder)    • History of alcohol abuse    • History of cardiac catheterization    • History of chest x-ray 02/11/2014    Chronic interstitial changes seen throughout the lung fields w/ no radiographic evidence of acute parenchymal disease.No definite LT-sided rib fracture present.   • History of chest x-ray 01/10/2014    No acute cardiopulmonary process.Dual lead LT subclavian pacemaker is in place.Aortic ectasia. Hyperinflation of lungs, suggesting COPD   • History of chest x-ray 10/01/2012    Ill-defined RT middle lobe opacity which likley reflects a pneumonia. FU to radiographic resolution is recommended.   • History of Doppler echocardiogram    • History of echocardiogram 09/26/2013    EF 55-60%. Mod concentric LVH. Abnormal LT VT diastolic filling is observed, consistent w/ impaired relaxation.Mild MR/TR. Trace VT. RVSP 44 mmHg.   • History of stress test     ECG performed   • HTN (hypertension)    • HX: long term anticoagulant use     Blood thinned due to long-term anticoagulant use (V58.61) (Z79.01)   • Hypercholesterolemia    • LVH (left ventricular hypertrophy)    • Mitral and aortic valve disease    • Myocardial infarction (CMS/HCC)    • Nonvenomous insect bite of multiple sites    • Onychomycosis    • Pacemaker at end of battery life    • Pneumonia    • Pneumothorax    • Pre-syncope    • Pulmonary nodule    • Rib pain on left side    • Sick sinus syndrome (CMS/HCC)    • SOB (shortness of breath)    • Upper respiratory infection    • Vitamin D deficiency        Allergies   Allergen Reactions   • Doxycycline Shortness Of Breath       Past Surgical History:   Procedure Laterality Date   • AMPUTATION  08/31/2015    metacarpal and index finger   • CARDIAC  PACEMAKER PLACEMENT  2008   • CATARACT EXTRACTION     • CORONARY STENT PLACEMENT      History of Cath Placement Of Stent 1  · Coronary stents   • EYE SURGERY     • SKIN CANCER EXCISION         Family History   Problem Relation Age of Onset   • Coronary artery disease Other    • Diabetes Other    • Hypertension Other        Social History     Socioeconomic History   • Marital status:      Spouse name: Not on file   • Number of children: Not on file   • Years of education: Not on file   • Highest education level: Not on file   Tobacco Use   • Smoking status: Former Smoker     Packs/day: 3.00     Years: 45.00     Pack years: 135.00     Last attempt to quit: 2008     Years since quittin.7   • Smokeless tobacco: Never Used   Substance and Sexual Activity   • Alcohol use: No   • Drug use: No   • Sexual activity: Defer           Objective   Physical Exam   Nursing note and vitals reviewed.    GEN: Patient is awake and alert.  He is sitting upright in stretcher wearing his nasal cannula with apnea and increased work of breathing.  Head: Normocephalic, atraumatic  Eyes: EOM intact  ENT: Posterior pharynx normal in appearance, oral mucosa is moist  Chest: Nontender to palpation  Cardiovascular: Regular rate and rhythm   Lungs: Breathing is even, unlabored, there are subcostal retractions and accessory muscle use.  There is poor air movement throughout all lung fields.  Abdomen: Soft, nontender, nondistended, no peritoneal signs or guarding   Extremities: No edema, normal appearance, full ROM.  No calf tenderness.  Radial and dorsalis pedis pulses are 2+ and equal  Neuro: GCS 15  Psych: Mood and affect are appropriate    Procedures           ED Course  ED Course as of Sep 14 155   Sat Sep 14, 2019   1209 Patient's O2 saturation has improved with rest to 95% on normal 5 L nasal cannula.  [LA]   1217 WBC: 7.29 [LA]   1218 Hemoglobin: (!) 10.5 [LA]   1218 Neutrophil Rel %: (!) 79.6 [LA]   1218 Lymphocyte  Rel %: (!) 9.7 [LA]   1218 Platelets: 173 [LA]   1218 Glucose: (!) 292 [LA]   1218 Creatinine: 0.83 [LA]   1218 Sodium: 140 [LA]   1218 Potassium: 3.5 [LA]   1218 Chloride: (!) 97 [LA]   1218 Albumin: 3.70 [LA]   1218 ALT (SGPT): 16 [LA]   1218 AST (SGOT): 14 [LA]   1218 Alkaline Phosphatase: 114 [LA]   1218 Total Bilirubin: 0.3 [LA]   1221 EKG interpreted by me: Sinus rhythm, normal rate, no acute ST/T changes, this is a normal EKG  [MP]   1221 proBNP: 451.1 [LA]   1221 Troponin T: <0.010 [LA]   1238 PROCEDURE: XR CHEST 2 VW-        HISTORY: [facility] Triage Protocol     COMPARISON: 12/07/2018.     FINDINGS: A left subclavian pacemaker is in place. The heart is normal  in size. The mediastinum is unremarkable. There is patchy airspace  disease in the right middle lobe towards the periphery consistent with a  pneumonia. The left lung is clear. There is no pneumothorax. There are  no acute osseous abnormalities.       Impression    Right basilar airspace disease consistent with a pneumonia.  Follow-up radiographs are recommended to ensure resolution.           This report was finalized on 9/14/2019 12:29 PM by Leslie Luu M.D..  [LA]   1238 pH, Arterial: 7.381 [LA]   1238 pCO2, Arterial: (!) 54.6 [LA]   1238 pO2, Arterial: (!) 124.0 [LA]   1238 HCO3, Arterial: (!) 32.3 [LA]   1238 Base Excess: (!) 6.1 [LA]   1238 O2 Saturation, Arterial: 99.0 [LA]   1238 Hemoglobin, Blood Gas: (!) 10.4 [LA]   1242 Patient has a right-sided pneumonia.  Blood cultures have been drawn.  We will start him on ceftriaxone and azithromycin.  He is allergic to doxycycline.  [LA]   1311 Spoke with Dr. Vo who graciously accepted the patient for admission.   [LA]      ED Course User Index  [LA] Ella Drake PA-C  [MP] Hossein Starks MD                  MDM  Number of Diagnoses or Management Options  Chronic respiratory failure with hypoxia and hypercapnia (CMS/HCC):   COPD exacerbation (CMS/HCC):   Hypoxia:   Pneumonia of  right lower lobe due to infectious organism (CMS/HCC):   Diagnosis management comments: On arrival, patient is hypoxic at 83% on his normal 5 L nasal cannula.  He is afebrile.  He does have increased work of breathing with accessory muscle use and subcostal retractions.  Differential includes COPD exacerbation, ACS, pulmonary edema, pneumothorax, pneumonia, and other concerns.  Will obtain basic labs including BNP, troponin, EKG, chest x-ray.  Patient was given methylprednisolone and DuoNeb. At rest, oxygen saturation improved to mid 90s.    Work-up revealed stable blood gas.  Glucose elevated to 92, creatinine sodium potassium within normal limits.  Patient does not have a leukocytosis.  His hemoglobin is 10.5 down from 11.96 months ago.  His troponin is negative.  His proBNP is 451.  X-ray shows a right-sided pneumonia.  Blood cultures were drawn and patient was given a dose of ceftriaxone and azithromycin here in the ER.  I believe given his hypoxia and increased work of breathing would benefit from an inpatient hospital stay. Discussed with Dr. Vo who accepted the patient for admission.        Amount and/or Complexity of Data Reviewed  Clinical lab tests: reviewed and ordered  Tests in the radiology section of CPT®: reviewed and ordered  Discussion of test results with the performing providers: yes  Decide to obtain previous medical records or to obtain history from someone other than the patient: yes  Review and summarize past medical records: yes  Discuss the patient with other providers: yes    Risk of Complications, Morbidity, and/or Mortality  Presenting problems: moderate  Diagnostic procedures: moderate  Management options: moderate    Patient Progress  Patient progress: stable      Final diagnoses:   Pneumonia of right lower lobe due to infectious organism (CMS/HCC)   COPD exacerbation (CMS/HCC)   Chronic respiratory failure with hypoxia and hypercapnia (CMS/HCC)   Hypoxia              Ella Drake  DEIRDRE Marie  09/14/19 1555

## 2019-09-14 NOTE — ED NOTES
"At this time, [facility] was contacted for transport of pt. Stated she would \"be down after she finished bed bath.\" PROSPER [place] notified.     Amanda Rogers  09/14/19 1351       Ken Amanda  09/14/19 1351    "

## 2019-09-14 NOTE — H&P
[facility]   HISTORY AND PHYSICAL      Name:  Alessandro Mendiola   Age:  79 y.o.  Sex:  male  :  1940  MRN:  [id number]   Visit Number:  [id number]  Admission Date:  2019  Date Of Service:  19  Primary Care Physician:  Silvio Jacobson MD     Admitting diagnosis:      Pneumonia of right lower lobe due to infectious organism (CMS/HCC)    COPD exacerbation (CMS/HCC)    Acute on chronic respiratory failure with hypoxia (CMS/HCC)    Paroxysmal atrial fibrillation (CMS/HCC)    Sick sinus syndrome (CMS/HCC)    HTN (hypertension)        History Of Presenting Illness:    79-year-old patient with past medical history of paroxysmal A. fib, coronary artery disease status post stent placement, COPD on home oxygen around 5 L, hypertension, sick sinus syndrome with a pacemaker, comes into the ER because of shortness of breath worsening over the last 1 week.  He states that he was in his usual state of health until a week ago when he started getting more short of breath along with cough which has been productive.  Patient denies any fever though he has felt a little chills.  Patient does use 4 L oxygen at home and initially he was found to be in the high 80s.  Upon evaluation in the ER he had x-ray which confirmed right-sided pneumonia and though white count was normal and he was given neb treatments along with a dose of Rocephin Zithromax and his oxygen did improved to 92%.  He has been further admitted for his pneumonia and COPD exacerbation.  Besides that other review of systems unremarkable except as above      Review Of Systems:     The following systems were reviewed and negative;  constitution, eyes, ENT, respiratory, cardiovascular, gastrointestinal, genitourinary, musculoskeletal, neurological and behavioral/psych,  Skin except as above.     Past Medical History:    Past Medical History:   Diagnosis Date   • Alcohol abuse    • Aneurysm of thoracic aorta (CMS/HCC)    •  Anxiety    • Ascending aortic aneurysm (CMS/HCC)     4.8 to 4.9 cm    • Atherosclerotic heart disease    • Atrial fibrillation (CMS/HCC)    • Blood thinned due to long-term anticoagulant use    • Body mass index (BMI) 20.0-20.9, adult    • CAD (coronary artery disease)    • Cancer (CMS/HCC)     skin   • Cardiac pacemaker    • Cellulitis    • Chronic bronchitis (CMS/HCC)    • Chronic cough    • COPD (chronic obstructive pulmonary disease) (CMS/HCC)    • Depression    • Difficulty breathing    • Emphysema lung (CMS/HCC)    • Encounter for long-term current use of medication    • Encounter for screening for malignant neoplasm of prostate    • Fatigue    • Fingertip amputation    • TAMIKO (generalized anxiety disorder)    • History of alcohol abuse    • History of cardiac catheterization    • History of chest x-ray 02/11/2014    Chronic interstitial changes seen throughout the lung fields w/ no radiographic evidence of acute parenchymal disease.No definite LT-sided rib fracture present.   • History of chest x-ray 01/10/2014    No acute cardiopulmonary process.Dual lead LT subclavian pacemaker is in place.Aortic ectasia. Hyperinflation of lungs, suggesting COPD   • History of chest x-ray 10/01/2012    Ill-defined RT middle lobe opacity which likley reflects a pneumonia. FU to radiographic resolution is recommended.   • History of Doppler echocardiogram    • History of echocardiogram 09/26/2013    EF 55-60%. Mod concentric LVH. Abnormal LT VT diastolic filling is observed, consistent w/ impaired relaxation.Mild MR/TR. Trace [place]. RVSP 44 mmHg.   • History of stress test     ECG performed   • HTN (hypertension)    • HX: long term anticoagulant use     Blood thinned due to long-term anticoagulant use (V58.61) (Z79.01)   • Hypercholesterolemia    • LVH (left ventricular hypertrophy)    • Mitral and aortic valve disease    • Myocardial infarction (CMS/HCC)    • Nonvenomous insect bite of multiple sites    • Onychomycosis    •  Pacemaker at end of battery life    • Pneumonia    • Pneumothorax    • Pre-syncope    • Pulmonary nodule    • Rib pain on left side    • Sick sinus syndrome (CMS/HCC)    • SOB (shortness of breath)    • Upper respiratory infection    • Vitamin D deficiency        Past Surgical history:    Past Surgical History:   Procedure Laterality Date   • AMPUTATION  2015    metacarpal and index finger   • CARDIAC PACEMAKER PLACEMENT  2008   • CATARACT EXTRACTION     • CORONARY STENT PLACEMENT      History of Cath Placement Of Stent 1  · Coronary stents   • EYE SURGERY     • SKIN CANCER EXCISION         Social History:    Social History     Socioeconomic History   • Marital status:      Spouse name: Not on file   • Number of children: Not on file   • Years of education: Not on file   • Highest education level: Not on file   Tobacco Use   • Smoking status: Former Smoker     Packs/day: 3.00     Years: 45.00     Pack years: 135.00     Last attempt to quit: 2008     Years since quittin.7   • Smokeless tobacco: Never Used   Substance and Sexual Activity   • Alcohol use: No   • Drug use: No   • Sexual activity: Defer       Family History:    Family History   Problem Relation Age of Onset   • Coronary artery disease Other    • Diabetes Other    • Hypertension Other          Allergies:      Doxycycline    Home Medications:    Prior to Admission Medications     Prescriptions Last Dose Informant Patient Reported? Taking?    aclidinium bromide (TUDORZA PRESSAIR) 400 MCG/ACT aerosol powder  powder for inhalation   No Yes    Inhale 1 puff 2 (Two) Times a Day.    azelastine (ASTELIN) 0.1 % nasal spray   Yes No    budesonide-formoterol (SYMBICORT) 80-4.5 MCG/ACT inhaler   No Yes    Inhale 2 puffs 2 (Two) Times a Day.    COMBIVENT RESPIMAT  MCG/ACT inhaler   No Yes    INHALE ONE PUFF BY MOUTH 4 TIMES DAILY AS NEEDED FOR WHEEZING OR  SHORTNESS  OF  AIR    diltiaZEM (CARDIZEM) 120 MG tablet   Yes Yes    Take 120  mg by mouth 4 (Four) Times a Day.    dronedarone (MULTAQ) 400 MG tablet 9/14/2019  No Yes    Take 1 tablet by mouth 2 (Two) Times a Day.    guaiFENesin (MUCINEX) 600 MG 12 hr tablet Unknown  No No    Take 1 tablet by mouth Every 12 (Twelve) Hours.    Patient taking differently:  Take 600 mg by mouth As Needed.    hepatitis A (HAVRIX) 1440 EL U/ML vaccine   No No    Inject into the appropriate muscle as directed per protocol and repeat in 6 months    hydrochlorothiazide (HYDRODIURIL) 25 MG tablet 9/14/2019  No Yes    Take 1 tablet by mouth Daily.    ipratropium (ATROVENT) 0.02 % nebulizer solution   No Yes    Take 2.5 mL by nebulization 4 (Four) Times a Day As Needed for Wheezing or Shortness of Air.    Multiple Vitamin (MULTI-VITAMINS PO) 9/14/2019  Yes Yes    Take  by mouth.    OXYGEN-HELIUM IN   Yes Yes    Oxygen; Patient Sig: Oxygen patient is to get home oxygen through  company of his choice at 2L/Min, as well as portable oxygen at the same rate.; 1; 0; 22-Mar-2016; Active    primidone (MYSOLINE) 50 MG tablet 9/14/2019  No Yes    Take 1 tablet by mouth 2 (Two) Times a Day.    rivaroxaban (XARELTO) 20 MG tablet 9/13/2019  No Yes    Take 1 tablet by mouth Daily.    roflumilast (DALIRESP) 500 MCG tablet tablet 9/14/2019  No Yes    Take 1 tablet by mouth Daily.    sertraline (ZOLOFT) 50 MG tablet 9/14/2019  No Yes    Take 1 tablet by mouth Daily.    tamsulosin (FLOMAX) 0.4 MG capsule 24 hr capsule 9/13/2019  No Yes    Take 1 capsule by mouth Every Night.    Tetrahydrozoline-Zn Sulfate (ALLERGY RELIEF EYE DROPS OP)   Yes No    Apply 1 drop to eye(s) as directed by provider 2 (Two) Times a Day As Needed.    VENTOLIN  (90 Base) MCG/ACT inhaler   No Yes    Inhale 2 puffs Every 6 (Six) Hours As Needed for Wheezing.                 Vital Signs:    Temp:  [98.6 °F (37 °C)-98.8 °F (37.1 °C)] 98.6 °F (37 °C)  Heart Rate:  [78-88] 78  Resp:  [17-20] 20  BP: (122-140)/(56-67) 122/67        09/14/19  1141 09/14/19  1437    Weight: 76.2 kg (168 lb) 77.2 kg (170 lb 4.8 oz)       Body mass index is 23.75 kg/m².    Physical Exam:      General Appearance:    Alert and cooperative,  And lying comfortably in bed   Head:    Atraumatic and normocephalic, without obvious abnormality.   Eyes:            PERRLA, conjunctivae and sclerae normal, no Icterus. No pallor. Extraocular movements are within normal limits.   Ears:    Ears appear intact with no abnormalities noted.   Throat:   No oral lesions, no thrush, oral mucosa moist.   Neck:   Supple, trachea midline, no thyromegaly, no carotid bruit, no lymphadenopathy   Lungs:     Chest shape is normal. Breath sounds heard bilaterally equally.  Very fine crepitations on the right base and mild expiratory wheezes. No Pleural rub or bronchial breathing.      Heart:    Normal S1 and S2, no murmur, no gallop, no rub. No JVD   Abdomen:     Normal bowel sounds, no masses, no organomegaly. Soft        non-tender, non-distended, no guarding, no rebound                tenderness   Extremities:   Moves all extremities well, no edema, no cyanosis, no             clubbing   Skin:   No  bruising or rash   Neurologic:   Cranial nerves 2 - 12 grossly intact, sensation intact, Motor power is normal and equal bilaterally.       EKG:      Normal sinus rhythm with no acute ischemic findings    Labs:    Lab Results (last 24 hours)     Procedure Component Value Units Date/Time    [place] Draw [[id number]] Collected:  09/14/19 1148    Specimen:  Blood Updated:  09/14/19 1352    Narrative:       The following orders were created for panel order [place] Draw.  Procedure                               Abnormality         Status                     ---------                               -----------         ------                     Light Blue Top[[id number]]                                   Final result               Green Top (Gel)[[id number]]                                  Final result               Lavender  Top[[id number]]                                     Final result               Gold Top - SST[[id number]]                                   Final result               Green Top (No Gel)[[id number]]                                                            Please view results for these tests on the individual orders.    Light Blue Top [[id number]] Collected:  09/14/19 1148    Specimen:  Blood Updated:  09/14/19 1300     Extra Tube hold for add-on     Comment: Auto resulted       Green Top (Gel) [[id number]] Collected:  09/14/19 1148    Specimen:  Blood Updated:  09/14/19 1300     Extra Tube Hold for add-ons.     Comment: Auto resulted.       Lavender Top [[id number]] Collected:  09/14/19 1148    Specimen:  Blood Updated:  09/14/19 1300     Extra Tube hold for add-on     Comment: Auto resulted       Gold Top - SST [[id number]] Collected:  09/14/19 1148    Specimen:  Blood Updated:  09/14/19 1300     Extra Tube Hold for add-ons.     Comment: Auto resulted.       Blood Culture - Blood, Arm, Left [[id number]] Collected:  09/14/19 1230    Specimen:  Blood from Arm, Left Updated:  09/14/19 1242    Blood Culture - Blood, Arm, Right [[id number]] Collected:  09/14/19 1237    Specimen:  Blood from Arm, Right Updated:  09/14/19 1241    Scan Slide [[id number]] Collected:  09/14/19 1148    Specimen:  Blood Updated:  09/14/19 1240     Anisocytosis Slight/1+     Hypochromia Slight/1+     WBC Morphology Normal     Platelet Estimate Adequate    Blood Gas, Arterial With Co-Ox [[id number]]  (Abnormal) Collected:  09/14/19 1231    Specimen:  Arterial Blood Updated:  09/14/19 1231     Site Left Radial     Jerry's Test Positive     pH, Arterial 7.381 pH units      pCO2, Arterial 54.6 mm Hg      Comment: 83 Value above reference range        pO2, Arterial 124.0 mm Hg      Comment: 83 Value above reference range        HCO3, Arterial 32.3 mmol/L      Comment: 83 Value above reference range        Base Excess, Arterial 6.1 mmol/L      Comment: 83  Value above reference range        O2 Saturation, Arterial 99.0 %      Hemoglobin, Blood Gas 10.4 g/dL      Comment: 84 Value below reference range        Hematocrit, Blood Gas 31.8 %      Comment: 84 Value below reference range        Oxyhemoglobin 97.2 %      Methemoglobin 1.00 %      Carboxyhemoglobin 0.8 %      A-a Gradiant --     Comment: UNABLE TO CALCULATE        Barometric Pressure for Blood Gas 741 mmHg      Modality Nasal Cannula     FIO2 40 %      Flow Rate 5.0 lpm      Ventilator Mode NA     Comment: Meter: [id number]     :  [id number]        Note --     pH, Temp Corrected --     pCO2, Temperature Corrected --     pO2, Temperature Corrected --    Troponin [[id number]]  (Normal) Collected:  09/14/19 1148    Specimen:  Blood Updated:  09/14/19 1220     Troponin T <0.010 ng/mL     Narrative:       Troponin T Reference Range:  <= 0.03 ng/mL-   Negative for AMI  >0.03 ng/mL-     Abnormal for myocardial necrosis.  Clinicians would have to utilize clinical acumen, EKG, Troponin and serial changes to determine if it is an Acute Myocardial Infarction or myocardial injury due to an underlying chronic condition.     BNP [[id number]]  (Normal) Collected:  09/14/19 1148    Specimen:  Blood Updated:  09/14/19 1220     proBNP 451.1 pg/mL     Narrative:       Among patients with dyspnea, NT-proBNP is highly sensitive for the detection of acute congestive heart failure. In addition NT-proBNP of <300 pg/ml effectively rules out acute congestive heart failure with 99% negative predictive value.    Comprehensive Metabolic Panel [[id number]]  (Abnormal) Collected:  09/14/19 1148    Specimen:  Blood Updated:  09/14/19 1217     Glucose 292 mg/dL      Comment: Glucose >180, Hemoglobin A1C recommended.        BUN 12 mg/dL      Creatinine 0.83 mg/dL      Sodium 140 mmol/L      Potassium 3.5 mmol/L      Chloride 97 mmol/L      CO2 26.5 mmol/L      Calcium 9.1 mg/dL      Total Protein 7.2 g/dL      Albumin 3.70 g/dL       ALT (SGPT) 16 U/L      AST (SGOT) 14 U/L      Alkaline Phosphatase 114 U/L      Total Bilirubin 0.3 mg/dL      eGFR Non African Amer 90 mL/min/1.73      Globulin 3.5 gm/dL      A/G Ratio 1.1 g/dL      BUN/Creatinine Ratio 14.5     Anion Gap 16.5 mmol/L     Narrative:       GFR Normal >60  Chronic Kidney Disease <60  Kidney Failure <15    CBC & Differential [[id number]] Collected:  09/14/19 1148    Specimen:  Blood Updated:  09/14/19 1216    Narrative:       The following orders were created for panel order CBC & Differential.  Procedure                               Abnormality         Status                     ---------                               -----------         ------                     CBC Auto Differential[[id number]]        Abnormal            Final result                 Please view results for these tests on the individual orders.    CBC Auto Differential [[id number]]  (Abnormal) Collected:  09/14/19 1148    Specimen:  Blood Updated:  09/14/19 1216     WBC 7.29 10*3/mm3      RBC 4.24 10*6/mm3      Hemoglobin 10.5 g/dL      Hematocrit 35.5 %      MCV 83.7 fL      MCH 24.8 pg      MCHC 29.6 g/dL      RDW 14.8 %      RDW-SD 45.1 fl      MPV 10.8 fL      Platelets 173 10*3/mm3      Neutrophil % 79.6 %      Lymphocyte % 9.7 %      Monocyte % 9.5 %      Eosinophil % 0.5 %      Basophil % 0.4 %      Immature Grans % 0.3 %      Neutrophils, Absolute 5.80 10*3/mm3      Lymphocytes, Absolute 0.71 10*3/mm3      Monocytes, Absolute 0.69 10*3/mm3      Eosinophils, Absolute 0.04 10*3/mm3      Basophils, Absolute 0.03 10*3/mm3      Immature Grans, Absolute 0.02 10*3/mm3      nRBC 0.0 /100 WBC           Radiology:    Imaging Results (last 72 hours)     Procedure Component Value Units Date/Time    XR Chest 2 View [[id number]] Collected:  09/14/19 1228     Updated:  09/14/19 1231    Narrative:       PROCEDURE: XR CHEST 2 VW-        HISTORY: [facility] Triage Protocol     COMPARISON: 12/07/2018.     FINDINGS: A left subclavian  pacemaker is in place. The heart is normal  in size. The mediastinum is unremarkable. There is patchy airspace  disease in the right middle lobe towards the periphery consistent with a  pneumonia. The left lung is clear. There is no pneumothorax. There are  no acute osseous abnormalities.       Impression:       Right basilar airspace disease consistent with a pneumonia.  Follow-up radiographs are recommended to ensure resolution.           This report was finalized on 9/14/2019 12:29 PM by Leslie Luu M.D..          Assessment:    Assessment/Plan       Pneumonia of right lower lobe due to infectious organism (CMS/HCC)    COPD exacerbation (CMS/HCC)    Acute on chronic respiratory failure with hypoxia (CMS/HCC)    Paroxysmal atrial fibrillation (CMS/HCC)    Sick sinus syndrome (CMS/HCC)    HTN (hypertension)        Plan:     1.  Right-sided pneumonia which is buqoofeqs-[id number]-mh will treat with IV Rocephin Zithromax along with bronchodilators  2.  COPD exacerbation he did not have more hypoxemia which is stabilized from his baseline and will need IV steroids along with the antibiotic and bronchodilators  3.  Paroxysmal A. Fib-continue with Xarelto for anticoagulation along with diltiazem and Multitak for his rate control  4.  Hypertension-currently stable with the diltiazem    Patient has been explained the mode of management and will be inpatient for 2 to 3 days and stabilized and this should be discharged on oral antibiotic with tapering dose of steroids    Adonay Loya MD  09/14/19  3:40 PM    Please note that portions of this note may have been completed with a voice recognition program. Efforts were made to edit the dictations, but occasionally words are mistranscribed.

## 2019-09-14 NOTE — ED NOTES
Requested tele bed at 1315. Jose Chaparro, assigned Room 321/Tele at 1316. PROSPER Bangura, notified.      Chelsea Arora  09/14/19 1317       Chelsea Arora  09/14/19 1318

## 2019-09-15 NOTE — PROGRESS NOTES
Discharge Planning Assessment   Bhavesh     Patient Name: Alessandro Mendiola  MRN: [id number]  Today's Date: 9/15/2019    Admit Date: 9/14/2019    Discharge Needs Assessment     Row Name 09/15/19 1024       Living Environment    Lives With  spouse    Name(s) of Who Lives With Patient  Madelyn Mendiola [phone]    Current Living Arrangements  home/apartment/condo    Duration at Residence  45 years    Primary Care Provided by  self    Provides Primary Care For  no one    Family Caregiver if Needed  spouse    Quality of Family Relationships  supportive    Able to Return to Prior Arrangements  yes       Resource/Environmental Concerns    Resource/Environmental Concerns  none       Transition Planning    Patient/Family Anticipates Transition to  home with family    Transportation Anticipated  family or friend will provide       Discharge Needs Assessment    Readmission Within the Last 30 Days  no previous admission in last 30 days    Concerns to be Addressed  no discharge needs identified    Equipment Currently Used at Home  nebulizer;oxygen;rollator;noninvasive ventilator O2@5Lper NC continuously and managed by [facility]    Equipment Needed After Discharge  none        Discharge Plan     Row Name 09/15/19 1028       Plan    Plan  Spoke with pt about discharge plans  Confirmed current address and contact numbers Madelyn Mendiola, spouse [phone]  Pt lives with spouse at current address for the past 45 years with no safety issues voiced   PCP Dr Silvio Jacobson  No POA  Pt states he cam perform ADL excellent with his O2@5L per [place] continuously  Pt states he still mows his lawn and drives  DME: nebulizer, rollator,cane,?trilogy, and O2@5L per [place] continuously and [facility] manages the O2   Pt states he has never used HH services in the past    Pt states he is being discharged home today  Pt states he feels so much better and wants to go home  Pt awaiting discharge instructions and his ride home        Destination      No  service coordination in this encounter.      Durable Medical Equipment      No service coordination in this encounter.      Dialysis/Infusion      No service coordination in this encounter.      Home Medical Care      No service coordination in this encounter.      Therapy      No service coordination in this encounter.      Community Resources      No service coordination in this encounter.        Expected Discharge Date and Time     Expected Discharge Date Expected Discharge Time    Sep 15, 2019         Demographic Summary     Row Name 09/15/19 1019       General Information    Admission Type  inpatient    Arrived From  emergency department    Referral Source  admission list    Reason for Consult  discharge planning    Preferred Language  English       Contact Information    Permission Granted to Share Info With      Contact Information Obtained for          Functional Status     Row Name 09/15/19 1021       Functional Status    Usual Activity Tolerance  excellent       Functional Status, IADL    Medications  independent    Meal Preparation  independent    Housekeeping  independent    Laundry  independent    Shopping  independent       Mental Status Summary    Recent Changes in Mental Status/Cognitive Functioning  no changes       Employment/    Employment Status  retired        Psychosocial    No documentation.       Abuse/Neglect    No documentation.       Legal    No documentation.       Substance Abuse    No documentation.       Patient Forms    No documentation.           Gabbie Tripp RN

## 2019-09-15 NOTE — DISCHARGE SUMMARY
[facility]   DISCHARGE SUMMARY      Name:  Alessandro Mendiola   Age:  79 y.o.  Sex:  male  :  1940  MRN:  [id number]   Visit Number:  [id number]  Primary Care Physician:  Silvio Jacobson MD  Date of Discharge:  9/15/2019  Admission Date:  2019      Discharge Diagnosis:       Pneumonia of right lower lobe due to infectious organism (CMS/HCC)    COPD exacerbation (CMS/HCC)    Acute on chronic respiratory failure with hypoxia (CMS/HCC)    Paroxysmal atrial fibrillation (CMS/HCC)    Sick sinus syndrome (CMS/HCC)    HTN (hypertension)          Consults:     Consults     No orders found for last 30 day(s).             Hospital Course:   The patient was admitted on 2019  Please see H&P for details on admission HPI and ROS.  79-year-old patient with past medical history of paroxysmal A. fib, coronary artery disease status post stent placement, COPD on home oxygen around 5 L, hypertension, sick sinus syndrome with a pacemaker, comes into the ER because of shortness of breath worsening over the last 1 week.  He states that he was in his usual state of health until a week ago when he started getting more short of breath along with cough which has been productive.  Patient denies any fever though he has felt a little chills.  Patient does use 4 L oxygen at home and initially he was found to be in the high 80s.  Upon evaluation in the ER he had x-ray which confirmed right-sided pneumonia and though white count was normal and he was given neb treatments along with a dose of Rocephin Zithromax and his oxygen did improved to 92%.  He has been further admitted for his pneumonia and COPD exacerbation  After admission with the IV Rocephin Zithromax started along with nebulizers , bronchodilators and IV steroid has been given.  Patient has improved over 24 hours and he does want to go home.  Patient has an eye appointment tomorrow morning Monday at 9 and has been difficult to get into the  optometrist for his eye issues and is really wanting to go home.  His oxygenation on 5 L which is what he uses at home is 93% and the wheezing is improved so we will discharge him on oral antibiotic Levaquin for the next 1 week.  He is to continue his other medications including his inhalers as prescribed and follow-up with Dr. Jacobson the primary doctor within a week.  Rest of the medication to be continued as per orders    Vital Signs:     Temp:  [98 °F (36.7 °C)-98.8 °F (37.1 °C)] 98.2 °F (36.8 °C)  Heart Rate:  [61-88] 64  Resp:  [16-20] 18  BP: (122-140)/(56-82) 134/80    Physical Exam:     General Appearance:    Alert and cooperative, not in any acute distress.   Head:    Atraumatic and normocephalic, without obvious abnormality.   Eyes:            PERRLA,  No pallor. Extraocular movements are within normal limits.   Neck:   Supple,  No lymph glands, no bruit   Lungs:     Chest shape is normal. Breath sounds heard bilaterally equally.  No crackles or wheezing.     Heart:    Normal S1 and S2, no murmur,  No JVD   Abdomen:     Normal bowel sounds, no masses, no organomegaly. Soft        non-tender, no guarding, no rebound tenderness   Extremities:   Moves all extremities well, no edema, no cyanosis,    Skin:   No  bruising or rash.   Neurologic:   Grossly non focal and moves all extrimities equally.        Pertinent Lab Results:     Results from last 7 days   Lab Units 09/15/19  0610 09/14/19  1148   SODIUM mmol/L 141 140   POTASSIUM mmol/L 4.2 3.5   CHLORIDE mmol/L 95* 97*   CO2 mmol/L 29.1* 26.5   BUN mg/dL 13 12   CREATININE mg/dL 0.77 0.83   CALCIUM mg/dL 9.6 9.1   BILIRUBIN mg/dL 0.3 0.3   ALK PHOS U/L 128* 114   ALT (SGPT) U/L 17 16   AST (SGOT) U/L 20 14   GLUCOSE mg/dL 246* 292*     Results from last 7 days   Lab Units 09/15/19  0610 09/14/19  1148   WBC 10*3/mm3 4.42 7.29   HEMOGLOBIN g/dL 10.1* 10.5*   HEMATOCRIT % 33.2* 35.5*   PLATELETS 10*3/mm3 182 173              Pertinent Radiology Results:     Imaging Results (most recent)     Procedure Component Value Units Date/Time    XR Chest 2 View [[id number]] Collected:  09/14/19 1228     Updated:  09/14/19 1231    Narrative:       PROCEDURE: XR CHEST 2 VW-        HISTORY: SOA Triage Protocol     COMPARISON: 12/07/2018.     FINDINGS: A left subclavian pacemaker is in place. The heart is normal  in size. The mediastinum is unremarkable. There is patchy airspace  disease in the right middle lobe towards the periphery consistent with a  pneumonia. The left lung is clear. There is no pneumothorax. There are  no acute osseous abnormalities.       Impression:       Right basilar airspace disease consistent with a pneumonia.  Follow-up radiographs are recommended to ensure resolution.           This report was finalized on 9/14/2019 12:29 PM by Leslie Luu M.D..                  Discharge Disposition:      Home or Self Care    Discharge Medication:         Discharge Medications      New Medications      Instructions Start Date   levoFLOXacin 500 MG tablet  Commonly known as:  LEVAQUIN   500 mg, Oral, Daily         Changes to Medications      Instructions Start Date   MUCINEX 600 MG 12 hr tablet  Generic drug:  guaiFENesin  What changed:    · when to take this  · reasons to take this   600 mg, Oral, Every 12 Hours Scheduled         Continue These Medications      Instructions Start Date   aclidinium bromide 400 MCG/ACT aerosol powder  powder for inhalation  Commonly known as:  TUDORZA PRESSAIR   1 puff, Inhalation, 2 Times Daily - RT      ALLERGY RELIEF EYE DROPS OP   1 drop, Ophthalmic, 2 Times Daily PRN      azelastine 0.1 % nasal spray  Commonly known as:  ASTELIN   No dose, route, or frequency recorded.      budesonide-formoterol 80-4.5 MCG/ACT inhaler  Commonly known as:  SYMBICORT   2 puffs, Inhalation, 2 Times Daily      COMBIVENT RESPIMAT  MCG/ACT inhaler  Generic drug:  ipratropium-albuterol   INHALE ONE PUFF BY MOUTH 4 TIMES DAILY AS NEEDED FOR  WHEEZING OR  SHORTNESS  OF  AIR      diltiaZEM 120 MG tablet  Commonly known as:  CARDIZEM   120 mg, Oral, Daily      dronedarone 400 MG tablet  Commonly known as:  MULTAQ   400 mg, Oral, 2 Times Daily      HAVRIX 1440 EL U/ML vaccine  Generic drug:  hepatitis A   Intramuscular      hydrochlorothiazide 25 MG tablet  Commonly known as:  HYDRODIURIL   25 mg, Oral, Daily      ipratropium 0.02 % nebulizer solution  Commonly known as:  ATROVENT   500 mcg, Nebulization, 4 Times Daily PRN      MULTI-VITAMINS PO   Oral      OXYGEN-HELIUM IN   Oxygen; Patient Sig: Oxygen patient is to get home oxygen through  company of his choice at 2L/Min, as well as portable oxygen at the same rate.; 1; 0; 22-Mar-2016; Active      primidone 50 MG tablet  Commonly known as:  MYSOLINE   50 mg, Oral, 2 Times Daily      rivaroxaban 20 MG tablet  Commonly known as:  XARELTO   20 mg, Oral, Daily      roflumilast 500 MCG tablet tablet  Commonly known as:  DALIRESP   500 mcg, Oral, Daily      sertraline 50 MG tablet  Commonly known as:  ZOLOFT   50 mg, Oral, Daily      tamsulosin 0.4 MG capsule 24 hr capsule  Commonly known as:  FLOMAX   1 capsule, Oral, Nightly      VENTOLIN  (90 Base) MCG/ACT inhaler  Generic drug:  albuterol sulfate HFA   2 puffs, Inhalation, Every 6 Hours PRN             Discharge Diet:         Healthy heart diet    Follow-up Appointments:      Future Appointments   Date Time Provider Department Center   9/25/2019 12:10 AM PACEART REMOTE, [facility] None   11/25/2019  1:30 PM Anna Wilson MD [facility] None   12/9/2019  1:15 PM Mikey Obrien MD [facility] None   12/18/2019  1:00 PM Silvio Jacobson MD [facility] None     Appointment with Dr. Jacobson should be made in 1 week    Test Results Pending at Discharge:       Order Current Status    Blood Culture - Blood, Arm, Left In process    Blood Culture - Blood, Arm, Right In process             Adonay Loya MD  09/15/19  9:49 AM    Time:   Discharge 25 min    Please note that portions of this note may have been completed with a voice recognition program. Efforts were made to edit the dictations, but occasionally words are mistranscribed.

## 2019-09-15 NOTE — PAYOR COMM NOTE
"  Inpt Admission Clincials    Pended Ref # is [id number]  I      Alessandro Mendiola (79 y.o. Male)     Date of Birth Social Security Number Address Home Phone MRN    1940  [address] [place] [id number] [phone] [id number]    Judaism Marital Status          Jew        Admission Date Admission Type Admitting Provider Attending Provider Department, Room/Bed    19 Emergency Alea Vo DO Karrick, Shandy Marcus, DO [facility] SURG  3, 321/1    Discharge Date Discharge Disposition Discharge Destination         Home or Self Care              Attending Provider:  Alea Vo DO    Allergies:  Doxycycline    Isolation:  None   Infection:  None   Code Status:  CPR    Ht:  180.3 cm (71\")   Wt:  77.2 kg (170 lb 4.8 oz)    Admission Cmt:  None   Principal Problem:  Pneumonia of right lower lobe due to infectious organism (CMS/LTAC, located within [facility]) [J18.1]                 Active Insurance as of 2019     Primary Coverage     Payor Plan Insurance Group Employer/Plan Group    HUMANA MEDICARE REPLACEMENT HUMANA MEDICARE REPL [id number]     Payor Plan Address Payor Plan Phone Number Payor Plan Fax Number Effective Dates    [address] [address] [phone]  2013 - None Entered    Formerly [facility] [id number]       Subscriber Name Subscriber Birth Date Member ID       ALESSANDRO MENDIOLA 1940 [id number]                 Emergency Contacts      (Rel.) Home Phone Work Phone Mobile Phone    Madelyn Mendiola (Spouse) [phone] -- --               History & Physical      Adonay Loya MD at 2019  3:40 PM              [facility],  [facility] MEDICINE   HISTORY AND PHYSICAL      Name:  Alessandro Mendiola   Age:  79 y.o.  Sex:  male  :  1940  MRN:  [id number]   Visit Number:  [id number]  Admission Date:  2019  Date Of Service:  19  Primary Care Physician:  Silvio Jacobson MD     Admitting diagnosis:      Pneumonia of right lower lobe due to " infectious organism (CMS/HCC)    COPD exacerbation (CMS/HCC)    Acute on chronic respiratory failure with hypoxia (CMS/HCC)    Paroxysmal atrial fibrillation (CMS/HCC)    Sick sinus syndrome (CMS/HCC)    HTN (hypertension)        History Of Presenting Illness:    79-year-old patient with past medical history of paroxysmal A. fib, coronary artery disease status post stent placement, COPD on home oxygen around 5 L, hypertension, sick sinus syndrome with a pacemaker, comes into the ER because of shortness of breath worsening over the last 1 week.  He states that he was in his usual state of health until a week ago when he started getting more short of breath along with cough which has been productive.  Patient denies any fever though he has felt a little chills.  Patient does use 4 L oxygen at home and initially he was found to be in the high 80s.  Upon evaluation in the ER he had x-ray which confirmed right-sided pneumonia and though white count was normal and he was given neb treatments along with a dose of Rocephin Zithromax and his oxygen did improved to 92%.  He has been further admitted for his pneumonia and COPD exacerbation.  Besides that other review of systems unremarkable except as above      Review Of Systems:     The following systems were reviewed and negative;  constitution, eyes, ENT, respiratory, cardiovascular, gastrointestinal, genitourinary, musculoskeletal, neurological and behavioral/psych,  Skin except as above.     Past Medical History:    Past Medical History:   Diagnosis Date   • Alcohol abuse    • Aneurysm of thoracic aorta (CMS/HCC)    • Anxiety    • Ascending aortic aneurysm (CMS/HCC)     4.8 to 4.9 cm    • Atherosclerotic heart disease    • Atrial fibrillation (CMS/HCC)    • Blood thinned due to long-term anticoagulant use    • Body mass index (BMI) 20.0-20.9, adult    • CAD (coronary artery disease)    • Cancer (CMS/HCC)     skin   • Cardiac pacemaker    • Cellulitis    • Chronic  bronchitis (CMS/HCC)    • Chronic cough    • COPD (chronic obstructive pulmonary disease) (CMS/HCC)    • Depression    • Difficulty breathing    • Emphysema lung (CMS/HCC)    • Encounter for long-term current use of medication    • Encounter for screening for malignant neoplasm of prostate    • Fatigue    • Fingertip amputation    • TAMIKO (generalized anxiety disorder)    • History of alcohol abuse    • History of cardiac catheterization    • History of chest x-ray 02/11/2014    Chronic interstitial changes seen throughout the lung fields w/ no radiographic evidence of acute parenchymal disease.No definite LT-sided rib fracture present.   • History of chest x-ray 01/10/2014    No acute cardiopulmonary process.Dual lead LT subclavian pacemaker is in place.Aortic ectasia. Hyperinflation of lungs, suggesting COPD   • History of chest x-ray 10/01/2012    Ill-defined RT middle lobe opacity which likley reflects a pneumonia. FU to radiographic resolution is recommended.   • History of Doppler echocardiogram    • History of echocardiogram 09/26/2013    EF 55-60%. Mod concentric LVH. Abnormal LT VT diastolic filling is observed, consistent w/ impaired relaxation.Mild MR/TR. Trace SC. RVSP 44 mmHg.   • History of stress test     ECG performed   • HTN (hypertension)    • HX: long term anticoagulant use     Blood thinned due to long-term anticoagulant use (V58.61) (Z79.01)   • Hypercholesterolemia    • LVH (left ventricular hypertrophy)    • Mitral and aortic valve disease    • Myocardial infarction (CMS/HCC)    • Nonvenomous insect bite of multiple sites    • Onychomycosis    • Pacemaker at end of battery life    • Pneumonia    • Pneumothorax    • Pre-syncope    • Pulmonary nodule    • Rib pain on left side    • Sick sinus syndrome (CMS/HCC)    • SOB (shortness of breath)    • Upper respiratory infection    • Vitamin D deficiency        Past Surgical history:    Past Surgical History:   Procedure Laterality Date   • AMPUTATION   2015    metacarpal and index finger   • CARDIAC PACEMAKER PLACEMENT  2008   • CATARACT EXTRACTION     • CORONARY STENT PLACEMENT      History of Cath Placement Of Stent 1  · Coronary stents   • EYE SURGERY     • SKIN CANCER EXCISION         Social History:    Social History     Socioeconomic History   • Marital status:      Spouse name: Not on file   • Number of children: Not on file   • Years of education: Not on file   • Highest education level: Not on file   Tobacco Use   • Smoking status: Former Smoker     Packs/day: 3.00     Years: 45.00     Pack years: 135.00     Last attempt to quit: 2008     Years since quittin.7   • Smokeless tobacco: Never Used   Substance and Sexual Activity   • Alcohol use: No   • Drug use: No   • Sexual activity: Defer       Family History:    Family History   Problem Relation Age of Onset   • Coronary artery disease Other    • Diabetes Other    • Hypertension Other          Allergies:      Doxycycline    Home Medications:    Prior to Admission Medications     Prescriptions Last Dose Informant Patient Reported? Taking?    aclidinium bromide (TUDORZA PRESSAIR) 400 MCG/ACT aerosol powder  powder for inhalation   No Yes    Inhale 1 puff 2 (Two) Times a Day.    azelastine (ASTELIN) 0.1 % nasal spray   Yes No    budesonide-formoterol (SYMBICORT) 80-4.5 MCG/ACT inhaler   No Yes    Inhale 2 puffs 2 (Two) Times a Day.    COMBIVENT RESPIMAT  MCG/ACT inhaler   No Yes    INHALE ONE PUFF BY MOUTH 4 TIMES DAILY AS NEEDED FOR WHEEZING OR  SHORTNESS  OF  AIR    diltiaZEM (CARDIZEM) 120 MG tablet   Yes Yes    Take 120 mg by mouth 4 (Four) Times a Day.    dronedarone (MULTAQ) 400 MG tablet 2019  No Yes    Take 1 tablet by mouth 2 (Two) Times a Day.    guaiFENesin (MUCINEX) 600 MG 12 hr tablet Unknown  No No    Take 1 tablet by mouth Every 12 (Twelve) Hours.    Patient taking differently:  Take 600 mg by mouth As Needed.    hepatitis A (HAVRIX) 1440 EL U/ML vaccine    No No    Inject into the appropriate muscle as directed per protocol and repeat in 6 months    hydrochlorothiazide (HYDRODIURIL) 25 MG tablet 9/14/2019  No Yes    Take 1 tablet by mouth Daily.    ipratropium (ATROVENT) 0.02 % nebulizer solution   No Yes    Take 2.5 mL by nebulization 4 (Four) Times a Day As Needed for Wheezing or Shortness of Air.    Multiple Vitamin (MULTI-VITAMINS PO) 9/14/2019  Yes Yes    Take  by mouth.    OXYGEN-HELIUM IN   Yes Yes    Oxygen; Patient Sig: Oxygen patient is to get home oxygen through  company of his choice at 2L/Min, as well as portable oxygen at the same rate.; 1; 0; 22-Mar-2016; Active    primidone (MYSOLINE) 50 MG tablet 9/14/2019  No Yes    Take 1 tablet by mouth 2 (Two) Times a Day.    rivaroxaban (XARELTO) 20 MG tablet 9/13/2019  No Yes    Take 1 tablet by mouth Daily.    roflumilast (DALIRESP) 500 MCG tablet tablet 9/14/2019  No Yes    Take 1 tablet by mouth Daily.    sertraline (ZOLOFT) 50 MG tablet 9/14/2019  No Yes    Take 1 tablet by mouth Daily.    tamsulosin (FLOMAX) 0.4 MG capsule 24 hr capsule 9/13/2019  No Yes    Take 1 capsule by mouth Every Night.    Tetrahydrozoline-Zn Sulfate (ALLERGY RELIEF EYE DROPS OP)   Yes No    Apply 1 drop to eye(s) as directed by provider 2 (Two) Times a Day As Needed.    VENTOLIN  (90 Base) MCG/ACT inhaler   No Yes    Inhale 2 puffs Every 6 (Six) Hours As Needed for Wheezing.                 Vital Signs:    Temp:  [98.6 °F (37 °C)-98.8 °F (37.1 °C)] 98.6 °F (37 °C)  Heart Rate:  [78-88] 78  Resp:  [17-20] 20  BP: (122-140)/(56-67) 122/67        09/14/19  1141 09/14/19  1437   Weight: 76.2 kg (168 lb) 77.2 kg (170 lb 4.8 oz)       Body mass index is 23.75 kg/m².    Physical Exam:      General Appearance:    Alert and cooperative,  And lying comfortably in bed   Head:    Atraumatic and normocephalic, without obvious abnormality.   Eyes:            PERRLA, conjunctivae and sclerae normal, no Icterus. No pallor. Extraocular  movements are within normal limits.   Ears:    Ears appear intact with no abnormalities noted.   Throat:   No oral lesions, no thrush, oral mucosa moist.   Neck:   Supple, trachea midline, no thyromegaly, no carotid bruit, no lymphadenopathy   Lungs:     Chest shape is normal. Breath sounds heard bilaterally equally.  Very fine crepitations on the right base and mild expiratory wheezes. No Pleural rub or bronchial breathing.      Heart:    Normal S1 and S2, no murmur, no gallop, no rub. No JVD   Abdomen:     Normal bowel sounds, no masses, no organomegaly. Soft        non-tender, non-distended, no guarding, no rebound                tenderness   Extremities:   Moves all extremities well, no edema, no cyanosis, no             clubbing   Skin:   No  bruising or rash   Neurologic:   Cranial nerves 2 - 12 grossly intact, sensation intact, Motor power is normal and equal bilaterally.       EKG:      Normal sinus rhythm with no acute ischemic findings    Labs:    Lab Results (last 24 hours)     Procedure Component Value Units Date/Time    [place] Draw [[id number]] Collected:  09/14/19 1148    Specimen:  Blood Updated:  09/14/19 1352    Narrative:       The following orders were created for panel order [place] Draw.  Procedure                               Abnormality         Status                     ---------                               -----------         ------                     Light Blue Top[[id number]]                                   Final result               Green Top (Gel)[[id number]]                                  Final result               Lavender Top[[id number]]                                     Final result               Gold Top - SST[[id number]]                                   Final result               Green Top (No Gel)[[id number]]                                                            Please view results for these tests on the individual orders.    Light Blue Top [[id number]] Collected:   09/14/19 1148    Specimen:  Blood Updated:  09/14/19 1300     Extra Tube hold for add-on     Comment: Auto resulted       Green Top (Gel) [[id number]] Collected:  09/14/19 1148    Specimen:  Blood Updated:  09/14/19 1300     Extra Tube Hold for add-ons.     Comment: Auto resulted.       Lavender Top [[id number]] Collected:  09/14/19 1148    Specimen:  Blood Updated:  09/14/19 1300     Extra Tube hold for add-on     Comment: Auto resulted       Gold Top - SST [[id number]] Collected:  09/14/19 1148    Specimen:  Blood Updated:  09/14/19 1300     Extra Tube Hold for add-ons.     Comment: Auto resulted.       Blood Culture - Blood, Arm, Left [[id number]] Collected:  09/14/19 1230    Specimen:  Blood from Arm, Left Updated:  09/14/19 1242    Blood Culture - Blood, Arm, Right [[id number]] Collected:  09/14/19 1237    Specimen:  Blood from Arm, Right Updated:  09/14/19 1241    Scan Slide [[id number]] Collected:  09/14/19 1148    Specimen:  Blood Updated:  09/14/19 1240     Anisocytosis Slight/1+     Hypochromia Slight/1+     WBC Morphology Normal     Platelet Estimate Adequate    Blood Gas, Arterial With Co-Ox [[id number]]  (Abnormal) Collected:  09/14/19 1231    Specimen:  Arterial Blood Updated:  09/14/19 1231     Site Left Radial     Jerry's Test Positive     pH, Arterial 7.381 pH units      pCO2, Arterial 54.6 mm Hg      Comment: 83 Value above reference range        pO2, Arterial 124.0 mm Hg      Comment: 83 Value above reference range        HCO3, Arterial 32.3 mmol/L      Comment: 83 Value above reference range        Base Excess, Arterial 6.1 mmol/L      Comment: 83 Value above reference range        O2 Saturation, Arterial 99.0 %      Hemoglobin, Blood Gas 10.4 g/dL      Comment: 84 Value below reference range        Hematocrit, Blood Gas 31.8 %      Comment: 84 Value below reference range        Oxyhemoglobin 97.2 %      Methemoglobin 1.00 %      Carboxyhemoglobin 0.8 %      A-a Gradiant --     Comment: UNABLE TO  CALCULATE        Barometric Pressure for Blood Gas 741 mmHg      Modality Nasal Cannula     FIO2 40 %      Flow Rate 5.0 lpm      Ventilator Mode NA     Comment: Meter: [id number]     :  [id number]        Note --     pH, Temp Corrected --     pCO2, Temperature Corrected --     pO2, Temperature Corrected --    Troponin [[id number]]  (Normal) Collected:  09/14/19 1148    Specimen:  Blood Updated:  09/14/19 1220     Troponin T <0.010 ng/mL     Narrative:       Troponin T Reference Range:  <= 0.03 ng/mL-   Negative for AMI  >0.03 ng/mL-     Abnormal for myocardial necrosis.  Clinicians would have to utilize clinical acumen, EKG, Troponin and serial changes to determine if it is an Acute Myocardial Infarction or myocardial injury due to an underlying chronic condition.     BNP [[id number]]  (Normal) Collected:  09/14/19 1148    Specimen:  Blood Updated:  09/14/19 1220     proBNP 451.1 pg/mL     Narrative:       Among patients with dyspnea, NT-proBNP is highly sensitive for the detection of acute congestive heart failure. In addition NT-proBNP of <300 pg/ml effectively rules out acute congestive heart failure with 99% negative predictive value.    Comprehensive Metabolic Panel [[id number]]  (Abnormal) Collected:  09/14/19 1148    Specimen:  Blood Updated:  09/14/19 1217     Glucose 292 mg/dL      Comment: Glucose >180, Hemoglobin A1C recommended.        BUN 12 mg/dL      Creatinine 0.83 mg/dL      Sodium 140 mmol/L      Potassium 3.5 mmol/L      Chloride 97 mmol/L      CO2 26.5 mmol/L      Calcium 9.1 mg/dL      Total Protein 7.2 g/dL      Albumin 3.70 g/dL      ALT (SGPT) 16 U/L      AST (SGOT) 14 U/L      Alkaline Phosphatase 114 U/L      Total Bilirubin 0.3 mg/dL      eGFR Non African Amer 90 mL/min/1.73      Globulin 3.5 gm/dL      A/G Ratio 1.1 g/dL      BUN/Creatinine Ratio 14.5     Anion Gap 16.5 mmol/L     Narrative:       GFR Normal >60  Chronic Kidney Disease <60  Kidney Failure <15    CBC &  Differential [[id number]] Collected:  09/14/19 1148    Specimen:  Blood Updated:  09/14/19 1216    Narrative:       The following orders were created for panel order CBC & Differential.  Procedure                               Abnormality         Status                     ---------                               -----------         ------                     CBC Auto Differential[[id number]]        Abnormal            Final result                 Please view results for these tests on the individual orders.    CBC Auto Differential [[id number]]  (Abnormal) Collected:  09/14/19 1148    Specimen:  Blood Updated:  09/14/19 1216     WBC 7.29 10*3/mm3      RBC 4.24 10*6/mm3      Hemoglobin 10.5 g/dL      Hematocrit 35.5 %      MCV 83.7 fL      MCH 24.8 pg      MCHC 29.6 g/dL      RDW 14.8 %      RDW-SD 45.1 fl      MPV 10.8 fL      Platelets 173 10*3/mm3      Neutrophil % 79.6 %      Lymphocyte % 9.7 %      Monocyte % 9.5 %      Eosinophil % 0.5 %      Basophil % 0.4 %      Immature Grans % 0.3 %      Neutrophils, Absolute 5.80 10*3/mm3      Lymphocytes, Absolute 0.71 10*3/mm3      Monocytes, Absolute 0.69 10*3/mm3      Eosinophils, Absolute 0.04 10*3/mm3      Basophils, Absolute 0.03 10*3/mm3      Immature Grans, Absolute 0.02 10*3/mm3      nRBC 0.0 /100 WBC           Radiology:    Imaging Results (last 72 hours)     Procedure Component Value Units Date/Time    XR Chest 2 View [[id number]] Collected:  09/14/19 1228     Updated:  09/14/19 1231    Narrative:       PROCEDURE: XR CHEST 2 VW-        HISTORY: [facility] Triage Protocol     COMPARISON: 12/07/2018.     FINDINGS: A left subclavian pacemaker is in place. The heart is normal  in size. The mediastinum is unremarkable. There is patchy airspace  disease in the right middle lobe towards the periphery consistent with a  pneumonia. The left lung is clear. There is no pneumothorax. There are  no acute osseous abnormalities.       Impression:       Right basilar airspace disease  consistent with a pneumonia.  Follow-up radiographs are recommended to ensure resolution.           This report was finalized on 9/14/2019 12:29 PM by Leslie Luu M.D..          Assessment:    Assessment/Plan       Pneumonia of right lower lobe due to infectious organism (CMS/HCC)    COPD exacerbation (CMS/HCC)    Acute on chronic respiratory failure with hypoxia (CMS/HCC)    Paroxysmal atrial fibrillation (CMS/HCC)    Sick sinus syndrome (CMS/HCC)    HTN (hypertension)        Plan:     1.  Right-sided pneumonia which is spbzzcapc-zjstkhjg-hl will treat with IV Rocephin Zithromax along with bronchodilators  2.  COPD exacerbation he did not have more hypoxemia which is stabilized from his baseline and will need IV steroids along with the antibiotic and bronchodilators  3.  Paroxysmal A. Fib-continue with Xarelto for anticoagulation along with diltiazem and Multitak for his rate control  4.  Hypertension-currently stable with the diltiazem    Patient has been explained the mode of management and will be inpatient for 2 to 3 days and stabilized and this should be discharged on oral antibiotic with tapering dose of steroids    Adonay Loya MD  09/14/19  3:40 PM    Please note that portions of this note may have been completed with a voice recognition program. Efforts were made to edit the dictations, but occasionally words are mistranscribed.    Electronically signed by Adonay Loya MD at 9/14/2019  3:45 PM       Hospital Medications (active)       Dose Frequency Start End    acetaminophen (TYLENOL) tablet 325 mg 325 mg Nightly PRN 9/14/2019     Sig - Route: Take 1 tablet by mouth At Night As Needed (sleep). - Oral    AZITHROMYCIN 500 MG/250 ML 0.9% NS IVPB (vial-mate) 500 mg Once 9/14/2019 9/14/2019    Sig - Route: Infuse 500 mg into a venous catheter 1 (One) Time. - Intravenous    Cosign for Ordering: Accepted by Hossein Starks MD on 9/14/2019  5:21 PM    AZITHROMYCIN 500 MG/250 ML 0.9% NS IVPB  (vial-mate) 500 mg Every 24 Hours 9/15/2019 9/18/2019    Sig - Route: Infuse 500 mg into a venous catheter Daily. - Intravenous    budesonide-formoterol (SYMBICORT) 80-4.5 MCG/ACT inhaler 2 puff 2 puff 2 Times Daily 9/14/2019     Sig - Route: Inhale 2 puffs 2 (Two) Times a Day. - Inhalation    cefTRIAXone (ROCEPHIN) IVPB 1 g/50ml dextrose (premix) 1 g Once 9/14/2019 9/14/2019    Sig - Route: Infuse 50 mL into a venous catheter 1 (One) Time. - Intravenous    Cosign for Ordering: Accepted by Hossein Starks MD on 9/14/2019  5:21 PM    cefTRIAXone (ROCEPHIN) IVPB 1 g/50ml dextrose (premix) 1 g Every 24 Hours 9/15/2019 9/18/2019    Sig - Route: Infuse 50 mL into a venous catheter Daily. - Intravenous    diltiaZEM CD (CARDIZEM CD) 24 hr capsule 120 mg 120 mg Every 24 Hours Scheduled 9/15/2019     Sig - Route: Take 1 capsule by mouth Daily. - Oral    diphenhydrAMINE (BENADRYL) capsule 25 mg 25 mg Nightly PRN 9/14/2019     Sig - Route: Take 1 capsule by mouth At Night As Needed for Sleep. - Oral    dronedarone (MULTAQ) tablet 400 mg 400 mg 2 Times Daily 9/14/2019     Sig - Route: Take 1 tablet by mouth 2 (Two) Times a Day. - Oral    guaiFENesin (MUCINEX) 12 hr tablet 600 mg 600 mg Every 12 Hours Scheduled 9/14/2019     Sig - Route: Take 1 tablet by mouth Every 12 (Twelve) Hours. - Oral    ipratropium-albuterol (DUO-NEB) nebulizer solution 3 mL 3 mL 4 Times Daily - RT 9/14/2019     Sig - Route: Take 3 mL by nebulization 4 (Four) Times a Day. - Nebulization    methylPREDNISolone sodium succinate (SOLU-Medrol) injection 40 mg 40 mg Every 12 Hours 9/15/2019     Sig - Route: Infuse 1 mL into a venous catheter Every 12 (Twelve) Hours. - Intravenous    rivaroxaban (XARELTO) tablet 20 mg 20 mg Daily With Dinner 9/14/2019     Sig - Route: Take 2 tablets by mouth Daily With Dinner. - Oral    sertraline (ZOLOFT) tablet 50 mg 50 mg Daily 9/15/2019     Sig - Route: Take 1 tablet by mouth Daily. - Oral    sodium chloride 0.9 %  flush 10 mL 10 mL As Needed 9/14/2019     Sig - Route: Infuse 10 mL into a venous catheter As Needed for Line Care. - Intravenous    Cosign for Ordering: Accepted by Hossein Starks MD on 9/14/2019  5:21 PM    tamsulosin (FLOMAX) 24 hr capsule 0.4 mg 0.4 mg Nightly 9/14/2019     Sig - Route: Take 1 capsule by mouth Every Night. - Oral    diltiaZEM (CARDIZEM) tablet 120 mg (Discontinued) 120 mg Every 8 Hours Scheduled 9/14/2019 9/14/2019    Sig - Route: Take 2 tablets by mouth Every 8 (Eight) Hours. - Oral            Lab Results (last 24 hours)     Procedure Component Value Units Date/Time    Comprehensive Metabolic Panel [[id number]]  (Abnormal) Collected:  09/15/19 0610    Specimen:  Blood Updated:  09/15/19 0655     Glucose 246 mg/dL      Comment: Glucose >180, Hemoglobin A1C recommended.        BUN 13 mg/dL      Creatinine 0.77 mg/dL      Sodium 141 mmol/L      Potassium 4.2 mmol/L      Chloride 95 mmol/L      CO2 29.1 mmol/L      Calcium 9.6 mg/dL      Total Protein 7.5 g/dL      Albumin 3.90 g/dL      ALT (SGPT) 17 U/L      AST (SGOT) 20 U/L      Alkaline Phosphatase 128 U/L      Total Bilirubin 0.3 mg/dL      eGFR Non African Amer 98 mL/min/1.73      Globulin 3.6 gm/dL      A/G Ratio 1.1 g/dL      BUN/Creatinine Ratio 16.9     Anion Gap 16.9 mmol/L     Narrative:       GFR Normal >60  Chronic Kidney Disease <60  Kidney Failure <15    CBC (No Diff) [[id number]]  (Abnormal) Collected:  09/15/19 0610    Specimen:  Blood Updated:  09/15/19 0625     WBC 4.42 10*3/mm3      RBC 4.03 10*6/mm3      Hemoglobin 10.1 g/dL      Hematocrit 33.2 %      MCV 82.4 fL      MCH 25.1 pg      MCHC 30.4 g/dL      RDW 14.4 %      RDW-SD 43.2 fl      MPV 10.8 fL      Platelets 182 10*3/mm3     [facility] Draw [[id number]] Collected:  09/14/19 1148    Specimen:  Blood Updated:  09/14/19 1352    Narrative:       The following orders were created for panel order [facility] Draw.  Procedure                               Abnormality          Status                     ---------                               -----------         ------                     Light Blue Top[[id number]]                                   Final result               Green Top (Gel)[[id number]]                                  Final result               Lavender Top[[id number]]                                     Final result               Gold Top - SST[[id number]]                                   Final result               Green Top (No Gel)[[id number]]                                                            Please view results for these tests on the individual orders.    Light Blue Top [[id number]] Collected:  09/14/19 1148    Specimen:  Blood Updated:  09/14/19 1300     Extra Tube hold for add-on     Comment: Auto resulted       Green Top (Gel) [[id number]] Collected:  09/14/19 1148    Specimen:  Blood Updated:  09/14/19 1300     Extra Tube Hold for add-ons.     Comment: Auto resulted.       Lavender Top [[id number]] Collected:  09/14/19 1148    Specimen:  Blood Updated:  09/14/19 1300     Extra Tube hold for add-on     Comment: Auto resulted       Gold Top - SST [[id number]] Collected:  09/14/19 1148    Specimen:  Blood Updated:  09/14/19 1300     Extra Tube Hold for add-ons.     Comment: Auto resulted.       Blood Culture - Blood, Arm, Left [[id number]] Collected:  09/14/19 1230    Specimen:  Blood from Arm, Left Updated:  09/14/19 1242    Blood Culture - Blood, Arm, Right [[id number]] Collected:  09/14/19 1237    Specimen:  Blood from Arm, Right Updated:  09/14/19 1241    Scan Slide [[id number]] Collected:  09/14/19 1148    Specimen:  Blood Updated:  09/14/19 1240     Anisocytosis Slight/1+     Hypochromia Slight/1+     WBC Morphology Normal     Platelet Estimate Adequate    Blood Gas, Arterial With Co-Ox [[id number]]  (Abnormal) Collected:  09/14/19 1231    Specimen:  Arterial Blood Updated:  09/14/19 1231     Site Left Radial     Jerry's Test Positive     pH, Arterial 7.381 pH  units      pCO2, Arterial 54.6 mm Hg      Comment: 83 Value above reference range        pO2, Arterial 124.0 mm Hg      Comment: 83 Value above reference range        HCO3, Arterial 32.3 mmol/L      Comment: 83 Value above reference range        Base Excess, Arterial 6.1 mmol/L      Comment: 83 Value above reference range        O2 Saturation, Arterial 99.0 %      Hemoglobin, Blood Gas 10.4 g/dL      Comment: 84 Value below reference range        Hematocrit, Blood Gas 31.8 %      Comment: 84 Value below reference range        Oxyhemoglobin 97.2 %      Methemoglobin 1.00 %      Carboxyhemoglobin 0.8 %      A-a Gradiant --     Comment: UNABLE TO CALCULATE        Barometric Pressure for Blood Gas 741 mmHg      Modality Nasal Cannula     FIO2 40 %      Flow Rate 5.0 lpm      Ventilator Mode NA     Comment: Meter: [id number]     :  [id number]        Note --     pH, Temp Corrected --     pCO2, Temperature Corrected --     pO2, Temperature Corrected --

## 2019-09-15 NOTE — PLAN OF CARE
Problem: Patient Care Overview  Goal: Plan of Care Review  Outcome: Ongoing (interventions implemented as appropriate)   09/15/19 5545   Coping/Psychosocial   Plan of Care Reviewed With patient   Plan of Care Review   Progress improving   OTHER   Outcome Summary pt use of incentive spirometer reaching level of 1500. pt slept well once given PRN medication for sleep. no acute events throughout the night.VSS, will continue to monitor

## 2019-09-16 NOTE — OUTREACH NOTE
Prep Survey      Responses   Facility patient discharged from?  [place]   Is patient eligible?  Yes   Discharge diagnosis  PNA & COPD exacerbation   Does the patient have one of the following disease processes/diagnoses(primary or secondary)?  COPD/Pneumonia   Does the patient have Home health ordered?  No   Is there a DME ordered?  No   Prep survey completed?  Yes          eKy Beckford RN

## 2019-09-16 NOTE — TELEPHONE ENCOUNTER
TCM HOSP FU; 09/15/19; [facility]; PNEUMONIA/COPD      APPT TIME:   Thursday, Sept 19, 2019 @ 11:30am w/ Dr. Jacobson

## 2019-09-17 NOTE — OUTREACH NOTE
Spoke with pt wife, states pt is feeling better, breathing easier. No vomiting, chest pain, fever, chills. Pt is having some mild nausea but feels this is due to Levaquin. Pt is able to eat and drink pretty normally. TCM [facility] sched for 09/19/19 with Dr Jacobson.

## 2019-09-17 NOTE — OUTREACH NOTE
COPD/PN Week 1 Survey      Responses   Facility patient discharged from?  [facility]   Does the patient have one of the following disease processes/diagnoses(primary or secondary)?  COPD/Pneumonia   Is there a successful TCM telephone encounter documented?  Yes          Eliane Arrieta RN

## 2019-09-19 PROBLEM — J41.0 SIMPLE CHRONIC BRONCHITIS (HCC): Status: ACTIVE | Noted: 2019-01-01

## 2019-09-19 NOTE — PROGRESS NOTES
Transitional Care Follow Up Visit  Subjective     Alessandro Mendiola is a 79 y.o. male who presents for a transitional care management visit.    Within 48 business hours after discharge our office contacted him via telephone to coordinate his care and needs.      I reviewed and discussed the details of that call along with the discharge summary, hospital problems, inpatient lab results, inpatient diagnostic studies, and consultation reports with Alessandro.     Current outpatient and discharge medications have been reconciled for the patient.  Reviewed by: Silvio Jacobson MD      Date of TCM Phone Call 9/17/2019   [facility]   Date of Admission 9/14/2019   Date of Discharge 9/15/2019   Discharge Disposition Home or Self Care     Risk for Readmission (LACE) Score: 7 (9/15/2019  6:00 AM)      History of Present Illness   Course During Hospital Stay: Patient admitted to the hospital with complains of cough and shortness of air chest x-ray with evidence of right lung infiltrate he received IV antibiotics initially on the following day discharged on oral antibiotics in the form of levofloxacin.  He has been taking this for 5 days now.  Has had complains of severe nausea which lasts for at least half a day after he takes the medication.  He denies fever or chills back to baseline with his respiratory status is using his inhalers regularly       Review of Systems   Constitutional: Negative.  Negative for activity change, appetite change, fatigue and fever.   HENT: Negative for congestion, ear discharge, ear pain and trouble swallowing.    Eyes: Negative for photophobia and visual disturbance.   Respiratory: Positive for cough and shortness of breath.    Cardiovascular: Negative for chest pain and palpitations.   Gastrointestinal: Negative for abdominal distention, abdominal pain, constipation, diarrhea, nausea and vomiting.   Endocrine: Negative.    Genitourinary: Negative for dysuria, hematuria and urgency.    Musculoskeletal: Positive for arthralgias. Negative for back pain, joint swelling and myalgias.   Skin: Negative for color change and rash.   Allergic/Immunologic: Negative.    Neurological: Negative for dizziness, weakness, light-headedness and headaches.   Hematological: Negative for adenopathy. Does not bruise/bleed easily.   Psychiatric/Behavioral: Negative for agitation, confusion and dysphoric mood. The patient is not nervous/anxious.        Objective   Physical Exam   Constitutional: He is oriented to person, place, and time. He appears well-developed and well-nourished. No distress.   HENT:   Nose: Nose normal.   Mouth/Throat: Oropharynx is clear and moist.   Eyes: Conjunctivae and EOM are normal. No scleral icterus.   Neck: No tracheal deviation present. No thyromegaly present.   Cardiovascular: Normal rate and regular rhythm. Exam reveals no friction rub.   No murmur heard.  Pulmonary/Chest: No respiratory distress. He has no wheezes. He has no rales.   Abdominal: Soft. He exhibits no distension and no mass. There is no tenderness. There is no guarding.   Musculoskeletal: Normal range of motion. He exhibits no deformity.   Lymphadenopathy:     He has no cervical adenopathy.   Neurological: He is alert and oriented to person, place, and time. He has normal reflexes. No cranial nerve deficit. Coordination normal.   Skin: Skin is warm and dry. No rash noted. No erythema.   Psychiatric: He has a normal mood and affect. His behavior is normal. Judgment and thought content normal.       Assessment/Plan   Alessandro was seen today for pneumonia.    Diagnoses and all orders for this visit:    Paroxysmal atrial fibrillation (CMS/[facility]) rate control okay on anticoagulant therapy    Pneumonia of right lower lobe due to infectious organism (CMS/[facility]) ER records and hospital records reviewed will stop antibiotics now continue with aggressive pulmonary toilet and inhalers    Simple chronic bronchitis (CMS/[facility]) stable  continue with current meds

## 2019-09-23 NOTE — OUTREACH NOTE
COPD/PN Week 2 Survey      Responses   Facility patient discharged from?  [facility]   Does the patient have one of the following disease processes/diagnoses(primary or secondary)?  COPD/Pneumonia   Was the primary reason for admission:  Pneumonia   Week 2 attempt successful?  Yes   Call start time  1720   Call end time  1725   Discharge diagnosis  PNA & COPD exacerbation   Is patient permission given to speak with other caregiver?  Yes   List who call center can speak with  Wife, Mrs. Mendiola   Person spoke with today (if not patient) and relationship  Wife, Mrs. Mendiola   Meds reviewed with patient/caregiver?  Yes   Is the patient having any side effects they believe may be caused by any medication additions or changes?  Yes   Side effects comments   Wife states that patient was nauseated and felt weak all over from the levofloxacin. PCP dc'd.    Does the patient have all medications ordered at discharge?  Yes   Is the patient taking all medications as directed (includes completed medication regime)?  Yes   Does the patient have a primary care provider?   Yes   Does the patient have an appointment with their PCP or pulmonologist within 7 days of discharge?  Yes   Comments regarding PCP  PCP Dr Jacobson. Wife reports patient has seen since discharge.    Has the patient kept scheduled appointments due by today?  Yes   Has home health visited the patient within 72 hours of discharge?  N/A   Psychosocial issues?  No   Did the patient receive a copy of their discharge instructions?  Yes   Nursing interventions  Reviewed instructions with patient   What is the patient's perception of their health status since discharge?  Improving   Is the patient/caregiver able to teach back the hierarchy of who to call/visit for symptoms/problems? PCP, Specialist, Home health nurse, Urgent Care, ED, 911  Yes   Is the patient/caregiver able to teach back signs and symptoms of worsening condition:  Fever/chills, Shortness of breath, Chest pain    Is the patient/caregiver able to teach back importance of completing antibiotic course of treatment?  Yes   Week 2 call completed?  Yes          Mya Cope RN

## 2019-11-25 NOTE — PROGRESS NOTES
"Chief Complaint   Patient presents with   • Follow-up   • Shortness of Breath       Subjective   Alessandro Mendiola is a 79 y.o. male.     History of Present Illness   Patient comes today for follow up of shortness of breath and COPD.     Patient says that his symptoms have been stable since the last clinic visit. he reports no recent exacerbations.     Patient is using medications, as prescribed. Exercise tolerance has also remained stable.     Patient also has chronic respiratory failure and he doesn't report any issues with the noninvasive ventilation device. Patient says that he is having issues with humidifier setting in his device and he can almost never use it at night.     Patient says that the compliance with the use of the noninvasive device is good during the daytime and feels it has helped his breathing \"a lot\".     The patient says that he is using oxygen as prescribed and feels that it appears to be helping some of his symptoms.    Patient says that he has been using his nasal sprays on a regular basis and describes no significant ongoing issues other than occasional congestion.         The following portions of the patient's history were reviewed and updated as appropriate: allergies, current medications, past family history, past medical history, past social history and past surgical history.    Review of Systems   Constitutional: Negative for chills and fever.   HENT: Positive for rhinorrhea. Negative for sinus pressure and sore throat.    Respiratory: Positive for cough, shortness of breath and wheezing. Negative for chest tightness.    Psychiatric/Behavioral: Positive for sleep disturbance.       Objective   Visit Vitals  /58   Pulse 69   Resp 18   Ht 180.3 cm (71\")   Wt 77.1 kg (170 lb)   SpO2 91%   BMI 23.71 kg/m²   84% on RA at rest.   Increased to 94% with 4-5 LPD at rest.     Physical Exam   Constitutional: He is oriented to person, place, and time. He appears well-developed and " well-nourished.   HENT:   Head: Normocephalic and atraumatic.   Dentures noted.   Eyes: EOM are normal.   Neck: Neck supple.   Cardiovascular: Normal rate and regular rhythm.   PPM noted.    Pulmonary/Chest: Effort normal. No respiratory distress.   Hyperresonant to percussion  Decreased A/E with out wheezing noted.   Musculoskeletal: He exhibits no edema.   Neurological: He is alert and oriented to person, place, and time.   Skin: Skin is warm and dry.   Psychiatric: He has a normal mood and affect.   Vitals reviewed.        Assessment/Plan   Alessandro was seen today for follow-up and shortness of breath.    Diagnoses and all orders for this visit:    Shortness of breath  -     CT Chest Without Contrast; Future  -     Pulmonary Function Test; Future    Chronic obstructive pulmonary disease, unspecified COPD type (CMS/HCC)  -     CT Chest Without Contrast; Future  -     Pulmonary Function Test; Future    Chronic respiratory failure with hypoxia and hypercapnia (CMS/HCC)  -     CT Chest Without Contrast; Future  -     Pulmonary Function Test; Future    Hypoxia    Other allergic rhinitis    Other orders  -     tiotropium bromide monohydrate (SPIRIVA RESPIMAT) 2.5 MCG/ACT aerosol solution inhaler; Inhale 2 puffs Daily.  -     Respiratory Therapy Supplies (FLUTTER) device; 1 each Every 6 (Six) Hours While Awake.           Return in about 4 months (around 3/25/2020) for Recheck, PFT F/U, Imaging, For Katia, ....Also 9 mths w/ Dr. Wilson.    DISCUSSION (if any):  Lab Results   Component Value Date    AFPTM 168 12/08/2016     Lab Results   Component Value Date    Q5UBJPHE MM 12/08/2016     Last PFTs showed severe COPD.  These were performed in March 2017.    PFTs will be ordered to be done upon follow up.    Due to symptoms which are suggestive of somewhat poorly controlled obstructive lung disease, I have decided to give him Flutter valve.    CT will also be done to assess for any bronchiectasis.     If he continues to have  issues with productive cough, then one option would be to try Azithromycin 250 mg every other day.     He was asked to use Mucinex DM BID.     Will change his Tudorza to Spiriva.     Other medications will remain the same    Compliance with medications stressed.     Side effects of prescribed medications were discussed with the patient    Continue treatment with noninvasive ventilation device at the current setting.    he is currently using full facemask.    Patient seems to be compliant with NIV device, based on the available data and his account of improved symptoms.     Repeat ABG will be considered, if clinically necessary.    The patient says that he is up-to-date with influenza and pneumonia vaccinations.     The patient was advised to continue oxygen 24/7, since he has noticed improvement in some symptoms since using it as prescribed.    He was asked to talk to his cardiologist about the possibility of PAD contributing to exertion related symptoms.       Dictated utilizing Dragon dictation.    This document was electronically signed by Anna Wilson MD on 11/25/19 at 2:24 PM

## 2019-12-05 PROBLEM — J18.9 PNEUMONIA OF RIGHT LOWER LOBE DUE TO INFECTIOUS ORGANISM: Status: RESOLVED | Noted: 2019-01-01 | Resolved: 2019-01-01

## 2019-12-05 PROBLEM — J96.21 ACUTE ON CHRONIC RESPIRATORY FAILURE WITH HYPOXIA (HCC): Status: RESOLVED | Noted: 2019-01-01 | Resolved: 2019-01-01

## 2019-12-05 NOTE — PROGRESS NOTES
"Subjective  Alessandro Mendiola is a 79 y.o. male    HPI coming in for follow-up patient accompanied by his wife who is giving us some of the history.  He has atrial fibrillation, severe COPD and atherosclerotic vascular disease.  Complains of generalized fatigue and nausea with poor oral intake.  Denies NSAID use or alcohol use.  Denies blood in his stools.  Complains of fatigue in his legs with minimal exertion    The following portions of the patient's history were reviewed and updated as appropriate: allergies, current medications, past family history, past medical history, past social history, past surgical history, and problem list.     Review of Systems   Constitutional: Positive for fatigue. Negative for activity change, appetite change and fever.   HENT: Negative for congestion, ear discharge, ear pain and trouble swallowing.    Eyes: Negative for photophobia and visual disturbance.   Respiratory: Positive for cough, shortness of breath and wheezing.    Cardiovascular: Negative for chest pain and palpitations.   Gastrointestinal: Positive for nausea. Negative for abdominal distention, abdominal pain, constipation, diarrhea and vomiting.   Endocrine: Negative.    Genitourinary: Negative for dysuria, hematuria and urgency.   Musculoskeletal: Positive for arthralgias and myalgias. Negative for back pain and joint swelling.   Skin: Negative for color change and rash.   Allergic/Immunologic: Negative.    Neurological: Positive for tremors. Negative for dizziness, weakness, light-headedness and headaches.   Hematological: Negative for adenopathy. Does not bruise/bleed easily.   Psychiatric/Behavioral: Positive for sleep disturbance. Negative for agitation, confusion and dysphoric mood. The patient is not nervous/anxious.        Visit Vitals  /57 Comment: Automatic   Pulse 73   Temp 98.3 °F (36.8 °C) (Temporal)   Ht 180.3 cm (71\")   Wt 75.4 kg (166 lb 1.9 oz)   SpO2 96% Comment: 5 LPM   BMI 23.17 kg/m² "       Objective  Physical Exam   Constitutional: He is oriented to person, place, and time. He appears well-developed and well-nourished. No distress.   HENT:   Nose: Nose normal.   Mouth/Throat: Oropharynx is clear and moist.   Eyes: Conjunctivae and EOM are normal. No scleral icterus.   Neck: No tracheal deviation present. No thyromegaly present.   Cardiovascular: Normal rate and regular rhythm. Exam reveals no friction rub.   No murmur heard.  DP not palpable in both feet   Pulmonary/Chest: He is in respiratory distress. He has no wheezes. He has no rales.   Abdominal: Soft. He exhibits no distension and no mass. There is no tenderness. There is no guarding.   Musculoskeletal: Normal range of motion. He exhibits deformity.   Lymphadenopathy:     He has no cervical adenopathy.   Neurological: He is alert and oriented to person, place, and time. He has normal reflexes. No cranial nerve deficit. Coordination normal.   Skin: Skin is warm and dry. No rash noted. No erythema.   Psychiatric: He has a normal mood and affect. His behavior is normal. Judgment and thought content normal.       Diagnoses and all orders for this visit:    Paroxysmal atrial fibrillation (CMS/HCC) rate control okay on diltiazem and anticoagulant therapy    Simple chronic bronchitis (CMS/HCC) on oxygen supplement meds and inhalers are being adjusted by his pulmonologist.  Recent ABG okay    Essential hypertension stable with current meds    PAD (peripheral artery disease) (CMS/HCC) absent dorsalis pedis pulses.  Has symptoms of claudication.  Check EDNA  Added on proton pump inhibitor for suspected gastritis  Other orders  -     azelastine (ASTELIN) 0.1 % nasal spray; 2 sprays into the nostril(s) as directed by provider 2 (Two) Times a Day.

## 2019-12-09 NOTE — PROGRESS NOTES
[facility] at [facility]  Office Progress Note  Alessandro Mendiola  1940      Visit Date: 12/09/19    PCP: Silvio Jacobson MD  [address] [address]    IDENTIFICATION: A 79 y.o. male from [facility].    PROBLEM LIST:   CAD      2008 BMS to RCA- Charisse      2012 SE wnl  HL      statin intolerant  PAF/ sss      st joan ppm gen change 3/15 MGR  End stage COPD- occas steroids/ 5 L O2  Pneumonia -recurrent- Wilson  ASC Ao Aneurysm T Rushing follows      2012 4.8 CM  HTN    Chief Complaint   Patient presents with   • Coronary Artery Disease       Allergies  Allergies   Allergen Reactions   • Doxycycline Shortness Of Breath       Current Medications    Current Outpatient Medications:   •  azelastine (ASTELIN) 0.1 % nasal spray, 2 sprays into the nostril(s) as directed by provider 2 (Two) Times a Day., Disp: 1 each, Rfl: 3  •  B Complex Vitamins (VITAMIN B COMPLEX PO), Take 1 tablet by mouth Daily., Disp: , Rfl:   •  budesonide-formoterol (SYMBICORT) 80-4.5 MCG/ACT inhaler, Inhale 2 puffs 2 (Two) Times a Day., Disp: 3 inhaler, Rfl: 1  •  dilTIAZem (CARDIZEM) 120 MG tablet, Take 1 tablet by mouth Daily., Disp: 90 tablet, Rfl: 3  •  dronedarone (MULTAQ) 400 MG tablet, Take 1 tablet by mouth 2 (Two) Times a Day., Disp: 180 tablet, Rfl: 1  •  fluorometholone (FML) 0.1 % ophthalmic suspension, INSTILL 1 DROP INTO EACH EYE THREE TIMES DAILY, Disp: , Rfl: 2  •  guaiFENesin (MUCINEX) 600 MG 12 hr tablet, Take 1 tablet by mouth Every 12 (Twelve) Hours. (Patient taking differently: Take 600 mg by mouth As Needed.), Disp: 10 tablet, Rfl: 0  •  hydrochlorothiazide (HYDRODIURIL) 25 MG tablet, Take 1 tablet by mouth Daily., Disp: 90 tablet, Rfl: 3  •  Multiple Vitamin (MULTI-VITAMINS PO), Take  by mouth., Disp: , Rfl:   •  OXYGEN-HELIUM IN, Oxygen; Patient Sig: Oxygen patient is to get home oxygen through  company of his choice at 2L/Min, as well as portable oxygen at the same rate.; 1; 0;  "22-Mar-2016; Active, Disp: , Rfl:   •  primidone (MYSOLINE) 50 MG tablet, Take 1 tablet by mouth 2 (Two) Times a Day., Disp: 60 tablet, Rfl: 5  •  Respiratory Therapy Supplies (FLUTTER) device, 1 each Every 6 (Six) Hours While Awake., Disp: 1 each, Rfl: 0  •  RESTASIS 0.05 % ophthalmic emulsion, , Disp: , Rfl:   •  rivaroxaban (XARELTO) 20 MG tablet, Take 1 tablet by mouth Daily., Disp: 30 tablet, Rfl: 12  •  roflumilast (DALIRESP) 500 MCG tablet tablet, Take 1 tablet by mouth Daily., Disp: 90 tablet, Rfl: 1  •  sertraline (ZOLOFT) 50 MG tablet, Take 1 tablet by mouth Daily., Disp: 90 tablet, Rfl: 3  •  tamsulosin (FLOMAX) 0.4 MG capsule 24 hr capsule, Take 1 capsule by mouth Every Night., Disp: 30 capsule, Rfl: 11  •  Tetrahydrozoline-Zn Sulfate (ALLERGY RELIEF EYE DROPS OP), Apply 1 drop to eye(s) as directed by provider 2 (Two) Times a Day As Needed., Disp: , Rfl:   •  tiotropium bromide monohydrate (SPIRIVA RESPIMAT) 2.5 MCG/ACT aerosol solution inhaler, Inhale 2 puffs Daily., Disp: 1 inhaler, Rfl: 5  •  VENTOLIN  (90 Base) MCG/ACT inhaler, Inhale 2 puffs Every 6 (Six) Hours As Needed for Wheezing., Disp: 1 inhaler, Rfl: 3  •  ipratropium (ATROVENT) 0.02 % nebulizer solution, Take 2.5 mL by nebulization 4 (Four) Times a Day As Needed for Wheezing or Shortness of Air., Disp: 300 mL, Rfl: 11  •  omeprazole (priLOSEC) 40 MG capsule, Take 1 capsule by mouth Daily., Disp: 30 capsule, Rfl: 2      History of Present Illness   Alessandro Mendiola is a 79 y.o. year old male here for follow up.  Some dyspnea with some productive cough no fevers for follow-up CAT scan per his pulmonologist today.  He has had no noted also peripheral cold sensation of his feet without claudication      OBJECTIVE:  Vitals:    12/09/19 1127   BP: 120/60   BP Location: Right arm   Patient Position: Sitting   Pulse: 80   SpO2: 97%   Weight: 75.8 kg (167 lb)   Height: 180.3 cm (71\")     Physical Exam   Constitutional: He appears well-developed " and well-nourished.   Neck: Normal range of motion. Neck supple. No hepatojugular reflux and no JVD present. Carotid bruit is not present. No tracheal deviation present. No thyromegaly present.   Cardiovascular: Normal rate, regular rhythm, S1 normal, S2 normal and intact distal pulses. PMI is not displaced. Exam reveals no gallop, no distant heart sounds, no friction rub, no midsystolic click and no opening snap.   No murmur heard.  Pulses:       Radial pulses are 2+ on the right side, and 2+ on the left side.        Dorsalis pedis pulses are 0 on the right side, and 0 on the left side.        Posterior tibial pulses are 1+ on the right side, and 1+ on the left side.   Pulmonary/Chest: He is in respiratory distress. He has no wheezes. He has rales.   Abdominal: Soft. Bowel sounds are normal. He exhibits no mass. There is no tenderness. There is no guarding.       Diagnostic Data:  Procedures  Pacer interrogation  Acceptable threshold and impedances  1 mode switch of 2 days total 1.2%  Generator 10.2 years      ASSESSMENT:   Diagnosis Plan   1. Paroxysmal atrial fibrillation (CMS/HCC)     2. Essential hypertension     3. Coronary arteriosclerosis in native artery     4. Sick sinus syndrome (CMS/HCC)         PLAN:  CAD post remote PCI and stenting no overt anginal equivalent    Hypertension controlled on current regimen    Paroxysmal A. fib mutaq with rhythm control and anticoagulated with Xarelto    COPD per Dr. Wilson at all    PVD with cool sensation in his lower extremities patient elects to have lower extremity arterial duplex    Silvio Jacobson MD, thank you for referring Mr. Mendiola for evaluation.  I have forwarded my electronically generated recommendations to you for review.  Please do not hesitate to call with any questions.      Mikey Obrien MD, [facility]

## 2019-12-11 NOTE — ED PROVIDER NOTES
Subjective   79-year-old male presenting with the sensation that his heart is racing.  He states that for the last couple days he has felt like his heart is been racing.  Patient and his wife are both unsure as to whether or not he has had increased shortness of breath.  His wife is concerned that he seems to be more short of breath when he gets up and exerts himself but he is adamant that he feels not much different than usual.  He has had intermittent cough that is occasionally productive.  Again it is difficult to ascertain whether or not this is a new symptom.  He denies fevers, chest pain, nausea, vomiting, abdominal pain.  Patient has a history of paroxysmal atrial fibrillation and was just at his cardiologist 2 days ago and had his pacemaker interrogated.  He denies any other complaints or concerns.          Review of Systems   Constitutional: Negative.    HENT: Negative.    Eyes: Negative.    Respiratory: Positive for cough and shortness of breath.    Cardiovascular: Positive for palpitations. Negative for chest pain.   Gastrointestinal: Negative.    Genitourinary: Negative.    Musculoskeletal: Negative.    Skin: Negative.    Neurological: Negative.    Psychiatric/Behavioral: Negative.        Past Medical History:   Diagnosis Date   • Alcohol abuse    • Aneurysm of thoracic aorta (CMS/HCC)    • Anxiety    • Ascending aortic aneurysm (CMS/HCC)     4.8 to 4.9 cm    • Atherosclerotic heart disease    • Atrial fibrillation (CMS/HCC)    • Blood thinned due to long-term anticoagulant use    • Body mass index (BMI) 20.0-20.9, adult    • CAD (coronary artery disease)    • Cancer (CMS/HCC)     skin   • Cardiac pacemaker    • Cellulitis    • Chronic bronchitis (CMS/HCC)    • Chronic cough    • COPD (chronic obstructive pulmonary disease) (CMS/HCC)    • Depression    • Difficulty breathing    • Emphysema lung (CMS/HCC)    • Encounter for long-term current use of medication    • Encounter for screening for malignant  neoplasm of prostate    • Fatigue    • Fingertip amputation    • TAMIKO (generalized anxiety disorder)    • History of alcohol abuse    • History of cardiac catheterization    • History of chest x-ray 02/11/2014    Chronic interstitial changes seen throughout the lung fields w/ no radiographic evidence of acute parenchymal disease.No definite LT-sided rib fracture present.   • History of chest x-ray 01/10/2014    No acute cardiopulmonary process.Dual lead LT subclavian pacemaker is in place.Aortic ectasia. Hyperinflation of lungs, suggesting COPD   • History of chest x-ray 10/01/2012    Ill-defined RT middle lobe opacity which likley reflects a pneumonia. FU to radiographic resolution is recommended.   • History of Doppler echocardiogram    • History of echocardiogram 09/26/2013    EF 55-60%. Mod concentric LVH. Abnormal LT VT diastolic filling is observed, consistent w/ impaired relaxation.Mild MR/TR. Trace NM. RVSP 44 mmHg.   • History of stress test     ECG performed   • HTN (hypertension)    • HX: long term anticoagulant use     Blood thinned due to long-term anticoagulant use (V58.61) (Z79.01)   • Hypercholesterolemia    • LVH (left ventricular hypertrophy)    • Mitral and aortic valve disease    • Myocardial infarction (CMS/HCC)    • Nonvenomous insect bite of multiple sites    • Onychomycosis    • Pacemaker at end of battery life    • Pneumonia    • Pneumothorax    • Pre-syncope    • Pulmonary nodule    • Rib pain on left side    • Sick sinus syndrome (CMS/HCC)    • SOB (shortness of breath)    • Upper respiratory infection    • Vitamin D deficiency        Allergies   Allergen Reactions   • Doxycycline Shortness Of Breath       Past Surgical History:   Procedure Laterality Date   • AMPUTATION  08/31/2015    metacarpal and index finger   • CARDIAC PACEMAKER PLACEMENT  06/01/2008   • CATARACT EXTRACTION     • CORONARY STENT PLACEMENT      History of Cath Placement Of Stent 1  · Coronary stents   • EYE SURGERY      • SKIN CANCER EXCISION         Family History   Problem Relation Age of Onset   • Coronary artery disease Other    • Diabetes Other    • Hypertension Other        Social History     Socioeconomic History   • Marital status:      Spouse name: Not on file   • Number of children: Not on file   • Years of education: Not on file   • Highest education level: Not on file   Tobacco Use   • Smoking status: Former Smoker     Packs/day: 3.00     Years: 45.00     Pack years: 135.00     Last attempt to quit: 2008     Years since quittin.9   • Smokeless tobacco: Never Used   Substance and Sexual Activity   • Alcohol use: No   • Drug use: No   • Sexual activity: Defer           Objective   Physical Exam   Constitutional: He is oriented to person, place, and time. He appears well-developed and well-nourished. No distress.   HENT:   Head: Normocephalic and atraumatic.   Right Ear: External ear normal.   Left Ear: External ear normal.   Nose: Nose normal.   Mouth/Throat: Oropharynx is clear and moist.   Eyes: Pupils are equal, round, and reactive to light. Conjunctivae and EOM are normal.   Neck: Normal range of motion. Neck supple.   Cardiovascular: Normal rate, normal heart sounds and intact distal pulses.   Pulmonary/Chest: Effort normal. No stridor. No respiratory distress.   Scattered faint wheeze   Abdominal: Soft. Bowel sounds are normal. He exhibits no distension. There is no tenderness. There is no rebound and no guarding.   Musculoskeletal: Normal range of motion. He exhibits no edema, tenderness or deformity.   Neurological: He is alert and oriented to person, place, and time.   Skin: Skin is warm and dry. No rash noted.   Psychiatric: He has a normal mood and affect. His behavior is normal.   Nursing note and vitals reviewed.      Procedures           ED Course                      No data recorded                        MDM  Number of Diagnoses or Management Options  Hypokalemia:   Palpitations:    Diagnosis management comments: 79-year-old male with palpitations and possible shortness of breath.  Well-developed, well-nourished elderly man in no distress with exam as above.  He has normal vital signs here.  His oxygen saturation is normal on his home O2.  He does have a few scattered wheezes.  Will check labs, EKG and chest x-ray.  We will give symptomatic treatment.  Disposition pending work-up.    DDX: Atrial fibrillation, ACS, CHF, pneumonia    EKG interpreted by me: this is a fairly poor tracing, sinus rhythm with frequent ectopy versus atrial rhythm, normal rate, some non specific ST/T changes, this is an abnormal EKG, morphology appears fairly similar to previous     Lab work notable for mildly elevated BNP, mild hypokalemia, mild anemia similar to previous.  Chest x-ray per radiology is without acute findings.  He has remained stable here, no tachyarrhythmias on the monitor.  I do feel he is safe for discharge home.  We will treat with course of antibiotics for possible COPD exacerbation.  Otherwise encouraged outpatient follow-up.  He is comfortable with and understanding of the plan.       Amount and/or Complexity of Data Reviewed  Clinical lab tests: reviewed  Tests in the radiology section of CPT®: reviewed  Decide to obtain previous medical records or to obtain history from someone other than the patient: yes        Final diagnoses:   Palpitations   Hypokalemia              Hossein Starks MD  12/11/19 1056

## 2020-01-01 ENCOUNTER — APPOINTMENT (OUTPATIENT)
Dept: GENERAL RADIOLOGY | Facility: HOSPITAL | Age: 80
End: 2020-01-01

## 2020-01-01 ENCOUNTER — HOSPITAL ENCOUNTER (INPATIENT)
Facility: HOSPITAL | Age: 80
LOS: 7 days | End: 2020-01-09
Attending: INTERNAL MEDICINE | Admitting: HOSPITALIST

## 2020-01-01 ENCOUNTER — HOSPITAL ENCOUNTER (EMERGENCY)
Facility: HOSPITAL | Age: 80
Discharge: ANOTHER HOSPITAL | End: 2020-01-02
Attending: EMERGENCY MEDICINE | Admitting: EMERGENCY MEDICINE

## 2020-01-01 ENCOUNTER — HOSPITAL ENCOUNTER (INPATIENT)
Facility: HOSPITAL | Age: 80
LOS: 1 days | End: 2020-01-09
Attending: INTERNAL MEDICINE | Admitting: INTERNAL MEDICINE

## 2020-01-01 ENCOUNTER — APPOINTMENT (OUTPATIENT)
Dept: CT IMAGING | Facility: HOSPITAL | Age: 80
End: 2020-01-01

## 2020-01-01 ENCOUNTER — APPOINTMENT (OUTPATIENT)
Dept: MRI IMAGING | Facility: HOSPITAL | Age: 80
End: 2020-01-01

## 2020-01-01 ENCOUNTER — APPOINTMENT (OUTPATIENT)
Dept: CARDIOLOGY | Facility: HOSPITAL | Age: 80
End: 2020-01-01

## 2020-01-01 VITALS — RESPIRATION RATE: 30 BRPM | HEART RATE: 93 BPM

## 2020-01-01 VITALS
BODY MASS INDEX: 21.56 KG/M2 | TEMPERATURE: 98.8 F | SYSTOLIC BLOOD PRESSURE: 109 MMHG | DIASTOLIC BLOOD PRESSURE: 82 MMHG | HEIGHT: 71 IN | RESPIRATION RATE: 18 BRPM | OXYGEN SATURATION: 84 % | WEIGHT: 154 LBS | HEART RATE: 89 BPM

## 2020-01-01 VITALS
WEIGHT: 154 LBS | RESPIRATION RATE: 18 BRPM | HEART RATE: 93 BPM | TEMPERATURE: 98.3 F | BODY MASS INDEX: 21.56 KG/M2 | OXYGEN SATURATION: 97 % | SYSTOLIC BLOOD PRESSURE: 124 MMHG | HEIGHT: 71 IN | DIASTOLIC BLOOD PRESSURE: 63 MMHG

## 2020-01-01 DIAGNOSIS — M62.81 MUSCLE WEAKNESS OF LEFT UPPER EXTREMITY: Primary | ICD-10-CM

## 2020-01-01 DIAGNOSIS — R13.12 OROPHARYNGEAL DYSPHAGIA: ICD-10-CM

## 2020-01-01 DIAGNOSIS — Z74.09 IMPAIRED MOBILITY AND ADLS: ICD-10-CM

## 2020-01-01 DIAGNOSIS — R47.1 DYSARTHRIA: ICD-10-CM

## 2020-01-01 DIAGNOSIS — I63.9 ISCHEMIC STROKE (HCC): Primary | ICD-10-CM

## 2020-01-01 DIAGNOSIS — Z78.9 IMPAIRED MOBILITY AND ADLS: ICD-10-CM

## 2020-01-01 DIAGNOSIS — J44.9 CHRONIC OBSTRUCTIVE PULMONARY DISEASE, UNSPECIFIED COPD TYPE (HCC): ICD-10-CM

## 2020-01-01 LAB
A-A DO2: 7.6 MMHG
ALBUMIN SERPL-MCNC: 3.4 G/DL (ref 3.5–5.2)
ALBUMIN SERPL-MCNC: 3.7 G/DL (ref 3.5–5.2)
ALBUMIN/GLOB SERPL: 1 G/DL
ALBUMIN/GLOB SERPL: 1.1 G/DL
ALP SERPL-CCNC: 112 U/L (ref 39–117)
ALP SERPL-CCNC: 130 U/L (ref 39–117)
ALT SERPL W P-5'-P-CCNC: 11 U/L (ref 1–41)
ALT SERPL W P-5'-P-CCNC: 8 U/L (ref 1–41)
ANION GAP SERPL CALCULATED.3IONS-SCNC: 10 MMOL/L (ref 5–15)
ANION GAP SERPL CALCULATED.3IONS-SCNC: 12 MMOL/L (ref 5–15)
ANION GAP SERPL CALCULATED.3IONS-SCNC: 14.9 MMOL/L (ref 5–15)
ANION GAP SERPL CALCULATED.3IONS-SCNC: 15 MMOL/L (ref 5–15)
APTT PPP: 50 SECONDS (ref 24.5–37.2)
ARTERIAL PATENCY WRIST A: ABNORMAL
ARTERIAL PATENCY WRIST A: POSITIVE
AST SERPL-CCNC: 16 U/L (ref 1–40)
AST SERPL-CCNC: 23 U/L (ref 1–40)
ATMOSPHERIC PRESS: 729 MMHG
ATMOSPHERIC PRESS: ABNORMAL MM[HG]
B PARAPERT DNA SPEC QL NAA+PROBE: NOT DETECTED
B PERT DNA SPEC QL NAA+PROBE: NOT DETECTED
BACTERIA BLD CULT: ABNORMAL
BACTERIA SPEC AEROBE CULT: ABNORMAL
BACTERIA UR QL AUTO: ABNORMAL /HPF
BASE EXCESS BLDA CALC-SCNC: 15.3 MMOL/L (ref 0–2)
BASE EXCESS BLDA CALC-SCNC: 5.1 MMOL/L (ref 0–2)
BASOPHILS # BLD AUTO: 0.02 10*3/MM3 (ref 0–0.2)
BASOPHILS # BLD AUTO: 0.04 10*3/MM3 (ref 0–0.2)
BASOPHILS NFR BLD AUTO: 0.2 % (ref 0–1.5)
BASOPHILS NFR BLD AUTO: 0.5 % (ref 0–1.5)
BDY SITE: ABNORMAL
BDY SITE: ABNORMAL
BH CV ECHO MEAS - AO MAX PG: 10.1 MMHG
BH CV ECHO MEAS - AO MEAN PG: 4.8 MMHG
BH CV ECHO MEAS - AO ROOT AREA (BSA CORRECTED): 2.2
BH CV ECHO MEAS - AO ROOT AREA: 13.4 CM^2
BH CV ECHO MEAS - AO ROOT DIAM: 4.1 CM
BH CV ECHO MEAS - AO V2 MAX: 159 CM/SEC
BH CV ECHO MEAS - AO V2 MEAN: 101.2 CM/SEC
BH CV ECHO MEAS - AO V2 VTI: 30.7 CM
BH CV ECHO MEAS - BSA(HAYCOCK): 1.9 M^2
BH CV ECHO MEAS - BSA: 1.9 M^2
BH CV ECHO MEAS - BZI_BMI: 22.1 KILOGRAMS/M^2
BH CV ECHO MEAS - BZI_METRIC_HEIGHT: 177.8 CM
BH CV ECHO MEAS - BZI_METRIC_WEIGHT: 69.9 KG
BH CV ECHO MEAS - EDV(CUBED): 104.1 ML
BH CV ECHO MEAS - EDV(MOD-SP2): 47 ML
BH CV ECHO MEAS - EDV(MOD-SP4): 96 ML
BH CV ECHO MEAS - EDV(TEICH): 102.6 ML
BH CV ECHO MEAS - EF(CUBED): 80 %
BH CV ECHO MEAS - EF(MOD-SP2): 72.3 %
BH CV ECHO MEAS - EF(MOD-SP4): 68.8 %
BH CV ECHO MEAS - EF(TEICH): 72.4 %
BH CV ECHO MEAS - ESV(CUBED): 20.8 ML
BH CV ECHO MEAS - ESV(MOD-SP2): 13 ML
BH CV ECHO MEAS - ESV(MOD-SP4): 30 ML
BH CV ECHO MEAS - ESV(TEICH): 28.3 ML
BH CV ECHO MEAS - FS: 41.5 %
BH CV ECHO MEAS - IVS/LVPW: 0.99
BH CV ECHO MEAS - IVSD: 1.1 CM
BH CV ECHO MEAS - LA DIMENSION: 3.6 CM
BH CV ECHO MEAS - LA/AO: 0.88
BH CV ECHO MEAS - LAD MAJOR: 3.8 CM
BH CV ECHO MEAS - LAT PEAK E' VEL: 7.1 CM/SEC
BH CV ECHO MEAS - LATERAL E/E' RATIO: 8.6
BH CV ECHO MEAS - LV DIASTOLIC VOL/BSA (35-75): 51.4 ML/M^2
BH CV ECHO MEAS - LV MASS(C)D: 198.8 GRAMS
BH CV ECHO MEAS - LV MASS(C)DI: 106.4 GRAMS/M^2
BH CV ECHO MEAS - LV SYSTOLIC VOL/BSA (12-30): 16.1 ML/M^2
BH CV ECHO MEAS - LVIDD: 4.7 CM
BH CV ECHO MEAS - LVIDS: 2.8 CM
BH CV ECHO MEAS - LVLD AP2: 6.5 CM
BH CV ECHO MEAS - LVLD AP4: 7.8 CM
BH CV ECHO MEAS - LVLS AP2: 4.6 CM
BH CV ECHO MEAS - LVLS AP4: 6.2 CM
BH CV ECHO MEAS - LVOT AREA (M): 3.1 CM^2
BH CV ECHO MEAS - LVOT AREA: 3 CM^2
BH CV ECHO MEAS - LVOT DIAM: 2 CM
BH CV ECHO MEAS - LVPWD: 1.2 CM
BH CV ECHO MEAS - MED PEAK E' VEL: 6.5 CM/SEC
BH CV ECHO MEAS - MEDIAL E/E' RATIO: 9.4
BH CV ECHO MEAS - MV A MAX VEL: 95.6 CM/SEC
BH CV ECHO MEAS - MV DEC TIME: 0.2 SEC
BH CV ECHO MEAS - MV E MAX VEL: 61.9 CM/SEC
BH CV ECHO MEAS - MV E/A: 0.65
BH CV ECHO MEAS - MV MAX PG: 4.9 MMHG
BH CV ECHO MEAS - MV MEAN PG: 1.8 MMHG
BH CV ECHO MEAS - MV V2 MAX: 110.4 CM/SEC
BH CV ECHO MEAS - MV V2 MEAN: 60.4 CM/SEC
BH CV ECHO MEAS - MV V2 VTI: 24.9 CM
BH CV ECHO MEAS - PA ACC SLOPE: 1094 CM/SEC^2
BH CV ECHO MEAS - PA ACC TIME: 0.14 SEC
BH CV ECHO MEAS - PA MAX PG: 11.2 MMHG
BH CV ECHO MEAS - PA PR(ACCEL): 17.2 MMHG
BH CV ECHO MEAS - PA V2 MAX: 165.6 CM/SEC
BH CV ECHO MEAS - SI(AO): 219.4 ML/M^2
BH CV ECHO MEAS - SI(CUBED): 44.6 ML/M^2
BH CV ECHO MEAS - SI(MOD-SP2): 18.2 ML/M^2
BH CV ECHO MEAS - SI(MOD-SP4): 35.3 ML/M^2
BH CV ECHO MEAS - SI(TEICH): 39.8 ML/M^2
BH CV ECHO MEAS - SV(AO): 409.8 ML
BH CV ECHO MEAS - SV(CUBED): 83.3 ML
BH CV ECHO MEAS - SV(MOD-SP2): 34 ML
BH CV ECHO MEAS - SV(MOD-SP4): 66 ML
BH CV ECHO MEAS - SV(TEICH): 74.3 ML
BH CV ECHO MEAS - TAPSE (>1.6): 1.8 CM2
BH CV ECHO MEASUREMENTS AVERAGE E/E' RATIO: 9.1
BH CV VAS BP RIGHT ARM: NORMAL MMHG
BH CV XLRA - RV BASE: 4.1 CM
BH CV XLRA - RV LENGTH: 4.6 CM
BH CV XLRA - RV MID: 2.4 CM
BILIRUB SERPL-MCNC: 0.3 MG/DL (ref 0.2–1.2)
BILIRUB SERPL-MCNC: 0.4 MG/DL (ref 0.2–1.2)
BILIRUB UR QL STRIP: NEGATIVE
BODY TEMPERATURE: 37 C
BUN BLD-MCNC: 12 MG/DL (ref 8–23)
BUN BLD-MCNC: 13 MG/DL (ref 8–23)
BUN BLD-MCNC: 13 MG/DL (ref 8–23)
BUN BLD-MCNC: 15 MG/DL (ref 8–23)
BUN/CREAT SERPL: 15.8 (ref 7–25)
BUN/CREAT SERPL: 16.9 (ref 7–25)
BUN/CREAT SERPL: 17 (ref 7–25)
BUN/CREAT SERPL: 17.1 (ref 7–25)
C PNEUM DNA NPH QL NAA+NON-PROBE: NOT DETECTED
CALCIUM SPEC-SCNC: 8.7 MG/DL (ref 8.6–10.5)
CALCIUM SPEC-SCNC: 8.7 MG/DL (ref 8.6–10.5)
CALCIUM SPEC-SCNC: 8.8 MG/DL (ref 8.6–10.5)
CALCIUM SPEC-SCNC: 9.1 MG/DL (ref 8.6–10.5)
CHLORIDE SERPL-SCNC: 96 MMOL/L (ref 98–107)
CHLORIDE SERPL-SCNC: 97 MMOL/L (ref 98–107)
CHLORIDE SERPL-SCNC: 98 MMOL/L (ref 98–107)
CHLORIDE SERPL-SCNC: 99 MMOL/L (ref 98–107)
CHOLEST SERPL-MCNC: 118 MG/DL (ref 0–200)
CLARITY UR: CLEAR
CO2 BLDA-SCNC: 43.3 MMOL/L (ref 22–33)
CO2 SERPL-SCNC: 28.1 MMOL/L (ref 22–29)
CO2 SERPL-SCNC: 29 MMOL/L (ref 22–29)
CO2 SERPL-SCNC: 31 MMOL/L (ref 22–29)
CO2 SERPL-SCNC: 32 MMOL/L (ref 22–29)
COHGB MFR BLD: 0.9 % (ref 0–2)
COHGB MFR BLD: 0.9 % (ref 0–2)
COLOR UR: YELLOW
CREAT BLD-MCNC: 0.76 MG/DL (ref 0.76–1.27)
CREAT BLD-MCNC: 0.76 MG/DL (ref 0.76–1.27)
CREAT BLD-MCNC: 0.77 MG/DL (ref 0.76–1.27)
CREAT BLD-MCNC: 0.88 MG/DL (ref 0.76–1.27)
DEPRECATED RDW RBC AUTO: 40 FL (ref 37–54)
DEPRECATED RDW RBC AUTO: 40.9 FL (ref 37–54)
DEPRECATED RDW RBC AUTO: 41.1 FL (ref 37–54)
DEPRECATED RDW RBC AUTO: 43.6 FL (ref 37–54)
EOSINOPHIL # BLD AUTO: 0 10*3/MM3 (ref 0–0.4)
EOSINOPHIL # BLD AUTO: 0.01 10*3/MM3 (ref 0–0.4)
EOSINOPHIL # BLD AUTO: 0.02 10*3/MM3 (ref 0–0.4)
EOSINOPHIL # BLD AUTO: 0.03 10*3/MM3 (ref 0–0.4)
EOSINOPHIL NFR BLD AUTO: 0 % (ref 0.3–6.2)
EOSINOPHIL NFR BLD AUTO: 0.1 % (ref 0.3–6.2)
EOSINOPHIL NFR BLD AUTO: 0.2 % (ref 0.3–6.2)
EOSINOPHIL NFR BLD AUTO: 0.4 % (ref 0.3–6.2)
ERYTHROCYTE [DISTWIDTH] IN BLOOD BY AUTOMATED COUNT: 14.6 % (ref 12.3–15.4)
ERYTHROCYTE [DISTWIDTH] IN BLOOD BY AUTOMATED COUNT: 14.9 % (ref 12.3–15.4)
ERYTHROCYTE [DISTWIDTH] IN BLOOD BY AUTOMATED COUNT: 15 % (ref 12.3–15.4)
ERYTHROCYTE [DISTWIDTH] IN BLOOD BY AUTOMATED COUNT: 15.7 % (ref 12.3–15.4)
FLUAV H1 2009 PAND RNA NPH QL NAA+PROBE: NOT DETECTED
FLUAV H1 HA GENE NPH QL NAA+PROBE: NOT DETECTED
FLUAV H3 RNA NPH QL NAA+PROBE: NOT DETECTED
FLUAV SUBTYP SPEC NAA+PROBE: NOT DETECTED
FLUBV RNA ISLT QL NAA+PROBE: NOT DETECTED
GAS FLOW AIRWAY: 4 LPM
GFR SERPL CREATININE-BSD FRML MDRD: 84 ML/MIN/1.73
GFR SERPL CREATININE-BSD FRML MDRD: 98 ML/MIN/1.73
GFR SERPL CREATININE-BSD FRML MDRD: 99 ML/MIN/1.73
GFR SERPL CREATININE-BSD FRML MDRD: 99 ML/MIN/1.73
GLOBULIN UR ELPH-MCNC: 3.1 GM/DL
GLOBULIN UR ELPH-MCNC: 3.8 GM/DL
GLUCOSE BLD-MCNC: 133 MG/DL (ref 65–99)
GLUCOSE BLD-MCNC: 136 MG/DL (ref 65–99)
GLUCOSE BLD-MCNC: 143 MG/DL (ref 65–99)
GLUCOSE BLD-MCNC: 193 MG/DL (ref 65–99)
GLUCOSE BLDC GLUCOMTR-MCNC: 122 MG/DL (ref 70–130)
GLUCOSE BLDC GLUCOMTR-MCNC: 133 MG/DL (ref 70–130)
GLUCOSE BLDC GLUCOMTR-MCNC: 142 MG/DL (ref 70–130)
GLUCOSE BLDC GLUCOMTR-MCNC: 142 MG/DL (ref 70–130)
GLUCOSE BLDC GLUCOMTR-MCNC: 145 MG/DL (ref 70–130)
GLUCOSE BLDC GLUCOMTR-MCNC: 146 MG/DL (ref 70–130)
GLUCOSE BLDC GLUCOMTR-MCNC: 160 MG/DL (ref 70–130)
GLUCOSE BLDC GLUCOMTR-MCNC: 162 MG/DL (ref 70–130)
GLUCOSE BLDC GLUCOMTR-MCNC: 162 MG/DL (ref 70–130)
GLUCOSE BLDC GLUCOMTR-MCNC: 164 MG/DL (ref 70–130)
GLUCOSE BLDC GLUCOMTR-MCNC: 167 MG/DL (ref 70–130)
GLUCOSE BLDC GLUCOMTR-MCNC: 168 MG/DL (ref 70–130)
GLUCOSE BLDC GLUCOMTR-MCNC: 177 MG/DL (ref 70–130)
GLUCOSE BLDC GLUCOMTR-MCNC: 179 MG/DL (ref 70–130)
GLUCOSE BLDC GLUCOMTR-MCNC: 196 MG/DL (ref 70–130)
GLUCOSE BLDC GLUCOMTR-MCNC: 204 MG/DL (ref 70–130)
GLUCOSE BLDC GLUCOMTR-MCNC: 205 MG/DL (ref 70–130)
GLUCOSE BLDC GLUCOMTR-MCNC: 206 MG/DL (ref 70–130)
GLUCOSE BLDC GLUCOMTR-MCNC: 214 MG/DL (ref 70–130)
GLUCOSE BLDC GLUCOMTR-MCNC: 226 MG/DL (ref 70–130)
GLUCOSE UR STRIP-MCNC: NEGATIVE MG/DL
GRAM STN SPEC: ABNORMAL
HADV DNA SPEC NAA+PROBE: NOT DETECTED
HBA1C MFR BLD: 6.3 % (ref 4.8–5.6)
HCO3 BLDA-SCNC: 29.7 MMOL/L (ref 22–28)
HCO3 BLDA-SCNC: 41.4 MMOL/L (ref 20–26)
HCOV 229E RNA SPEC QL NAA+PROBE: NOT DETECTED
HCOV HKU1 RNA SPEC QL NAA+PROBE: NOT DETECTED
HCOV NL63 RNA SPEC QL NAA+PROBE: NOT DETECTED
HCOV OC43 RNA SPEC QL NAA+PROBE: NOT DETECTED
HCT VFR BLD AUTO: 27.1 % (ref 37.5–51)
HCT VFR BLD AUTO: 27.7 % (ref 37.5–51)
HCT VFR BLD AUTO: 28 % (ref 37.5–51)
HCT VFR BLD AUTO: 32.7 % (ref 37.5–51)
HCT VFR BLD CALC: 23.4 %
HCT VFR BLD CALC: 28.1 %
HDLC SERPL-MCNC: 36 MG/DL (ref 40–60)
HGB BLD-MCNC: 7.4 G/DL (ref 13–17.7)
HGB BLD-MCNC: 7.8 G/DL (ref 13–17.7)
HGB BLD-MCNC: 7.9 G/DL (ref 13–17.7)
HGB BLD-MCNC: 9.4 G/DL (ref 13–17.7)
HGB BLDA-MCNC: 7.6 G/DL (ref 13.5–17.5)
HGB BLDA-MCNC: 9.2 G/DL (ref 12–18)
HGB UR QL STRIP.AUTO: NEGATIVE
HMPV RNA NPH QL NAA+NON-PROBE: NOT DETECTED
HOLD SPECIMEN: NORMAL
HOLD SPECIMEN: NORMAL
HOROWITZ INDEX BLD+IHG-RTO: 60 %
HPIV1 RNA SPEC QL NAA+PROBE: NOT DETECTED
HPIV2 RNA SPEC QL NAA+PROBE: NOT DETECTED
HPIV3 RNA NPH QL NAA+PROBE: NOT DETECTED
HPIV4 P GENE NPH QL NAA+PROBE: NOT DETECTED
HYALINE CASTS UR QL AUTO: ABNORMAL /LPF
HYPOCHROMIA BLD QL: NORMAL
HYPOCHROMIA BLD QL: NORMAL
IMM GRANULOCYTES # BLD AUTO: 0.04 10*3/MM3 (ref 0–0.05)
IMM GRANULOCYTES # BLD AUTO: 0.04 10*3/MM3 (ref 0–0.05)
IMM GRANULOCYTES # BLD AUTO: 0.05 10*3/MM3 (ref 0–0.05)
IMM GRANULOCYTES # BLD AUTO: 0.07 10*3/MM3 (ref 0–0.05)
IMM GRANULOCYTES NFR BLD AUTO: 0.4 % (ref 0–0.5)
IMM GRANULOCYTES NFR BLD AUTO: 0.5 % (ref 0–0.5)
IMM GRANULOCYTES NFR BLD AUTO: 0.6 % (ref 0–0.5)
IMM GRANULOCYTES NFR BLD AUTO: 0.9 % (ref 0–0.5)
INR PPP: 2.13 (ref 0.9–1.1)
KETONES UR QL STRIP: NEGATIVE
LDLC SERPL CALC-MCNC: 67 MG/DL (ref 0–100)
LDLC/HDLC SERPL: 1.86 {RATIO}
LEUKOCYTE ESTERASE UR QL STRIP.AUTO: NEGATIVE
LYMPHOCYTES # BLD AUTO: 0.49 10*3/MM3 (ref 0.7–3.1)
LYMPHOCYTES # BLD AUTO: 0.95 10*3/MM3 (ref 0.7–3.1)
LYMPHOCYTES # BLD AUTO: 1.17 10*3/MM3 (ref 0.7–3.1)
LYMPHOCYTES # BLD AUTO: 1.41 10*3/MM3 (ref 0.7–3.1)
LYMPHOCYTES NFR BLD AUTO: 10.7 % (ref 19.6–45.3)
LYMPHOCYTES NFR BLD AUTO: 13.7 % (ref 19.6–45.3)
LYMPHOCYTES NFR BLD AUTO: 14.5 % (ref 19.6–45.3)
LYMPHOCYTES NFR BLD AUTO: 6 % (ref 19.6–45.3)
M PNEUMO IGG SER IA-ACNC: NOT DETECTED
MAGNESIUM SERPL-MCNC: 1.7 MG/DL (ref 1.6–2.4)
MAGNESIUM SERPL-MCNC: 1.8 MG/DL (ref 1.6–2.4)
MAGNESIUM SERPL-MCNC: 2 MG/DL (ref 1.6–2.4)
MAXIMAL PREDICTED HEART RATE: 143 BPM
MCH RBC QN AUTO: 20.7 PG (ref 26.6–33)
MCH RBC QN AUTO: 21.3 PG (ref 26.6–33)
MCH RBC QN AUTO: 21.5 PG (ref 26.6–33)
MCH RBC QN AUTO: 21.6 PG (ref 26.6–33)
MCHC RBC AUTO-ENTMCNC: 27.3 G/DL (ref 31.5–35.7)
MCHC RBC AUTO-ENTMCNC: 27.9 G/DL (ref 31.5–35.7)
MCHC RBC AUTO-ENTMCNC: 28.5 G/DL (ref 31.5–35.7)
MCHC RBC AUTO-ENTMCNC: 28.7 G/DL (ref 31.5–35.7)
MCV RBC AUTO: 75.2 FL (ref 79–97)
MCV RBC AUTO: 75.3 FL (ref 79–97)
MCV RBC AUTO: 75.9 FL (ref 79–97)
MCV RBC AUTO: 76.3 FL (ref 79–97)
METHGB BLD QL: 0.8 % (ref 0–1.5)
METHGB BLD QL: 1 % (ref 0–1.5)
MICROCYTES BLD QL: NORMAL
MODALITY: ABNORMAL
MODALITY: ABNORMAL
MONOCYTES # BLD AUTO: 0.57 10*3/MM3 (ref 0.1–0.9)
MONOCYTES # BLD AUTO: 0.84 10*3/MM3 (ref 0.1–0.9)
MONOCYTES # BLD AUTO: 1.04 10*3/MM3 (ref 0.1–0.9)
MONOCYTES # BLD AUTO: 1.07 10*3/MM3 (ref 0.1–0.9)
MONOCYTES NFR BLD AUTO: 11.7 % (ref 5–12)
MONOCYTES NFR BLD AUTO: 12.6 % (ref 5–12)
MONOCYTES NFR BLD AUTO: 7 % (ref 5–12)
MONOCYTES NFR BLD AUTO: 8.6 % (ref 5–12)
NEUTROPHILS # BLD AUTO: 6.17 10*3/MM3 (ref 1.7–7)
NEUTROPHILS # BLD AUTO: 6.83 10*3/MM3 (ref 1.7–7)
NEUTROPHILS # BLD AUTO: 6.94 10*3/MM3 (ref 1.7–7)
NEUTROPHILS # BLD AUTO: 7.42 10*3/MM3 (ref 1.7–7)
NEUTROPHILS NFR BLD AUTO: 72.5 % (ref 42.7–76)
NEUTROPHILS NFR BLD AUTO: 76.1 % (ref 42.7–76)
NEUTROPHILS NFR BLD AUTO: 76.9 % (ref 42.7–76)
NEUTROPHILS NFR BLD AUTO: 85.5 % (ref 42.7–76)
NITRITE UR QL STRIP: NEGATIVE
NOTE: ABNORMAL
NOTE: ABNORMAL
NRBC BLD AUTO-RTO: 0 /100 WBC (ref 0–0.2)
OXYHGB MFR BLDV: 89.1 % (ref 94–99)
OXYHGB MFR BLDV: 95.5 % (ref 94–99)
PCO2 BLDA: 43.4 MM HG (ref 35–45)
PCO2 BLDA: 62.9 MM HG
PCO2 TEMP ADJ BLD: 62.9 MM HG (ref 35–48)
PCO2 TEMP ADJ BLD: ABNORMAL MM[HG]
PH BLDA: 7.43 PH UNITS (ref 7.35–7.45)
PH BLDA: 7.44 PH UNITS (ref 7.3–7.5)
PH UR STRIP.AUTO: <=5 [PH] (ref 5–8)
PH, TEMP CORRECTED: 7.43 PH UNITS
PH, TEMP CORRECTED: ABNORMAL
PLAT MORPH BLD: NORMAL
PLATELET # BLD AUTO: 154 10*3/MM3 (ref 140–450)
PLATELET # BLD AUTO: 155 10*3/MM3 (ref 140–450)
PLATELET # BLD AUTO: 166 10*3/MM3 (ref 140–450)
PLATELET # BLD AUTO: 241 10*3/MM3 (ref 140–450)
PMV BLD AUTO: 10.6 FL (ref 6–12)
PMV BLD AUTO: 10.9 FL (ref 6–12)
PMV BLD AUTO: 11.2 FL (ref 6–12)
PMV BLD AUTO: 11.4 FL (ref 6–12)
PO2 BLDA: 58.6 MM HG (ref 83–108)
PO2 BLDA: 86.7 MM HG (ref 75–100)
PO2 TEMP ADJ BLD: 58.6 MM HG (ref 83–108)
PO2 TEMP ADJ BLD: ABNORMAL MM[HG]
POTASSIUM BLD-SCNC: 3.3 MMOL/L (ref 3.5–5.2)
POTASSIUM BLD-SCNC: 3.4 MMOL/L (ref 3.5–5.2)
POTASSIUM BLD-SCNC: 3.5 MMOL/L (ref 3.5–5.2)
POTASSIUM BLD-SCNC: 3.5 MMOL/L (ref 3.5–5.2)
POTASSIUM BLD-SCNC: 3.7 MMOL/L (ref 3.5–5.2)
POTASSIUM BLD-SCNC: 3.7 MMOL/L (ref 3.5–5.2)
PROT SERPL-MCNC: 6.5 G/DL (ref 6–8.5)
PROT SERPL-MCNC: 7.5 G/DL (ref 6–8.5)
PROT UR QL STRIP: ABNORMAL
PROTHROMBIN TIME: 24.3 SECONDS (ref 12–15.1)
RBC # BLD AUTO: 3.57 10*6/MM3 (ref 4.14–5.8)
RBC # BLD AUTO: 3.67 10*6/MM3 (ref 4.14–5.8)
RBC # BLD AUTO: 3.68 10*6/MM3 (ref 4.14–5.8)
RBC # BLD AUTO: 4.35 10*6/MM3 (ref 4.14–5.8)
RBC # UR: ABNORMAL /HPF
REF LAB TEST METHOD: ABNORMAL
RHINOVIRUS RNA SPEC NAA+PROBE: DETECTED
RSV RNA NPH QL NAA+NON-PROBE: NOT DETECTED
SAO2 % BLDCOA: 97.3 % (ref 94–100)
SMALL PLATELETS BLD QL SMEAR: ADEQUATE
SODIUM BLD-SCNC: 139 MMOL/L (ref 136–145)
SODIUM BLD-SCNC: 139 MMOL/L (ref 136–145)
SODIUM BLD-SCNC: 140 MMOL/L (ref 136–145)
SODIUM BLD-SCNC: 144 MMOL/L (ref 136–145)
SP GR UR STRIP: 1.07 (ref 1–1.03)
SQUAMOUS #/AREA URNS HPF: ABNORMAL /HPF
STRESS TARGET HR: 122 BPM
TRIGL SERPL-MCNC: 76 MG/DL (ref 0–150)
TROPONIN T SERPL-MCNC: <0.01 NG/ML (ref 0–0.03)
TSH SERPL DL<=0.05 MIU/L-ACNC: 0.82 UIU/ML (ref 0.27–4.2)
UROBILINOGEN UR QL STRIP: ABNORMAL
VENTILATOR MODE: ABNORMAL
VENTILATOR MODE: ABNORMAL
VLDLC SERPL-MCNC: 15.2 MG/DL
WBC MORPH BLD: NORMAL
WBC MORPH BLD: NORMAL
WBC NRBC COR # BLD: 8.12 10*3/MM3 (ref 3.4–10.8)
WBC NRBC COR # BLD: 8.51 10*3/MM3 (ref 3.4–10.8)
WBC NRBC COR # BLD: 8.88 10*3/MM3 (ref 3.4–10.8)
WBC NRBC COR # BLD: 9.75 10*3/MM3 (ref 3.4–10.8)
WBC UR QL AUTO: ABNORMAL /HPF
WHOLE BLOOD HOLD SPECIMEN: NORMAL
WHOLE BLOOD HOLD SPECIMEN: NORMAL

## 2020-01-01 PROCEDURE — 93005 ELECTROCARDIOGRAM TRACING: CPT | Performed by: NURSE PRACTITIONER

## 2020-01-01 PROCEDURE — 87040 BLOOD CULTURE FOR BACTERIA: CPT | Performed by: INTERNAL MEDICINE

## 2020-01-01 PROCEDURE — 25010000002 LORAZEPAM PER 2 MG: Performed by: INTERNAL MEDICINE

## 2020-01-01 PROCEDURE — 82962 GLUCOSE BLOOD TEST: CPT

## 2020-01-01 PROCEDURE — 74230 X-RAY XM SWLNG FUNCJ C+: CPT

## 2020-01-01 PROCEDURE — 70496 CT ANGIOGRAPHY HEAD: CPT

## 2020-01-01 PROCEDURE — G0378 HOSPITAL OBSERVATION PER HR: HCPCS

## 2020-01-01 PROCEDURE — 70498 CT ANGIOGRAPHY NECK: CPT

## 2020-01-01 PROCEDURE — 94799 UNLISTED PULMONARY SVC/PX: CPT

## 2020-01-01 PROCEDURE — 25010000002 AMPICILLIN PER 500 MG: Performed by: NURSE PRACTITIONER

## 2020-01-01 PROCEDURE — 97110 THERAPEUTIC EXERCISES: CPT

## 2020-01-01 PROCEDURE — 87077 CULTURE AEROBIC IDENTIFY: CPT | Performed by: INTERNAL MEDICINE

## 2020-01-01 PROCEDURE — 25010000002 HEPARIN (PORCINE) PER 1000 UNITS: Performed by: INTERNAL MEDICINE

## 2020-01-01 PROCEDURE — 71045 X-RAY EXAM CHEST 1 VIEW: CPT

## 2020-01-01 PROCEDURE — 99222 1ST HOSP IP/OBS MODERATE 55: CPT | Performed by: INTERNAL MEDICINE

## 2020-01-01 PROCEDURE — 83735 ASSAY OF MAGNESIUM: CPT | Performed by: EMERGENCY MEDICINE

## 2020-01-01 PROCEDURE — 99223 1ST HOSP IP/OBS HIGH 75: CPT | Performed by: PSYCHIATRY & NEUROLOGY

## 2020-01-01 PROCEDURE — 97162 PT EVAL MOD COMPLEX 30 MIN: CPT

## 2020-01-01 PROCEDURE — 99223 1ST HOSP IP/OBS HIGH 75: CPT | Performed by: INTERNAL MEDICINE

## 2020-01-01 PROCEDURE — 82375 ASSAY CARBOXYHB QUANT: CPT

## 2020-01-01 PROCEDURE — 82805 BLOOD GASES W/O2 SATURATION: CPT

## 2020-01-01 PROCEDURE — 25010000002 ONDANSETRON PER 1 MG: Performed by: INTERNAL MEDICINE

## 2020-01-01 PROCEDURE — 25010000002 CEFTRIAXONE PER 250 MG: Performed by: NURSE PRACTITIONER

## 2020-01-01 PROCEDURE — 99232 SBSQ HOSP IP/OBS MODERATE 35: CPT | Performed by: HOSPITALIST

## 2020-01-01 PROCEDURE — 92523 SPEECH SOUND LANG COMPREHEN: CPT

## 2020-01-01 PROCEDURE — 25010000002 MORPHINE PER 10 MG: Performed by: NURSE PRACTITIONER

## 2020-01-01 PROCEDURE — 74178 CT ABD&PLV WO CNTR FLWD CNTR: CPT

## 2020-01-01 PROCEDURE — 87186 SC STD MICRODIL/AGAR DIL: CPT | Performed by: INTERNAL MEDICINE

## 2020-01-01 PROCEDURE — 92526 ORAL FUNCTION THERAPY: CPT

## 2020-01-01 PROCEDURE — 25010000002 LORAZEPAM PER 2 MG: Performed by: NURSE PRACTITIONER

## 2020-01-01 PROCEDURE — 80061 LIPID PANEL: CPT | Performed by: NURSE PRACTITIONER

## 2020-01-01 PROCEDURE — 85730 THROMBOPLASTIN TIME PARTIAL: CPT | Performed by: EMERGENCY MEDICINE

## 2020-01-01 PROCEDURE — 85007 BL SMEAR W/DIFF WBC COUNT: CPT | Performed by: EMERGENCY MEDICINE

## 2020-01-01 PROCEDURE — 83036 HEMOGLOBIN GLYCOSYLATED A1C: CPT | Performed by: NURSE PRACTITIONER

## 2020-01-01 PROCEDURE — 83735 ASSAY OF MAGNESIUM: CPT | Performed by: HOSPITALIST

## 2020-01-01 PROCEDURE — 36600 WITHDRAWAL OF ARTERIAL BLOOD: CPT

## 2020-01-01 PROCEDURE — 25010000002 VANCOMYCIN 10 G RECONSTITUTED SOLUTION

## 2020-01-01 PROCEDURE — 99232 SBSQ HOSP IP/OBS MODERATE 35: CPT | Performed by: PSYCHIATRY & NEUROLOGY

## 2020-01-01 PROCEDURE — 94640 AIRWAY INHALATION TREATMENT: CPT

## 2020-01-01 PROCEDURE — 25010000002 MORPHINE PER 10 MG: Performed by: INTERNAL MEDICINE

## 2020-01-01 PROCEDURE — 80053 COMPREHEN METABOLIC PANEL: CPT | Performed by: NURSE PRACTITIONER

## 2020-01-01 PROCEDURE — 99232 SBSQ HOSP IP/OBS MODERATE 35: CPT | Performed by: INTERNAL MEDICINE

## 2020-01-01 PROCEDURE — 25010000002 LORAZEPAM PER 2 MG: Performed by: HOSPITALIST

## 2020-01-01 PROCEDURE — 99233 SBSQ HOSP IP/OBS HIGH 50: CPT | Performed by: HOSPITALIST

## 2020-01-01 PROCEDURE — 85025 COMPLETE CBC W/AUTO DIFF WBC: CPT | Performed by: EMERGENCY MEDICINE

## 2020-01-01 PROCEDURE — 92507 TX SP LANG VOICE COMM INDIV: CPT

## 2020-01-01 PROCEDURE — 80053 COMPREHEN METABOLIC PANEL: CPT | Performed by: EMERGENCY MEDICINE

## 2020-01-01 PROCEDURE — 99233 SBSQ HOSP IP/OBS HIGH 50: CPT | Performed by: INTERNAL MEDICINE

## 2020-01-01 PROCEDURE — 87150 DNA/RNA AMPLIFIED PROBE: CPT | Performed by: INTERNAL MEDICINE

## 2020-01-01 PROCEDURE — 84132 ASSAY OF SERUM POTASSIUM: CPT | Performed by: HOSPITALIST

## 2020-01-01 PROCEDURE — 94660 CPAP INITIATION&MGMT: CPT

## 2020-01-01 PROCEDURE — 81001 URINALYSIS AUTO W/SCOPE: CPT | Performed by: INTERNAL MEDICINE

## 2020-01-01 PROCEDURE — 0042T HC CT CEREBRAL PERFUSION W/WO CONTRAST: CPT

## 2020-01-01 PROCEDURE — 93005 ELECTROCARDIOGRAM TRACING: CPT | Performed by: EMERGENCY MEDICINE

## 2020-01-01 PROCEDURE — 84484 ASSAY OF TROPONIN QUANT: CPT | Performed by: EMERGENCY MEDICINE

## 2020-01-01 PROCEDURE — 93306 TTE W/DOPPLER COMPLETE: CPT | Performed by: INTERNAL MEDICINE

## 2020-01-01 PROCEDURE — 85007 BL SMEAR W/DIFF WBC COUNT: CPT | Performed by: NURSE PRACTITIONER

## 2020-01-01 PROCEDURE — 99284 EMERGENCY DEPT VISIT MOD MDM: CPT

## 2020-01-01 PROCEDURE — 92610 EVALUATE SWALLOWING FUNCTION: CPT

## 2020-01-01 PROCEDURE — 70450 CT HEAD/BRAIN W/O DYE: CPT

## 2020-01-01 PROCEDURE — 85025 COMPLETE CBC W/AUTO DIFF WBC: CPT | Performed by: NURSE PRACTITIONER

## 2020-01-01 PROCEDURE — 85025 COMPLETE CBC W/AUTO DIFF WBC: CPT | Performed by: INTERNAL MEDICINE

## 2020-01-01 PROCEDURE — 80048 BASIC METABOLIC PNL TOTAL CA: CPT

## 2020-01-01 PROCEDURE — 63710000001 DIPHENHYDRAMINE PER 50 MG: Performed by: PHYSICIAN ASSISTANT

## 2020-01-01 PROCEDURE — 0 DIATRIZOATE MEGLUMINE & SODIUM PER 1 ML: Performed by: INTERNAL MEDICINE

## 2020-01-01 PROCEDURE — 80048 BASIC METABOLIC PNL TOTAL CA: CPT | Performed by: NURSE PRACTITIONER

## 2020-01-01 PROCEDURE — 70551 MRI BRAIN STEM W/O DYE: CPT

## 2020-01-01 PROCEDURE — 93005 ELECTROCARDIOGRAM TRACING: CPT | Performed by: PHYSICIAN ASSISTANT

## 2020-01-01 PROCEDURE — 99239 HOSP IP/OBS DSCHRG MGMT >30: CPT | Performed by: HOSPITALIST

## 2020-01-01 PROCEDURE — 0 IOPAMIDOL PER 1 ML: Performed by: INTERNAL MEDICINE

## 2020-01-01 PROCEDURE — 93010 ELECTROCARDIOGRAM REPORT: CPT | Performed by: INTERNAL MEDICINE

## 2020-01-01 PROCEDURE — 25010000002 IOPAMIDOL 61 % SOLUTION: Performed by: HOSPITALIST

## 2020-01-01 PROCEDURE — 85610 PROTHROMBIN TIME: CPT | Performed by: EMERGENCY MEDICINE

## 2020-01-01 PROCEDURE — A9270 NON-COVERED ITEM OR SERVICE: HCPCS | Performed by: NURSE PRACTITIONER

## 2020-01-01 PROCEDURE — 92611 MOTION FLUOROSCOPY/SWALLOW: CPT

## 2020-01-01 PROCEDURE — 0100U HC BIOFIRE FILMARRAY RESP PANEL 2: CPT | Performed by: INTERNAL MEDICINE

## 2020-01-01 PROCEDURE — 84443 ASSAY THYROID STIM HORMONE: CPT | Performed by: NURSE PRACTITIONER

## 2020-01-01 PROCEDURE — 25010000002 HALOPERIDOL LACTATE PER 5 MG: Performed by: NURSE PRACTITIONER

## 2020-01-01 PROCEDURE — 93306 TTE W/DOPPLER COMPLETE: CPT

## 2020-01-01 PROCEDURE — 63710000001 BUDESONIDE-FORMOTEROL 80-4.5 MCG/ACT AEROSOL 6.9 G INHALER: Performed by: NURSE PRACTITIONER

## 2020-01-01 PROCEDURE — 25010000002 VANCOMYCIN PER 500 MG

## 2020-01-01 PROCEDURE — 97116 GAIT TRAINING THERAPY: CPT

## 2020-01-01 PROCEDURE — 97165 OT EVAL LOW COMPLEX 30 MIN: CPT

## 2020-01-01 PROCEDURE — 83050 HGB METHEMOGLOBIN QUAN: CPT

## 2020-01-01 PROCEDURE — 25010000002 DIPHENHYDRAMINE PER 50 MG: Performed by: NURSE PRACTITIONER

## 2020-01-01 PROCEDURE — 84132 ASSAY OF SERUM POTASSIUM: CPT | Performed by: INTERNAL MEDICINE

## 2020-01-01 PROCEDURE — 99231 SBSQ HOSP IP/OBS SF/LOW 25: CPT | Performed by: PSYCHIATRY & NEUROLOGY

## 2020-01-01 RX ORDER — HALOPERIDOL 5 MG/ML
1 INJECTION INTRAMUSCULAR EVERY 6 HOURS PRN
Status: DISCONTINUED | OUTPATIENT
Start: 2020-01-01 | End: 2020-01-01 | Stop reason: HOSPADM

## 2020-01-01 RX ORDER — BUDESONIDE AND FORMOTEROL FUMARATE DIHYDRATE 80; 4.5 UG/1; UG/1
2 AEROSOL RESPIRATORY (INHALATION) 2 TIMES DAILY
Status: DISCONTINUED | OUTPATIENT
Start: 2020-01-01 | End: 2020-01-01 | Stop reason: HOSPADM

## 2020-01-01 RX ORDER — DIPHENHYDRAMINE HCL 25 MG
25 CAPSULE ORAL ONCE
Status: COMPLETED | OUTPATIENT
Start: 2020-01-01 | End: 2020-01-01

## 2020-01-01 RX ORDER — LORAZEPAM 2 MG/ML
0.25 INJECTION INTRAMUSCULAR EVERY 4 HOURS PRN
Status: DISCONTINUED | OUTPATIENT
Start: 2020-01-01 | End: 2020-01-01 | Stop reason: HOSPADM

## 2020-01-01 RX ORDER — ATORVASTATIN CALCIUM 40 MG/1
80 TABLET, FILM COATED ORAL NIGHTLY
Status: DISCONTINUED | OUTPATIENT
Start: 2020-01-01 | End: 2020-01-01 | Stop reason: HOSPADM

## 2020-01-01 RX ORDER — ACETAMINOPHEN 650 MG/1
650 SUPPOSITORY RECTAL EVERY 4 HOURS PRN
Status: CANCELLED | OUTPATIENT
Start: 2020-01-01

## 2020-01-01 RX ORDER — ACETAMINOPHEN 160 MG/5ML
650 SOLUTION ORAL EVERY 4 HOURS PRN
Status: CANCELLED | OUTPATIENT
Start: 2020-01-01

## 2020-01-01 RX ORDER — LORAZEPAM 2 MG/ML
0.5 INJECTION INTRAMUSCULAR ONCE
Status: COMPLETED | OUTPATIENT
Start: 2020-01-01 | End: 2020-01-01

## 2020-01-01 RX ORDER — IPRATROPIUM BROMIDE AND ALBUTEROL SULFATE 2.5; .5 MG/3ML; MG/3ML
3 SOLUTION RESPIRATORY (INHALATION) EVERY 6 HOURS PRN
Status: DISCONTINUED | OUTPATIENT
Start: 2020-01-01 | End: 2020-01-01 | Stop reason: HOSPADM

## 2020-01-01 RX ORDER — MORPHINE SULFATE 2 MG/ML
1 INJECTION, SOLUTION INTRAMUSCULAR; INTRAVENOUS
Status: CANCELLED | OUTPATIENT
Start: 2020-01-01 | End: 2020-01-18

## 2020-01-01 RX ORDER — DILTIAZEM HYDROCHLORIDE 60 MG/1
120 TABLET, FILM COATED ORAL DAILY
Status: DISCONTINUED | OUTPATIENT
Start: 2020-01-01 | End: 2020-01-01

## 2020-01-01 RX ORDER — POTASSIUM CHLORIDE 750 MG/1
40 CAPSULE, EXTENDED RELEASE ORAL AS NEEDED
Status: CANCELLED | OUTPATIENT
Start: 2020-01-01

## 2020-01-01 RX ORDER — DILTIAZEM HCL IN NACL,ISO-OSM 125 MG/125
5-15 PLASTIC BAG, INJECTION (ML) INTRAVENOUS
Status: CANCELLED | OUTPATIENT
Start: 2020-01-01

## 2020-01-01 RX ORDER — ONDANSETRON 2 MG/ML
4 INJECTION INTRAMUSCULAR; INTRAVENOUS EVERY 6 HOURS PRN
Status: CANCELLED | OUTPATIENT
Start: 2020-01-01

## 2020-01-01 RX ORDER — ACETAMINOPHEN 650 MG/1
650 SUPPOSITORY RECTAL EVERY 4 HOURS PRN
Status: DISCONTINUED | OUTPATIENT
Start: 2020-01-01 | End: 2020-01-01 | Stop reason: HOSPADM

## 2020-01-01 RX ORDER — ONDANSETRON 2 MG/ML
4 INJECTION INTRAMUSCULAR; INTRAVENOUS EVERY 6 HOURS PRN
Status: DISCONTINUED | OUTPATIENT
Start: 2020-01-01 | End: 2020-01-01 | Stop reason: HOSPADM

## 2020-01-01 RX ORDER — MORPHINE SULFATE 4 MG/ML
4 INJECTION, SOLUTION INTRAMUSCULAR; INTRAVENOUS
Status: DISCONTINUED | OUTPATIENT
Start: 2020-01-01 | End: 2020-01-10 | Stop reason: HOSPADM

## 2020-01-01 RX ORDER — POTASSIUM CHLORIDE 7.45 MG/ML
10 INJECTION INTRAVENOUS
Status: DISCONTINUED | OUTPATIENT
Start: 2020-01-01 | End: 2020-01-01 | Stop reason: HOSPADM

## 2020-01-01 RX ORDER — HALOPERIDOL 5 MG/ML
1 INJECTION INTRAMUSCULAR EVERY 4 HOURS PRN
Status: DISCONTINUED | OUTPATIENT
Start: 2020-01-01 | End: 2020-01-10 | Stop reason: HOSPADM

## 2020-01-01 RX ORDER — LACTULOSE 10 G/15ML
10 SOLUTION ORAL ONCE
Status: COMPLETED | OUTPATIENT
Start: 2020-01-01 | End: 2020-01-01

## 2020-01-01 RX ORDER — SODIUM CHLORIDE 0.9 % (FLUSH) 0.9 %
10 SYRINGE (ML) INJECTION EVERY 12 HOURS SCHEDULED
Status: DISCONTINUED | OUTPATIENT
Start: 2020-01-01 | End: 2020-01-01 | Stop reason: HOSPADM

## 2020-01-01 RX ORDER — BUDESONIDE AND FORMOTEROL FUMARATE DIHYDRATE 80; 4.5 UG/1; UG/1
2 AEROSOL RESPIRATORY (INHALATION) 2 TIMES DAILY
Status: CANCELLED | OUTPATIENT
Start: 2020-01-01

## 2020-01-01 RX ORDER — DOCUSATE SODIUM 100 MG/1
100 CAPSULE, LIQUID FILLED ORAL 2 TIMES DAILY
Status: DISCONTINUED | OUTPATIENT
Start: 2020-01-01 | End: 2020-01-01

## 2020-01-01 RX ORDER — ATORVASTATIN CALCIUM 40 MG/1
80 TABLET, FILM COATED ORAL NIGHTLY
Status: CANCELLED | OUTPATIENT
Start: 2020-01-01

## 2020-01-01 RX ORDER — CLOPIDOGREL BISULFATE 75 MG/1
75 TABLET ORAL DAILY
Status: CANCELLED | OUTPATIENT
Start: 2020-01-10

## 2020-01-01 RX ORDER — DILTIAZEM HCL IN NACL,ISO-OSM 125 MG/125
5-15 PLASTIC BAG, INJECTION (ML) INTRAVENOUS
Status: DISCONTINUED | OUTPATIENT
Start: 2020-01-01 | End: 2020-01-01 | Stop reason: HOSPADM

## 2020-01-01 RX ORDER — POTASSIUM CHLORIDE 750 MG/1
40 CAPSULE, EXTENDED RELEASE ORAL AS NEEDED
Status: DISCONTINUED | OUTPATIENT
Start: 2020-01-01 | End: 2020-01-01 | Stop reason: HOSPADM

## 2020-01-01 RX ORDER — BISACODYL 10 MG
10 SUPPOSITORY, RECTAL RECTAL DAILY PRN
Status: DISCONTINUED | OUTPATIENT
Start: 2020-01-01 | End: 2020-01-01 | Stop reason: HOSPADM

## 2020-01-01 RX ORDER — PANTOPRAZOLE SODIUM 40 MG/1
40 TABLET, DELAYED RELEASE ORAL EVERY MORNING
Status: CANCELLED | OUTPATIENT
Start: 2020-01-10

## 2020-01-01 RX ORDER — IPRATROPIUM BROMIDE AND ALBUTEROL SULFATE 2.5; .5 MG/3ML; MG/3ML
3 SOLUTION RESPIRATORY (INHALATION) ONCE
Status: COMPLETED | OUTPATIENT
Start: 2020-01-01 | End: 2020-01-01

## 2020-01-01 RX ORDER — CYCLOSPORINE 0.5 MG/ML
1 EMULSION OPHTHALMIC 2 TIMES DAILY
Status: CANCELLED | OUTPATIENT
Start: 2020-01-01

## 2020-01-01 RX ORDER — HALOPERIDOL 5 MG/ML
1 INJECTION INTRAMUSCULAR EVERY 6 HOURS PRN
Status: CANCELLED | OUTPATIENT
Start: 2020-01-01

## 2020-01-01 RX ORDER — AZELASTINE 1 MG/ML
2 SPRAY, METERED NASAL 2 TIMES DAILY
Status: DISCONTINUED | OUTPATIENT
Start: 2020-01-01 | End: 2020-01-01 | Stop reason: HOSPADM

## 2020-01-01 RX ORDER — CYCLOSPORINE 0.5 MG/ML
1 EMULSION OPHTHALMIC 2 TIMES DAILY
Status: DISCONTINUED | OUTPATIENT
Start: 2020-01-01 | End: 2020-01-01 | Stop reason: HOSPADM

## 2020-01-01 RX ORDER — PRIMIDONE 50 MG/1
50 TABLET ORAL 2 TIMES DAILY
Status: DISCONTINUED | OUTPATIENT
Start: 2020-01-01 | End: 2020-01-01 | Stop reason: HOSPADM

## 2020-01-01 RX ORDER — LORAZEPAM 2 MG/ML
0.25 INJECTION INTRAMUSCULAR EVERY 4 HOURS PRN
Status: CANCELLED | OUTPATIENT
Start: 2020-01-01 | End: 2020-01-18

## 2020-01-01 RX ORDER — AMOXICILLIN 250 MG
2 CAPSULE ORAL 2 TIMES DAILY
Status: DISCONTINUED | OUTPATIENT
Start: 2020-01-01 | End: 2020-01-01 | Stop reason: HOSPADM

## 2020-01-01 RX ORDER — HEPARIN SODIUM 5000 [USP'U]/ML
5000 INJECTION, SOLUTION INTRAVENOUS; SUBCUTANEOUS EVERY 8 HOURS SCHEDULED
Status: CANCELLED | OUTPATIENT
Start: 2020-01-01

## 2020-01-01 RX ORDER — BISACODYL 10 MG
10 SUPPOSITORY, RECTAL RECTAL DAILY PRN
Status: DISCONTINUED | OUTPATIENT
Start: 2020-01-01 | End: 2020-01-10 | Stop reason: HOSPADM

## 2020-01-01 RX ORDER — MORPHINE SULFATE 2 MG/ML
1 INJECTION, SOLUTION INTRAMUSCULAR; INTRAVENOUS
Status: DISCONTINUED | OUTPATIENT
Start: 2020-01-01 | End: 2020-01-01 | Stop reason: HOSPADM

## 2020-01-01 RX ORDER — ONDANSETRON 2 MG/ML
4 INJECTION INTRAMUSCULAR; INTRAVENOUS EVERY 6 HOURS PRN
Status: DISCONTINUED | OUTPATIENT
Start: 2020-01-01 | End: 2020-01-10 | Stop reason: HOSPADM

## 2020-01-01 RX ORDER — SODIUM CHLORIDE 0.9 % (FLUSH) 0.9 %
10 SYRINGE (ML) INJECTION AS NEEDED
Status: DISCONTINUED | OUTPATIENT
Start: 2020-01-01 | End: 2020-01-01 | Stop reason: HOSPADM

## 2020-01-01 RX ORDER — SODIUM CHLORIDE 0.9 % (FLUSH) 0.9 %
10 SYRINGE (ML) INJECTION AS NEEDED
Status: CANCELLED | OUTPATIENT
Start: 2020-01-01

## 2020-01-01 RX ORDER — SODIUM CHLORIDE 0.9 % (FLUSH) 0.9 %
10 SYRINGE (ML) INJECTION EVERY 12 HOURS SCHEDULED
Status: CANCELLED | OUTPATIENT
Start: 2020-01-01

## 2020-01-01 RX ORDER — DIPHENHYDRAMINE HYDROCHLORIDE 50 MG/ML
12.5 INJECTION INTRAMUSCULAR; INTRAVENOUS ONCE
Status: COMPLETED | OUTPATIENT
Start: 2020-01-01 | End: 2020-01-01

## 2020-01-01 RX ORDER — BISACODYL 10 MG
10 SUPPOSITORY, RECTAL RECTAL DAILY PRN
Status: CANCELLED | OUTPATIENT
Start: 2020-01-01

## 2020-01-01 RX ORDER — KETOROLAC TROMETHAMINE 30 MG/ML
15 INJECTION, SOLUTION INTRAMUSCULAR; INTRAVENOUS EVERY 6 HOURS PRN
Status: DISCONTINUED | OUTPATIENT
Start: 2020-01-01 | End: 2020-01-10 | Stop reason: HOSPADM

## 2020-01-01 RX ORDER — ACETAMINOPHEN 325 MG/1
650 TABLET ORAL EVERY 4 HOURS PRN
Status: DISCONTINUED | OUTPATIENT
Start: 2020-01-01 | End: 2020-01-01 | Stop reason: HOSPADM

## 2020-01-01 RX ORDER — CLOPIDOGREL BISULFATE 75 MG/1
75 TABLET ORAL DAILY
Status: DISCONTINUED | OUTPATIENT
Start: 2020-01-01 | End: 2020-01-01 | Stop reason: HOSPADM

## 2020-01-01 RX ORDER — MORPHINE SULFATE 2 MG/ML
2 INJECTION, SOLUTION INTRAMUSCULAR; INTRAVENOUS EVERY 6 HOURS
Status: DISCONTINUED | OUTPATIENT
Start: 2020-01-01 | End: 2020-01-10 | Stop reason: HOSPADM

## 2020-01-01 RX ORDER — TAMSULOSIN HYDROCHLORIDE 0.4 MG/1
0.4 CAPSULE ORAL NIGHTLY
Status: DISCONTINUED | OUTPATIENT
Start: 2020-01-01 | End: 2020-01-01 | Stop reason: HOSPADM

## 2020-01-01 RX ORDER — ASPIRIN 81 MG/1
324 TABLET, CHEWABLE ORAL DAILY
Status: DISCONTINUED | OUTPATIENT
Start: 2020-01-01 | End: 2020-01-01 | Stop reason: HOSPADM

## 2020-01-01 RX ORDER — MORPHINE SULFATE 2 MG/ML
2 INJECTION, SOLUTION INTRAMUSCULAR; INTRAVENOUS
Status: DISCONTINUED | OUTPATIENT
Start: 2020-01-01 | End: 2020-01-10 | Stop reason: HOSPADM

## 2020-01-01 RX ORDER — HEPARIN SODIUM 5000 [USP'U]/ML
5000 INJECTION, SOLUTION INTRAVENOUS; SUBCUTANEOUS EVERY 8 HOURS SCHEDULED
Status: DISCONTINUED | OUTPATIENT
Start: 2020-01-01 | End: 2020-01-01 | Stop reason: HOSPADM

## 2020-01-01 RX ORDER — DOCUSATE SODIUM 100 MG/1
100 CAPSULE, LIQUID FILLED ORAL DAILY
Status: DISCONTINUED | OUTPATIENT
Start: 2020-01-01 | End: 2020-01-01

## 2020-01-01 RX ORDER — ACETAMINOPHEN 160 MG/5ML
650 SOLUTION ORAL EVERY 4 HOURS PRN
Status: DISCONTINUED | OUTPATIENT
Start: 2020-01-01 | End: 2020-01-01 | Stop reason: HOSPADM

## 2020-01-01 RX ORDER — AMOXICILLIN 250 MG
2 CAPSULE ORAL 2 TIMES DAILY
Status: CANCELLED | OUTPATIENT
Start: 2020-01-01

## 2020-01-01 RX ORDER — TAMSULOSIN HYDROCHLORIDE 0.4 MG/1
0.4 CAPSULE ORAL NIGHTLY
Status: CANCELLED | OUTPATIENT
Start: 2020-01-01

## 2020-01-01 RX ORDER — ACETAMINOPHEN 650 MG/1
650 SUPPOSITORY RECTAL EVERY 4 HOURS PRN
Status: DISCONTINUED | OUTPATIENT
Start: 2020-01-01 | End: 2020-01-10 | Stop reason: HOSPADM

## 2020-01-01 RX ORDER — VANCOMYCIN HYDROCHLORIDE 1 G/200ML
1000 INJECTION, SOLUTION INTRAVENOUS EVERY 12 HOURS
Status: DISCONTINUED | OUTPATIENT
Start: 2020-01-01 | End: 2020-01-01

## 2020-01-01 RX ORDER — CETIRIZINE HYDROCHLORIDE 10 MG/1
10 TABLET ORAL DAILY
Status: CANCELLED | OUTPATIENT
Start: 2020-01-10

## 2020-01-01 RX ORDER — LORAZEPAM 2 MG/ML
1 INJECTION INTRAMUSCULAR
Status: DISCONTINUED | OUTPATIENT
Start: 2020-01-01 | End: 2020-01-10 | Stop reason: HOSPADM

## 2020-01-01 RX ORDER — IPRATROPIUM BROMIDE AND ALBUTEROL SULFATE 2.5; .5 MG/3ML; MG/3ML
3 SOLUTION RESPIRATORY (INHALATION) EVERY 6 HOURS PRN
Status: CANCELLED | OUTPATIENT
Start: 2020-01-01

## 2020-01-01 RX ORDER — LORAZEPAM 2 MG/ML
0.5 INJECTION INTRAMUSCULAR
Status: DISCONTINUED | OUTPATIENT
Start: 2020-01-01 | End: 2020-01-10 | Stop reason: HOSPADM

## 2020-01-01 RX ORDER — AZELASTINE 1 MG/ML
2 SPRAY, METERED NASAL 2 TIMES DAILY
Status: CANCELLED | OUTPATIENT
Start: 2020-01-01

## 2020-01-01 RX ORDER — QUETIAPINE FUMARATE 25 MG/1
25 TABLET, FILM COATED ORAL NIGHTLY PRN
Status: DISCONTINUED | OUTPATIENT
Start: 2020-01-01 | End: 2020-01-01 | Stop reason: HOSPADM

## 2020-01-01 RX ORDER — SODIUM CHLORIDE 0.9 % (FLUSH) 0.9 %
10 SYRINGE (ML) INJECTION AS NEEDED
Status: DISCONTINUED | OUTPATIENT
Start: 2020-01-01 | End: 2020-01-10 | Stop reason: HOSPADM

## 2020-01-01 RX ORDER — GLYCOPYRROLATE 0.2 MG/ML
0.4 INJECTION INTRAMUSCULAR; INTRAVENOUS EVERY 6 HOURS PRN
Status: DISCONTINUED | OUTPATIENT
Start: 2020-01-01 | End: 2020-01-10 | Stop reason: HOSPADM

## 2020-01-01 RX ORDER — ASPIRIN 300 MG/1
300 SUPPOSITORY RECTAL DAILY
Status: DISCONTINUED | OUTPATIENT
Start: 2020-01-01 | End: 2020-01-01

## 2020-01-01 RX ORDER — ROFLUMILAST 500 UG/1
500 TABLET ORAL DAILY
Status: CANCELLED | OUTPATIENT
Start: 2020-01-10

## 2020-01-01 RX ORDER — ROFLUMILAST 500 UG/1
500 TABLET ORAL DAILY
Status: DISCONTINUED | OUTPATIENT
Start: 2020-01-01 | End: 2020-01-01 | Stop reason: HOSPADM

## 2020-01-01 RX ORDER — PANTOPRAZOLE SODIUM 40 MG/1
40 TABLET, DELAYED RELEASE ORAL EVERY MORNING
Status: DISCONTINUED | OUTPATIENT
Start: 2020-01-01 | End: 2020-01-01 | Stop reason: HOSPADM

## 2020-01-01 RX ORDER — CETIRIZINE HYDROCHLORIDE 10 MG/1
10 TABLET ORAL DAILY
Status: DISCONTINUED | OUTPATIENT
Start: 2020-01-01 | End: 2020-01-01 | Stop reason: HOSPADM

## 2020-01-01 RX ORDER — QUETIAPINE FUMARATE 25 MG/1
25 TABLET, FILM COATED ORAL NIGHTLY PRN
Status: CANCELLED | OUTPATIENT
Start: 2020-01-01

## 2020-01-01 RX ORDER — ASPIRIN 81 MG/1
324 TABLET, CHEWABLE ORAL DAILY
Status: CANCELLED | OUTPATIENT
Start: 2020-01-10

## 2020-01-01 RX ORDER — POTASSIUM CHLORIDE 1.5 G/1.77G
40 POWDER, FOR SOLUTION ORAL AS NEEDED
Status: CANCELLED | OUTPATIENT
Start: 2020-01-01

## 2020-01-01 RX ORDER — HALOPERIDOL 5 MG/ML
1 INJECTION INTRAMUSCULAR ONCE
Status: COMPLETED | OUTPATIENT
Start: 2020-01-01 | End: 2020-01-01

## 2020-01-01 RX ORDER — POTASSIUM CHLORIDE 1.5 G/1.77G
40 POWDER, FOR SOLUTION ORAL AS NEEDED
Status: DISCONTINUED | OUTPATIENT
Start: 2020-01-01 | End: 2020-01-01 | Stop reason: HOSPADM

## 2020-01-01 RX ORDER — PRIMIDONE 50 MG/1
50 TABLET ORAL 2 TIMES DAILY
Status: CANCELLED | OUTPATIENT
Start: 2020-01-01

## 2020-01-01 RX ORDER — ACETAMINOPHEN 325 MG/1
650 TABLET ORAL EVERY 4 HOURS PRN
Status: CANCELLED | OUTPATIENT
Start: 2020-01-01

## 2020-01-01 RX ORDER — POTASSIUM CHLORIDE 7.45 MG/ML
10 INJECTION INTRAVENOUS
Status: CANCELLED | OUTPATIENT
Start: 2020-01-01

## 2020-01-01 RX ADMIN — SENNOSIDES AND DOCUSATE SODIUM 2 TABLET: 8.6; 5 TABLET ORAL at 14:07

## 2020-01-01 RX ADMIN — BARIUM SULFATE 55 ML: 0.81 POWDER, FOR SUSPENSION ORAL at 13:10

## 2020-01-01 RX ADMIN — HALOPERIDOL LACTATE 1 MG: 5 INJECTION INTRAMUSCULAR at 01:36

## 2020-01-01 RX ADMIN — PRIMIDONE 50 MG: 50 TABLET ORAL at 21:12

## 2020-01-01 RX ADMIN — AMPICILLIN SODIUM 2 G: 2 INJECTION, POWDER, FOR SOLUTION INTRAMUSCULAR; INTRAVENOUS at 10:21

## 2020-01-01 RX ADMIN — IOPAMIDOL 115 ML: 755 INJECTION, SOLUTION INTRAVENOUS at 21:05

## 2020-01-01 RX ADMIN — ASPIRIN 81 MG 324 MG: 81 TABLET ORAL at 12:45

## 2020-01-01 RX ADMIN — MORPHINE SULFATE 1 MG: 2 INJECTION, SOLUTION INTRAMUSCULAR; INTRAVENOUS at 19:14

## 2020-01-01 RX ADMIN — IPRATROPIUM BROMIDE AND ALBUTEROL SULFATE 3 ML: 2.5; .5 SOLUTION RESPIRATORY (INHALATION) at 20:05

## 2020-01-01 RX ADMIN — HEPARIN SODIUM 5000 UNITS: 5000 INJECTION INTRAVENOUS; SUBCUTANEOUS at 21:29

## 2020-01-01 RX ADMIN — DOCUSATE SODIUM 100 MG: 100 CAPSULE, LIQUID FILLED ORAL at 10:00

## 2020-01-01 RX ADMIN — CETIRIZINE HYDROCHLORIDE 10 MG: 10 TABLET, FILM COATED ORAL at 15:42

## 2020-01-01 RX ADMIN — CYCLOSPORINE 1 DROP: 0.5 EMULSION OPHTHALMIC at 14:06

## 2020-01-01 RX ADMIN — AMPICILLIN SODIUM 2 G: 2 INJECTION, POWDER, FOR SOLUTION INTRAMUSCULAR; INTRAVENOUS at 12:48

## 2020-01-01 RX ADMIN — MORPHINE SULFATE 2 MG: 2 INJECTION, SOLUTION INTRAMUSCULAR; INTRAVENOUS at 20:26

## 2020-01-01 RX ADMIN — IPRATROPIUM BROMIDE AND ALBUTEROL SULFATE 3 ML: 2.5; .5 SOLUTION RESPIRATORY (INHALATION) at 10:10

## 2020-01-01 RX ADMIN — AMPICILLIN SODIUM 2 G: 2 INJECTION, POWDER, FOR SOLUTION INTRAMUSCULAR; INTRAVENOUS at 01:36

## 2020-01-01 RX ADMIN — PRIMIDONE 50 MG: 50 TABLET ORAL at 21:28

## 2020-01-01 RX ADMIN — CETIRIZINE HYDROCHLORIDE 10 MG: 10 TABLET, FILM COATED ORAL at 10:00

## 2020-01-01 RX ADMIN — AMPICILLIN SODIUM 2 G: 2 INJECTION, POWDER, FOR SOLUTION INTRAMUSCULAR; INTRAVENOUS at 14:05

## 2020-01-01 RX ADMIN — DOCUSATE SODIUM 100 MG: 100 CAPSULE, LIQUID FILLED ORAL at 09:59

## 2020-01-01 RX ADMIN — CEFTRIAXONE 2 G: 2 INJECTION, POWDER, FOR SOLUTION INTRAMUSCULAR; INTRAVENOUS at 18:31

## 2020-01-01 RX ADMIN — IOPAMIDOL 85 ML: 612 INJECTION, SOLUTION INTRAVENOUS at 11:55

## 2020-01-01 RX ADMIN — CLOPIDOGREL BISULFATE 75 MG: 75 TABLET ORAL at 10:30

## 2020-01-01 RX ADMIN — PANTOPRAZOLE SODIUM 40 MG: 40 TABLET, DELAYED RELEASE ORAL at 10:00

## 2020-01-01 RX ADMIN — AZELASTINE HYDROCHLORIDE 2 SPRAY: 137 SPRAY, METERED NASAL at 21:00

## 2020-01-01 RX ADMIN — DRONEDARONE 400 MG: 400 TABLET, FILM COATED ORAL at 10:11

## 2020-01-01 RX ADMIN — HEPARIN SODIUM 5000 UNITS: 5000 INJECTION INTRAVENOUS; SUBCUTANEOUS at 21:53

## 2020-01-01 RX ADMIN — QUETIAPINE FUMARATE 25 MG: 25 TABLET ORAL at 21:53

## 2020-01-01 RX ADMIN — POTASSIUM CHLORIDE 40 MEQ: 750 CAPSULE, EXTENDED RELEASE ORAL at 18:18

## 2020-01-01 RX ADMIN — BARIUM SULFATE 20 ML: 400 SUSPENSION ORAL at 13:09

## 2020-01-01 RX ADMIN — MORPHINE SULFATE 2 MG: 2 INJECTION, SOLUTION INTRAMUSCULAR; INTRAVENOUS at 15:17

## 2020-01-01 RX ADMIN — DOCUSATE SODIUM 100 MG: 100 CAPSULE, LIQUID FILLED ORAL at 08:02

## 2020-01-01 RX ADMIN — ONDANSETRON 4 MG: 2 INJECTION INTRAMUSCULAR; INTRAVENOUS at 10:30

## 2020-01-01 RX ADMIN — MORPHINE SULFATE 1 MG: 2 INJECTION, SOLUTION INTRAMUSCULAR; INTRAVENOUS at 04:28

## 2020-01-01 RX ADMIN — POLYETHYLENE GLYCOL 3350 17 G: 17 POWDER, FOR SOLUTION ORAL at 10:00

## 2020-01-01 RX ADMIN — POLYETHYLENE GLYCOL 3350 17 G: 17 POWDER, FOR SOLUTION ORAL at 14:11

## 2020-01-01 RX ADMIN — HEPARIN SODIUM 5000 UNITS: 5000 INJECTION INTRAVENOUS; SUBCUTANEOUS at 04:36

## 2020-01-01 RX ADMIN — IPRATROPIUM BROMIDE AND ALBUTEROL SULFATE 3 ML: 2.5; .5 SOLUTION RESPIRATORY (INHALATION) at 12:19

## 2020-01-01 RX ADMIN — CYCLOSPORINE 1 DROP: 0.5 EMULSION OPHTHALMIC at 21:28

## 2020-01-01 RX ADMIN — CLOPIDOGREL BISULFATE 75 MG: 75 TABLET ORAL at 08:02

## 2020-01-01 RX ADMIN — CYCLOSPORINE 1 DROP: 0.5 EMULSION OPHTHALMIC at 20:41

## 2020-01-01 RX ADMIN — AMPICILLIN SODIUM 2 G: 2 INJECTION, POWDER, FOR SOLUTION INTRAMUSCULAR; INTRAVENOUS at 21:52

## 2020-01-01 RX ADMIN — HEPARIN SODIUM 5000 UNITS: 5000 INJECTION INTRAVENOUS; SUBCUTANEOUS at 14:07

## 2020-01-01 RX ADMIN — CEFTRIAXONE 2 G: 2 INJECTION, POWDER, FOR SOLUTION INTRAMUSCULAR; INTRAVENOUS at 17:53

## 2020-01-01 RX ADMIN — POLYETHYLENE GLYCOL 3350 17 G: 17 POWDER, FOR SOLUTION ORAL at 10:31

## 2020-01-01 RX ADMIN — IPRATROPIUM BROMIDE AND ALBUTEROL SULFATE 3 ML: 2.5; .5 SOLUTION RESPIRATORY (INHALATION) at 21:23

## 2020-01-01 RX ADMIN — AMPICILLIN SODIUM 2 G: 2 INJECTION, POWDER, FOR SOLUTION INTRAMUSCULAR; INTRAVENOUS at 17:27

## 2020-01-01 RX ADMIN — AMPICILLIN SODIUM 2 G: 2 INJECTION, POWDER, FOR SOLUTION INTRAMUSCULAR; INTRAVENOUS at 12:07

## 2020-01-01 RX ADMIN — AMPICILLIN SODIUM 2 G: 2 INJECTION, POWDER, FOR SOLUTION INTRAMUSCULAR; INTRAVENOUS at 22:45

## 2020-01-01 RX ADMIN — BUDESONIDE AND FORMOTEROL FUMARATE DIHYDRATE 2 PUFF: 80; 4.5 AEROSOL RESPIRATORY (INHALATION) at 08:24

## 2020-01-01 RX ADMIN — AMPICILLIN SODIUM 2 G: 2 INJECTION, POWDER, FOR SOLUTION INTRAMUSCULAR; INTRAVENOUS at 09:38

## 2020-01-01 RX ADMIN — DRONEDARONE 400 MG: 400 TABLET, FILM COATED ORAL at 21:29

## 2020-01-01 RX ADMIN — IPRATROPIUM BROMIDE AND ALBUTEROL SULFATE 3 ML: 2.5; .5 SOLUTION RESPIRATORY (INHALATION) at 06:39

## 2020-01-01 RX ADMIN — AMPICILLIN SODIUM 2 G: 2 INJECTION, POWDER, FOR SOLUTION INTRAMUSCULAR; INTRAVENOUS at 17:21

## 2020-01-01 RX ADMIN — HEPARIN SODIUM 5000 UNITS: 5000 INJECTION INTRAVENOUS; SUBCUTANEOUS at 06:09

## 2020-01-01 RX ADMIN — MORPHINE SULFATE 1 MG: 2 INJECTION, SOLUTION INTRAMUSCULAR; INTRAVENOUS at 21:45

## 2020-01-01 RX ADMIN — QUETIAPINE FUMARATE 25 MG: 25 TABLET ORAL at 21:28

## 2020-01-01 RX ADMIN — LACTULOSE 10 G: 10 SOLUTION ORAL at 15:41

## 2020-01-01 RX ADMIN — AZELASTINE HYDROCHLORIDE 2 SPRAY: 137 SPRAY, METERED NASAL at 08:02

## 2020-01-01 RX ADMIN — BUDESONIDE AND FORMOTEROL FUMARATE DIHYDRATE 2 PUFF: 80; 4.5 AEROSOL RESPIRATORY (INHALATION) at 20:05

## 2020-01-01 RX ADMIN — AMPICILLIN SODIUM 2 G: 2 INJECTION, POWDER, FOR SOLUTION INTRAMUSCULAR; INTRAVENOUS at 21:56

## 2020-01-01 RX ADMIN — AZELASTINE HYDROCHLORIDE 2 SPRAY: 137 SPRAY, METERED NASAL at 10:44

## 2020-01-01 RX ADMIN — HEPARIN SODIUM 5000 UNITS: 5000 INJECTION INTRAVENOUS; SUBCUTANEOUS at 09:59

## 2020-01-01 RX ADMIN — IPRATROPIUM BROMIDE AND ALBUTEROL SULFATE 3 ML: .5; 3 SOLUTION RESPIRATORY (INHALATION) at 13:49

## 2020-01-01 RX ADMIN — MORPHINE SULFATE 1 MG: 2 INJECTION, SOLUTION INTRAMUSCULAR; INTRAVENOUS at 17:12

## 2020-01-01 RX ADMIN — VANCOMYCIN HYDROCHLORIDE 1000 MG: 1 INJECTION, SOLUTION INTRAVENOUS at 04:44

## 2020-01-01 RX ADMIN — IPRATROPIUM BROMIDE AND ALBUTEROL SULFATE 3 ML: 2.5; .5 SOLUTION RESPIRATORY (INHALATION) at 17:27

## 2020-01-01 RX ADMIN — CETIRIZINE HYDROCHLORIDE 10 MG: 10 TABLET, FILM COATED ORAL at 10:31

## 2020-01-01 RX ADMIN — ACETAMINOPHEN 649.6 MG: 650 SOLUTION ORAL at 00:25

## 2020-01-01 RX ADMIN — HEPARIN SODIUM 5000 UNITS: 5000 INJECTION INTRAVENOUS; SUBCUTANEOUS at 06:13

## 2020-01-01 RX ADMIN — AZELASTINE HYDROCHLORIDE 2 SPRAY: 137 SPRAY, METERED NASAL at 10:16

## 2020-01-01 RX ADMIN — SODIUM CHLORIDE, PRESERVATIVE FREE 10 ML: 5 INJECTION INTRAVENOUS at 20:26

## 2020-01-01 RX ADMIN — BUDESONIDE AND FORMOTEROL FUMARATE DIHYDRATE 2 PUFF: 80; 4.5 AEROSOL RESPIRATORY (INHALATION) at 21:02

## 2020-01-01 RX ADMIN — BUDESONIDE AND FORMOTEROL FUMARATE DIHYDRATE 2 PUFF: 80; 4.5 AEROSOL RESPIRATORY (INHALATION) at 20:09

## 2020-01-01 RX ADMIN — AMPICILLIN SODIUM 2 G: 2 INJECTION, POWDER, FOR SOLUTION INTRAMUSCULAR; INTRAVENOUS at 15:41

## 2020-01-01 RX ADMIN — POTASSIUM CHLORIDE 40 MEQ: 750 CAPSULE, EXTENDED RELEASE ORAL at 14:34

## 2020-01-01 RX ADMIN — CYCLOSPORINE 1 DROP: 0.5 EMULSION OPHTHALMIC at 10:00

## 2020-01-01 RX ADMIN — IPRATROPIUM BROMIDE AND ALBUTEROL SULFATE 3 ML: 2.5; .5 SOLUTION RESPIRATORY (INHALATION) at 00:46

## 2020-01-01 RX ADMIN — CYCLOSPORINE 1 DROP: 0.5 EMULSION OPHTHALMIC at 03:48

## 2020-01-01 RX ADMIN — DOCUSATE SODIUM 100 MG: 100 CAPSULE, LIQUID FILLED ORAL at 21:53

## 2020-01-01 RX ADMIN — SERTRALINE HYDROCHLORIDE 50 MG: 50 TABLET ORAL at 08:02

## 2020-01-01 RX ADMIN — ACETAMINOPHEN 650 MG: 325 TABLET ORAL at 01:36

## 2020-01-01 RX ADMIN — DRONEDARONE 400 MG: 400 TABLET, FILM COATED ORAL at 14:05

## 2020-01-01 RX ADMIN — SODIUM CHLORIDE, PRESERVATIVE FREE 10 ML: 5 INJECTION INTRAVENOUS at 14:31

## 2020-01-01 RX ADMIN — MORPHINE SULFATE 2 MG: 2 INJECTION, SOLUTION INTRAMUSCULAR; INTRAVENOUS at 21:38

## 2020-01-01 RX ADMIN — CYCLOSPORINE 1 DROP: 0.5 EMULSION OPHTHALMIC at 21:13

## 2020-01-01 RX ADMIN — AMPICILLIN SODIUM 2 G: 2 INJECTION, POWDER, FOR SOLUTION INTRAMUSCULAR; INTRAVENOUS at 09:59

## 2020-01-01 RX ADMIN — ATORVASTATIN CALCIUM 80 MG: 40 TABLET, FILM COATED ORAL at 21:12

## 2020-01-01 RX ADMIN — BUDESONIDE AND FORMOTEROL FUMARATE DIHYDRATE 2 PUFF: 80; 4.5 AEROSOL RESPIRATORY (INHALATION) at 10:09

## 2020-01-01 RX ADMIN — MORPHINE SULFATE 1 MG: 2 INJECTION, SOLUTION INTRAMUSCULAR; INTRAVENOUS at 14:53

## 2020-01-01 RX ADMIN — CYCLOSPORINE 1 DROP: 0.5 EMULSION OPHTHALMIC at 20:44

## 2020-01-01 RX ADMIN — AMPICILLIN SODIUM 2 G: 2 INJECTION, POWDER, FOR SOLUTION INTRAMUSCULAR; INTRAVENOUS at 00:00

## 2020-01-01 RX ADMIN — DRONEDARONE 400 MG: 400 TABLET, FILM COATED ORAL at 10:00

## 2020-01-01 RX ADMIN — AZELASTINE HYDROCHLORIDE 2 SPRAY: 137 SPRAY, METERED NASAL at 21:17

## 2020-01-01 RX ADMIN — CEFTRIAXONE 2 G: 2 INJECTION, POWDER, FOR SOLUTION INTRAMUSCULAR; INTRAVENOUS at 17:21

## 2020-01-01 RX ADMIN — HEPARIN SODIUM 5000 UNITS: 5000 INJECTION INTRAVENOUS; SUBCUTANEOUS at 15:43

## 2020-01-01 RX ADMIN — CEFTRIAXONE 2 G: 2 INJECTION, POWDER, FOR SOLUTION INTRAMUSCULAR; INTRAVENOUS at 17:44

## 2020-01-01 RX ADMIN — ATORVASTATIN CALCIUM 80 MG: 40 TABLET, FILM COATED ORAL at 21:53

## 2020-01-01 RX ADMIN — PANTOPRAZOLE SODIUM 40 MG: 40 TABLET, DELAYED RELEASE ORAL at 10:14

## 2020-01-01 RX ADMIN — BUDESONIDE AND FORMOTEROL FUMARATE DIHYDRATE 2 PUFF: 80; 4.5 AEROSOL RESPIRATORY (INHALATION) at 20:29

## 2020-01-01 RX ADMIN — DRONEDARONE 400 MG: 400 TABLET, FILM COATED ORAL at 08:02

## 2020-01-01 RX ADMIN — PRIMIDONE 50 MG: 50 TABLET ORAL at 21:17

## 2020-01-01 RX ADMIN — CYCLOSPORINE 1 DROP: 0.5 EMULSION OPHTHALMIC at 10:13

## 2020-01-01 RX ADMIN — CEFTRIAXONE 2 G: 2 INJECTION, POWDER, FOR SOLUTION INTRAMUSCULAR; INTRAVENOUS at 05:50

## 2020-01-01 RX ADMIN — AMPICILLIN SODIUM 2 G: 2 INJECTION, POWDER, FOR SOLUTION INTRAMUSCULAR; INTRAVENOUS at 21:17

## 2020-01-01 RX ADMIN — AMPICILLIN SODIUM 2 G: 2 INJECTION, POWDER, FOR SOLUTION INTRAMUSCULAR; INTRAVENOUS at 01:04

## 2020-01-01 RX ADMIN — DOCUSATE SODIUM 100 MG: 100 CAPSULE, LIQUID FILLED ORAL at 10:31

## 2020-01-01 RX ADMIN — ATORVASTATIN CALCIUM 80 MG: 40 TABLET, FILM COATED ORAL at 21:17

## 2020-01-01 RX ADMIN — ATORVASTATIN CALCIUM 80 MG: 40 TABLET, FILM COATED ORAL at 20:41

## 2020-01-01 RX ADMIN — SERTRALINE HYDROCHLORIDE 50 MG: 50 TABLET ORAL at 10:30

## 2020-01-01 RX ADMIN — TAMSULOSIN HYDROCHLORIDE 0.4 MG: 0.4 CAPSULE ORAL at 21:17

## 2020-01-01 RX ADMIN — AMPICILLIN SODIUM 2 G: 2 INJECTION, POWDER, FOR SOLUTION INTRAMUSCULAR; INTRAVENOUS at 10:33

## 2020-01-01 RX ADMIN — LORAZEPAM 0.5 MG: 2 INJECTION INTRAMUSCULAR; INTRAVENOUS at 01:05

## 2020-01-01 RX ADMIN — CETIRIZINE HYDROCHLORIDE 10 MG: 10 TABLET, FILM COATED ORAL at 08:02

## 2020-01-01 RX ADMIN — CYCLOSPORINE 1 DROP: 0.5 EMULSION OPHTHALMIC at 08:02

## 2020-01-01 RX ADMIN — IPRATROPIUM BROMIDE AND ALBUTEROL SULFATE 3 ML: 2.5; .5 SOLUTION RESPIRATORY (INHALATION) at 05:19

## 2020-01-01 RX ADMIN — AMPICILLIN SODIUM 2 G: 2 INJECTION, POWDER, FOR SOLUTION INTRAMUSCULAR; INTRAVENOUS at 08:01

## 2020-01-01 RX ADMIN — MORPHINE SULFATE 1 MG: 2 INJECTION, SOLUTION INTRAMUSCULAR; INTRAVENOUS at 12:46

## 2020-01-01 RX ADMIN — AMPICILLIN SODIUM 2 G: 2 INJECTION, POWDER, FOR SOLUTION INTRAMUSCULAR; INTRAVENOUS at 21:30

## 2020-01-01 RX ADMIN — ROFLUMILAST 500 MCG: 500 TABLET ORAL at 10:00

## 2020-01-01 RX ADMIN — AMPICILLIN SODIUM 2 G: 2 INJECTION, POWDER, FOR SOLUTION INTRAMUSCULAR; INTRAVENOUS at 04:30

## 2020-01-01 RX ADMIN — CEFTRIAXONE 2 G: 2 INJECTION, POWDER, FOR SOLUTION INTRAMUSCULAR; INTRAVENOUS at 05:28

## 2020-01-01 RX ADMIN — AMPICILLIN SODIUM 2 G: 2 INJECTION, POWDER, FOR SOLUTION INTRAMUSCULAR; INTRAVENOUS at 20:41

## 2020-01-01 RX ADMIN — ATORVASTATIN CALCIUM 80 MG: 40 TABLET, FILM COATED ORAL at 21:28

## 2020-01-01 RX ADMIN — PRIMIDONE 50 MG: 50 TABLET ORAL at 10:11

## 2020-01-01 RX ADMIN — BUDESONIDE AND FORMOTEROL FUMARATE DIHYDRATE 2 PUFF: 80; 4.5 AEROSOL RESPIRATORY (INHALATION) at 09:41

## 2020-01-01 RX ADMIN — PANTOPRAZOLE SODIUM 40 MG: 40 TABLET, DELAYED RELEASE ORAL at 06:10

## 2020-01-01 RX ADMIN — SODIUM CHLORIDE, PRESERVATIVE FREE 10 ML: 5 INJECTION INTRAVENOUS at 10:42

## 2020-01-01 RX ADMIN — CEFTRIAXONE 2 G: 2 INJECTION, POWDER, FOR SOLUTION INTRAMUSCULAR; INTRAVENOUS at 06:13

## 2020-01-01 RX ADMIN — CEFTRIAXONE 2 G: 2 INJECTION, POWDER, FOR SOLUTION INTRAMUSCULAR; INTRAVENOUS at 06:09

## 2020-01-01 RX ADMIN — BUDESONIDE AND FORMOTEROL FUMARATE DIHYDRATE 2 PUFF: 80; 4.5 AEROSOL RESPIRATORY (INHALATION) at 08:25

## 2020-01-01 RX ADMIN — ROFLUMILAST 500 MCG: 500 TABLET ORAL at 10:12

## 2020-01-01 RX ADMIN — DIATRIZOATE MEGLUMINE AND DIATRIZOATE SODIUM 15 ML: 660; 100 LIQUID ORAL; RECTAL at 08:08

## 2020-01-01 RX ADMIN — PRIMIDONE 50 MG: 50 TABLET ORAL at 08:02

## 2020-01-01 RX ADMIN — Medication: at 18:00

## 2020-01-01 RX ADMIN — BUDESONIDE AND FORMOTEROL FUMARATE DIHYDRATE 2 PUFF: 80; 4.5 AEROSOL RESPIRATORY (INHALATION) at 19:37

## 2020-01-01 RX ADMIN — DRONEDARONE 400 MG: 400 TABLET, FILM COATED ORAL at 21:53

## 2020-01-01 RX ADMIN — DRONEDARONE 400 MG: 400 TABLET, FILM COATED ORAL at 21:13

## 2020-01-01 RX ADMIN — BARIUM SULFATE 5 ML: 400 PASTE ORAL at 13:10

## 2020-01-01 RX ADMIN — HEPARIN SODIUM 5000 UNITS: 5000 INJECTION INTRAVENOUS; SUBCUTANEOUS at 14:11

## 2020-01-01 RX ADMIN — PANTOPRAZOLE SODIUM 40 MG: 40 TABLET, DELAYED RELEASE ORAL at 05:49

## 2020-01-01 RX ADMIN — CEFTRIAXONE 2 G: 2 INJECTION, POWDER, FOR SOLUTION INTRAMUSCULAR; INTRAVENOUS at 18:51

## 2020-01-01 RX ADMIN — AMPICILLIN SODIUM 2 G: 2 INJECTION, POWDER, FOR SOLUTION INTRAMUSCULAR; INTRAVENOUS at 05:17

## 2020-01-01 RX ADMIN — AMPICILLIN SODIUM 2 G: 2 INJECTION, POWDER, FOR SOLUTION INTRAMUSCULAR; INTRAVENOUS at 18:50

## 2020-01-01 RX ADMIN — PRIMIDONE 50 MG: 50 TABLET ORAL at 21:53

## 2020-01-01 RX ADMIN — HALOPERIDOL LACTATE 1 MG: 5 INJECTION INTRAMUSCULAR at 22:21

## 2020-01-01 RX ADMIN — DRONEDARONE 400 MG: 400 TABLET, FILM COATED ORAL at 21:18

## 2020-01-01 RX ADMIN — HEPARIN SODIUM 5000 UNITS: 5000 INJECTION INTRAVENOUS; SUBCUTANEOUS at 20:40

## 2020-01-01 RX ADMIN — DOCUSATE SODIUM 100 MG: 100 CAPSULE, LIQUID FILLED ORAL at 15:41

## 2020-01-01 RX ADMIN — CLOPIDOGREL BISULFATE 75 MG: 75 TABLET ORAL at 10:00

## 2020-01-01 RX ADMIN — DIPHENHYDRAMINE HYDROCHLORIDE 12.5 MG: 50 INJECTION INTRAMUSCULAR; INTRAVENOUS at 03:49

## 2020-01-01 RX ADMIN — HEPARIN SODIUM 5000 UNITS: 5000 INJECTION INTRAVENOUS; SUBCUTANEOUS at 05:49

## 2020-01-01 RX ADMIN — AMPICILLIN SODIUM 2 G: 2 INJECTION, POWDER, FOR SOLUTION INTRAMUSCULAR; INTRAVENOUS at 14:25

## 2020-01-01 RX ADMIN — DOCUSATE SODIUM 100 MG: 100 CAPSULE, LIQUID FILLED ORAL at 21:28

## 2020-01-01 RX ADMIN — ASPIRIN 81 MG 324 MG: 81 TABLET ORAL at 10:30

## 2020-01-01 RX ADMIN — HEPARIN SODIUM 5000 UNITS: 5000 INJECTION INTRAVENOUS; SUBCUTANEOUS at 05:29

## 2020-01-01 RX ADMIN — HEPARIN SODIUM 5000 UNITS: 5000 INJECTION INTRAVENOUS; SUBCUTANEOUS at 14:25

## 2020-01-01 RX ADMIN — POLYETHYLENE GLYCOL 3350 17 G: 17 POWDER, FOR SOLUTION ORAL at 21:28

## 2020-01-01 RX ADMIN — CYCLOSPORINE 1 DROP: 0.5 EMULSION OPHTHALMIC at 21:18

## 2020-01-01 RX ADMIN — ONDANSETRON 4 MG: 2 INJECTION INTRAMUSCULAR; INTRAVENOUS at 09:44

## 2020-01-01 RX ADMIN — BUDESONIDE AND FORMOTEROL FUMARATE DIHYDRATE 2 PUFF: 80; 4.5 AEROSOL RESPIRATORY (INHALATION) at 10:31

## 2020-01-01 RX ADMIN — SODIUM CHLORIDE, PRESERVATIVE FREE 10 ML: 5 INJECTION INTRAVENOUS at 08:03

## 2020-01-01 RX ADMIN — IPRATROPIUM BROMIDE 0.5 MG: 0.5 SOLUTION RESPIRATORY (INHALATION) at 10:08

## 2020-01-01 RX ADMIN — CYCLOSPORINE 1 DROP: 0.5 EMULSION OPHTHALMIC at 21:53

## 2020-01-01 RX ADMIN — DIPHENHYDRAMINE HYDROCHLORIDE 25 MG: 25 CAPSULE ORAL at 22:55

## 2020-01-01 RX ADMIN — BARIUM SULFATE 20 ML: 400 SUSPENSION ORAL at 13:10

## 2020-01-01 RX ADMIN — LORAZEPAM 1 MG: 2 INJECTION INTRAMUSCULAR; INTRAVENOUS at 16:53

## 2020-01-01 RX ADMIN — HEPARIN SODIUM 5000 UNITS: 5000 INJECTION INTRAVENOUS; SUBCUTANEOUS at 21:13

## 2020-01-01 RX ADMIN — AMPICILLIN SODIUM 2 G: 2 INJECTION, POWDER, FOR SOLUTION INTRAMUSCULAR; INTRAVENOUS at 05:28

## 2020-01-01 RX ADMIN — PANTOPRAZOLE SODIUM 40 MG: 40 TABLET, DELAYED RELEASE ORAL at 05:29

## 2020-01-01 RX ADMIN — CYCLOSPORINE 1 DROP: 0.5 EMULSION OPHTHALMIC at 10:39

## 2020-01-01 RX ADMIN — SERTRALINE HYDROCHLORIDE 50 MG: 50 TABLET ORAL at 09:59

## 2020-01-01 RX ADMIN — DOCUSATE SODIUM 100 MG: 100 CAPSULE, LIQUID FILLED ORAL at 21:00

## 2020-01-01 RX ADMIN — CLOPIDOGREL BISULFATE 75 MG: 75 TABLET ORAL at 09:59

## 2020-01-01 RX ADMIN — ACETAMINOPHEN 650 MG: 325 TABLET ORAL at 00:25

## 2020-01-01 RX ADMIN — TAMSULOSIN HYDROCHLORIDE 0.4 MG: 0.4 CAPSULE ORAL at 21:53

## 2020-01-01 RX ADMIN — MORPHINE SULFATE 2 MG: 2 INJECTION, SOLUTION INTRAMUSCULAR; INTRAVENOUS at 19:16

## 2020-01-01 RX ADMIN — SODIUM CHLORIDE, PRESERVATIVE FREE 10 ML: 5 INJECTION INTRAVENOUS at 09:37

## 2020-01-01 RX ADMIN — ROFLUMILAST 500 MCG: 500 TABLET ORAL at 08:02

## 2020-01-01 RX ADMIN — AZELASTINE HYDROCHLORIDE 2 SPRAY: 137 SPRAY, METERED NASAL at 10:02

## 2020-01-01 RX ADMIN — SERTRALINE HYDROCHLORIDE 50 MG: 50 TABLET ORAL at 10:00

## 2020-01-01 RX ADMIN — ROFLUMILAST 500 MCG: 500 TABLET ORAL at 14:06

## 2020-01-01 RX ADMIN — AMPICILLIN SODIUM 2 G: 2 INJECTION, POWDER, FOR SOLUTION INTRAMUSCULAR; INTRAVENOUS at 05:49

## 2020-01-01 RX ADMIN — TAMSULOSIN HYDROCHLORIDE 0.4 MG: 0.4 CAPSULE ORAL at 21:13

## 2020-01-01 RX ADMIN — TAMSULOSIN HYDROCHLORIDE 0.4 MG: 0.4 CAPSULE ORAL at 21:28

## 2020-01-01 RX ADMIN — SODIUM CHLORIDE, PRESERVATIVE FREE 10 ML: 5 INJECTION INTRAVENOUS at 10:00

## 2020-01-01 RX ADMIN — VANCOMYCIN HYDROCHLORIDE 1000 MG: 1 INJECTION, SOLUTION INTRAVENOUS at 17:27

## 2020-01-01 RX ADMIN — CEFTRIAXONE 2 G: 2 INJECTION, POWDER, FOR SOLUTION INTRAMUSCULAR; INTRAVENOUS at 04:34

## 2020-01-01 RX ADMIN — VANCOMYCIN HYDROCHLORIDE 1000 MG: 1 INJECTION, SOLUTION INTRAVENOUS at 05:20

## 2020-01-01 RX ADMIN — PRIMIDONE 50 MG: 50 TABLET ORAL at 10:00

## 2020-01-01 RX ADMIN — AMPICILLIN SODIUM 2 G: 2 INJECTION, POWDER, FOR SOLUTION INTRAMUSCULAR; INTRAVENOUS at 06:13

## 2020-01-01 RX ADMIN — AMPICILLIN SODIUM 2 G: 2 INJECTION, POWDER, FOR SOLUTION INTRAMUSCULAR; INTRAVENOUS at 00:25

## 2020-01-01 RX ADMIN — MORPHINE SULFATE 1 MG: 2 INJECTION, SOLUTION INTRAMUSCULAR; INTRAVENOUS at 01:04

## 2020-01-01 RX ADMIN — AMPICILLIN SODIUM 2 G: 2 INJECTION, POWDER, FOR SOLUTION INTRAMUSCULAR; INTRAVENOUS at 17:03

## 2020-01-01 RX ADMIN — VANCOMYCIN HYDROCHLORIDE 1750 MG: 10 INJECTION, POWDER, LYOPHILIZED, FOR SOLUTION INTRAVENOUS at 16:28

## 2020-01-01 RX ADMIN — SODIUM CHLORIDE, PRESERVATIVE FREE 10 ML: 5 INJECTION INTRAVENOUS at 21:14

## 2020-01-01 RX ADMIN — SODIUM CHLORIDE, PRESERVATIVE FREE 10 ML: 5 INJECTION INTRAVENOUS at 21:17

## 2020-01-01 RX ADMIN — PRIMIDONE 50 MG: 50 TABLET ORAL at 14:06

## 2020-01-01 RX ADMIN — BUDESONIDE AND FORMOTEROL FUMARATE DIHYDRATE 2 PUFF: 80; 4.5 AEROSOL RESPIRATORY (INHALATION) at 20:14

## 2020-01-01 RX ADMIN — HEPARIN SODIUM 5000 UNITS: 5000 INJECTION INTRAVENOUS; SUBCUTANEOUS at 15:42

## 2020-01-01 RX ADMIN — LORAZEPAM 0.5 MG: 2 INJECTION INTRAMUSCULAR; INTRAVENOUS at 16:09

## 2020-01-01 RX ADMIN — HEPARIN SODIUM 5000 UNITS: 5000 INJECTION INTRAVENOUS; SUBCUTANEOUS at 21:18

## 2020-01-01 RX ADMIN — IPRATROPIUM BROMIDE AND ALBUTEROL SULFATE 3 ML: 2.5; .5 SOLUTION RESPIRATORY (INHALATION) at 15:32

## 2020-01-01 RX ADMIN — ASPIRIN 81 MG 324 MG: 81 TABLET ORAL at 09:59

## 2020-01-01 RX ADMIN — HEPARIN SODIUM 5000 UNITS: 5000 INJECTION INTRAVENOUS; SUBCUTANEOUS at 22:45

## 2020-01-01 RX ADMIN — SODIUM CHLORIDE, PRESERVATIVE FREE 10 ML: 5 INJECTION INTRAVENOUS at 20:37

## 2020-01-01 RX ADMIN — AMPICILLIN SODIUM 2 G: 2 INJECTION, POWDER, FOR SOLUTION INTRAMUSCULAR; INTRAVENOUS at 11:01

## 2020-01-01 RX ADMIN — SODIUM CHLORIDE, PRESERVATIVE FREE 10 ML: 5 INJECTION INTRAVENOUS at 10:01

## 2020-01-02 PROBLEM — M62.81 MUSCLE WEAKNESS OF LEFT UPPER EXTREMITY: Status: ACTIVE | Noted: 2020-01-01

## 2020-01-02 PROBLEM — R47.01 APHASIA: Status: ACTIVE | Noted: 2020-01-01

## 2020-01-02 NOTE — ED NOTES
Called [email or web address] at this time for Dr. Goodman. Dr. Kirk will call back.     Nettie Longoria  01/02/20 6524

## 2020-01-02 NOTE — H&P
[facility] Medicine Services  HISTORY AND PHYSICAL    Patient Name: Alessandro Mendiola  : 1940  MRN: [id number]  Primary Care Physician: Silvio Jacobson MD  Date of admission: 2020      Subjective   Subjective     Chief Complaint:  Left upper extremity weakness     HPI:  Alessandro Mendiola is a 79 y.o. 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  Patient's wife helps with history, stating that the patient got out of bed and went to the restroom and when he came back to sit down in his recliner he was unable to hold different objects with his left hand, dropping them.  Then he noted to her that his left arm felt weak a little numb and tingly.  Wife noticed that the patient's speech was slurred and called her son to help get patient to the ER. In ED, labs mostly unremarkable. CT head without showed no acute infarct, and CXR with no acute cardiopulmonary illness. NIHSS on arrival to ED was 9, then 3 prior to transfer to [facility] for higher level of care. Patient will be admitted to the hospital medicine group for further evaluation and treatment.     Review of Systems   Constitutional: Positive for activity change. Negative for appetite change, chills, diaphoresis, fatigue, fever and unexpected weight change.   HENT: Positive for hearing loss, trouble swallowing and voice change. Negative for congestion, dental problem, drooling, facial swelling and mouth sores.    Eyes: Negative for photophobia and visual disturbance.   Respiratory: Positive for cough, shortness of breath and wheezing. Negative for choking and chest tightness.    Cardiovascular: Negative for chest pain, palpitations and leg swelling.   Gastrointestinal:  Negative for abdominal distention, abdominal pain, constipation, diarrhea, nausea and vomiting.   Endocrine: Negative for cold intolerance and heat intolerance.   Genitourinary: Negative for difficulty urinating, dysuria and flank pain.   Musculoskeletal: Positive for gait problem. Negative for arthralgias, back pain, joint swelling and myalgias.   Skin: Negative for color change, pallor, rash and wound.   Neurological: Positive for speech difficulty, weakness and numbness. Negative for dizziness, tremors, syncope, facial asymmetry, light-headedness and headaches.   Hematological: Negative for adenopathy. Bruises/bleeds easily.   Psychiatric/Behavioral: Negative for agitation, behavioral problems and confusion. The patient is not nervous/anxious.         All other systems reviewed and are negative.     Personal History     Past Medical History:   Diagnosis Date   • Alcohol abuse    • Aneurysm of thoracic aorta (CMS/HCC)    • Anxiety    • Ascending aortic aneurysm (CMS/HCC)     4.8 to 4.9 cm    • Atherosclerotic heart disease    • Atrial fibrillation (CMS/HCC)    • Blood thinned due to long-term anticoagulant use    • Body mass index (BMI) 20.0-20.9, adult    • CAD (coronary artery disease)    • Cancer (CMS/HCC)     skin   • Cardiac pacemaker    • Cellulitis    • Chronic bronchitis (CMS/HCC)    • Chronic cough    • COPD (chronic obstructive pulmonary disease) (CMS/HCC)    • Depression    • Difficulty breathing    • Emphysema lung (CMS/HCC)    • Encounter for long-term current use of medication    • Encounter for screening for malignant neoplasm of prostate    • Fatigue    • Fingertip amputation    • TAMIKO (generalized anxiety disorder)    • History of alcohol abuse    • History of cardiac catheterization    • History of chest x-ray 02/11/2014    Chronic interstitial changes seen throughout the lung fields w/ no radiographic evidence of acute parenchymal disease.No definite LT-sided rib fracture present.   • History of  chest x-ray 01/10/2014    No acute cardiopulmonary process.Dual lead LT subclavian pacemaker is in place.Aortic ectasia. Hyperinflation of lungs, suggesting COPD   • History of chest x-ray 10/01/2012    Ill-defined RT middle lobe opacity which likley reflects a pneumonia. FU to radiographic resolution is recommended.   • History of Doppler echocardiogram    • History of echocardiogram 09/26/2013    EF 55-60%. Mod concentric LVH. Abnormal LT VT diastolic filling is observed, consistent w/ impaired relaxation.Mild MR/TR. Trace FL. RVSP 44 mmHg.   • History of stress test     ECG performed   • HTN (hypertension)    • HX: long term anticoagulant use     Blood thinned due to long-term anticoagulant use (V58.61) (Z79.01)   • Hypercholesterolemia    • LVH (left ventricular hypertrophy)    • Mitral and aortic valve disease    • Myocardial infarction (CMS/HCC)    • Nonvenomous insect bite of multiple sites    • Onychomycosis    • Pacemaker at end of battery life    • Pneumonia    • Pneumothorax    • Pre-syncope    • Pulmonary nodule    • Rib pain on left side    • Sick sinus syndrome (CMS/HCC)    • SOB (shortness of breath)    • Upper respiratory infection    • Vitamin D deficiency        Past Surgical History:   Procedure Laterality Date   • AMPUTATION  08/31/2015    metacarpal and index finger   • CARDIAC PACEMAKER PLACEMENT  06/01/2008   • CATARACT EXTRACTION     • CORONARY STENT PLACEMENT      History of Cath Placement Of Stent 1  · Coronary stents   • EYE SURGERY     • SKIN CANCER EXCISION         Family History: family history includes Coronary artery disease in an other family member; Diabetes in an other family member; Hypertension in an other family member. Otherwise pertinent FHx was reviewed and unremarkable.     Social History:  reports that he quit smoking about 12 years ago. He has a 135.00 pack-year smoking history. He has never used smokeless tobacco. He reports that he does not drink alcohol or use  drugs.  Social History     Social History Narrative   • Not on file       Medications:  Available home medication information reviewed.  Medications Prior to Admission   Medication Sig Dispense Refill Last Dose   • azelastine (ASTELIN) 0.1 % nasal spray 2 sprays into the nostril(s) as directed by provider 2 (Two) Times a Day. 1 each 3 Taking   • B Complex Vitamins (VITAMIN B COMPLEX PO) Take 1 tablet by mouth Daily.   Taking   • budesonide-formoterol (SYMBICORT) 80-4.5 MCG/ACT inhaler Inhale 2 puffs 2 (Two) Times a Day. 3 inhaler 1 Taking   • dilTIAZem (CARDIZEM) 120 MG tablet Take 1 tablet by mouth Daily. 90 tablet 3 Taking   • dronedarone (MULTAQ) 400 MG tablet Take 1 tablet by mouth 2 (Two) Times a Day. 180 tablet 1 Taking   • fluorometholone (FML) 0.1 % ophthalmic suspension INSTILL 1 DROP INTO EACH EYE THREE TIMES DAILY  2 Taking   • guaiFENesin (MUCINEX) 600 MG 12 hr tablet Take 1 tablet by mouth Every 12 (Twelve) Hours. (Patient taking differently: Take 600 mg by mouth As Needed.) 10 tablet 0 Taking   • hydrochlorothiazide (HYDRODIURIL) 25 MG tablet Take 1 tablet by mouth Daily. 90 tablet 3 Taking   • ipratropium (ATROVENT) 0.02 % nebulizer solution Take 2.5 mL by nebulization 4 (Four) Times a Day As Needed for Wheezing or Shortness of Air. 300 mL 11 Taking   • Multiple Vitamin (MULTI-VITAMINS PO) Take  by mouth.   Taking   • omeprazole (priLOSEC) 40 MG capsule Take 1 capsule by mouth Daily. 30 capsule 2    • OXYGEN-HELIUM IN Oxygen; Patient Sig: Oxygen patient is to get home oxygen through  company of his choice at 2L/Min, as well as portable oxygen at the same rate.; 1; 0; 22-Mar-2016; Active   Taking   • primidone (MYSOLINE) 50 MG tablet Take 1 tablet by mouth 2 (Two) Times a Day. 60 tablet 5 Taking   • Respiratory Therapy Supplies (FLUTTER) device 1 each Every 6 (Six) Hours While Awake. 1 each 0 Taking   • RESTASIS 0.05 % ophthalmic emulsion    Taking   • rivaroxaban (XARELTO) 20 MG tablet Take 1 tablet  by mouth Daily. 30 tablet 12 Taking   • roflumilast (DALIRESP) 500 MCG tablet tablet Take 1 tablet by mouth Daily. 90 tablet 1 Taking   • sertraline (ZOLOFT) 50 MG tablet Take 1 tablet by mouth Daily. 90 tablet 3 Taking   • tamsulosin (FLOMAX) 0.4 MG capsule 24 hr capsule Take 1 capsule by mouth Every Night. 30 capsule 11 Taking   • Tetrahydrozoline-Zn Sulfate (ALLERGY RELIEF EYE DROPS OP) Apply 1 drop to eye(s) as directed by provider 2 (Two) Times a Day As Needed.   Taking   • tiotropium bromide monohydrate (SPIRIVA RESPIMAT) 2.5 MCG/ACT aerosol solution inhaler Inhale 2 puffs Daily. 1 inhaler 5 Taking   • VENTOLIN  (90 Base) MCG/ACT inhaler Inhale 2 puffs Every 6 (Six) Hours As Needed for Wheezing. 1 inhaler 3 Taking       Allergies   Allergen Reactions   • Doxycycline Shortness Of Breath       Objective   Objective     Vital Signs:   Temp:  [98.8 °F (37.1 °C)-100.6 °F (38.1 °C)] 100.6 °F (38.1 °C)  Heart Rate:  [78-92] 78  Resp:  [18-20] 18  BP: (109-139)/(59-83) 135/64   Total (NIH Stroke Scale): 4    Physical Exam   Constitutional: Awake, alert, chronically ill   Eyes: PERRLA, sclerae anicteric, no conjunctival injection  HENT: NCAT, mucous membranes moist  Neck: Supple, no thyromegaly, no lymphadenopathy, trachea midline  Respiratory: Bibasilar rhonchi with exp wheezing, moderately labored respirations on 5LNC (baseline)/ barrel chested   Cardiovascular: Paced, no murmurs, rubs, or gallops, palpable pedal pulses bilaterally  Gastrointestinal: Positive bowel sounds, soft, nontender, nondistended  Musculoskeletal: No bilateral ankle edema, no clubbing or cyanosis to extremities  Psychiatric: Appropriate affect, cooperative, anxious   Neurologic: Oriented x 3, LUE weakness, Cranial Nerves grossly intact to confrontation, speech garbled   Skin: No rashes, multiple stages of bruising on upper extremities     Results Reviewed:  I have personally reviewed current lab and radiology data.    Results from last 7  days   Lab Units 01/02/20  1344   WBC 10*3/mm3 9.75   HEMOGLOBIN g/dL 9.4*   HEMATOCRIT % 32.7*   PLATELETS 10*3/mm3 166   INR  2.13*     Results from last 7 days   Lab Units 01/02/20  1344   SODIUM mmol/L 139   POTASSIUM mmol/L 3.7   CHLORIDE mmol/L 96*   CO2 mmol/L 28.1   BUN mg/dL 13   CREATININE mg/dL 0.76   GLUCOSE mg/dL 193*   CALCIUM mg/dL 9.1   ALT (SGPT) U/L 11   AST (SGOT) U/L 23   TROPONIN T ng/mL <0.010     Estimated Creatinine Clearance: 76.5 mL/min (by C-G formula based on SCr of 0.76 mg/dL).  Brief Urine Lab Results     None        Imaging Results (Last 24 Hours)     ** No results found for the last 24 hours. **             Assessment/Plan   Assessment & Plan     Active Hospital Problems    Diagnosis POA   • Muscle weakness of left upper extremity [M62.81] Unknown   • Aphasia [R47.01] Unknown   • COPD exacerbation (CMS/HCC) [J44.1] Yes   • Aneurysm of thoracic aorta (CMS/HCC) [I71.2] Yes   • Coronary arteriosclerosis in native artery [I25.10] Yes   • Paroxysmal atrial fibrillation (CMS/HCC) [I48.0] Yes   • Sick sinus syndrome (CMS/HCC) [I49.5] Yes       Initiate ischemic stroke protocol  Cannot have MRI per wife  CTA head and neck tonight  Will possibly need carotid duplex in AM pending CTA results-- NOT ordered yet    Will repeat CT without contrast in AM   Allow permissive HTN, monitor and call provider if SBP > 150 due to multiple aneurysms   CT perfusion tonight   Continue home meds  PT/OT/SLP evaluation  Neurology consult in AM       DVT prophylaxis:  Xarelto    CODE STATUS:    Code Status and Medical Interventions:   Ordered at: 01/02/20 1916     Limited Support to NOT Include:    Intubation    Cardioversion/Defibrillation     Level Of Support Discussed With:    Patient     Code Status:    No CPR     Medical Interventions (Level of Support Prior to Arrest):    Limited       Admission Status:  I believe this patient meets INPATIENT status due to CVA.  I feel patient’s risk for adverse outcomes  and need for care warrant INPATIENT evaluation and I predict the patient’s care encounter to likely last beyond 2 midnights.    Electronically signed by DAVID Betancourt, 01/02/20, 6:21 PM.      Attending   Admission Attestation       I have seen and examined the patient, performing an independent face-to-face diagnostic evaluation with plan of care reviewed and developed with the advanced practice clinician (APC).      Brief Summary Statement:   Alessandro Mendiola is a 79 y.o. male sent from [facility] with left sided weakness/numbness. Per patient and family this started suddenly at 1200 today. At that time was numb on left side, unable to use COPD inhalers effectively, and had difficulty speaking. These symptoms are still present. Patient has known a fib and reports taking xarelto as instructed. Upon arrival to floor was febrile to 100.6 and had episode of vomiting. Has no infectious complaints at this time.    Remainder of detailed HPI is as noted by APC and has been reviewed and/or edited by me for completeness.    Attending Physical Exam:  Constitutional: Awake, alert  Eyes: PERRLA, sclerae anicteric, no conjunctival injection  HENT: NCAT, mucous membranes moist  Neck: Supple, no thyromegaly, no lymphadenopathy, trachea midline  Respiratory: Clear to auscultation bilaterally, nonlabored respirations   Cardiovascular: RRR, no murmurs, rubs, or gallops, palpable pedal pulses bilaterally  Gastrointestinal: Positive bowel sounds, soft, nontender, nondistended  Musculoskeletal: No bilateral ankle edema, no clubbing or cyanosis to extremities  Psychiatric: Appropriate affect, cooperative  Neurologic: Oriented x 3, diminished sensation on left, mild dysarthria, mild left facial droop.  Skin: No rashes    CT head OSH personally reviewed without acute disease. Agree with interpretation.    Brief Assessment/Plan :    80 y/o male with a fib on xarelto presenting from [facility] with left sided weakness, dysarthria concerning for CVA.  Also found to be febrile here upon arrival.  --Start asa suppository now. Resume xarelto and lipitor when appropriate (failed bedside swallow.)  --Proceed with CT perfusion, CTA head and neck now. Cannot undergo MRI due to PPM. Echocardiogram tomorrow along with neurology eval, PT/OT, SLP.  --Blood cultures, cxr, u/a, flu swab for fever. No abx for now.     See detailed assessment and plan developed with APC which I have reviewed and/or edited for completeness.    Electronically signed by Georgina Pompa II, DO, 01/02/20, 7:37 PM.

## 2020-01-02 NOTE — ED NOTES
Located within [facility] called back at this time for report. Call transferred to PROSPER Lau Lindsey D  01/02/20 [id number]

## 2020-01-02 NOTE — ED PROVIDER NOTES
Subjective   History of Present Illness    Chief Complaint: Stroke symptoms  History of Present Illness: 79-year-old chronically ill male history of end-stage COPD, 4 to 5 L nasal cannula at home, history of atrial fibrillation on chronic anticoagulation Xarelto, history of aortic aneurysm, comes with above complaint of primarily left upper extremity loss of control which began at 12:20 PM today. Also had difficulty talking  Onset: 12:20 PM  Duration: Persistent  Exacerbating / Alleviating factors: None  Associated symptoms: chronic shortness of breath      Nurses Notes reviewed and agree, including vitals, allergies, social history and prior medical history.     REVIEW OF SYSTEMS: All systems reviewed and not pertinent unless noted.    Positive for: Weakness affecting left upper extremity,    Negative for: Fever hemoptysis syncope  Review of Systems    Past Medical History:   Diagnosis Date   • Alcohol abuse    • Aneurysm of thoracic aorta (CMS/HCC)    • Anxiety    • Ascending aortic aneurysm (CMS/HCC)     4.8 to 4.9 cm    • Atherosclerotic heart disease    • Atrial fibrillation (CMS/HCC)    • Blood thinned due to long-term anticoagulant use    • Body mass index (BMI) 20.0-20.9, adult    • CAD (coronary artery disease)    • Cancer (CMS/HCC)     skin   • Cardiac pacemaker    • Cellulitis    • Chronic bronchitis (CMS/HCC)    • Chronic cough    • COPD (chronic obstructive pulmonary disease) (CMS/HCC)    • Depression    • Difficulty breathing    • Emphysema lung (CMS/HCC)    • Encounter for long-term current use of medication    • Encounter for screening for malignant neoplasm of prostate    • Fatigue    • Fingertip amputation    • TAMIKO (generalized anxiety disorder)    • History of alcohol abuse    • History of cardiac catheterization    • History of chest x-ray 02/11/2014    Chronic interstitial changes seen throughout the lung fields w/ no radiographic evidence of acute parenchymal disease.No definite LT-sided rib  fracture present.   • History of chest x-ray 01/10/2014    No acute cardiopulmonary process.Dual lead LT subclavian pacemaker is in place.Aortic ectasia. Hyperinflation of lungs, suggesting COPD   • History of chest x-ray 10/01/2012    Ill-defined RT middle lobe opacity which likley reflects a pneumonia. FU to radiographic resolution is recommended.   • History of Doppler echocardiogram    • History of echocardiogram 09/26/2013    EF 55-60%. Mod concentric LVH. Abnormal LT VT diastolic filling is observed, consistent w/ impaired relaxation.Mild MR/TR. Trace PA. RVSP 44 mmHg.   • History of stress test     ECG performed   • HTN (hypertension)    • HX: long term anticoagulant use     Blood thinned due to long-term anticoagulant use (V58.61) (Z79.01)   • Hypercholesterolemia    • LVH (left ventricular hypertrophy)    • Mitral and aortic valve disease    • Myocardial infarction (CMS/HCC)    • Nonvenomous insect bite of multiple sites    • Onychomycosis    • Pacemaker at end of battery life    • Pneumonia    • Pneumothorax    • Pre-syncope    • Pulmonary nodule    • Rib pain on left side    • Sick sinus syndrome (CMS/HCC)    • SOB (shortness of breath)    • Upper respiratory infection    • Vitamin D deficiency        Allergies   Allergen Reactions   • Doxycycline Shortness Of Breath       Past Surgical History:   Procedure Laterality Date   • AMPUTATION  08/31/2015    metacarpal and index finger   • CARDIAC PACEMAKER PLACEMENT  06/01/2008   • CATARACT EXTRACTION     • CORONARY STENT PLACEMENT      History of Cath Placement Of Stent 1  · Coronary stents   • EYE SURGERY     • SKIN CANCER EXCISION         Family History   Problem Relation Age of Onset   • Coronary artery disease Other    • Diabetes Other    • Hypertension Other        Social History     Socioeconomic History   • Marital status:      Spouse name: Not on file   • Number of children: Not on file   • Years of education: Not on file   • Highest education  level: Not on file   Tobacco Use   • Smoking status: Former Smoker     Packs/day: 3.00     Years: 45.00     Pack years: 135.00     Last attempt to quit: 2008     Years since quittin.0   • Smokeless tobacco: Never Used   Substance and Sexual Activity   • Alcohol use: No   • Drug use: No   • Sexual activity: Defer           Objective   Physical Exam      GENERAL APPEARANCE: Well developed, well nourished, in no acute distress.  VITAL SIGNS: per nursing, reviewed and noted  SKIN: no rashes, ulcerations or petechiae.  Head: Normocephalic, atraumatic.   EYES: perrla. EOMI.  ENT:  TM clear, posterior pharynx patent.  LUNGS: Decreased breath sounds throughout.  Minimal wheezes.  CARDIOVASCULAR:  regular rate and rhythm, no murmurs.  Good Peripheral pulses.  ABDOMEN: Soft, nontender, normal bowel sounds. No hernia. No ascites.  MUSCULOSKELETAL:  No tenderness. Full ROM. Strength and tone normal.  NEUROLOGIC: Alert, oriented x 3.  See NIH stroke scale below, mild left upper extremity dysmetria with good  strength  NECK: Supple, symmetric. No tenderness, no masses. Full ROM  Back: full rom, no paraspinal spasm. No CVA tenderness.   PSYCH: appropriate affect,  : no bladder tenderness or distention, no CVA tenderness      3:01 PM  After return from CT   NIH Stroke Scale/Score (NIHSS) from [email or web address]  on 2020  ** All calculations should be rechecked by clinician prior to use **    RESULT SUMMARY:  3 points  NIH Stroke Scale      INPUTS:  1A: Level of consciousness --> 0 = Alert; keenly responsive  1B: Ask month and age --> 0 = Both questions right  1C: 'Blink eyes' & 'squeeze hands' --> 0 = Performs both tasks  2: Horizontal extraocular movements --> 0 = Normal  3: Visual fields --> 0 = No visual loss  4: Facial palsy --> 0 = Normal symmetry  5A: Left arm motor drift --> 1 = Drift, but doesn't hit bed  5B: Right arm motor drift --> 0 = No drift for 10 seconds  6A: Left leg motor drift --> 0 = No drift for 5  seconds  6B: Right leg motor drift --> 0 = No drift for 5 seconds  7: Limb Ataxia --> 1 = Ataxia in 1 Limb  8: Sensation --> 1 = Mild-moderate loss: less sharp/more dull   9: Language/aphasia --> 0 = Normal; no aphasia  10: Dysarthria --> 0 = Normal  11: Extinction/inattention --> 0 = No abnormality    Procedures     No attending provider procedures were performed      ED Course         EKG interpreted by me reveals sinus rhythm rate of 90.  Low voltage with no acute ischemic changes or ectopy.                                      MDM  Number of Diagnoses or Management Options  Chronic obstructive pulmonary disease, unspecified COPD type (CMS/HCC):   Ischemic stroke (CMS/HCC):      Amount and/or Complexity of Data Reviewed  Clinical lab tests: reviewed and ordered  Tests in the radiology section of CPT®: reviewed and ordered  Decide to obtain previous medical records or to obtain history from someone other than the patient: yes  Obtain history from someone other than the patient: yes  Review and summarize past medical records: yes  Discuss the patient with other providers: yes  Independent visualization of images, tracings, or specimens: yes    Risk of Complications, Morbidity, and/or Mortality  Presenting problems: moderate  Diagnostic procedures: moderate  Management options: low    Critical Care  Total time providing critical care: 30-74 minutes    Patient Progress  Patient progress: improved    Exclusion criteria for TPA :Patient reports last use of xarelto last night. Improving symptoms, with initial nih stroke scale of 9 per nursing calculation, 3 after return from Ct.   Hx. Of pacemaker, copd  Discussed with Dr. Kirk, on call neuro at [facility], will accept.   Final diagnoses:   Ischemic stroke (CMS/HCC)   Chronic obstructive pulmonary disease, unspecified COPD type (CMS/HCC)            Yunier Goodman,   01/02/20 1507

## 2020-01-02 NOTE — ED NOTES
NEURO ASSESSMENT AND NIH STROKE SCALE INITIALLY DONE AT 1315, CHARTED AT 1352.     Judi Garrido RN  01/02/20 [id number]

## 2020-01-02 NOTE — NURSING NOTE
"  ACC REVIEW REPORT: [facility]        PATIENT NAME: Alessandro Mendiola    PATIENT ID: [id number]    BED: [id number]    BED TYPE: TELE    BED GIVEN TO: RAFA MUSA RN     TIME BED GIVEN: 1430    YOB: 1940    AGE: 79 YRS    GENDER: MALE    PREVIOUS ADMIT TO [facility]:     PREVIOUS ADMISSION DATE:     PATIENT CLASS:     TODAY'S DATE: 1/2/2020    TRANSFER DATE: 01/02/2020    ETA:     TRANSFERRING FACILITY: [facility]    TRANSFERRING FACILITY PHONE # : [phone]    TRANSFERRING MD: DR KELLER    DATE/TIME REQUEST RECEIVED: 01/02/2020    [facility] RN: KYLAH CHENG    REPORT FROM:     TIME REPORT TAKEN: 1440    DIAGNOSIS: CVA    REASON FOR TRANSFER TO [facility]: HIGHER LEVEL CARE    TRANSPORTATION: GROUND    CLINICAL REASON FOR TRANSFER TO [facility]: PATIENT WAS HOME AT NOON AND AT 12:10, LOST THE LEFT SIDE OF HIS BODY.  HE SPEECH WAS ALSO SLURRED.  IN THE ER, HIS SPEECH WAS FRAGMENTED AND HIS INITIAL NIH WAS 9, NOW IT'S \"3.\"  HIS LEFT ARM IS FLACCID AND HIS SPEECH HAS IMPROVED BUT NOT COMPLETELY.  HE TAKES XARELTO 20 MG FOR AFIB AND IS PACED ON THE MONITOR.  CT HEAD SHOWED NO ACUTE PROCESS.  HE IS ON 5LNC ALL THE TIME, EXCEPT WHEN HE'S ON THE CONCENTRATOR, HE'S ON 4LNC.  WHEN HE TALKS, HIS 02SAT DROPS TO THE LOWER 80'S BUT WHEN HE DOESN'T-IT'S 94-96%.  HE'S HAD DUONEBS IN THE ER.  HIS PH IS 7.44, HC03 IS 29.7.  HE HAS A LONG HISTORY WHICH INCLUDES COPD, AAA MEASURING 4.8-4.9 CM, PACEMAKER, CARDIAC STENT, PNEUMOTHORAX, SOB, MITRAL AND AORTIC VALVE DISEASE.    HISTORY OF SMOKING 3 PACKS A DAY FOR 45 YEARS, HAS QUIT FOR 8 YEARS.       CLINICAL INFORMATION    HEIGHT:     WEIGHT: 69.9 KG    ALLERGIES: DOXYCYCLINE    ROCK:     INFECTIOUS DISEASE:     ISOLATION:     1ST VITAL SIGNS:   TIME:   TEMP:   PULSE:   B/P:   RESP:     LAST VITAL SIGNS:  TIME: 1430  TEMP: 98.8  PULSE:   B/P: 109/82  RESP:18-20   94% ON 5LNC     LAB INFORMATION:     CULTURE INFORMATION:     MEDS/IV FLUIDS: # 20 RFA                                         HAS " RECEIVED NO MEDS      CARDIAC SYSTEM:    CHEST PAIN:     RATE:     SCALE:     RHYTHM: PACED    Is patient taking or has patient been given any drugs that could increase bleeding? YES  (Plavix, Brilinta, Effient, Eliquis, Xarelto, Warfarin, Integrilin, Angiomax)    DRUG:  XARELTO 20 MG Q D    CARDIAC ENZYMES:    DATE:   TIME:   CK:   CKMB:   DANUTA:   TROP:     DATE:   TIME:   CK:   CKMB:   DANUTA:   TROP:         SITE:   SIZE:   DATE INSERTED:         VASOSEAL:    SITE:   DATE INSERTED:     EXTERNAL PACEMAKER:         CARDIAC NOTES:       RESPIRATORY SYSTEM:    LUNG SOUNDS:    CLEAR:   CRACKLES:   WHEEZES:   RHONCHI:   DIMINISHED:     ABG DATE:         ABG TIME:     ABG RESULTS:    PH:   PO2:   PCO2:   HCO3:   O2 SAT:         OXYGEN:     O2 SAT:     ADMINISTRATION ROUTE:     IMAGING FINDINGS:     PNEUMO LOCATION:     PNEUMO SIZE:     PNEUMO CHEST TUBE SEAL TYPE:     RADIOLOGY RESULTS:     RESPIRATORY STATUS:       CNS/MUSCULOSKELETAL      LAST KNOWN WELL:   1210 TODAY    LEFT ARM IS FLACCID  SPEECH IS SLURRED    NIHSS    Survey Item  0: Means Alert  1: Drowsy or Answer Correctly  2: Incorrect, Forced, Can't Resist Gravity  3: Complete or No Effort  4: No Movement  NT: Not Testable Acceptable As Noted Above    1A: Level of Consciousness (alert, drowsy, etc) :     1B: LOC Questions (month, age) :     1C: LOC Commands (open/close eyes, make a fist & let go):     2:  Best Gaze (eyes open-pt follows examiner's fingers or face):     3:  Visual (introduce visual stimulus/threat to pt's visual field quad. Cover 1 eye and hold up fingers in all 4 quadrants) :     4.  Facial Palsy (show teeth, raise eyebrows and squeeze eyes tightly shut):     5A: Motor Arm-Left (elevate extremity to 90 degrees and score drift/movement.  Count to 10 aloud and use fingers for visual cue):     5B:  Motor Arm-Right (elevate extremity to 90 degrees and score drift/movement.  Count to 10 aloud and use fingers for visual cue):     6A:  Motor Leg-Left  (elevate extremity to 30 degrees and score drift/movement.  Count to 5 out loud and use fingers for visual cue):     6B:  Motor Leg-Right (elevate extremity to 30 degrees and score drift/movement.  Count to 5 out loud and use fingers for visual cue):     7:  Limb Ataxia- finger to nose, heel down shin:     8:  Sensory- pin prick to face, arms, trunk, and legs. Compare sharpness side to side:     9:  Best Language- name, items, describe picture, and read sentences.  Do not forget glasses if they normally wear them:     10: Dysarthria- elevate speech clarity by pt reading or repeating words on a list:     11: Extinction and Inattention- Use information from prior testing or double simultaneous stimuli testing to identify neglect. Face, arms, legs and visual field:     Total NIHSS Score: 3  Date: 01/02/2020  Time of NIHSS Assessment:           SIZE OF HEMORRHAGE:     CAT SCAN RESULTS:     MRI RESULTS:     CNS/MUSCULOSKELETAL NOTES:       GI//GY      ABDOMINAL PAIN:     VOMITING:     DIARRHEA:     NAUSEA:     BOWEL SOUNDS:     OCCULT STOOL:     VAGINAL BLEEDING:     TESTICULAR PAIN:     HEMATURIA:     NG TUBE:    SIZE:   DATE INSERTED:       ULTRASOUND:     ULTRASOUND RESULTS:       ACUTE ABDOMEN:     ACUTE ABDOMEN RESULTS:       CT SCAN:     CT SCAN RESULTS:       GI//GY NOTES:     PAST MEDICAL HISTORY: PACEMAKER, COPD, ANEURYSM, AFIB, ANXIETY, HEART CATH., STENT, CHRONIC COUGH, PNEUMOTHORAX, ALCOHOL ABUSE    OTHER SYMPTOM NOTES:     ADDITIONAL NOTES:           Ami Bianchi RN  1/2/2020  2:34 PM     Type 2 diabetes mellitus without complication, with long-term current use of insulin Urinary tract infection without hematuria, site unspecified Hyperkalemia

## 2020-01-03 NOTE — PROGRESS NOTES
[facility]  PROGRESS NOTE    Patient Name: Alessandro Mendiola  : 1940  MRN: [id number]    Date of Admission: 2020  Primary Care Physician: Silvio Jacobson MD    Subjective   Subjective     CC:  Follow-up for left sided weakness    HPI:  No acute events overnight. Still feeling weak in left upper extremity compared to right without numbness or tingling.     Review of Systems  Gen- No fevers, chills  CV- No chest pain, palpitations  Resp- No cough, dyspnea  GI- No N/V/D, abd pain      Objective   Objective     Vital Signs:   Temp:  [98.7 °F (37.1 °C)-100.6 °F (38.1 °C)] 98.7 °F (37.1 °C)  Heart Rate:  [68-92] 75  Resp:  [18-22] 18  BP: (109-146)/(53-83) 146/65  Total (NIH Stroke Scale): 7     Physical Exam:  Constitutional: No acute distress, awake, alert  HENT: NCAT, mucous membranes moist  Respiratory: Clear to auscultation bilaterally, respiratory effort normal   Cardiovascular: irregular rhythm, no murmurs  Gastrointestinal: Positive bowel sounds, soft, nontender, nondistended  Psychiatric: Appropriate affect, cooperative  Neurologic: Oriented x 3, strength 4/5 in left upper extremity compared to 5/5 on right side, smile symmetric    Skin: No rashes on exposed skin     Results Reviewed:    Results from last 7 days   Lab Units 20  0719 20  1344   WBC 10*3/mm3 8.88 9.75   HEMOGLOBIN g/dL 7.9* 9.4*   HEMATOCRIT % 27.7* 32.7*   PLATELETS 10*3/mm3 155 166   INR   --  2.13*     Results from last 7 days   Lab Units 20  0719 20  1344   SODIUM mmol/L 139 139   POTASSIUM mmol/L 3.4* 3.7   CHLORIDE mmol/L 98 96*   CO2 mmol/L 31.0* 28.1   BUN mg/dL 13 13   CREATININE mg/dL 0.77 0.76   GLUCOSE mg/dL 133* 193*   CALCIUM mg/dL 8.7 9.1   ALT (SGPT) U/L 8 11   AST (SGOT) U/L 16 23   TROPONIN T ng/mL  --  <0.010     Estimated Creatinine Clearance: 76.5 mL/min (by C-G formula based on SCr of 0.77 mg/dL).    Microbiology Results Abnormal     None           Imaging Results (Last 24 Hours)     Procedure Component Value Units Date/Time    CT Angiogram Head [[id number]] Collected:  01/02/20 2132     Updated:  01/02/20 2134    Narrative:       The CTA head results were included on the CTA neck report. Please see that report for full description of the head and neck findings    Signer Name: Sharath Ortega MD   Signed: 1/2/2020 9:32 PM   Workstation Name: [id number]    [facility]    CT Angiogram Neck [[id number]] Collected:  01/02/20 2122     Updated:  01/02/20 2124    Narrative:       CTA Neck    INDICATION:   TIA prior imaging    TECHNIQUE:   CT angiogram of the head and neck with 75 cc of Isovue 300 IV contrast. 3-D reconstructions were obtained and reviewed.  Evaluation for a significant carotid arterial stenosis is based on the NASCET criteria. Radiation dose reduction techniques included  automated exposure control or exposure modulation based on body size. Count of known CT and cardiac nuc med studies performed in previous 12 months: 1.     COMPARISON:   None available.    FINDINGS:   CTA neck:  Great vessel origins were not included in the scan. The proximal great vessels are widely patent with calcified plaque seen in the left subclavian. Both vertebral arteries are widely patent at approximately the same size. The distal vertebrals  are unremarkable into the basilar artery. Both carotid bifurcations are widely patent with mild calcified plaque noted. The cervical internal carotids are patent up to the siphons. Extravascular structures are remarkable for a small amount of fluid in  the left maxillary sinus.    CTA head:  There is no evidence of aneurysm, vascular malformation or major branch vessel occlusion. No severe intracranial stenosis is seen. Major dural venous sinuses are patent.      Impression:       Mild plaque is seen at the carotid bifurcations and in the proximal left subclavian artery. No evidence of major branch vessel  occlusion or severe intracranial stenosis.    Signer Name: Sharath Ortega MD   Signed: 1/2/2020 9:22 PM   [facility]: [facility]    [facility]    CT Cerebral Perfusion With & Without Contrast [[id number]] Collected:  01/02/20 2117     Updated:  01/02/20 2119    Narrative:       CT CEREBRAL PERFUSION W WO CONTRAST    HISTORY:   TIA prior to imaging    TECHNIQUE:   Axial CT images of the brain without and with intravenous contrast using cerebral perfusion protocol. Post-processing parametric maps were created using RAPID software and reviewed. Radiation dose reduction techniques included automated exposure control  or exposure modulation based on body size. CT and nuclear cardiology exams in the last 12 months: 1.    COMPARISON:       FINDINGS:   Arterial input function is optimal.     Cerebral blood flow, blood volume and transit time are within normal limits. No significant perfusion abnormalities are seen on either side. No mismatch is identified.      Impression:       Normal brain perfusion CT.          Signer Name: Sharath Ortega MD   Signed: 1/2/2020 9:17 PM   Workstation Name: [facility]    [facility]    XR Chest 1 View [[id number]] Collected:  01/02/20 2036     Updated:  01/02/20 2038    Narrative:       Chest 1 view 1/2/2019    INDICATION:  New onset fever today.    FINDINGS: The heart is minimally enlarged. Cardiac pacemaker leads are in the right atrium and right ventricle. There is no pneumothorax. The lungs are hyperinflated with emphysematous and fibrotic changes characteristic of COPD. No airspace  consolidation is seen. There are no pleural effusions.      Impression:       Cardiomegaly. COPD. No active pulmonary disease.    Signer Name: Jared Lal MD   Signed: 1/2/2020 8:36 PM   Workstation Name: [email or web address]    [facility]               I have reviewed the medications:  Scheduled Meds:  aspirin 300 mg Rectal Daily    atorvastatin 80 mg Oral Nightly   azelastine 2 spray Each Nare BID   budesonide-formoterol 2 puff Inhalation BID   [START ON 1/4/2020] clopidogrel 75 mg Oral Daily   cycloSPORINE 1 drop Both Eyes BID   docusate sodium 100 mg Oral Daily   dronedarone 400 mg Oral BID   pantoprazole 40 mg Oral QAM   primidone 50 mg Oral BID   roflumilast 500 mcg Oral Daily   sertraline 50 mg Oral Daily   sodium chloride 10 mL Intravenous Q12H   tamsulosin 0.4 mg Oral Nightly     Continuous Infusions:   PRN Meds:.•  acetaminophen **OR** acetaminophen **OR** acetaminophen  •  ipratropium  •  ipratropium-albuterol  •  ondansetron  •  sodium chloride      Assessment/Plan   Assessment & Plan     Active Hospital Problems    Diagnosis  POA   • **Muscle weakness of left upper extremity [M62.81]  Clinically Undetermined   • Aphasia [R47.01]  Clinically Undetermined   • COPD exacerbation (CMS/HCC) [J44.1]  Yes   • 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        Brief Hospital Course to date:  Alessandro Mendiola is a 79 y.o. male new to me today with PMH of end-stage COPD 4-5 L oxygen, HTN, PAF on Xarelto, AAA, TAA, and SSS with PPM who was admitted on 1/2/2020 for left upper extremity weakness and aphasia concerning for stroke.    Left upper extremity weakness  -aphasia improved   -CT cerebral perfusion was normal, CTA with mild plaque seen at the carotid bifurcations and in the proximal left subclavian artery   -echo ordered   -speech recommending formal swallow eval  -PT/OT eval   -neurology consultation - Discussed with Dr. Patel, etiology likely due to CVA and she's recommending MRI brain - he has a pacemaker that needs to be approved for MRI by cardiology so we will get a cardiology consultation     -holding xarelto and starting aspirin and plavix, continue statin   -permissive HTN x24hrs    Prediabetes  A1c 6.3  - on diet and exercise  -repeat A1c in 3 months    COPD   -on baseline oxygen 4-5L NC and is in no respiratory distress  -continue Symbicort, daliresp    PAF  -holding Xarelto - on plavix and aspirin for stroke   -continue diltiazem and dronedarone     HTN- allowing for permissive htn due to suspected CVA, follow up this year     Ascending thoracic aorta aneurysm - CT chest w/contrast 2/25/2019 it was 4.4 cm     DVT Prophylaxis:  heparin    Disposition: I expect the patient to be discharged in 3-4 days.    CODE STATUS:   Code Status and Medical Interventions:   Ordered at: 01/02/20 1916     Limited Support to NOT Include:    Intubation    Cardioversion/Defibrillation     Level Of Support Discussed With:    Patient     Code Status:    No CPR     Medical Interventions (Level of Support Prior to Arrest):    Limited         Electronically signed by Iris Hsu MD, 01/03/20, 12:02 PM.

## 2020-01-03 NOTE — PLAN OF CARE
Problem: Patient Care Overview  Goal: Plan of Care Review  Outcome: Ongoing (interventions implemented as appropriate)  Flowsheets (Taken 1/3/2020 1343)  Plan of Care Reviewed With: patient; spouse  Note:   SLP evaluation completed. Will address oropharyngeal dysphagia and dysarthria in tx. Pt would benefit from further cognitive-linguistic assessment in tx. Please see note for further details and recommendations.

## 2020-01-03 NOTE — PLAN OF CARE
Problem: Patient Care Overview  Goal: Plan of Care Review  Outcome: Ongoing (interventions implemented as appropriate)  Flowsheets (Taken 1/3/2020 0830)  Outcome Summary: Pt with PMH and current evolving symptoms that have decreased I with ADLs. Pt is C GA with sit to stand, expected min to mod with LBADLs. Pt may need SNF or go hoome with HH if pt can ambulate safely with AD and spouse.

## 2020-01-03 NOTE — CONSULTS
Attempted to see pt apox 1300 . Pt not in room at this time, diabetes educator will attempt to follow up at a later time. Thank you for this consult.

## 2020-01-03 NOTE — CONSULTS
Referring Provider: Dr. Hsu  Reason for Consultation: Left upper extremity weakness    Patient Care Team:  Silvio Jacobson MD as PCP - General (Internal Medicine)  Dagoberto Rasmussen MD as Consulting Physician (General Surgery)    Chief complaint speech difficulty and left upper extremity weakness    Subjective .     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  His wife reported that he got out of bed and went to the bathroom and when he came back to sit down in his recliner he was unable to hold objects with his left hand and kept dropping things.  He then reported that his left arm felt a little numb and tingly and his wife noted his speech was slurred.  Patient concurs and reports he had no difficulty understanding others and could get words out, they just were not clear.  Daughter at bedside now reports his wife also noted that he was dragging his left foot.  He was brought to the [facility] ED where labs were unremarkable, head CT and chest x-ray also showed no acute changes.  Although exact time of onset was known, he reported taking his Xarelto and was therefore not a TPA candidate.  He was transferred here for higher level of care and on admission reported trouble swallowing, cough, shortness of breath and wheezing, and was found to have left upper extremity weakness and impaired speech.  Patient notes this morning that symptoms are improved although not at baseline.  He notes some weakness in his arm more than leg.  Has not yet been cleared for swallow.  He denies any changes in his vision which is poor at baseline and he notes also chronic right ptosis.  He denies any headache, recent chest pain, abdominal pain or febrile illness.   He has chronic shortness of breath which is unchanged from usual as well as some cough.    Review of Systems  Pertinent items are noted in HPI, all other systems reviewed and negative     History  Past Medical History:   Diagnosis Date   • Alcohol abuse    • Aneurysm of thoracic aorta (CMS/HCC)    • Anxiety    • Ascending aortic aneurysm (CMS/HCC)     4.8 to 4.9 cm    • Atherosclerotic heart disease    • Atrial fibrillation (CMS/HCC)    • Blood thinned due to long-term anticoagulant use    • Body mass index (BMI) 20.0-20.9, adult    • CAD (coronary artery disease)    • Cancer (CMS/HCC)     skin   • Cardiac pacemaker    • Cellulitis    • Chronic bronchitis (CMS/HCC)    • Chronic cough    • COPD (chronic obstructive pulmonary disease) (CMS/HCC)    • Depression    • Difficulty breathing    • Emphysema lung (CMS/HCC)    • Encounter for long-term current use of medication    • Encounter for screening for malignant neoplasm of prostate    • Fatigue    • Fingertip amputation    • TAMIKO (generalized anxiety disorder)    • History of alcohol abuse    • History of cardiac catheterization    • History of chest x-ray 02/11/2014    Chronic interstitial changes seen throughout the lung fields w/ no radiographic evidence of acute parenchymal disease.No definite LT-sided rib fracture present.   • History of chest x-ray 01/10/2014    No acute cardiopulmonary process.Dual lead LT subclavian pacemaker is in place.Aortic ectasia. Hyperinflation of lungs, suggesting COPD   • History of chest x-ray 10/01/2012    Ill-defined RT middle lobe opacity which likley reflects a pneumonia. FU to radiographic resolution is recommended.   • History of Doppler echocardiogram    • History of echocardiogram 09/26/2013    EF 55-60%. Mod concentric LVH. Abnormal LT VT diastolic filling is observed, consistent w/ impaired relaxation.Mild MR/TR. Trace NV. RVSP 44 mmHg.   • History of stress test     ECG performed   • HTN (hypertension)    • HX: long term  anticoagulant use     Blood thinned due to long-term anticoagulant use (V58.61) (Z79.01)   • Hypercholesterolemia    • LVH (left ventricular hypertrophy)    • Mitral and aortic valve disease    • Myocardial infarction (CMS/HCC)    • Nonvenomous insect bite of multiple sites    • Onychomycosis    • Pacemaker at end of battery life    • Pneumonia    • Pneumothorax    • Pre-syncope    • Pulmonary nodule    • Rib pain on left side    • Sick sinus syndrome (CMS/HCC)    • SOB (shortness of breath)    • Upper respiratory infection    • Vitamin D deficiency    ,   Past Surgical History:   Procedure Laterality Date   • AMPUTATION  2015    metacarpal and index finger   • CARDIAC PACEMAKER PLACEMENT  2008   • CATARACT EXTRACTION     • CORONARY STENT PLACEMENT      History of Cath Placement Of Stent 1  · Coronary stents   • EYE SURGERY     • SKIN CANCER EXCISION     ,   Family History   Problem Relation Age of Onset   • Coronary artery disease Other    • Diabetes Other    • Hypertension Other    ,   Social History     Tobacco Use   • Smoking status: Former Smoker     Packs/day: 3.00     Years: 45.00     Pack years: 135.00     Last attempt to quit: 2008     Years since quittin.0   • Smokeless tobacco: Never Used   Substance Use Topics   • Alcohol use: No   • Drug use: No   ,   Medications Prior to Admission   Medication Sig Dispense Refill Last Dose   • azelastine (ASTELIN) 0.1 % nasal spray 2 sprays into the nostril(s) as directed by provider 2 (Two) Times a Day. 1 each 3 Taking   • B Complex Vitamins (VITAMIN B COMPLEX PO) Take 1 tablet by mouth Daily.   Taking   • budesonide-formoterol (SYMBICORT) 80-4.5 MCG/ACT inhaler Inhale 2 puffs 2 (Two) Times a Day. 3 inhaler 1 Taking   • dilTIAZem (CARDIZEM) 120 MG tablet Take 1 tablet by mouth Daily. 90 tablet 3 Taking   • dronedarone (MULTAQ) 400 MG tablet Take 1 tablet by mouth 2 (Two) Times a Day. 180 tablet 1 Taking   • fluorometholone (FML) 0.1 % ophthalmic  suspension INSTILL 1 DROP INTO EACH EYE THREE TIMES DAILY  2 Taking   • guaiFENesin (MUCINEX) 600 MG 12 hr tablet Take 1 tablet by mouth Every 12 (Twelve) Hours. (Patient taking differently: Take 600 mg by mouth As Needed.) 10 tablet 0 Taking   • hydrochlorothiazide (HYDRODIURIL) 25 MG tablet Take 1 tablet by mouth Daily. 90 tablet 3 Taking   • ipratropium (ATROVENT) 0.02 % nebulizer solution Take 2.5 mL by nebulization 4 (Four) Times a Day As Needed for Wheezing or Shortness of Air. 300 mL 11 Taking   • Multiple Vitamin (MULTI-VITAMINS PO) Take  by mouth.   Taking   • omeprazole (priLOSEC) 40 MG capsule Take 1 capsule by mouth Daily. 30 capsule 2    • OXYGEN-HELIUM IN Oxygen; Patient Sig: Oxygen patient is to get home oxygen through  company of his choice at 2L/Min, as well as portable oxygen at the same rate.; 1; 0; 22-Mar-2016; Active   Taking   • primidone (MYSOLINE) 50 MG tablet Take 1 tablet by mouth 2 (Two) Times a Day. 60 tablet 5 Taking   • Respiratory Therapy Supplies (FLUTTER) device 1 each Every 6 (Six) Hours While Awake. 1 each 0 Taking   • RESTASIS 0.05 % ophthalmic emulsion    Taking   • rivaroxaban (XARELTO) 20 MG tablet Take 1 tablet by mouth Daily. 30 tablet 12 Taking   • roflumilast (DALIRESP) 500 MCG tablet tablet Take 1 tablet by mouth Daily. 90 tablet 1 Taking   • sertraline (ZOLOFT) 50 MG tablet Take 1 tablet by mouth Daily. 90 tablet 3 Taking   • tamsulosin (FLOMAX) 0.4 MG capsule 24 hr capsule Take 1 capsule by mouth Every Night. 30 capsule 11 Taking   • Tetrahydrozoline-Zn Sulfate (ALLERGY RELIEF EYE DROPS OP) Apply 1 drop to eye(s) as directed by provider 2 (Two) Times a Day As Needed.   Taking   • tiotropium bromide monohydrate (SPIRIVA RESPIMAT) 2.5 MCG/ACT aerosol solution inhaler Inhale 2 puffs Daily. 1 inhaler 5 Taking   • VENTOLIN  (90 Base) MCG/ACT inhaler Inhale 2 puffs Every 6 (Six) Hours As Needed for Wheezing. 1 inhaler 3 Taking   , Scheduled Meds:    aspirin 300 mg  "Rectal Daily   atorvastatin 80 mg Oral Nightly   azelastine 2 spray Each Nare BID   budesonide-formoterol 2 puff Inhalation BID   cycloSPORINE 1 drop Both Eyes BID   docusate sodium 100 mg Oral Daily   dronedarone 400 mg Oral BID   pantoprazole 40 mg Oral QAM   primidone 50 mg Oral BID   rivaroxaban 20 mg Oral Daily   roflumilast 500 mcg Oral Daily   sertraline 50 mg Oral Daily   sodium chloride 10 mL Intravenous Q12H   tamsulosin 0.4 mg Oral Nightly   , Continuous Infusions:   , PRN Meds:  •  acetaminophen **OR** acetaminophen **OR** acetaminophen  •  ipratropium  •  ipratropium-albuterol  •  ondansetron  •  sodium chloride and Allergies:  Doxycycline    Objective     Vital Signs   Blood pressure 132/53, pulse 68, temperature 100.1 °F (37.8 °C), temperature source Oral, resp. rate 18, height 180.3 cm (70.98\"), weight 69.9 kg (154 lb 1.6 oz), SpO2 96 %.    Physical Exam:   Elderly white man in no acute distress with occasional cough, alert and fully oriented, with normal language including fluency, comprehension, naming and repetition, normal memory and fund of knowledge.  He has no dysarthria.  Pupils 2.5 mm and reactive, visual fields full to confrontation, extraocular movements intact, right ptosis, normal facial sensation, left lower facial weakness (decreased left nasolabial fold, smile near symmetric), hearing decreased to finger rub, palate and shoulder shrug elevates symmetrically, tongue protrudes midline  Motor: Right side strength intact, left upper extremity 4/5, left lower extremity 4+ proximally 5- distally  Coordination commensurate with strength  Light touch is symmetric including with double simultaneous stimulation  Reflexes trace to 1+, except for ankle present ankle jerks, all symmetric, withdraws from plantar stim bilaterally  Muscle tone is slightly decreased in proximal left upper extremity otherwise unremarkable  Neck is supple, no carotid bruit appreciated  Heart paced, irregular, no murmur " appreciated  Lungs with expiratory wheeze, mild to moderate increased respiratory effort, barrel chest  Abdomen soft, NT, ND with positive bowel sounds  Skin warm dry, no rashes but bruising on extremities  Head normocephalic and atraumatic  Psych: Normal affect and thought content, pleasant and cooperative with exam      Results Review:   I reviewed the patient's new clinical results.  I reviewed the patient's new imaging results and agree with the interpretation.  I reviewed the patient's other test results and agree with the interpretation   Labs reviewed, A1c 6.3  Total cholesterol 118 triglycerides 76 HDL 36 LDL 67  CMP with glucose 133 potassium 3.4 CO2 31 albumin 3.4 otherwise normal  CBC with white count 8.88 H&H 7.9 and 27 platelets 155  TSH normal at 0.84  INR 2.13 yesterday  Head CT, CT perfusion and CTA head and neck images reviewed, agree normal perfusion scan, no marked intra-or extracranial stenoses  Echo pending    Assessment/Plan       Muscle weakness of left upper extremity    Aneurysm of thoracic aorta (CMS/HCC)    Coronary arteriosclerosis in native artery    Paroxysmal atrial fibrillation (CMS/HCC)    Sick sinus syndrome (CMS/HCC)    COPD exacerbation (CMS/HCC)    Aphasia      Ischemic stroke with improving left hemiparesis-suspect small deep right hemisphere stroke with no obvious cortical signs, but would like to get MRI if possible to help elucidate mechanism and assess size.  Discussed with patient and his daughter that symptoms may fluctuate and potentially worsen, particularly if this is small vessel/lacunar in mechanism.  We will plan to hold Xarelto in the short-term and use dual antiplatelet therapy until going back to anticoagulation.  Discussed risk of recurrent stroke off of anticoagulation (constant risk) versus risk of hemorrhagic conversion (decreasing with time) with patient and daughter at bedside.  Discussed with Dr. Obrien who noted patient is not pacemaker dependent.  Will  attempt to get MRI.    I discussed the patients findings and my recommendations with patient, family and primary care team    Fern Patel MD  01/03/20  10:28 AM

## 2020-01-03 NOTE — PLAN OF CARE
Problem: Patient Care Overview  Goal: Plan of Care Review  Outcome: Ongoing (interventions implemented as appropriate)  Flowsheets (Taken 1/3/2020 1432)  Outcome Summary: PT eval complete.  Pt presents with weakness and reduced mobility warranting skilled IPPT services.  Pt is CGA for STS and min A SPT.  Pt unable to ambulate >4' due to fatigue and anxiety.  Pt oxygen sat WFL during mobility on supplemental oxygen.  Recommend DC to IRF when appropriate.

## 2020-01-03 NOTE — PROGRESS NOTES
"Pharmacy Consult-Vancomycin Dosing  Alessandro Mendiola is a  79 y.o. male receiving vancomycin therapy.     Indication: Bacteremia   Consulting Provider: Hospitalist  ID Consult: Not at this time    Goal Trough: 15-20mcg/mL    Current Antimicrobial Therapy  Anti-Infectives (From admission, onward)      Ordered     Dose/Rate Route Frequency Start Stop    01/03/20 1455  Pharmacy to dose vancomycin     Ordering Provider:  Iris Hsu MD     Does not apply Continuous PRN 01/03/20 1454 01/17/20 1453            Allergies  Allergies as of 01/02/2020 - Reviewed 01/02/2020   Allergen Reaction Noted    Doxycycline Shortness Of Breath 07/31/2018       Labs    Results from last 7 days   Lab Units 01/03/20  0719 01/02/20  1344   BUN mg/dL 13 13   CREATININE mg/dL 0.77 0.76       Results from last 7 days   Lab Units 01/03/20  0719 01/02/20  1344   WBC 10*3/mm3 8.88 9.75       Evaluation of Dosing     Last Dose Received in the ED/Outside Facility: No  Is Patient on Dialysis or Renal Replacement: No    Ht - 180.3 cm (70.98\")  Wt - 69.9 kg (154 lb 1.6 oz)    Estimated Creatinine Clearance: 76.5 mL/min (by C-G formula based on SCr of 0.77 mg/dL).    Intake & Output (last 3 days)         12/31 0701 - 01/01 0700 01/01 0701 - 01/02 0700 01/02 0701 - 01/03 0700 01/03 0701 - 01/04 0700    Urine (mL/kg/hr)   300     Total Output   300     Net   -300                     Microbiology and Radiology  Microbiology Results (last 10 days)       Procedure Component Value - Date/Time    Blood Culture - Blood, Arm, Left [[id number]]  (Abnormal) Collected:  01/02/20 2025    Lab Status:  Preliminary result Specimen:  Blood from Arm, Left Updated:  01/03/20 1445     Blood Culture Abnormal Stain     Gram Stain Aerobic Bottle Gram positive cocci in pairs and chains      Anaerobic Bottle Gram positive cocci in pairs and chains    Blood Culture ID, PCR - Blood, Arm, Left [[id number]]  (Abnormal) Collected:  01/02/20 2025    Lab Status:  Final result " Specimen:  Blood from Arm, Left Updated:  01/03/20 1447     BCID, PCR Enterococcus spp, not VRE. Identification by BCID PCR.            Evaluation of Level         Assessment/Plan:  1. Pharmacy dosing vancomycin for bacteremia (BCID: Enterococcus)  2. Patient will received a loading dose of 1750 mg (~25mg/kg) and will start a maintenance dose of 1000 mg q12h (~14mg/kg)  3. Goal trough is 15-20 mcg/mL  4. Vancomycin trough ordered for 1/4/20 @1530 prior to 5th dose. Repeat cultures have been ordered and a BMP for the morning.  5. Will monitor renal function, culture and sensitivities, and clinical status and adjust regimen as needed. Pharmacy will continue to follow     Lis Rodriguez, PharmD  Pharmacy Resident   1/3/2020  3:13 PM

## 2020-01-03 NOTE — THERAPY EVALUATION
Patient Name: Alessandro Mendiola  : 1940    MRN: [id number]                              Today's Date: 1/3/2020       Admit Date: 2020    Visit Dx:     ICD-10-CM ICD-9-CM   1. Muscle weakness of left upper extremity M62.81 728.87   2. Impaired mobility and ADLs Z74.09 799.89   3. Oropharyngeal dysphagia R13.12 787.22   4. Dysarthria R47.1 784.51     Patient Active Problem List   Diagnosis   • Aneurysm of thoracic aorta (CMS/HCC)   • Coronary arteriosclerosis in native artery   • Paroxysmal atrial fibrillation (CMS/HCC)   • Sick sinus syndrome (CMS/HCC)   • Vitamin D deficiency   • COPD exacerbation (CMS/HCC)   • HTN (hypertension)   • Mood disorder (CMS/HCC)   • Simple chronic bronchitis (CMS/HCC)   • Muscle weakness of left upper extremity   • Aphasia     Past Medical History:   Diagnosis Date   • Alcohol abuse    • Aneurysm of thoracic aorta (CMS/HCC)    • Anxiety    • Ascending aortic aneurysm (CMS/HCC)     4.8 to 4.9 cm    • Atherosclerotic heart disease    • Atrial fibrillation (CMS/HCC)    • Blood thinned due to long-term anticoagulant use    • Body mass index (BMI) 20.0-20.9, adult    • CAD (coronary artery disease)    • Cancer (CMS/HCC)     skin   • Cardiac pacemaker    • Cellulitis    • Chronic bronchitis (CMS/HCC)    • Chronic cough    • COPD (chronic obstructive pulmonary disease) (CMS/HCC)    • Depression    • Difficulty breathing    • Emphysema lung (CMS/HCC)    • Encounter for long-term current use of medication    • Encounter for screening for malignant neoplasm of prostate    • Fatigue    • Fingertip amputation    • TAMIKO (generalized anxiety disorder)    • History of alcohol abuse    • History of cardiac catheterization    • History of chest x-ray 2014    Chronic interstitial changes seen throughout the lung fields w/ no radiographic evidence of acute parenchymal disease.No definite LT-sided rib fracture present.   • History of chest x-ray 01/10/2014    No acute cardiopulmonary  process.Dual lead LT subclavian pacemaker is in place.Aortic ectasia. Hyperinflation of lungs, suggesting COPD   • History of chest x-ray 10/01/2012    Ill-defined RT middle lobe opacity which likley reflects a pneumonia. FU to radiographic resolution is recommended.   • History of Doppler echocardiogram    • History of echocardiogram 09/26/2013    EF 55-60%. Mod concentric LVH. Abnormal LT VT diastolic filling is observed, consistent w/ impaired relaxation.Mild MR/TR. Trace AR. RVSP 44 mmHg.   • History of stress test     ECG performed   • HTN (hypertension)    • HX: long term anticoagulant use     Blood thinned due to long-term anticoagulant use (V58.61) (Z79.01)   • Hypercholesterolemia    • LVH (left ventricular hypertrophy)    • Mitral and aortic valve disease    • Myocardial infarction (CMS/HCC)    • Nonvenomous insect bite of multiple sites    • Onychomycosis    • Pacemaker at end of battery life    • Pneumonia    • Pneumothorax    • Pre-syncope    • Pulmonary nodule    • Rib pain on left side    • Sick sinus syndrome (CMS/HCC)    • SOB (shortness of breath)    • Upper respiratory infection    • Vitamin D deficiency      Past Surgical History:   Procedure Laterality Date   • AMPUTATION  08/31/2015    metacarpal and index finger   • CARDIAC PACEMAKER PLACEMENT  06/01/2008   • CATARACT EXTRACTION     • CORONARY STENT PLACEMENT      History of Cath Placement Of Stent 1  · Coronary stents   • EYE SURGERY     • SKIN CANCER EXCISION       General Information     Row Name 01/03/20 1432          PT Evaluation Time/Intention    Document Type  evaluation  -AA     Mode of Treatment  physical therapy  -AA     Row Name 01/03/20 1432          General Information    Patient Profile Reviewed?  yes  -AA     Prior Level of Function  independent:;all household mobility;community mobility  -AA     Existing Precautions/Restrictions  fall;oxygen therapy device and L/min  -AA     Barriers to Rehab  none identified  -AA     Row Name  01/03/20 1432          Relationship/Environment    Lives With  spouse  -AA     Row Name 01/03/20 1432          Resource/Environmental Concerns    Current Living Arrangements  home/apartment/condo  -AA     Row Name 01/03/20 1432          Home Main Entrance    Number of Stairs, Main Entrance  two  -AA     Stair Railings, Main Entrance  none  -AA     Row Name 01/03/20 1432          Stairs Within Home, Primary    Number of Stairs, Within Home, Primary  none  -AA     Row Name 01/03/20 1432          Cognitive Assessment/Intervention- PT/OT    Orientation Status (Cognition)  oriented x 4  -AA     Personal Safety Interventions  fall prevention program maintained  -AA     Row Name 01/03/20 1432          Safety Issues, Functional Mobility    Safety Issues Affecting Function (Mobility)  awareness of need for assistance;insight into deficits/self awareness;judgment;sequencing abilities;positioning of assistive device;problem solving  -AA     Impairments Affecting Function (Mobility)  balance;endurance/activity tolerance;grasp;shortness of breath;strength  -AA       User Key  (r) = Recorded By, (t) = Taken By, (c) = Cosigned By    Initials Name Provider Type    Deepali Murphy, PT Physical Therapist        Mobility     Row Name 01/03/20 1432          Bed Mobility Assessment/Treatment    Bed Mobility Assessment/Treatment  rolling right;scooting/bridging;supine-sit  -AA     Rolling Right [place] (Bed Mobility)  conditional independence  -AA     Scooting/Bridging [place] (Bed Mobility)  conditional independence  -AA     Supine-Sit [place] (Bed Mobility)  conditional independence  -AA     Assistive Device (Bed Mobility)  bed rails;head of bed elevated  -AA     Row Name 01/03/20 1432          Transfer Assessment/Treatment    Comment (Transfers)  VC for foot placement, proper step sequencing during SPT, and posture  -AA     Row Name 01/03/20 1432          Bed-Chair Transfer    Bed-Chair [place] (Transfers)   minimum assist (75% patient effort);verbal cues  -AA     Assistive Device (Bed-Chair Transfers)  other (see comments) gait belt and HHA  -AA     Row Name 01/03/20 1432          Sit-Stand Transfer    Sit-Stand [place] (Transfers)  contact guard;verbal cues  -AA     Assistive Device (Sit-Stand Transfers)  other (see comments) gait belt and BUE support  -AA     Row Name 01/03/20 1432          Gait/Stairs Assessment/Training    Gait/Stairs Assessment/Training  gait/ambulation independence;distance ambulated  -AA     [place] Level (Gait)  minimum assist (75% patient effort);verbal cues  -AA     Assistive Device (Gait)  other (see comments) gait belt and BUE support  -AA     Distance in Feet (Gait)  4'  -AA     Pattern (Gait)  step-to  -AA     Deviations/Abnormal Patterns (Gait)  bilateral deviations;diamante decreased;steppage;stride length decreased  -AA     Bilateral Gait Deviations  forward flexed posture  -AA     Comment (Gait/Stairs)  Ambulation distance limited by anxiety and fatigue.    -AA       User Key  (r) = Recorded By, (t) = Taken By, (c) = Cosigned By    Initials Name Provider Type    AA Deepali Putnam, PT Physical Therapist        Obj/Interventions     Row Name 01/03/20 1432          General ROM    GENERAL ROM COMMENTS  BLE WNL  -AA     Row Name 01/03/20 1432          MMT (Manual Muscle Testing)    General MMT Comments  LLE 4/5, RLE 4+/5  -AA     Row Name 01/03/20 1432          Static Sitting Balance    Level of [place] (Unsupported Sitting, Static Balance)  supervision  -AA     Sitting Position (Unsupported Sitting, Static Balance)  sitting on edge of bed  -AA     Time Able to Maintain Position (Unsupported Sitting, Static Balance)  more than 5 minutes  -AA     Row Name 01/03/20 1432          Static Standing Balance    Level of [place] (Supported Standing, Static Balance)  contact guard assist  -AA     Assistive Device Utilized (Supported Standing, Static Balance)  other (see comments)  gait belt and BUE support  -AA     Row Name 01/03/20 [facility]          Dynamic Standing Balance    Level of [place], Reaches Outside Midline (Standing, Dynamic Balance)  minimal assist, 75% patient effort  -AA     Assistive Device Utilized (Supported Standing, Dynamic Balance)  other (see comments) gait belt and BUE support  -[facility] Name 01/03/20 1432          Sensory Assessment/Intervention    Sensory General Assessment  no sensation deficits identified BLE  -AA       User Key  (r) = Recorded By, (t) = Taken By, (c) = Cosigned By    Initials Name Provider Type    Deepali Murphy, PT Physical Therapist        Goals/Plan     [facility] Name 01/03/20 [facility]          Transfer Goal 1 (PT)    Activity/Assistive Device (Transfer Goal 1, PT)  sit-to-stand/stand-to-sit;bed-to-chair/chair-to-bed  -AA     [place] Level/Cues Needed (Transfer Goal 1, PT)  supervision required  -AA     Time Frame (Transfer Goal 1, PT)  10 days  -AA     Row Name 01/03/20 1432          Gait Training Goal 1 (PT)    Activity/Assistive Device (Gait Training Goal 1, PT)  gait (walking locomotion);assistive device use  -AA     [place] Level (Gait Training Goal 1, PT)  standby assist  -AA     Distance (Gait Goal 1, PT)  100 feet  -AA     Time Frame (Gait Training Goal 1, PT)  10 days  -AA     Row Name 01/03/20 [facility]          Stairs Goal 1 (PT)    Activity/Assistive Device (Stairs Goal 1, PT)  stairs, all skills  -AA     [place] Level/Cues Needed (Stairs Goal 1, PT)  contact guard assist  -AA     Number of Stairs (Stairs Goal 1, PT)  2  -AA       User Key  (r) = Recorded By, (t) = Taken By, (c) = Cosigned By    Initials Name Provider Type    Deepali Murhpy, PT Physical Therapist        Clinical Impression     [facility] Name 01/03/20 1432          Pain Assessment    Additional Documentation  Pain Scale: Numbers Pre/Post-Treatment (Group)  -AA     Row Name 01/03/20 [facility]          Pain Scale: Numbers Pre/Post-Treatment    Pain Scale: Numbers,  Pretreatment  0/10 - no pain  -AA     Pain Scale: Numbers, Post-Treatment  0/10 - no pain  -AA     Row Name 01/03/20 1432          Plan of Care Review    Plan of Care Reviewed With  patient;spouse  -AA     Progress  improving  -AA     Row Name 01/03/20 1432          Physical Therapy Clinical Impression    Criteria for Skilled Interventions Met (PT Clinical Impression)  yes;treatment indicated  -AA     Rehab Potential (PT Clinical Summary)  good, to achieve stated therapy goals  -AA     Predicted Duration of Therapy (PT)  10 days  -AA     Row Name 01/03/20 1432          Vital Signs    Pre Patient Position  Supine  -AA     Intra Patient Position  Standing  -AA     Post Patient Position  Sitting  -AA     Row Name 01/03/20 1432          Positioning and Restraints    Pre-Treatment Position  in bed  -AA     Post Treatment Position  chair  -AA     In Chair  notified nsg;exit alarm on;reclined;with family/caregiver;call light within reach;encouraged to call for assist;waffle cushion  -AA       User Key  (r) = Recorded By, (t) = Taken By, (c) = Cosigned By    Initials Name Provider Type    Deepali Murphy, PT Physical Therapist        Outcome Measures     Row Name 01/03/20 1432          How much help from another person do you currently need...    Turning from your back to your side while in flat bed without using bedrails?  4  -AA     Moving from lying on back to sitting on the side of a flat bed without bedrails?  4  -AA     Moving to and from a bed to a chair (including a wheelchair)?  3  -AA     Standing up from a chair using your arms (e.g., wheelchair, bedside chair)?  3  -AA     Climbing 3-5 steps with a railing?  2  -AA     To walk in hospital room?  2  -AA     AM-PAC 6 Clicks Score (PT)  18  -AA     Row Name 01/03/20 1432          Modified [place] Scale    Pre-Stroke Modified [place] Scale  0 - No Symptoms at all.  -AA     Modified [place] Scale  4 - Moderately severe disability.  Unable to walk without assistance,  and unable to attend to own bodily needs without assistance.  -     Row Name 01/03/20 1432          Functional Assessment    Outcome Measure Options  AM-PAC 6 Clicks Basic Mobility (PT);Modified [place]  -       User Key  (r) = Recorded By, (t) = Taken By, (c) = Cosigned By    Initials Name Provider Type    Deepali Murphy, PT Physical Therapist          PT Recommendation and Plan  Planned Therapy Interventions (PT Eval): balance training, bed mobility training, gait training, home exercise program, neuromuscular re-education, patient/family education, stair training, strengthening, transfer training  Outcome Summary/Treatment Plan (PT)  Anticipated Discharge Disposition (PT): inpatient rehabilitation facility  Plan of Care Reviewed With: patient, spouse  Progress: improving  Outcome Summary: PT eval complete.  Pt presents with weakness and reduced mobility warranting skilled IPPT services.  Pt is CGA for STS and min A SPT.  Pt unable to ambulate >4' due to fatigue and anxiety.  Pt oxygen sat WFL during mobility on supplemental oxygen.  Recommend DC to IRF when appropriate.     Time Calculation:   PT Charges     Row Name 01/03/20 1432             Time Calculation    Start Time  [facility]  -      PT Received On  01/03/20  -MAMIE      PT Goal Re-Cert Due Date  01/13/20  -        User Key  (r) = Recorded By, (t) = Taken By, (c) = Cosigned By    Initials Name Provider Type    Deepali Murphy PT Physical Therapist        Therapy Charges for Today     Code Description Service Date Service Provider Modifiers Qty    [id number]  PT EVAL MOD COMPLEXITY 4 1/3/2020 Deepali Putnam, PT GP 1          PT G-Codes  Outcome Measure Options: AM-PAC 6 Clicks Basic Mobility (PT), Modified [place]  AM-PAC 6 Clicks Score (PT): 18  AM-PAC 6 Clicks Score (OT): 17  Modified [place] Scale: 4 - Moderately severe disability.  Unable to walk without assistance, and unable to attend to own bodily needs without assistance.    Deepali Putnam  PT  1/3/2020

## 2020-01-03 NOTE — CONSULTS
Referring Provider: MD Aracelis  Reason for Consultation: COPD    Subjective .   Education:  NN spoke with pt at BS.  Pt alert and able to answer questions appropriately.  Pt O2 sat    92% on  3.5 L currently, home O2 use 4 L.  Pt reports the ability to ambulate  at baseline  Approximately 20 feet before experiencing SOB.  Pt states use of rescue inhaler 35   times weekly, with no relief of SOB.  Former smoker, quit date 2008.  Up to date on flu and PNA vaccines.  Family expresses significant concern about care for patient after discharge.  Wife is asking about wheelchair and rehab as she does not feel that she would be able to manage patient at home.  Wife states that it is very hard for the patient to attend appointments.  Pt and wife also express concern for medication costs.  Family encouraged to ask for samples at any appointments and to look for any other cheaper pharmacies.  Concerns relayed to case management. Pt reports no previous hx of formal COPD teaching, no understanding of action plan, or OR.  Stop light report, NN contact information, instructions for accessing [facility] and list of educational videos given to pt.  COPD education completed in the form of explanation, handouts, and videos.  No new concerns or questions voiced at this time.  NN will continue to follow as needed.    Age: 79 y.o.  Sex: male  Smoker Status: former, 135 pack years  Pulmonologist: Katie  FEV1 (PFT): 31%  Home O2: 4L CPAP HS    Objective     SpO2 SpO2: 95 % (01/03/20 1150)  Device Device (Oxygen Therapy): humidified, nasal cannula (01/03/20 1200)  Flow Flow (L/min): 3.5 (01/03/20 1200)  Incentive Spirometer    IS Predicted Level (mL)     Number of Repetitions     Level Incentive Spirometer (mL)    Patient Tolerance     Inhaler Treatment Status Respiratory Treatment Status (Inhaler): given(family refused for patient to rinse) (01/02/20 2029)  Treatment Route Respiratory Treatment Status (Inhaler): given(family refused for patient  to rinse) (01/02/20 2029)      Home Medications:  Medications Prior to Admission   Medication Sig Dispense Refill Last Dose   • azelastine (ASTELIN) 0.1 % nasal spray 2 sprays into the nostril(s) as directed by provider 2 (Two) Times a Day. 1 each 3 Taking   • B Complex Vitamins (VITAMIN B COMPLEX PO) Take 1 tablet by mouth Daily.   Taking   • budesonide-formoterol (SYMBICORT) 80-4.5 MCG/ACT inhaler Inhale 2 puffs 2 (Two) Times a Day. 3 inhaler 1 Taking   • dilTIAZem (CARDIZEM) 120 MG tablet Take 1 tablet by mouth Daily. 90 tablet 3 Taking   • dronedarone (MULTAQ) 400 MG tablet Take 1 tablet by mouth 2 (Two) Times a Day. 180 tablet 1 Taking   • fluorometholone (FML) 0.1 % ophthalmic suspension INSTILL 1 DROP INTO EACH EYE THREE TIMES DAILY  2 Taking   • guaiFENesin (MUCINEX) 600 MG 12 hr tablet Take 1 tablet by mouth Every 12 (Twelve) Hours. (Patient taking differently: Take 600 mg by mouth As Needed.) 10 tablet 0 Taking   • hydrochlorothiazide (HYDRODIURIL) 25 MG tablet Take 1 tablet by mouth Daily. 90 tablet 3 Taking   • ipratropium (ATROVENT) 0.02 % nebulizer solution Take 2.5 mL by nebulization 4 (Four) Times a Day As Needed for Wheezing or Shortness of Air. 300 mL 11 Taking   • Multiple Vitamin (MULTI-VITAMINS PO) Take  by mouth.   Taking   • omeprazole (priLOSEC) 40 MG capsule Take 1 capsule by mouth Daily. 30 capsule 2    • OXYGEN-HELIUM IN Oxygen; Patient Sig: Oxygen patient is to get home oxygen through  company of his choice at 2L/Min, as well as portable oxygen at the same rate.; 1; 0; 22-Mar-2016; Active   Taking   • primidone (MYSOLINE) 50 MG tablet Take 1 tablet by mouth 2 (Two) Times a Day. 60 tablet 5 Taking   • Respiratory Therapy Supplies (FLUTTER) device 1 each Every 6 (Six) Hours While Awake. 1 each 0 Taking   • RESTASIS 0.05 % ophthalmic emulsion    Taking   • rivaroxaban (XARELTO) 20 MG tablet Take 1 tablet by mouth Daily. 30 tablet 12 Taking   • roflumilast (DALIRESP) 500 MCG tablet tablet  Take 1 tablet by mouth Daily. 90 tablet 1 Taking   • sertraline (ZOLOFT) 50 MG tablet Take 1 tablet by mouth Daily. 90 tablet 3 Taking   • tamsulosin (FLOMAX) 0.4 MG capsule 24 hr capsule Take 1 capsule by mouth Every Night. 30 capsule 11 Taking   • Tetrahydrozoline-Zn Sulfate (ALLERGY RELIEF EYE DROPS OP) Apply 1 drop to eye(s) as directed by provider 2 (Two) Times a Day As Needed.   Taking   • tiotropium bromide monohydrate (SPIRIVA RESPIMAT) 2.5 MCG/ACT aerosol solution inhaler Inhale 2 puffs Daily. 1 inhaler 5 Taking   • VENTOLIN  (90 Base) MCG/ACT inhaler Inhale 2 puffs Every 6 (Six) Hours As Needed for Wheezing. 1 inhaler 3 Taking       Discussion: Per current GOLD Standards, please consider: No LAMA being used, inpatient pulmonary rehab      Discussed with ROSA Bass RN

## 2020-01-03 NOTE — THERAPY EVALUATION
Acute Care - Occupational Therapy Initial Evaluation  [facility]     Patient Name: Alessandro Mendiola  : 1940  MRN: [id number]  Today's Date: 1/3/2020             Admit Date: 2020       ICD-10-CM ICD-9-CM   1. Impaired mobility and ADLs Z74.09 799.89     Patient Active Problem List   Diagnosis   • Aneurysm of thoracic aorta (CMS/HCC)   • Coronary arteriosclerosis in native artery   • Paroxysmal atrial fibrillation (CMS/HCC)   • Sick sinus syndrome (CMS/HCC)   • Vitamin D deficiency   • COPD exacerbation (CMS/HCC)   • HTN (hypertension)   • Mood disorder (CMS/HCC)   • Simple chronic bronchitis (CMS/HCC)   • Muscle weakness of left upper extremity   • Aphasia     Past Medical History:   Diagnosis Date   • Alcohol abuse    • Aneurysm of thoracic aorta (CMS/HCC)    • Anxiety    • Ascending aortic aneurysm (CMS/HCC)     4.8 to 4.9 cm    • Atherosclerotic heart disease    • Atrial fibrillation (CMS/HCC)    • Blood thinned due to long-term anticoagulant use    • Body mass index (BMI) 20.0-20.9, adult    • CAD (coronary artery disease)    • Cancer (CMS/HCC)     skin   • Cardiac pacemaker    • Cellulitis    • Chronic bronchitis (CMS/HCC)    • Chronic cough    • COPD (chronic obstructive pulmonary disease) (CMS/HCC)    • Depression    • Difficulty breathing    • Emphysema lung (CMS/HCC)    • Encounter for long-term current use of medication    • Encounter for screening for malignant neoplasm of prostate    • Fatigue    • Fingertip amputation    • TAMIKO (generalized anxiety disorder)    • History of alcohol abuse    • History of cardiac catheterization    • History of chest x-ray 2014    Chronic interstitial changes seen throughout the lung fields w/ no radiographic evidence of acute parenchymal disease.No definite LT-sided rib fracture present.   • History of chest x-ray 01/10/2014    No acute cardiopulmonary process.Dual lead LT subclavian pacemaker is in place.Aortic ectasia. Hyperinflation of lungs,  suggesting COPD   • History of chest x-ray 10/01/2012    Ill-defined RT middle lobe opacity which likley reflects a pneumonia. FU to radiographic resolution is recommended.   • History of Doppler echocardiogram    • History of echocardiogram 09/26/2013    EF 55-60%. Mod concentric LVH. Abnormal LT VT diastolic filling is observed, consistent w/ impaired relaxation.Mild MR/TR. Trace LA. RVSP 44 mmHg.   • History of stress test     ECG performed   • HTN (hypertension)    • HX: long term anticoagulant use     Blood thinned due to long-term anticoagulant use (V58.61) (Z79.01)   • Hypercholesterolemia    • LVH (left ventricular hypertrophy)    • Mitral and aortic valve disease    • Myocardial infarction (CMS/HCC)    • Nonvenomous insect bite of multiple sites    • Onychomycosis    • Pacemaker at end of battery life    • Pneumonia    • Pneumothorax    • Pre-syncope    • Pulmonary nodule    • Rib pain on left side    • Sick sinus syndrome (CMS/HCC)    • SOB (shortness of breath)    • Upper respiratory infection    • Vitamin D deficiency      Past Surgical History:   Procedure Laterality Date   • AMPUTATION  08/31/2015    metacarpal and index finger   • CARDIAC PACEMAKER PLACEMENT  06/01/2008   • CATARACT EXTRACTION     • CORONARY STENT PLACEMENT      History of Cath Placement Of Stent 1  · Coronary stents   • EYE SURGERY     • SKIN CANCER EXCISION            OT ASSESSMENT FLOWSHEET (last 12 hours)      Occupational Therapy Evaluation     Row Name 01/03/20 0830                   OT Evaluation Time/Intention    Subjective Information  complains of;fatigue  -AN        Document Type  evaluation  -AN        Mode of Treatment  occupational therapy  -AN        Patient Effort  good  -AN        Symptoms Noted During/After Treatment  none  -AN           General Information    Patient Profile Reviewed?  yes  -AN        Patient Observations  alert;cooperative;agree to therapy  -AN        Patient/Family Observations  spouse present   -AN        General Observations of Patient  Pt supine in bed , SCDS, bed alarm, O2NC  -AN        Prior Level of Function  independent:;transfer;bed mobility;ADL's;all household mobility;community mobility pt. has been sponge bathing because standing fatigues pt  -AN        Equipment Currently Used at Home  rollator;oxygen recommneded shower seat  -AN        Pertinent History of Current Functional Problem  Pt to ED with decreased L UE coordination, slurred speech  -AN        Existing Precautions/Restrictions  fall;oxygen therapy device and L/min  -AN        Risks Reviewed  patient:;spouse/S.O.:;LOB;dizziness;increased discomfort;change in vital signs  -AN        Benefits Reviewed  patient:;spouse/S.O.:;improve function;increase independence;increase strength;increase balance  -AN        Barriers to Rehab  none identified  -AN           Relationship/Environment    Primary Source of Support/Comfort  spouse  -AN        Lives With  spouse  -AN        Family Caregiver if Needed  spouse  -AN           Resource/Environmental Concerns    Current Living Arrangements  home/apartment/condo  -AN           Home Main Entrance    Number of Stairs, Main Entrance  two  -AN           Cognitive Assessment/Interventions    Additional Documentation  Cognitive Assessment/Intervention (Group)  -AN           Cognitive Assessment/Intervention- PT/OT    Affect/Mental Status (Cognitive)  WFL  -AN        Orientation Status (Cognition)  oriented x 4  -AN        Follows Commands (Cognition)  WFL  -AN        Personal Safety Interventions  fall prevention program maintained  -AN           Bed Mobility Assessment/Treatment    Bed Mobility Assessment/Treatment  supine-sit;sit-supine  -AN        Supine-Sit [place] (Bed Mobility)  conditional independence  -AN        Sit-Supine [place] (Bed Mobility)  conditional independence  -AN        Assistive Device (Bed Mobility)  bed rails;head of bed elevated  -AN           Transfer  Assessment/Treatment    Transfer Assessment/Treatment  sit-stand transfer;stand-sit transfer  -AN           Sit-Stand Transfer    Sit-Stand [place] (Transfers)  contact guard;verbal cues  -AN           Stand-Sit Transfer    Stand-Sit [place] (Transfers)  contact guard  -AN           ADL Assessment/Intervention    BADL Assessment/Intervention  lower body dressing;bathing  -AN           Bathing Assessment/Intervention    Comment (Bathing)  simulated min , decreased balance to reach LB  -AN           Lower Body Dressing Assessment/Training    Comment (Lower Body Dressing)  simulated min assist  -AN           BADL Safety/Performance    Impairments, BADL Safety/Performance  balance;coordination;strength;endurance/activity tolerance  -AN           General ROM    GENERAL ROM COMMENTS  [place] UE WFL  -AN           MMT (Manual Muscle Testing)    General MMT Comments  L UE grossly 4/5, R 4+/5  -AN           Motor Assessment/Interventions    Additional Documentation  Balance (Group);Gross Motor Coordination (Group);Therapeutic Exercise (Group)  -AN           Gross Motor Coordination    Gross Motor Skill, Impairments Detail  L UE decreased motor control/dysmetria; impaired L finger to nose  -AN           Balance    Balance  static sitting balance;static standing balance;dynamic sitting balance;dynamic standing balance  -AN           Static Sitting Balance    Level of [place] (Unsupported Sitting, Static Balance)  supervision  -AN        Sitting Position (Unsupported Sitting, Static Balance)  sitting on edge of bed  -AN        Time Able to Maintain Position (Unsupported Sitting, Static Balance)  3 to 4 minutes  -AN        Comment (Unsupported Sitting, Static Balance)  difficulty with L UE support  -AN           Dynamic Sitting Balance    Level of [place], Reaches Outside Midline (Sitting, Dynamic Balance)  contact guard assist  -AN        Sitting Position, Reaches Outside Midline (Sitting, Dynamic Balance)   sitting on edge of bed  -AN        Comment, Reaches Outside Midline (Sitting, Dynamic Balance)  L./R weight shift  -AN           Static Standing Balance    Level of [place] (Supported Standing, Static Balance)  contact guard assist  -AN           Dynamic Standing Balance    Level of [place], Reaches Outside Midline (Standing, Dynamic Balance)  minimal assist, 75% patient effort  -AN        Comment, Reaches Outside Midline (Standing, Dynamic Balance)  txfrs  -AN           Sensory Assessment/Intervention    Additional Documentation  Vision Assessment/Intervention (Group)  -AN           Vision Assessment/Intervention    Visual Motor Impairment  visual tracking, right pt reports hx of decreased R vision  -AN           Positioning and Restraints    Pre-Treatment Position  sitting in chair/recliner  -AN        Post Treatment Position  bed  -AN        In Bed  supine;exit alarm on;with family/caregiver;SCD pump applied  -AN           Pain Assessment    Additional Documentation  Pain Scale: Numbers Pre/Post-Treatment (Group)  -AN           Pain Scale: Numbers Pre/Post-Treatment    Pain Scale: Numbers, Pretreatment  0/10 - no pain  -AN        Pain Scale: Numbers, Post-Treatment  0/10 - no pain  -AN           Plan of Care Review    Plan of Care Reviewed With  patient;spouse  -AN           Clinical Impression (OT)    OT Diagnosis  impaired ADLs  -AN        Criteria for Skilled Therapeutic Interventions Met (OT Eval)  yes;treatment indicated  -AN        Rehab Potential (OT Eval)  good, to achieve stated therapy goals  -AN        Therapy Frequency (OT Eval)  daily  -AN        Anticipated Discharge Disposition (OT)  skilled nursing facility  -AN        Patient/Family Concerns, Anticipated Discharge Disposition (OT)  pt wants to go home, will need assist from spouse and HH  -AN           Vital Signs    Pre Systolic BP Rehab  132  -AN        Pre Treatment Diastolic BP  53  -AN        Post Systolic BP Rehab  138  -AN         Post Treatment Diastolic BP  61  -AN        Pretreatment Heart Rate (beats/min)  83  -AN        Posttreatment Heart Rate (beats/min)  67  -AN        Pre SpO2 (%)  95  -AN        O2 Delivery Pre Treatment  supplemental O2  -AN        Post SpO2 (%)  98  -AN        O2 Delivery Post Treatment  supplemental O2  -AN           OT Goals    Transfer Goal Selection (OT)  transfer, OT goal 1  -AN        Bathing Goal Selection (OT)  bathing, OT goal 1  -AN        Dressing Goal Selection (OT)  dressing, OT goal 1  -AN        Coordination Goal Selection (OT)  coordination, OT goal 1  -AN        Additional Documentation  Coordination Goal Selection (OT) (Row)  -AN           Transfer Goal 1 (OT)    Activity/Assistive Device (Transfer Goal 1, OT)  toilet;walker, rolling  -AN        [place] Level/Cues Needed (Transfer Goal 1, OT)  supervision required  -AN        Time Frame (Transfer Goal 1, OT)  10 days  -AN        Progress/Outcome (Transfer Goal 1, OT)  goal ongoing  -AN           Bathing Goal 1 (OT)    Activity/Assistive Device (Bathing Goal 1, OT)  lower body bathing;long-handled sponge;shower chair  -AN        [place] Level/Cues Needed (Bathing Goal 1, OT)  contact guard assist  -AN        Time Frame (Bathing Goal 1, OT)  10 days  -AN        Progress/Outcomes (Bathing Goal 1, OT)  goal ongoing  -AN           Dressing Goal 1 (OT)    Activity/Assistive Device (Dressing Goal 1, OT)  lower body dressing;reacher;sock-aid don pants and socks  -AN        [place]/Cues Needed (Dressing Goal 1, OT)  contact guard assist  -AN        Time Frame (Dressing Goal 1, OT)  10 days  -AN        Progress/Outcome (Dressing Goal 1, OT)  goal ongoing  -AN           Coordination Goal 1 (OT)    Activity/Assistive Device (Coordination Goal 1, OT)  GM task  -AN        [place] Level/Cues Needed (Coordination Goal 1, OT)  contact guard assist  -AN        Time Frame (Coordination Goal 1, OT)  10 days  -AN        Progress/Outcomes  (Coordination Goal 1, OT)  goal ongoing  -AN           Living Environment    Home Accessibility  stairs to enter home;tub/shower is not walk in  -AN          User Key  (r) = Recorded By, (t) = Taken By, (c) = Cosigned By    Initials Name Effective Dates    Tanja Carcamo OT 06/22/15 -                OT Recommendation and Plan  Outcome Summary/Treatment Plan (OT)  Anticipated Discharge Disposition (OT): skilled nursing facility  Patient/Family Concerns, Anticipated Discharge Disposition (OT): pt wants to go home, will need assist from spouse and HH  Therapy Frequency (OT Eval): daily  Plan of Care Review  Plan of Care Reviewed With: patient, spouse  Plan of Care Reviewed With: patient, spouse  Outcome Summary: Pt with PMH and current evolving symptoms that have decreased I with ADLs. Pt is C GA with sit to stand, expected min to mod with LBADLs. Pt may need SNF or go hoome with HH if pt can ambulate safely with AD and spouse.    Outcome Measures     Row Name 01/03/20 0871             How much help from another is currently needed...    Putting on and taking off regular lower body clothing?  2  -AN      Bathing (including washing, rinsing, and drying)  2  -AN      Toileting (which includes using toilet bed pan or urinal)  3  -AN      Putting on and taking off regular upper body clothing  3  -AN      Taking care of personal grooming (such as brushing teeth)  3  -AN      Eating meals  4  -AN      AM-PAC 6 Clicks Score (OT)  17  -AN         Modified Jeffrey Scale    Modified Jeffrey Scale  4 - Moderately severe disability.  Unable to walk without assistance, and unable to attend to own bodily needs without assistance.  -AN         Functional Assessment    Outcome Measure Options  AM-PAC 6 Clicks Daily Activity (OT);Modified [place]  -AN        User Key  (r) = Recorded By, (t) = Taken By, (c) = Cosigned By    Initials Name Provider Type    Tanja Carcamo, OT Occupational Therapist          Time Calculation:   Time  Calculation- OT     Row Name 01/03/20 0935             Time Calculation- OT    OT Start Time  0830  -AN      Total Timed Code Minutes- OT  0 minute(s)  -AN      OT Received On  01/03/20  -AN      OT Goal Re-Cert Due Date  01/13/20  -AN        User Key  (r) = Recorded By, (t) = Taken By, (c) = Cosigned By    Initials Name Provider Type    Tanja Carcamo OT Occupational Therapist        Therapy Charges for Today     Code Description Service Date Service Provider Modifiers Qty    [id number]  OT EVAL LOW COMPLEXITY 4 1/3/2020 Tanja eLmus OT GO 1               Tanja Lemus OT  1/3/2020

## 2020-01-03 NOTE — PROGRESS NOTES
"Adult Nutrition  Assessment/PES    Patient Name:  Alessandro Mendiola  YOB: 1940  MRN: [id number]  Admit Date:  1/2/2020    Assessment Date:  1/3/2020      Reason for Assessment     Row Name 01/03/20 1702          Reason for Assessment    Reason For Assessment  -- 35 mins. Nsg identified risk r/t difficulty swallowing/wt change     Diagnosis  -- CVA, L hemiparesis, aphasia, COPD, PAF.  Complete hx/list of dx per MD notes this adm.         Nutrition/Diet History     Row Name 01/03/20 1708 01/03/20 1701       Nutrition/Diet History    Factors Affecting Nutritional Intake  -- pt was eating lunch (post- SLP eval) when seen by RD today at 3:00p  -- pt c/o poor appetite and nausea x 3 months.     Row Name 01/03/20 1657          Nutrition/Diet History    Factors Affecting Nutritional Intake  -- pt reports he has been eating less than 50% of usual po intake during the past 3months, and c/o \"no appetite.\"         Anthropometrics     Row Name 01/03/20 1655 01/03/20 1653       Admit Weight    Admit Weight  --  -- ht=71in, pt states to RD that his wt was 156lb earlier this week; need to obtain actual wt this adm. BMI=21.7       Usual Body Weight (UBW)    Usual Body Weight  --  -- 185lb per pt, which he states he weighed 3 months ago. (?)  Epic indicates pt weighed 185lb 11 months ago.    Weight Loss  unintentional pt reports 29lb unintentional wt loss over the past 3 months since pt perceives wt was 185lb approx 3 months ago.  Per [facility] data, pt's wt was 180lb on 7/1/19 and 185lb on 2/18/19. RD requested that [facility] staff obtain current wt and [facility] agrees to do so.  --        Labs/Tests/Procedures/Meds     Row Name 01/03/20 1703 01/03/20 1659       Labs/Procedures/Meds    Lab Results Reviewed  reviewed noted low K today; rec replace per protocol.  --       Diagnostic Tests/Procedures    Diagnostic Test/Procedure Reviewed  --  reviewed noted results of MBS today and recs for dysphagia level 4 with honey thick liquids    "         Nutrition Prescription Ordered     Row Name 01/03/20 1700          Nutrition Prescription PO    Current PO Diet  Dysphagia     Dysphagia Level  4  Mechanical soft no mixed consistencies     Fluid Consistency  Honey thick         Evaluation of Received Nutrient/Fluid Intake     Row Name 01/03/20 1700          PO Evaluation    Number of Days PO Intake Evaluated  Insufficient Data               Problem/Interventions:  Problem 1     Row Name 01/03/20 1704          Nutrition Diagnoses Problem 1    Problem 1  -- needs alternate texture     Etiology (related to)  -- oral dysfunction, pharyngeal dysfunction per SLP notes.     Signs/Symptoms (evidenced by)  SLP/Swallow eval on 1/3               Intervention Goal     Row Name 01/03/20 1705          Intervention Goal    PO  Establish PO         Nutrition Intervention     Row Name 01/03/20 1706          Nutrition Intervention    RD/Tech Action  Interview for preference;Encourage intake;Supplement provided;Follow Tx progress         Nutrition Prescription     Row Name 01/03/20 1706          Nutrition Prescription PO    PO Prescription  Begin supplement     Supplement  Boost Plus honey thick.      Supplement Frequency  2 times a day   pt reports he likes supplement.        Nutrition Prescription EN    New EN Prescription Ordered?  Yes         Education/Evaluation     Row Name 01/03/20 1706          Monitor/Evaluation    Monitor  Per protocol   will continue to follow and assess for potential malnutrition as more data avail.           Electronically signed by:  Simona Saini, MS,RD,LD  01/03/20 5:09 PM

## 2020-01-03 NOTE — PROGRESS NOTES
Discharge Planning Assessment  [facility]     Patient Name: Alessandro Mendiola  MRN: [id number]  Today's Date: 1/3/2020    Admit Date: 1/2/2020    Discharge Needs Assessment     Row Name 01/03/20 0809       Living Environment    Lives With  spouse    Name(s) of Who Lives With Patient  Madelyn (wife) [phone]    Current Living Arrangements  home/apartment/condo    Primary Care Provided by  self    Provides Primary Care For  no one    Family Caregiver if Needed  spouse    Family Caregiver Names  Madelyn (wife)     Quality of Family Relationships  helpful;involved;supportive    Able to Return to Prior Arrangements  yes       Resource/Environmental Concerns    Resource/Environmental Concerns  none    Transportation Concerns  car, none       Transition Planning    Patient/Family Anticipates Transition to  home with family    Patient/Family Anticipated Services at Transition  none    Transportation Anticipated  family or friend will provide       Discharge Needs Assessment    Readmission Within the Last 30 Days  no previous admission in last 30 days    Concerns to be Addressed  denies needs/concerns at this time    Equipment Currently Used at Home  rollator;cane, straight;oxygen    Anticipated Changes Related to Illness  none    Equipment Needed After Discharge  none        Discharge Plan     Row Name 01/03/20 0810       Plan    Plan  Home with family    Patient/Family in Agreement with Plan  yes    Plan Comments  Spoke to patient and wife at bedside. Lives with Madelyn (wife) [phone] in [facility]. Is independent with ADL's. No problems with insurance or medications. Has a rollator, Straight cane and Oxygen from [place] at home. Plan is home with family. CM will continue to follow    Final Discharge Disposition Code  01 - home or self-care        Destination      Coordination has not been started for this encounter.      Durable Medical Equipment      Coordination has not been started for this encounter.       Dialysis/Infusion      Coordination has not been started for this encounter.      Home Medical Care      Coordination has not been started for this encounter.      Therapy      Coordination has not been started for this encounter.      Community Resources      Coordination has not been started for this encounter.          Demographic Summary     Row Name 01/03/20 0808       General Information    Admission Type  inpatient    Arrived From  hospital    Referral Source  admission list    Reason for Consult  discharge planning    Preferred Language  English     Used During This Interaction  no       Contact Information    Permission Granted to Share Info With      Contact Information Obtained for      Contact Information Comments  PCP is Silvio Jacobson MD        Functional Status     Row Name 01/03/20 0808       Functional Status    Usual Activity Tolerance  moderate    Current Activity Tolerance  moderate       Functional Status, IADL    Medications  independent    Meal Preparation  independent    Housekeeping  independent    Laundry  independent    Shopping  independent       Mental Status    General Appearance WDL  WDL       Mental Status Summary    Recent Changes in Mental Status/Cognitive Functioning  no changes       Employment/    Employment Status  retired        Psychosocial    No documentation.       Abuse/Neglect    No documentation.       Legal    No documentation.       Substance Abuse    No documentation.       Patient Forms    No documentation.           Flaco Starks RN

## 2020-01-04 NOTE — PROGRESS NOTES
"Neurology       Patient Care Team:  Silvio Jacobson MD as PCP - General (Internal Medicine)  Dagoberto Rasmussen MD as Consulting Physician (General Surgery)    Chief complaint stroke with left upper more than lower extremity weakness      Subjective .     History: Patient's wife at bedside thinks patient speech is little more slurred again and left side of a bit more weak again.  She notes he had quite marked weakness at onset which did improve, and that his thinking does not seem to be impaired at all.  His wife also notes that patient has strict blood pressure parameters due to aortic aneurysm.  Patient denies headache, acknowledges little more difficulty with enunciating.    ROS: No fever or chest pain    Objective     Vital Signs   Blood pressure 115/50, pulse 66, temperature 97.8 °F (36.6 °C), temperature source Oral, resp. rate 18, height 180.3 cm (70.98\"), weight 69.9 kg (154 lb), SpO2 98 %.    Physical Exam:              Neuro: Elderly white man initially sleeping, wakes to voice and then appropriate.  Mild dysarthria.  Fluent and non-aphasic.  EOMI, decreased left nasolabial fold but grimace symmetric  Motor: Mild left hemiparesis, 4/5    Results Review:              A1c 6.3  CTA head neck and perfusion scans unremarkable  Echo report pending but preliminary with normal left atrial size and volume    Assessment/Plan     Ischemic stroke with left hemiparesis and dysarthria that initially improved following onset-on Xarelto at the time of the event.  No evidence for significant large vessel cerebrovascular disease.  Mild increase in symptoms this morning.  Xarelto held due to concern for risk of hemorrhagic conversion.  On aspirin, Plavix and high-dose statin for now.    Unable to get MRI until Monday.  If stroke is small as suspected clinically, will likely be able to restart anticoagulation on Monday.  Long discussion with his wife at bedside regarding mechanisms of stroke, treatment, investigations and " findings so far.  Discussed that if this is a lacunar stroke as I suspect at this point, symptoms may fluctuate significantly over several days.  Discussed permissive hypertension, but given his strict blood pressure parameters with aneurysm, cannot allow significant hypertension.  Patient with acute stroke, fluctuating hemiparesis, appears appropriate for inpatient status.    I discussed the patients findings and my recommendations with patient, family and primary care team    Fern Patel MD  01/04/20  8:20 AM

## 2020-01-04 NOTE — PROGRESS NOTES
[facility]  PROGRESS NOTE    Patient Name: Alessandro Mendiola  : 1940  MRN: [id number]    Date of Admission: 2020  Primary Care Physician: Silvio Jacobson MD    Subjective   Subjective     CC:  Follow-up for left-sided weakness and enterococcus bacteremia    HPI:  No acute events overnight, appears that his left-sided weakness is gotten a little bit worse, denies slurred speech.  Denies fever, chills, nausea, vomiting, diarrhea, or dysuria.    Review of Systems  Gen- No fevers, chills  CV- No chest pain, palpitations  Resp- No cough, dyspnea    Objective   Objective     Vital Signs:   Temp:  [97.1 °F (36.2 °C)-100.1 °F (37.8 °C)] 98.1 °F (36.7 °C)  Heart Rate:  [] 102  Resp:  [17-26] 20  BP: (112-128)/(50-86) 112/86  Total (NIH Stroke Scale): 7     Physical Exam:  Constitutional: No acute distress, awake, alert  HENT: NCAT, mucous membranes moist  Respiratory: Clear to auscultation bilaterally, respiratory effort normal on nasal canula  Cardiovascular: Irregular rhythm, no murmurs  Gastrointestinal: Positive bowel sounds, soft, nontender, nondistended  Psychiatric: Appropriate affect, cooperative  Neurologic: Oriented x 3, strength decreased in left compared to right, Cranial Nerves grossly intact to confrontation, speech clear      Results Reviewed:    Results from last 7 days   Lab Units 20  0719 20  1344   WBC 10*3/mm3 8.88 9.75   HEMOGLOBIN g/dL 7.9* 9.4*   HEMATOCRIT % 27.7* 32.7*   PLATELETS 10*3/mm3 155 166   INR   --  2.13*     Results from last 7 days   Lab Units 20  0438 20  0719 20  1344   SODIUM mmol/L 140 139 139   POTASSIUM mmol/L 3.5 3.4* 3.7   CHLORIDE mmol/L 99 98 96*   CO2 mmol/L 29.0 31.0* 28.1   BUN mg/dL 15 13 13   CREATININE mg/dL 0.88 0.77 0.76   GLUCOSE mg/dL 136* 133* 193*   CALCIUM mg/dL 8.8 8.7 9.1   ALT (SGPT) U/L  --  8 11   AST (SGOT) U/L  --  16 23   TROPONIN T ng/mL  --   --  <0.010     Estimated  Creatinine Clearance: 69.5 mL/min (by C-G formula based on SCr of 0.88 mg/dL).    Microbiology Results Abnormal     Procedure Component Value - Date/Time    Blood Culture - Blood, Arm, Left [[id number]]  (Abnormal) Collected:  01/03/20 1506    Lab Status:  Preliminary result Specimen:  Blood from Arm, Left Updated:  01/04/20 1027     Blood Culture Abnormal Stain     Gram Stain Aerobic Bottle Gram positive cocci in pairs and chains    Narrative:       REFER TO PREVIOUS BLOOD CULTURE COLLECTED ON 01-    Blood Culture - Blood, Arm, Right [[id number]]  (Abnormal) Collected:  01/02/20 2019    Lab Status:  Preliminary result Specimen:  Blood from Arm, Right Updated:  01/04/20 0621     Blood Culture Enterococcus species     Gram Stain Aerobic Bottle Gram positive cocci in pairs and chains      Anaerobic Bottle Gram positive cocci in pairs and chains    Blood Culture - Blood, Arm, Left [[id number]]  (Abnormal) Collected:  01/02/20 2025    Lab Status:  Preliminary result Specimen:  Blood from Arm, Left Updated:  01/04/20 0621     Blood Culture Enterococcus species     Gram Stain Aerobic Bottle Gram positive cocci in pairs and chains      Anaerobic Bottle Gram positive cocci in pairs and chains    Blood Culture ID, PCR - Blood, Arm, Left [[id number]]  (Abnormal) Collected:  01/02/20 2025    Lab Status:  Final result Specimen:  Blood from Arm, Left Updated:  01/03/20 1447     BCID, PCR Enterococcus spp, not VRE. Identification by BCID PCR.          Imaging Results (Last 24 Hours)     Procedure Component Value Units Date/Time    FL Video Swallow With Speech Single Contrast [[id number]] Collected:  01/03/20 1335     Updated:  01/03/20 1704    Narrative:       EXAMINATION: FL VIDEO SWALLOW W SPEECH SINGLE-CONTRAST-     INDICATION: dysphagia; M62.81-Muscle weakness (generalized);  Z74.09-Other reduced mobility     TECHNIQUE: 1 minute and 6 seconds of fluoroscopic time was used for this  exam. 1 associated image was saved. The  patient was evaluated in the  seated lateral position while taking a variety of consistencies of  barium by mouth under the direction of speech pathology.     COMPARISON: NONE     FINDINGS: There was aspiration with sips of thin, nectar consistency  barium. There was no penetration and no aspiration with honey, pudding,  or solid consistency barium.          Impression:       Fluoroscopy provided for a modified barium swallow. Please  see speech therapy report for full details and recommendations.         This report was finalized on 1/3/2020 5:01 PM by Dr. Aftab Kraus.                  I have reviewed the medications:  Scheduled Meds:  aspirin 300 mg Rectal Daily   atorvastatin 80 mg Oral Nightly   azelastine 2 spray Each Nare BID   budesonide-formoterol 2 puff Inhalation BID   clopidogrel 75 mg Oral Daily   cycloSPORINE 1 drop Both Eyes BID   docusate sodium 100 mg Oral Daily   dronedarone 400 mg Oral BID   heparin (porcine) 5,000 Units Subcutaneous Q8H   pantoprazole 40 mg Oral QAM   primidone 50 mg Oral BID   roflumilast 500 mcg Oral Daily   sertraline 50 mg Oral Daily   sodium chloride 10 mL Intravenous Q12H   tamsulosin 0.4 mg Oral Nightly   vancomycin 1,000 mg Intravenous Q12H     Continuous Infusions:  Pharmacy to dose vancomycin      PRN Meds:.•  acetaminophen **OR** acetaminophen **OR** acetaminophen  •  ipratropium  •  ipratropium-albuterol  •  ondansetron  •  Pharmacy to dose vancomycin  •  sodium chloride      Assessment/Plan   Assessment & Plan     Active Hospital Problems    Diagnosis  POA   • **Muscle weakness of left upper extremity [M62.81]  Clinically Undetermined   • Aphasia [R47.01]  Clinically Undetermined   • COPD exacerbation (CMS/HCC) [J44.1]  Yes   • Aneurysm of thoracic aorta (CMS/HCC) [I71.2]  Yes   • Coronary arteriosclerosis in native artery [I25.10]  Yes   • Paroxysmal atrial fibrillation (CMS/HCC) [I48.0]  Yes   • Sick sinus syndrome (CMS/HCC) [I49.5]  Yes      Resolved Hospital  Problems   No resolved problems to display.        Brief Hospital Course to date:  Alessandro Mendiola is a 79 y.o. 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     Left upper extremity weakness  -aphasia improved   -CT cerebral perfusion was normal, CTA with mild plaque seen at the carotid bifurcations and in the proximal left subclavian artery   -echo ordered, final interpretation pending  -speech recommending formal swallow eval  -PT/OT eval   -neurology consultation - Discussed with Dr. Patel, etiology likely due to CVA and she's recommending MRI brain - he has a pacemaker that needs to be approved for MRI by cardiology so we will get a cardiology consultation   for them to approve MRI  -holding xarelto and starting aspirin and plavix, continue statin   -permissive HTN, patient currently not on any antihypertensive medications and blood pressures within normal limits     Enterococcus bacteremia  -Unknown source, blood cultures from 1-2-2020 growing enterococcus susceptibilities are not back yet, continue vancomycin and consult infectious disease  - Blood cultures from 1-3 with abnormal stain    Prediabetes  A1c 6.3  - on diet and exercise  -repeat A1c in 3 months     COPD   -on baseline oxygen 4-5L NC and is in no respiratory distress  -continue Symbicort, daliresp     PAF  -holding Xarelto - on plavix and aspirin for stroke   -Holding diltiazem for permissive hypertension and continue dronedarone      HTN-holding diltiazem per above     Ascending thoracic aorta aneurysm - CT chest w/contrast 2/25/2019 it was 4.4 cm      DVT Prophylaxis:  heparin     Disposition: I expect the patient to be discharged in 3-4 days.    CODE STATUS:   Code Status and Medical Interventions:   Ordered at: 01/02/20 4826     Limited Support to NOT Include:    Intubation     Cardioversion/Defibrillation     Level Of Support Discussed With:    Patient     Code Status:    No CPR     Medical Interventions (Level of Support Prior to Arrest):    Limited         Electronically signed by Iris Hsu MD, 01/04/20, 12:09 PM.

## 2020-01-04 NOTE — NURSING NOTE
"Spoke with Dr. Ospina r/t cardiology consult for clearance for pt to have MRI on 1/6/2020. Pt has pacemaker in place and per MRI pt needs to be seen by cardiology and form on chart completed by Cardiology. Per Dr. Ospina \"pt is not having the MRI today or tomorrow so call back on Monday\". I tried to explain the the pt has a time blocked off in MRI on 1/6/2020 with paer rep to be present. Charge nurse Toshia and ben in MRI notified of Dr. Ospina's request to call on Monday the 6th. Nurse Miller also notified during report on 1/4/2020.   "

## 2020-01-04 NOTE — PLAN OF CARE
Problem: Patient Care Overview  Goal: Plan of Care Review  Outcome: Ongoing (interventions implemented as appropriate)  Flowsheets (Taken 1/4/2020 5125)  Plan of Care Reviewed With: patient; spouse; family  Note:   SLP treatment completed. Will continue to address dysphagia. Please see note for further details and recommendations.

## 2020-01-04 NOTE — CONSULTS
"INFECTIOUS DISEASE CONSULT/INITIAL HOSPITAL VISIT    Alessandro Mendiola  1940  [id number]    Date of Consult: 1/4/2020    Admission Date: 1/2/2020      Requesting Provider: Georgina Pompa II, DO  Evaluating Physician: DAVID Baker    Reason for Consultation: Enterococcus bacteremia    Chief complaint: Left upper extremity weakness    Current antimicrobial therapy: Vancomycin IV    History of present illness:    Alessandro Mendiola is a 79 y.o. male being evaluated for enterococcus bacteremia.  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  He presented to the ED yesterday with complaints of left upper extremity weakness and speech difficulties.  Per documentation, family states that the patient went to the restroom after getting up out of bed on the morning of 1/2 and upon returning and sitting down in his recliner he was unable to hold objects in his left hand and drop them.  Then he stated to his wife that his left arm felt \"weak, numb and tingly\".  Wife also noted that his speech was a little slurred and called her son to help get the patient to the ER ([facility]). Work-up in the ED showed a CT of the head without acute infarct and a chest x-ray with no acute cardiopulmonary illness.  NIHSS on arrival to ED was 9 and then 3 prior to transfer to [facility] for higher level of care.    Since arrival here patient has had a T-max of 100.6 and has been hemodynamically stable.  Labs show he is without leukocytosis with a white blood cell count of 8.88 and a neutrophilia of 76.9%.  Most recent BUN/creatinine is 15 and 0.88 with a GFR of 84.  1/2 blood cultures are positive in 4/4 bottles for enterococcus not VRE identified by bio fire report.  Repeat blood cultures on 1/3 are currently " positive and 1/4 bottles for gram-positive cocci in pairs and chains.    He has a listed allergy to doxycycline with shortness of breath listed as a reaction and is currently receiving vancomycin IV.  ID has been consulted for further evaluation and treatment as well as antimicrobial therapy management.    Of note: TTE was performed yesterday and is currently pending radiologist read.  Preliminary results show no vegetations and some TR.    The patient has experienced a 30 lbs weight loss over the last several months. He has additionally been perisistently nauseated. No abdominal pain. No bloody BMs. Last colonoscopy was about 3 years ago per patient and was ok.    Past Medical History:   Diagnosis Date   • Alcohol abuse    • Aneurysm of thoracic aorta (CMS/HCC)    • Anxiety    • Ascending aortic aneurysm (CMS/HCC)     4.8 to 4.9 cm    • Atherosclerotic heart disease    • Atrial fibrillation (CMS/HCC)    • Blood thinned due to long-term anticoagulant use    • Body mass index (BMI) 20.0-20.9, adult    • CAD (coronary artery disease)    • Cancer (CMS/HCC)     skin   • Cardiac pacemaker    • Cellulitis    • Chronic bronchitis (CMS/HCC)    • Chronic cough    • COPD (chronic obstructive pulmonary disease) (CMS/HCC)    • Depression    • Difficulty breathing    • Emphysema lung (CMS/HCC)    • Encounter for long-term current use of medication    • Encounter for screening for malignant neoplasm of prostate    • Fatigue    • Fingertip amputation    • TAMIKO (generalized anxiety disorder)    • History of alcohol abuse    • History of cardiac catheterization    • History of chest x-ray 02/11/2014    Chronic interstitial changes seen throughout the lung fields w/ no radiographic evidence of acute parenchymal disease.No definite LT-sided rib fracture present.   • History of chest x-ray 01/10/2014    No acute cardiopulmonary process.Dual lead LT subclavian pacemaker is in place.Aortic ectasia. Hyperinflation of lungs, suggesting COPD    • History of chest x-ray 10/01/2012    Ill-defined RT middle lobe opacity which likley reflects a pneumonia. FU to radiographic resolution is recommended.   • History of Doppler echocardiogram    • History of echocardiogram 2013    EF 55-60%. Mod concentric LVH. Abnormal LT VT diastolic filling is observed, consistent w/ impaired relaxation.Mild MR/TR. Trace DC. RVSP 44 mmHg.   • History of stress test     ECG performed   • HTN (hypertension)    • HX: long term anticoagulant use     Blood thinned due to long-term anticoagulant use (V58.61) (Z79.01)   • Hypercholesterolemia    • LVH (left ventricular hypertrophy)    • Mitral and aortic valve disease    • Myocardial infarction (CMS/HCC)    • Nonvenomous insect bite of multiple sites    • Onychomycosis    • Pacemaker at end of battery life    • Pneumonia    • Pneumothorax    • Pre-syncope    • Pulmonary nodule    • Rib pain on left side    • Sick sinus syndrome (CMS/HCC)    • SOB (shortness of breath)    • Upper respiratory infection    • Vitamin D deficiency        Past Surgical History:   Procedure Laterality Date   • AMPUTATION  2015    metacarpal and index finger   • CARDIAC PACEMAKER PLACEMENT  2008   • CATARACT EXTRACTION     • CORONARY STENT PLACEMENT      History of Cath Placement Of Stent 1  · Coronary stents   • EYE SURGERY     • SKIN CANCER EXCISION         Family History   Problem Relation Age of Onset   • Coronary artery disease Other    • Diabetes Other    • Hypertension Other        Social History     Socioeconomic History   • Marital status:      Spouse name: Not on file   • Number of children: Not on file   • Years of education: Not on file   • Highest education level: Not on file   Tobacco Use   • Smoking status: Former Smoker     Packs/day: 3.00     Years: 45.00     Pack years: 135.00     Last attempt to quit: 2008     Years since quittin.0   • Smokeless tobacco: Never Used   Substance and Sexual Activity   •  Alcohol use: No   • Drug use: No   • Sexual activity: Defer       Allergies   Allergen Reactions   • Doxycycline Shortness Of Breath         Medication:    Current Facility-Administered Medications:   •  acetaminophen (TYLENOL) tablet 650 mg, 650 mg, Oral, Q4H PRN **OR** acetaminophen (TYLENOL) 160 MG/5ML solution 650 mg, 650 mg, Oral, Q4H PRN, 649.6 mg at 01/04/20 0025 **OR** acetaminophen (TYLENOL) suppository 650 mg, 650 mg, Rectal, Q4H PRN, Stacey Padilla APRN  •  aspirin suppository 300 mg, 300 mg, Rectal, Daily, Georgina Pompa II, DO  •  atorvastatin (LIPITOR) tablet 80 mg, 80 mg, Oral, Nightly, Stacey Padilla APRN, 80 mg at 01/03/20 2112  •  azelastine (ASTELIN) nasal spray 2 spray, 2 spray, Each Nare, BID, Stacey Padilla APRN, 2 spray at 01/04/20 1016  •  budesonide-formoterol (SYMBICORT) 80-4.5 MCG/ACT inhaler 2 puff, 2 puff, Inhalation, BID, Stacey Padilla APRN, 2 puff at 01/04/20 0825  •  clopidogrel (PLAVIX) tablet 75 mg, 75 mg, Oral, Daily, Fern Patel MD, 75 mg at 01/04/20 0959  •  cycloSPORINE (RESTASIS) 0.05 % ophthalmic emulsion 1 drop, 1 drop, Both Eyes, BID, Stacey Padilla APRN, 1 drop at 01/04/20 1013  •  docusate sodium (COLACE) capsule 100 mg, 100 mg, Oral, Daily, Stacey Padilla APRN, 100 mg at 01/04/20 0959  •  dronedarone (MULTAQ) tablet 400 mg, 400 mg, Oral, BID, Stacey Padilla APRN, 400 mg at 01/04/20 1011  •  heparin (porcine) 5000 UNIT/ML injection 5,000 Units, 5,000 Units, Subcutaneous, Q8H, Iris Hsu MD, 5,000 Units at 01/04/20 0959  •  ipratropium (ATROVENT) nebulizer solution 0.5 mg, 500 mcg, Nebulization, 4x Daily PRN, Stacey Padilla APRN  •  ipratropium-albuterol (DUO-NEB) nebulizer solution 3 mL, 3 mL, Nebulization, Q6H PRN, Chiquita Mcmahan APRN, 3 mL at 01/03/20 2123  •  ondansetron (ZOFRAN) injection 4 mg, 4 mg, Intravenous, Q6H PRN, Georgina Pompa II, DO  •  pantoprazole (PROTONIX) EC tablet 40 mg, 40 mg, Oral, QAM, Stacey Padilla  PIPPA, APRN, 40 mg at 01/04/20 1014  •  Pharmacy to dose vancomycin, , Does not apply, Continuous PRN, Iris Hsu MD  •  primidone (MYSOLINE) tablet 50 mg, 50 mg, Oral, BID, Stacey Padilla, APRN, 50 mg at 01/04/20 1011  •  roflumilast (DALIRESP) tablet 500 mcg, 500 mcg, Oral, Daily, Stacey Padilla, APRN, 500 mcg at 01/04/20 1012  •  sertraline (ZOLOFT) tablet 50 mg, 50 mg, Oral, Daily, Stacey Padilla, APRN, 50 mg at 01/04/20 0959  •  sodium chloride 0.9 % flush 10 mL, 10 mL, Intravenous, Q12H, Stacey Padilla, APRN, 10 mL at 01/04/20 1000  •  sodium chloride 0.9 % flush 10 mL, 10 mL, Intravenous, PRN, Stacey Padilla, APRN  •  tamsulosin (FLOMAX) 24 hr capsule 0.4 mg, 0.4 mg, Oral, Nightly, Stacey Padilla, APRN, 0.4 mg at 01/03/20 2113  •  vancomycin (VANCOCIN) in iso-osmotic dextrose IVPB 1 g (premix) 200 mL, 1,000 mg, Intravenous, Q12H, Lis Romero RPH, Stopped at 01/04/20 1039    Antibiotics:  Anti-Infectives (From admission, onward)    Ordered     Dose/Rate Route Frequency Start Stop    01/03/20 1502  vancomycin (VANCOCIN) in iso-osmotic dextrose IVPB 1 g (premix) 200 mL  Review   Ordering Provider:  Lis Romero RPH    1,000 mg  over 90 Minutes Intravenous Every 12 Hours 01/04/20 0400 01/11/20 0359    01/03/20 1502  vancomycin 1750 mg/500 mL 0.9% NS IVPB ([facility])     Ordering Provider:  Lis Romero RPH    1,750 mg  over 120 Minutes Intravenous Once 01/03/20 1600 01/03/20 2245    01/03/20 1455  Pharmacy to dose vancomycin  Review   Ordering Provider:  Iris Hsu MD     Does not apply Continuous PRN 01/03/20 1454 01/17/20 1453            Review of Systems:  Constitutional-- No Fever, chills or sweats.  Poor appetite. 30 lbs weight loss. + fatigue.  HEENT-- No new vision, hearing or throat complaints.  No epistaxis or oral sores.  Denies odynophagia or dysphagia. No headache, photophobia or neck stiffness.  CV-- No chest pain, palpitation or syncope  Resp-- +chronic SOB. No  cough/Hemoptysis  GI- + nausea, no vomiting, no diarrhea.  No hematochezia, melena, or hematemesis. Denies jaundice or chronic liver disease.  -- No dysuria, hematuria, or flank pain.  Denies hesitancy, urgency or flank pain.  Lymph- no swollen lymph nodes in neck/axilla or groin.   Heme- No active bruising or bleeding; no Hx of DVT or PE.  MS--generalized left-sided weakness.  Neuro--left-sided numbness, tingling, weakness and slurred speech  Skin--No rashes or lesions      Physical Exam:   Vital Signs  Temp (24hrs), Av.3 °F (36.8 °C), Min:97.1 °F (36.2 °C), Max:100.1 °F (37.8 °C)    Temp  Min: 97.1 °F (36.2 °C)  Max: 100.1 °F (37.8 °C)  BP  Min: 115/50  Max: 146/65  Pulse  Min: 63  Max: 84  Resp  Min: 17  Max: 26  SpO2  Min: 95 %  Max: 100 %    GENERAL: Awake and alert, in mild distress. Frail appearing  HEENT: Normocephalic, atraumatic.  PERRL. EOMI. No conjunctival injection. No icterus. Oropharynx clear without evidence of thrush or exudate. No evidence of peridontal disease.    Chest: PPM pocket over left chest wall is without erythema, warmth, or tenderness  NECK: Supple without nuchal rigidity. No mass.  LYMPH: No cervical, axillary lymphadenopathy.  HEART: RRR; No murmur, rubs, gallops.   LUNGS: decreased breath sounds throughout but no wheezing or crackles.Labored breathing on nasal cannula  ABDOMEN: Soft, nontender, nondistended. Positive bowel sounds. No rebound or guarding. NO mass or HSM.  EXT:  No cyanosis, clubbing or edema. No cord.  :  Without Denny catheter.  MSK: FROM without joint effusions noted arms/legs.    SKIN: Warm and dry without cutaneous eruptions on Inspection/palpation.    NEURO: Oriented to PPT. Weakness of RUE and RLE compared to left side. Slurred speech. aphasia  PSYCHIATRIC: Normal insight and judgement. Cooperative with PE    Laboratory Data    Results from last 7 days   Lab Units 20  0719 20  1344   WBC 10*3/mm3 8.88 9.75   HEMOGLOBIN g/dL 7.9* 9.4*    HEMATOCRIT % 27.7* 32.7*   PLATELETS 10*3/mm3 155 166     Results from last 7 days   Lab Units 01/04/20  0438   SODIUM mmol/L 140   POTASSIUM mmol/L 3.5   CHLORIDE mmol/L 99   CO2 mmol/L 29.0   BUN mg/dL 15   CREATININE mg/dL 0.88   GLUCOSE mg/dL 136*   CALCIUM mg/dL 8.8     Results from last 7 days   Lab Units 01/03/20  0719   ALK PHOS U/L 112   BILIRUBIN mg/dL 0.3   ALT (SGPT) U/L 8   AST (SGOT) U/L 16                         Estimated Creatinine Clearance: 69.5 mL/min (by C-G formula based on SCr of 0.88 mg/dL).      Microbiology:  Microbiology Results (last 10 days)     Procedure Component Value - Date/Time    Blood Culture - Blood, Arm, Left [[id number]]  (Abnormal) Collected:  01/03/20 1506    Lab Status:  Preliminary result Specimen:  Blood from Arm, Left Updated:  01/04/20 1027     Blood Culture Abnormal Stain     Gram Stain Aerobic Bottle Gram positive cocci in pairs and chains    Narrative:       REFER TO PREVIOUS BLOOD CULTURE COLLECTED ON 01-    Blood Culture - Blood, Arm, Left [[id number]]  (Abnormal) Collected:  01/02/20 2025    Lab Status:  Preliminary result Specimen:  Blood from Arm, Left Updated:  01/04/20 0621     Blood Culture Enterococcus species     Gram Stain Aerobic Bottle Gram positive cocci in pairs and chains      Anaerobic Bottle Gram positive cocci in pairs and chains    Blood Culture ID, PCR - Blood, Arm, Left [[id number]]  (Abnormal) Collected:  01/02/20 2025    Lab Status:  Final result Specimen:  Blood from Arm, Left Updated:  01/03/20 1447     BCID, PCR Enterococcus spp, not VRE. Identification by BCID PCR.    Blood Culture - Blood, Arm, Right [[id number]]  (Abnormal) Collected:  01/02/20 2019    Lab Status:  Preliminary result Specimen:  Blood from Arm, Right Updated:  01/04/20 0621     Blood Culture Enterococcus species     Gram Stain Aerobic Bottle Gram positive cocci in pairs and chains      Anaerobic Bottle Gram positive cocci in pairs and chains               Radiology:  Imaging Results (Last 72 Hours)     Procedure Component Value Units Date/Time    FL Video Swallow With Speech Single Contrast [[id number]] Collected:  01/03/20 1335     Updated:  01/03/20 1704    Narrative:       EXAMINATION: FL VIDEO SWALLOW W SPEECH SINGLE-CONTRAST-     INDICATION: dysphagia; M62.81-Muscle weakness (generalized);  Z74.09-Other reduced mobility     TECHNIQUE: 1 minute and 6 seconds of fluoroscopic time was used for this  exam. 1 associated image was saved. The patient was evaluated in the  seated lateral position while taking a variety of consistencies of  barium by mouth under the direction of speech pathology.     COMPARISON: NONE     FINDINGS: There was aspiration with sips of thin, nectar consistency  barium. There was no penetration and no aspiration with honey, pudding,  or solid consistency barium.          Impression:       Fluoroscopy provided for a modified barium swallow. Please  see speech therapy report for full details and recommendations.         This report was finalized on 1/3/2020 5:01 PM by Dr. Aftab Kraus.       CT Angiogram Head [[id number]] Collected:  01/02/20 2132     Updated:  01/02/20 2134    Narrative:       The CTA head results were included on the CTA neck report. Please see that report for full description of the head and neck findings    Signer Name: Sharath Ortega MD   Signed: 1/2/2020 9:32 PM   Workstation Name: [id number]    [facility]    CT Angiogram Neck [[id number]] Collected:  01/02/20 2122     Updated:  01/02/20 2124    Narrative:       CTA Neck    INDICATION:   TIA prior imaging    TECHNIQUE:   CT angiogram of the head and neck with 75 cc of Isovue 300 IV contrast. 3-D reconstructions were obtained and reviewed.  Evaluation for a significant carotid arterial stenosis is based on the NASCET criteria. Radiation dose reduction techniques included  automated exposure control or exposure modulation based on body size.  Count of known CT and cardiac nuc med studies performed in previous 12 months: 1.     COMPARISON:   None available.    FINDINGS:   CTA neck:  Great vessel origins were not included in the scan. The proximal great vessels are widely patent with calcified plaque seen in the left subclavian. Both vertebral arteries are widely patent at approximately the same size. The distal vertebrals  are unremarkable into the basilar artery. Both carotid bifurcations are widely patent with mild calcified plaque noted. The cervical internal carotids are patent up to the siphons. Extravascular structures are remarkable for a small amount of fluid in  the left maxillary sinus.    CTA head:  There is no evidence of aneurysm, vascular malformation or major branch vessel occlusion. No severe intracranial stenosis is seen. Major dural venous sinuses are patent.      Impression:       Mild plaque is seen at the carotid bifurcations and in the proximal left subclavian artery. No evidence of major branch vessel occlusion or severe intracranial stenosis.    Signer Name: Sharath Ortega MD   Signed: 1/2/2020 9:22 PM   Workstation Name: [id number]    [facility]    CT Cerebral Perfusion With & Without Contrast [[id number]] Collected:  01/02/20 2117     Updated:  01/02/20 2119    Narrative:       CT CEREBRAL PERFUSION W WO CONTRAST    HISTORY:   TIA prior to imaging    TECHNIQUE:   Axial CT images of the brain without and with intravenous contrast using cerebral perfusion protocol. Post-processing parametric maps were created using RAPID software and reviewed. Radiation dose reduction techniques included automated exposure control  or exposure modulation based on body size. CT and nuclear cardiology exams in the last 12 months: 1.    COMPARISON:       FINDINGS:   Arterial input function is optimal.     Cerebral blood flow, blood volume and transit time are within normal limits. No significant perfusion abnormalities are seen  on either side. No mismatch is identified.      Impression:       Normal brain perfusion CT.          Signer Name: Sharath Ortega MD   Signed: 1/2/2020 9:17 PM   Workstation Name: [id number]    [facility]    XR Chest 1 View [[id number]] Collected:  01/02/20 2036     Updated:  01/02/20 2038    Narrative:       Chest 1 view 1/2/2019    INDICATION:  New onset fever today.    FINDINGS: The heart is minimally enlarged. Cardiac pacemaker leads are in the right atrium and right ventricle. There is no pneumothorax. The lungs are hyperinflated with emphysematous and fibrotic changes characteristic of COPD. No airspace  consolidation is seen. There are no pleural effusions.      Impression:       Cardiomegaly. COPD. No active pulmonary disease.    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 Would normal require pacemaker extraction but patient and wife are unsure if he could tolerate this. He is a patient of Dr. Obrien and would like to discuss risk/benefit of MARLON with cardiology prior to pursuing this. If he would not like to have any procedures then best option may be to treat with long course of IV abx followed by lifelong oral abx suppression if possible. The other quest is of the initial source for his septicemia. He denies any urinary symptoms or prostate issues. Given enterococcus is also an enteric bacteria and he has been experiencing nausea and significant weight loss, I have discussed benefit of obtaining a CT A/P to help rule out any intraabdominal pathology.    -- Muscle weakness of left side with upper extremity being greater  than lower extremity- per neurology likely a lacunar stroke  -- Aphasia  -- Hemiparesis  ----Nausea  ----significant weight loss  -- COPD exacerbation  -- Aneurysm of thoracic aorta  -- PAF  -- Sick sinus syndrome status post permanent cardiac pacemaker    PLAN/RECOMMENDATIONS:   Thank you for asking us to see Alessandro Mendiola, I recommend the following:  -- Continue IV vancomycin and start ampicillin 2g q 4 hours and start rocephin 2g iv q 12 hours-will stop vancomycin if enterococcus is sensitive to vancomycin  -- Continue to follow cultures and sensitivity reports and adjust antibiotics accordingly  -- Continue to follow physical exam and radiological reports and adjust therapies as indicated  -- Agree with neurology consult  --- Consult Cardiology (Dr. Obrien)  -- Would normal obtain MARLON (as this is like infective endocarditis from his pacemaker) but may not be within GOC. Will continue discussing with patient and wife  ---obtain CT A/P with IV/oral contrast tomorrow AM  ---Follow up MRI brain if able to obtain this  ---repeat blood cultures tomorrow AM  ---continue to closely follow cbc with diff and bmp for now    Dr. Piyush Elliott has obtained the history, performed the physical exam and formulated the above treatment plan.     Long discussion with the patient and his wife today    Very complex case requiring high level of medical decision making. Patient has significant risk for further morbidity    I (Dr. Elliott) spent >60 minutes on the patient's case today, >50% of which was spent in conference and care coordination.    Cortes Vidal, APRN  1/4/2020  11:19 AM

## 2020-01-04 NOTE — THERAPY TREATMENT NOTE
Patient Name: Alessandro Mendiola  : 1940    MRN: [id number]                              Today's Date: 2020       Admit Date: 2020    Visit Dx:     ICD-10-CM ICD-9-CM   1. Muscle weakness of left upper extremity M62.81 728.87   2. Impaired mobility and ADLs Z74.09 799.89   3. Oropharyngeal dysphagia R13.12 787.22   4. Dysarthria R47.1 784.51     Patient Active Problem List   Diagnosis   • Aneurysm of thoracic aorta (CMS/HCC)   • Coronary arteriosclerosis in native artery   • Paroxysmal atrial fibrillation (CMS/HCC)   • Sick sinus syndrome (CMS/HCC)   • Vitamin D deficiency   • COPD exacerbation (CMS/HCC)   • HTN (hypertension)   • Mood disorder (CMS/HCC)   • Simple chronic bronchitis (CMS/HCC)   • Muscle weakness of left upper extremity   • Aphasia     Past Medical History:   Diagnosis Date   • Alcohol abuse    • Aneurysm of thoracic aorta (CMS/HCC)    • Anxiety    • Ascending aortic aneurysm (CMS/HCC)     4.8 to 4.9 cm    • Atherosclerotic heart disease    • Atrial fibrillation (CMS/HCC)    • Blood thinned due to long-term anticoagulant use    • Body mass index (BMI) 20.0-20.9, adult    • CAD (coronary artery disease)    • Cancer (CMS/HCC)     skin   • Cardiac pacemaker    • Cellulitis    • Chronic bronchitis (CMS/HCC)    • Chronic cough    • COPD (chronic obstructive pulmonary disease) (CMS/HCC)    • Depression    • Difficulty breathing    • Emphysema lung (CMS/HCC)    • Encounter for long-term current use of medication    • Encounter for screening for malignant neoplasm of prostate    • Fatigue    • Fingertip amputation    • TAMIKO (generalized anxiety disorder)    • History of alcohol abuse    • History of cardiac catheterization    • History of chest x-ray 2014    Chronic interstitial changes seen throughout the lung fields w/ no radiographic evidence of acute parenchymal disease.No definite LT-sided rib fracture present.   • History of chest x-ray 01/10/2014    No acute cardiopulmonary  process.Dual lead LT subclavian pacemaker is in place.Aortic ectasia. Hyperinflation of lungs, suggesting COPD   • History of chest x-ray 10/01/2012    Ill-defined RT middle lobe opacity which likley reflects a pneumonia. FU to radiographic resolution is recommended.   • History of Doppler echocardiogram    • History of echocardiogram 09/26/2013    EF 55-60%. Mod concentric LVH. Abnormal LT VT diastolic filling is observed, consistent w/ impaired relaxation.Mild MR/TR. Trace GA. RVSP 44 mmHg.   • History of stress test     ECG performed   • HTN (hypertension)    • HX: long term anticoagulant use     Blood thinned due to long-term anticoagulant use (V58.61) (Z79.01)   • Hypercholesterolemia    • LVH (left ventricular hypertrophy)    • Mitral and aortic valve disease    • Myocardial infarction (CMS/HCC)    • Nonvenomous insect bite of multiple sites    • Onychomycosis    • Pacemaker at end of battery life    • Pneumonia    • Pneumothorax    • Pre-syncope    • Pulmonary nodule    • Rib pain on left side    • Sick sinus syndrome (CMS/HCC)    • SOB (shortness of breath)    • Upper respiratory infection    • Vitamin D deficiency      Past Surgical History:   Procedure Laterality Date   • AMPUTATION  08/31/2015    metacarpal and index finger   • CARDIAC PACEMAKER PLACEMENT  06/01/2008   • CATARACT EXTRACTION     • CORONARY STENT PLACEMENT      History of Cath Placement Of Stent 1  · Coronary stents   • EYE SURGERY     • SKIN CANCER EXCISION       General Information     Row Name 01/04/20 1117          PT Evaluation Time/Intention    Document Type  therapy note (daily note)  -SC     Mode of Treatment  physical therapy  -SC     Row Name 01/04/20 1117          General Information    Patient Profile Reviewed?  yes  -SC     Existing Precautions/Restrictions  fall;oxygen therapy device and L/min  -SC     Row Name 01/04/20 1117          Cognitive Assessment/Intervention- PT/OT    Orientation Status (Cognition)  oriented x 4  -SC      Cognitive Assessment/Intervention Comment  alert, following commands with tactile cues and repetition  -SC     Row Name 01/04/20 1117          Safety Issues, Functional Mobility    Safety Issues Affecting Function (Mobility)  ability to follow commands;insight into deficits/self awareness;judgment  -SC     Impairments Affecting Function (Mobility)  balance;endurance/activity tolerance;grasp;shortness of breath;strength  -SC       User Key  (r) = Recorded By, (t) = Taken By, (c) = Cosigned By    Initials Name Provider Type    SC Jamia Huddleston PT Physical Therapist        Mobility     Row Name 01/04/20 1118          Bed Mobility Assessment/Treatment    Scooting/Bridging [place] (Bed Mobility)  independent  -SC     Supine-Sit [place] (Bed Mobility)  conditional independence  -SC     Assistive Device (Bed Mobility)  bed rails;head of bed elevated  -SC     Comment (Bed Mobility)  cues for hand placement and getting up to edge of bed  -SC     Row Name 01/04/20 1118          Transfer Assessment/Treatment    Comment (Transfers)  stood from edge of bed with cues to push up with R UE  -SC     Row Name 01/04/20 1118          Sit-Stand Transfer    Sit-Stand [place] (Transfers)  contact guard;verbal cues  -SC     Assistive Device (Sit-Stand Transfers)  walker, front-wheeled  -SC     Row Name 01/04/20 1128 01/04/20 1118       Gait/Stairs Assessment/Training    Gait/Stairs Assessment/Training  --  gait/ambulation independence  -SC    [place] Level (Gait)  --  moderate assist (50% patient effort)  -SC    Assistive Device (Gait)  --  walker, front-wheeled  -SC    Distance in Feet (Gait)  10  -SC  --    Pattern (Gait)  --  step-through  -SC    Deviations/Abnormal Patterns (Gait)  --  gait speed decreased;ataxic;base of support, narrow  -SC    Bilateral Gait Deviations  --  forward flexed posture  -SC    Comment (Gait/Stairs)  --  Gt training focused on controling walker . Encouraged to use his vision to  place legs correctily and avoid crossing over.   -SC      User Key  (r) = Recorded By, (t) = Taken By, (c) = Cosigned By    Initials Name Provider Type    Jamia York PT Physical Therapist        Obj/Interventions     Row Name 01/04/20 1122          Therapeutic Exercise    Lower Extremity (Therapeutic Exercise)  gastroc stretch, bilateral;heel slides, bilateral;LAQ (long arc quad), bilateral;SLR (straight leg raise), bilateral  -SC     Lower Extremity Range of Motion (Therapeutic Exercise)  hip abduction/adduction, bilateral  -SC     Exercise Type (Therapeutic Exercise)  AROM (active range of motion)  -SC     Position (Therapeutic Exercise)  seated;supine  -SC     Sets/Reps (Therapeutic Exercise)  10  -SC     Row Name 01/04/20 1122          Dynamic Standing Balance    Level of [place], Reaches Outside Midline (Standing, Dynamic Balance)  moderate assist, 50 to 74% patient effort  -SC     Assistive Device Utilized (Supported Standing, Dynamic Balance)  walker, rolling  -SC       User Key  (r) = Recorded By, (t) = Taken By, (c) = Cosigned By    Initials Name Provider Type    SC Jamia Huddleston PT Physical Therapist        Goals/Plan    No documentation.       Clinical Impression     [facility] Name 01/04/20 1129          Pain Assessment    Additional Documentation  Pain Scale: FACES Pre/Post-Treatment (Group)  -[facility] Name 01/04/20 1129          Pain Scale: Numbers Pre/Post-Treatment    Pain Scale: Numbers, Pretreatment  0/10 - no pain  -SC     Pain Scale: Numbers, Post-Treatment  0/10 - no pain  -[facility] Name 01/04/20 1123          Plan of Care Review    Plan of Care Reviewed With  patient  -SC     Progress  improving  -[facility] Name 01/04/20 1129 01/04/20 1123       Physical Therapy Clinical Impression    Criteria for Skilled Interventions Met (PT Clinical Impression)  yes;treatment indicated  -SC  yes;treatment indicated  -SC    Rehab Potential (PT Clinical Summary)  good, to achieve stated therapy  goals  -SC  good, to achieve stated therapy goals  -SC    Row Name 01/04/20 1123          Vital Signs    Post Systolic BP Rehab  112  -SC     Post Treatment Diastolic BP  70  -SC     Post SpO2 (%)  92  -SC     O2 Delivery Post Treatment  supplemental O2  -SC     Row Name 01/04/20 1123          Positioning and Restraints    Pre-Treatment Position  in bed  -SC     Post Treatment Position  chair  -SC     In Bed  sitting;supine;notified nsg;call light within reach  -SC       User Key  (r) = Recorded By, (t) = Taken By, (c) = Cosigned By    Initials Name Provider Type    Jamia York, PT Physical Therapist        Outcome Measures     Row Name 01/04/20 1125          How much help from another person do you currently need...    Turning from your back to your side while in flat bed without using bedrails?  4  -SC     Moving from lying on back to sitting on the side of a flat bed without bedrails?  3  -SC     Moving to and from a bed to a chair (including a wheelchair)?  3  -SC     Standing up from a chair using your arms (e.g., wheelchair, bedside chair)?  3  -SC     Climbing 3-5 steps with a railing?  2  -SC     To walk in hospital room?  2  -SC     AM-PAC 6 Clicks Score (PT)  17  -SC     Row Name 01/04/20 1125          Functional Assessment    Outcome Measure Options  AM-PAC 6 Clicks Basic Mobility (PT)  -SC       User Key  (r) = Recorded By, (t) = Taken By, (c) = Cosigned By    Initials Name Provider Type    Jamia York, PT Physical Therapist          PT Recommendation and Plan     Outcome Summary/Treatment Plan (PT)  Anticipated Discharge Disposition (PT): inpatient rehabilitation facility  Plan of Care Reviewed With: patient, spouse  Progress: improving  Outcome Summary: Patient able to ambulate in room with moderat assistance to control walker on turns. Recommend Acute rehab     Time Calculation:   PT Charges     Row Name 01/04/20 1040             Time Calculation    Start Time  1040  -SC      PT Received  On  01/04/20  -SC      PT Goal Re-Cert Due Date  01/13/20  -SC         Time Calculation- PT    Total Timed Code Minutes- PT  33 minute(s)  -SC         Timed Charges    12842 - PT Therapeutic Exercise Minutes  13  -SC      88129 - Gait Training Minutes   10  -SC      14526 - PT Therapeutic Activity Minutes  10  -SC        User Key  (r) = Recorded By, (t) = Taken By, (c) = Cosigned By    Initials Name Provider Type    SC Jamia Huddleston PT Physical Therapist        Therapy Charges for Today     Code Description Service Date Service Provider Modifiers Qty    88791932147  PT THER PROC EA 15 MIN 1/4/2020 Jamia Huddleston, PT GP 1    95281701659 HC GAIT TRAINING EA 15 MIN 1/4/2020 Jamia Huddleston PT GP 1          PT G-Codes  Outcome Measure Options: AM-PAC 6 Clicks Basic Mobility (PT)  AM-PAC 6 Clicks Score (PT): 17  AM-PAC 6 Clicks Score (OT): 17  Modified [place] Scale: 4 - Moderately severe disability.  Unable to walk without assistance, and unable to attend to own bodily needs without assistance.    Jamia Huddleston PT  1/4/2020

## 2020-01-04 NOTE — PLAN OF CARE
Problem: Patient Care Overview  Goal: Plan of Care Review  Flowsheets (Taken 1/4/2020 1126)  Plan of Care Reviewed With: patient; spouse  Outcome Summary: Patient able to ambulate in room with moderate assistance to control walker on turns. Recommend Acute rehab

## 2020-01-04 NOTE — THERAPY TREATMENT NOTE
Acute Care - Speech Language Pathology   Swallow Treatment Note [facility]     Patient Name: Alessandro Mendiola  : 1940  MRN: [id number]  Today's Date: 2020               Admit Date: 2020    Visit Dx:      ICD-10-CM ICD-9-CM   1. Muscle weakness of left upper extremity M62.81 728.87   2. Impaired mobility and ADLs Z74.09 799.89   3. Oropharyngeal dysphagia R13.12 787.22   4. Dysarthria R47.1 784.51     Patient Active Problem List   Diagnosis   • Aneurysm of thoracic aorta (CMS/HCC)   • Coronary arteriosclerosis in native artery   • Paroxysmal atrial fibrillation (CMS/HCC)   • Sick sinus syndrome (CMS/HCC)   • Vitamin D deficiency   • COPD exacerbation (CMS/HCC)   • HTN (hypertension)   • Mood disorder (CMS/HCC)   • Simple chronic bronchitis (CMS/HCC)   • Muscle weakness of left upper extremity   • Aphasia       Therapy Treatment  Rehabilitation Treatment Summary     Row Name 20 1545             Treatment Time/Intention    Discipline  speech language pathologist  -SM      Document Type  therapy note (daily note)  -SM      Subjective Information  no complaints  -      Care Plan Review  evaluation/treatment results reviewed;care plan/treatment goals reviewed;risks/benefits reviewed;patient/other agree to care plan  -      Care Plan Review, Other Participant(s)  spouse;family  -SM      Patient Effort  good  -SM      Comment  [place]  -SM      Recorded by [SM] Marilyn Zuñiga MS CCC-SLP 20 1616      Row Name 20 1545             Pain Scale: Numbers Pre/Post-Treatment    Pain Scale: Numbers, Pretreatment  0/10 - no pain  -      Pain Scale: Numbers, Post-Treatment  0/10 - no pain  -SM      Recorded by [] Marilyn Zuñiga MS CCC-SLP 20 1616      Row Name 20 1545             Outcome Summary/Treatment Plan (SLP)    Daily Summary of Progress (SLP)  progress toward functional goals as expected  -      Plan for Continued Treatment (SLP)  Continue dysphagia tx. Has copy  of exercises to use between sessions. Ok for ice chips between meals, max 6-8 chips every 2 hours. Will need repeat MBS prior to upgrade though needs more tx before considering repeat. Pt/family in agreement with all.   -SM      Anticipated Dischage Disposition  home with home health;anticipate therapy at next level of care  -SM      Recorded by [SM] Marilyn Zuñiga, MS CCC-SLP 01/04/20 1616        User Key  (r) = Recorded By, (t) = Taken By, (c) = Cosigned By    Initials Name Effective Dates Discipline     Marilyn Zuñiga, MS CCC-SLP 06/22/15 -  SLP          Outcome Summary  Outcome Summary/Treatment Plan (SLP)  Daily Summary of Progress (SLP): progress toward functional goals as expected (01/04/20 1545 : Marilyn Zuñiga, MS CCC-SLP)  Plan for Continued Treatment (SLP): Continue dysphagia tx. Has copy of exercises to use between sessions. Ok for ice chips between meals, max 6-8 chips every 2 hours. Will need repeat MBS prior to upgrade though needs more tx before considering repeat. Pt/family in agreement with all.  (01/04/20 1545 : Marilyn Zuñiga, MS CCC-SLP)  Anticipated Dischage Disposition: home with home health, anticipate therapy at next level of care (01/04/20 1545 : Marilyn Zuñiga, MS [facility]-SLP)      SLP GOALS     Row Name 01/04/20 1545 01/03/20 0845          Oral Nutrition/Hydration Goal 1 (SLP)    Oral Nutrition/Hydration Goal 1, SLP  LTG: Pt will return to regular diet & thin liquid and tolerate w/o s/sxs aspiration w/ 100% acc w/o cues.  -SM  LTG: Pt will return to regular diet & thin liquid and tolerate w/o s/sxs aspiration w/ 100% acc w/o cues.  -AC     Time Frame (Oral Nutrition/Hydration Goal 1, SLP)  by discharge  -SM  by discharge  -AC     Progress/Outcomes (Oral Nutrition/Hydration Goal 1, SLP)  continuing progress toward goal  -  --        Oral Nutrition/Hydration Goal 2 (SLP)    Oral Nutrition/Hydration Goal 2, SLP  Pt will tolerate trials of soft solids &  honey-thick liquids w/o s/sxs aspiration w/ 100% acc w/o cues.  -SM  Pt will tolerate trials of soft solids & honey-thick liquids w/o s/sxs aspiration w/ 100% acc w/o cues.  -AC     Time Frame (Oral Nutrition/Hydration Goal 2, SLP)  short term goal (STG)  -SM  short term goal (STG)  -AC     Progress/Outcomes (Oral Nutrition/Hydration Goal 2, SLP)  good progress toward goal  -SM  --        Oral Nutrition/Hydration Goal (SLP)    Oral Nutrition/Hydration Goal, SLP  Pt will tolerate therapeutic trials of ice/thin H2O w/o s/sxs aspiration or distress w/ 70% acc w/o cues.  -SM  Pt will tolerate therapeutic trials of ice/thin H2O w/o s/sxs aspiration or distress w/ 70% acc w/o cues.  -AC     Time Frame (Oral Nutrition/Hydration Goal, SLP)  short term goal (STG)  -SM  short term goal (STG)  -AC     Barriers (Oral Nutrition/Hydration Goal, SLP)  Intermittent coughing throughout  -SM  --     Progress/Outcomes (Oral Nutrition/Hydration Goal, SLP)  continuing progress toward goal  -SM  --        Lingual Strengthening Goal 1 (SLP)    Activity (Lingual Strengthening Goal 1, SLP)  increase tongue back strength  -SM  increase tongue back strength  -AC     Increase Tongue Back Strength  lingual resistance exercises  -SM  lingual resistance exercises  -AC     [place]/Accuracy (Lingual Strengthening Goal 1, SLP)  with minimal cues (75-90% accuracy)  -SM  with minimal cues (75-90% accuracy)  -AC     Time Frame (Lingual Strengthening Goal 1, SLP)  short term goal (STG)  -SM  short term goal (STG)  -AC     Progress/Outcomes (Lingual Strengthening Goal 1, SLP)  good progress toward goal  -SM  --        Pharyngeal Strengthening Exercise Goal 1 (SLP)    Activity (Pharyngeal Strengthening Goal 1, SLP)  increase timing;increase superior movement of the hyolaryngeal complex;increase closure at entrance to airway/closure of airway at glottis  -SM  increase timing  -AC     Increase Timing  prepping - 3 second prep or suck swallow or 3-step  swallow  -SM  prepping - 3 second prep or suck swallow or 3-step swallow  -AC     Increase Superior Movement of the Hyolaryngeal Complex  effortful pitch glide (falsetto + pharyngeal squeeze)  -SM  --     Increase Closure at Entrance to Airway/Closure of Airway at Glottis  supraglottic swallow  -SM  --     [place]/Accuracy (Pharyngeal Strengthening Goal 1, SLP)  with minimal cues (75-90% accuracy)  -SM  with minimal cues (75-90% accuracy)  -AC     Time Frame (Pharyngeal Strengthening Goal 1, SLP)  short term goal (STG)  -SM  short term goal (STG)  -AC     Barriers (Pharyngeal Strengthening Goal 1, SLP)  Provided copy of exercises and reviewed with pt. Pt demonstrated each exercises x2-3 with min cues (mainly r/t [place]). Did not target cog-comm goals 2' emphasis of dysphagia. Simple communication needs being met.   -SM  --     Progress/Outcomes (Pharyngeal Strengthening Goal 1, SLP)  good progress toward goal  -SM  --        Respiratory Support Goal (SLP)    Improve Respiratory Support Goal (SLP)  --  Pt will use shorter length of utterance per breath (2-3 words per breath) for improved speech intelligibility at sentence level w/ 80% acc w/ min cues.  -AC     Time Frame (Respiratory Support Goal, SLP)  --  short term goal (STG)  -AC        Articulation Goal 1 (SLP)    Improve Articulation Goal 1 (SLP)  --  by over-articulating at word level;80%;with minimal cues (75-90%)  -AC     Time Frame (Articulation Goal 1, SLP)  --  short term goal (STG)  -AC        Additional Goal 1 (SLP)    Additional Goal 1, SLP  --  LTG: Pt will improve cognitive-communication skills in order to participate in care in hospital setting w/ 100% acc w/ min cues.  -AC     Time Frame (Additional Goal 1, SLP)  --  by discharge  -AC       User Key  (r) = Recorded By, (t) = Taken By, (c) = Cosigned By    Initials Name Provider Type    Marilyn Monteiro, MS CCC-SLP Speech and Language Pathologist    Sherice Dodson, MS CCC-SLP Speech and  Language Pathologist          EDUCATION  The patient has been educated in the following areas:   Home Exercise Program (HEP) Dysphagia (Swallowing Impairment) Oral Care/Hydration Modified Diet Instruction.    SLP Recommendation and Plan  Daily Summary of Progress (SLP): progress toward functional goals as expected     Plan for Continued Treatment (SLP): Continue dysphagia tx. Has copy of exercises to use between sessions. Ok for ice chips between meals, max 6-8 chips every 2 hours. Will need repeat MBS prior to upgrade though needs more tx before considering repeat. Pt/family in agreement with all.   Anticipated Dischage Disposition: home with home health, anticipate therapy at next level of care                    Time Calculation:   Time Calculation- SLP     Row Name 01/04/20 1620             Time Calculation- SLP    SLP Start Time  1545  -      SLP Received On  01/04/20  -        User Key  (r) = Recorded By, (t) = Taken By, (c) = Cosigned By    Initials Name Provider Type    Marilyn Monteiro MS CCC-SLP Speech and Language Pathologist          Therapy Charges for Today     Code Description Service Date Service Provider Modifiers Qty    54333061640  ST TREATMENT SWALLOW 3 1/4/2020 Marilyn Zuñiga MS CCC-SLP GN 1                 Marilyn Zuñiga MS CCC-SLP  1/4/2020

## 2020-01-05 NOTE — PROGRESS NOTES
"Neurology       Patient Care Team:  Silvio Jacobson MD as PCP - General (Internal Medicine)  Dagoberto Rasmussen MD as Consulting Physician (General Surgery)    Chief complaint left-sided weakness/stroke      Subjective .     History: Slept poorly, wife notes he has his days and nights mixed up.  Typically takes Benadryl at home to sleep.  Breathing a little better today.  Left side weakness improved as well.  She notes his thinking continues to seem normal as well.  Infectious disease notes reviewed, including concern for endocarditis/possible pacemaker lead infection/high-grade enterococcus septicemia.    ROS: No fever or chest pain.  + Fatigue    Objective     Vital Signs   Blood pressure 117/52, pulse 69, temperature 98.2 °F (36.8 °C), temperature source Oral, resp. rate 18, height 180.3 cm (70.98\"), weight 69.9 kg (154 lb), SpO2 94 %.    Physical Exam:              Neuro: Elderly white man in no acute distress, but chronically ill-appearing.  Mildly inattentive but awake and appropriate, follows verbal commands well  EOMI decreased left nasolabial fold but symmetric facial movement  Motor: Mild and improved left hemiparesis, no definite asymmetry appreciated in lower extremities today.  Motor    Results Review:              CBC with white count 8.5 H&H 7.4 and 27 platelets 154  Potassium 3.3  Blood cultures positive in 4/4 bottles, enterococcus not VRE, repeat cultures 1/4 for gram-positive cocci in pairs and chains    Assessment/Plan     1.  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    MARLON pending.    MRI pending.    We will continue to hold Xarelto, treat with dual antiplatelet and high-dose statin.  2.  Insomnia-in elderly sick patient, high risk for delirium.  Will institute PRN Seroquel at night.  Also on primidone, but latter at very low dose.    I discussed the patients findings and " my recommendations with patient and family    Fern Patel MD  01/05/20  9:06 AM

## 2020-01-05 NOTE — CONSULTS
[facility]   Consult Note    Referring Provider: Dr. Piyush Schreiber    Primary Cardiologist: Dr. Obrien    Reason for Consultation: concern for infective endocarditis and PPM infection in setting of septicemia and ?stroke.    Patient Care Team:  Silvio Jacobson MD as PCP - General (Internal Medicine)  Dagoberto Rasmussen MD as Consulting Physician (General Surgery)     Problem List:  1. Coronary artery disease  1. 2008 BMS to RCA Dr. Mcqueen  2. 2012 SE WNL  2. Dyslipidemia  1. Statin intolerant  3. Hypertension  4. PAF/SSS  1. 2/2015 [facility] PPM generator change Dr. Bautista.  2. Anticoagulated with Xarelto  5. CVA  1. Ischemic CVA with left hemiparesis and dysarthria.  6. End stage COPD  7. Recurrent PNA  8. Ascending aortic aneurysm  1. Followed by Dr. Rushing.  9. Skin cancer  10. Depression  11. Surgeries:  1. Metacarpal and index finger amputation  2. PPM implant  3. Cataract extraction  4. Skin cancer removal.      Allergies   Allergen Reactions   • Doxycycline Shortness Of Breath           Current Facility-Administered Medications:   •  ! Vancomycin Trough 1/5 @1530. Please hold 1600 dose until pharmacy verifies., , Does not apply, Once, Lis Romero, [facility]  •  acetaminophen (TYLENOL) tablet 650 mg, 650 mg, Oral, Q4H PRN **OR** acetaminophen (TYLENOL) 160 MG/5ML solution 650 mg, 650 mg, Oral, Q4H PRN, 649.6 mg at 01/04/20 0025 **OR** acetaminophen (TYLENOL) suppository 650 mg, 650 mg, Rectal, Q4H PRN, Stacey Padilla APRN  •  ampicillin 2 g/100 mL 0.9% NS (MBP), 2 g, Intravenous, Q4H, Cortes Vidal APRN, 2 g at 01/05/20 0801  •  aspirin suppository 300 mg, 300 mg, Rectal, Daily, Georgina Pompa II, DO  •  atorvastatin (LIPITOR) tablet 80 mg, 80 mg, Oral, Nightly, Stacey Padilla APRN, 80 mg at 01/04/20 2117  •  azelastine (ASTELIN) nasal spray 2 spray, 2 spray, Each Nare, BID, Stacey Padilla, APRN, 2 spray at 01/05/20 0802  •  budesonide-formoterol (SYMBICORT) 80-4.5  MCG/ACT inhaler 2 puff, 2 puff, Inhalation, BID, Stacey Padilla APRN, 2 puff at 01/04/20 2102  •  cefTRIAXone (ROCEPHIN) 2 g/100 mL 0.9% NS VTB (SUSAN), 2 g, Intravenous, Q12H, Cortes Vidal APRN, 2 g at 01/05/20 0609  •  cetirizine (zyrTEC) tablet 10 mg, 10 mg, Oral, Daily, Iris Hsu MD, 10 mg at 01/05/20 0802  •  clopidogrel (PLAVIX) tablet 75 mg, 75 mg, Oral, Daily, Fern Patel MD, 75 mg at 01/05/20 0802  •  cycloSPORINE (RESTASIS) 0.05 % ophthalmic emulsion 1 drop, 1 drop, Both Eyes, BID, Stacey Padilla APRN, 1 drop at 01/05/20 0802  •  docusate sodium (COLACE) capsule 100 mg, 100 mg, Oral, Daily, Stacey Padilla APRN, 100 mg at 01/05/20 0802  •  dronedarone (MULTAQ) tablet 400 mg, 400 mg, Oral, BID, Stacey Padilla APRN, 400 mg at 01/05/20 0802  •  heparin (porcine) 5000 UNIT/ML injection 5,000 Units, 5,000 Units, Subcutaneous, Q8H, Iris Hsu MD, 5,000 Units at 01/05/20 0609  •  ipratropium (ATROVENT) nebulizer solution 0.5 mg, 500 mcg, Nebulization, 4x Daily PRN, Stacey Padilla APRN  •  ipratropium-albuterol (DUO-NEB) nebulizer solution 3 mL, 3 mL, Nebulization, Q6H PRN, Chiquita Mcmahan APRN, 3 mL at 01/03/20 2123  •  ondansetron (ZOFRAN) injection 4 mg, 4 mg, Intravenous, Q6H PRN, Georgina Pompa II, DO, 4 mg at 01/05/20 0944  •  pantoprazole (PROTONIX) EC tablet 40 mg, 40 mg, Oral, QAM, Stacey Padilla APRN, 40 mg at 01/05/20 0610  •  potassium chloride (MICRO-K) CR capsule 40 mEq, 40 mEq, Oral, PRN **OR** potassium chloride (KLOR-CON) packet 40 mEq, 40 mEq, Oral, PRN **OR** potassium chloride 10 mEq in 100 mL IVPB, 10 mEq, Intravenous, Q1H PRN, Mario Donato MD  •  primidone (MYSOLINE) tablet 50 mg, 50 mg, Oral, BID, Stacey Padilla APRN, 50 mg at 01/05/20 0802  •  roflumilast (DALIRESP) tablet 500 mcg, 500 mcg, Oral, Daily, Stacey Padilla APRN, 500 mcg at 01/05/20 0802  •  sertraline (ZOLOFT) tablet 50 mg, 50 mg, Oral, Daily, Stacey Padilla APRN,  50 mg at 01/05/20 0802  •  sodium chloride 0.9 % flush 10 mL, 10 mL, Intravenous, Q12H, Stacey Padilla APRN, 10 mL at 01/05/20 0803  •  sodium chloride 0.9 % flush 10 mL, 10 mL, Intravenous, PRN, Stacey Padilla APRN  •  tamsulosin (FLOMAX) 24 hr capsule 0.4 mg, 0.4 mg, Oral, Nightly, Stacey Padilla APRN, 0.4 mg at 01/04/20 2117         Medications Prior to Admission   Medication Sig Dispense Refill Last Dose   • azelastine (ASTELIN) 0.1 % nasal spray 2 sprays into the nostril(s) as directed by provider 2 (Two) Times a Day. 1 each 3 Taking   • B Complex Vitamins (VITAMIN B COMPLEX PO) Take 1 tablet by mouth Daily.   Taking   • budesonide-formoterol (SYMBICORT) 80-4.5 MCG/ACT inhaler Inhale 2 puffs 2 (Two) Times a Day. 3 inhaler 1 Taking   • dilTIAZem (CARDIZEM) 120 MG tablet Take 1 tablet by mouth Daily. 90 tablet 3 Taking   • dronedarone (MULTAQ) 400 MG tablet Take 1 tablet by mouth 2 (Two) Times a Day. 180 tablet 1 Taking   • fluorometholone (FML) 0.1 % ophthalmic suspension INSTILL 1 DROP INTO EACH EYE THREE TIMES DAILY  2 Taking   • guaiFENesin (MUCINEX) 600 MG 12 hr tablet Take 1 tablet by mouth Every 12 (Twelve) Hours. (Patient taking differently: Take 600 mg by mouth As Needed.) 10 tablet 0 Taking   • hydrochlorothiazide (HYDRODIURIL) 25 MG tablet Take 1 tablet by mouth Daily. 90 tablet 3 Taking   • ipratropium (ATROVENT) 0.02 % nebulizer solution Take 2.5 mL by nebulization 4 (Four) Times a Day As Needed for Wheezing or Shortness of Air. 300 mL 11 Taking   • Multiple Vitamin (MULTI-VITAMINS PO) Take  by mouth.   Taking   • omeprazole (priLOSEC) 40 MG capsule Take 1 capsule by mouth Daily. 30 capsule 2    • OXYGEN-HELIUM IN Oxygen; Patient Sig: Oxygen patient is to get home oxygen through  company of his choice at 2L/Min, as well as portable oxygen at the same rate.; 1; 0; 22-Mar-2016; Active   Taking   • primidone (MYSOLINE) 50 MG tablet Take 1 tablet by mouth 2 (Two) Times a Day. 60 tablet 5 Taking  "  • Respiratory Therapy Supplies (FLUTTER) device 1 each Every 6 (Six) Hours While Awake. 1 each 0 Taking   • RESTASIS 0.05 % ophthalmic emulsion    Taking   • rivaroxaban (XARELTO) 20 MG tablet Take 1 tablet by mouth Daily. 30 tablet 12 Taking   • roflumilast (DALIRESP) 500 MCG tablet tablet Take 1 tablet by mouth Daily. 90 tablet 1 Taking   • sertraline (ZOLOFT) 50 MG tablet Take 1 tablet by mouth Daily. 90 tablet 3 Taking   • tamsulosin (FLOMAX) 0.4 MG capsule 24 hr capsule Take 1 capsule by mouth Every Night. 30 capsule 11 Taking   • Tetrahydrozoline-Zn Sulfate (ALLERGY RELIEF EYE DROPS OP) Apply 1 drop to eye(s) as directed by provider 2 (Two) Times a Day As Needed.   Taking   • tiotropium bromide monohydrate (SPIRIVA RESPIMAT) 2.5 MCG/ACT aerosol solution inhaler Inhale 2 puffs Daily. 1 inhaler 5 Taking   • VENTOLIN  (90 Base) MCG/ACT inhaler Inhale 2 puffs Every 6 (Six) Hours As Needed for Wheezing. 1 inhaler 3 Taking         Subjective .   History of present illness:    Patient is a 79-year-old  male who we are seeing today for further evaluation of possible endocarditis and noted bacteremia with an enterococcus species.  Patient was admitted on January 2.  Patient asked for his wife to give the history.  Patient's wife notes that he woke that morning and while he was eating breakfast began to have difficulty picking things up with his left arm.  This gradually progressed into the point to where his left leg was significantly weakened.  He was eventually taken out to the car to be brought to the hospital.  At that time she notes that his left leg was \"dragging\".  Patient feels like the left leg weakness is improving still notes left arm weakness.  Patient was brought in and underwent CTA as well as CT perfusion which was negative.  Patient also had noted dysarthria.  Upon arrival he was also noted to be febrile.  Blood cultures have been checked and noted to be positive for gram-positive " cocci in pairs and chains.  Infectious disease has been consulted and is treating him with antibiotics.  Neurology has also been consulted and is concerned regarding ischemic stroke.  Patient has noted history of atrial fibrillation and was on Xarelto at the time of admission.  This is currently been held.  Neurology is currently awaiting MRI results to evaluate size of infarct and went to possibly resume anticoagulation.  He has a pacemaker so has not been able to proceed with MRI as of yet.  It is a [employer] device Assu[employer] model.l patient denies any recent chest pain, pressure.  Denies any recent increase shortness of breath.  No syncope, near syncope.  Patient still notes some left-sided weakness.      Social History     Socioeconomic History   • Marital status:      Spouse name: Not on file   • Number of children: Not on file   • Years of education: Not on file   • Highest education level: Not on file   Tobacco Use   • Smoking status: Former Smoker     Packs/day: 3.00     Years: 45.00     Pack years: 135.00     Last attempt to quit: 2008     Years since quittin.0   • Smokeless tobacco: Never Used   Substance and Sexual Activity   • Alcohol use: No   • Drug use: No   • Sexual activity: Defer     Family History   Problem Relation Age of Onset   • Coronary artery disease Other    • Diabetes Other    • Hypertension Other          Review of Systems:  Review of Systems   Constitution: Positive for malaise/fatigue. Negative for fever.   HENT: Negative for nosebleeds.    Eyes: Negative for redness and visual disturbance.   Cardiovascular: Positive for dyspnea on exertion. Negative for orthopnea, palpitations and paroxysmal nocturnal dyspnea.   Respiratory: Positive for shortness of breath (chronic due to lung disease). Negative for cough, snoring, sputum production and wheezing.    Hematologic/Lymphatic: Negative for bleeding problem.   Skin: Negative for flushing, itching and rash.   Musculoskeletal:  "Positive for arthritis. Negative for falls, joint pain and muscle cramps.   Gastrointestinal: Negative for abdominal pain, diarrhea, heartburn, nausea and vomiting.   Genitourinary: Negative for hematuria.   Neurological: Positive for focal weakness. Negative for excessive daytime sleepiness, dizziness, headaches, tremors and weakness.   Psychiatric/Behavioral: Negative for substance abuse. The patient is not nervous/anxious.         Objective   Vitals:  Blood pressure 117/52, pulse 69, temperature 98.2 °F (36.8 °C), temperature source Oral, resp. rate 18, height 180.3 cm (70.98\"), weight 69.9 kg (154 lb), SpO2 94 %.     Intake/Output Summary (Last 24 hours) at 1/5/2020 0930  Last data filed at 1/5/2020 0520  Gross per 24 hour   Intake 420 ml   Output --   Net 420 ml       Physical Exam   Constitutional: He is oriented to person, place, and time. He appears well-developed and well-nourished.   HENT:   Head: Normocephalic and atraumatic.   Eyes: Right eye exhibits no discharge. Left eye exhibits no discharge.   Neck: No JVD present. No tracheal deviation present.   Cardiovascular: Normal rate, normal heart sounds and intact distal pulses. An irregular rhythm present. Exam reveals no friction rub.   No murmur heard.  Pacemaker generator site without any edema, erythema   Pulmonary/Chest: Effort normal. No respiratory distress. He has no rales.   decreased   Abdominal: Soft. Bowel sounds are normal. There is no tenderness.   Musculoskeletal: He exhibits no edema or deformity.   Neurological: He is alert and oriented to person, place, and time.   Left sided weakness, dysarthria   Skin: Skin is warm and dry.            Results Review:  I reviewed the patient's new clinical results.  Results from last 7 days   Lab Units 01/05/20  0633   WBC 10*3/mm3 8.51   HEMOGLOBIN g/dL 7.4*   HEMATOCRIT % 27.1*   PLATELETS 10*3/mm3 154     Results from last 7 days   Lab Units 01/05/20  0632 01/04/20  0438 01/03/20  0719   SODIUM mmol/L "  --  140 139   POTASSIUM mmol/L 3.3* 3.5 3.4*   CHLORIDE mmol/L  --  99 98   CO2 mmol/L  --  29.0 31.0*   BUN mg/dL  --  15 13   CREATININE mg/dL  --  0.88 0.77   CALCIUM mg/dL  --  8.8 8.7   BILIRUBIN mg/dL  --   --  0.3   ALK PHOS U/L  --   --  112   ALT (SGPT) U/L  --   --  8   AST (SGOT) U/L  --   --  16   GLUCOSE mg/dL  --  136* 133*     Results from last 7 days   Lab Units 01/05/20  0632 01/04/20  0438   SODIUM mmol/L  --  140   POTASSIUM mmol/L 3.3* 3.5   CHLORIDE mmol/L  --  99   CO2 mmol/L  --  29.0   BUN mg/dL  --  15   CREATININE mg/dL  --  0.88   GLUCOSE mg/dL  --  136*   CALCIUM mg/dL  --  8.8     Results from last 7 days   Lab Units 01/02/20  1344   INR  2.13*     Lab Results   Lab Value Date/Time    TROPONINI <0.012 07/31/2018 0522    TROPONINI <0.012 07/30/2018 2342    TROPONINI <0.012 07/30/2018 1739    TROPONINI <0.012 07/30/2018 1435    TROPONINI <0.012 04/28/2018 1251    TROPONINI <0.012 12/21/2017 1101     Results from last 7 days   Lab Units 01/03/20  0719   TSH uIU/mL 0.824     Results from last 7 days   Lab Units 01/03/20  0719   CHOLESTEROL mg/dL 118   TRIGLYCERIDES mg/dL 76   HDL CHOL mg/dL 36*   LDL CHOL mg/dL 67           CT A/P:  1. Normal sized spleen, but with evidence of splenic infarct involving  up to 50% of the spleen. Mild associated perisplenic fat stranding.  2. Gallstones.  3. Retained barium in the right colon which limits fine detail in the  right mid abdomen. No definite acute inflammatory focus is seen here or  elsewhere however.     Echo:  · Left ventricular systolic function is normal. Estimated EF appears to be in the range of 61 - 65%.  · Left ventricular wall thickness is consistent with borderline concentric hypertrophy.  · Electronic leads present right side of the heart  · Mild MAC is present. Mild mitral valve regurgitation is present  · Mild dilation of the aortic root is present. Measures 4.1 cm  · No valvular vegetations visualized on this study, however valves  not well seen  · Bubble study nondiagnostic due to poor visualization         Tele:  SR with PAC's    EKG: No EKG this admission in chart.      Assessment/Plan     1. Suspected ischemic CVA  1. Neurology following.  2. Awaiting MRI due to PPM  3. Xarelto currently on hold  4. CTA and perfusion negative  2. PAF/SSS  1. Previous PPM  2. Was anticoagulated with Xarelto PTA, currently on hold  3. Hypertension  4. Dyslipidemia  5. Bacteremia, enterococcus  1. ID following.  2. Concern for endocarditis/PPM infection.  3. 2D echo pending  6. COPD, baseline 4-5L NC        Plan:    1. Transthoracic echocardiogram without valvular vegetations.  However valves not very well visualized.  Bubble study nondiagnostic due to poor visualization  2. Recommend MARLON to evaluate for valvular endocarditis and to evaluate pacemaker leads.  N.p.o. at midnight  3. CT of abdomen and pelvis, shows a splenic infarct which further supports the notion of endocarditis and possible cardioembolic stroke related endocarditis  4. We will review the patient's [employer] pacemaker parameters with our device clinic to see if MRI can be safely obtained.  5. Agree with holding BP medications at this time and allowing permissive HTN.        DAVID Osullivan obtained past medical, family history, social history, review of systems and functioned as a scribe for the remainder of the dictation for Dr. Kohli.      Dictated utilizing Dragon dictation    I,Isra Kohli M.D., personally performed the services described in this documentation as scribed by the above named individual in my presence, and it is both accurate and complete.  1/5/2020  1:36 PM

## 2020-01-05 NOTE — CONSULTS
"INFECTIOUS DISEASE CONSULT/INITIAL HOSPITAL VISIT    Alessandro Mendiola  1940  [id number]    Date of Consult: 1/5/2020    Admission Date: 1/2/2020      Requesting Provider: Georgina Pompa II, DO  Evaluating Physician: Piyush Elliott MD    Reason for Consultation: Enterococcus bacteremia    Chief complaint: Left upper extremity weakness    Current antimicrobial therapy: Vancomycin IV    History of present illness:    Alessandro Mendiola is a 79 y.o. male being evaluated for enterococcus bacteremia.  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  He presented to the ED yesterday with complaints of left upper extremity weakness and speech difficulties.  Per documentation, family states that the patient went to the restroom after getting up out of bed on the morning of 1/2 and upon returning and sitting down in his recliner he was unable to hold objects in his left hand and drop them.  Then he stated to his wife that his left arm felt \"weak, numb and tingly\".  Wife also noted that his speech was a little slurred and called her son to help get the patient to the ER ([facility]). Work-up in the ED showed a CT of the head without acute infarct and a chest x-ray with no acute cardiopulmonary illness.  NIHSS on arrival to ED was 9 and then 3 prior to transfer to [facility] for higher level of care.    Since arrival here patient has had a T-max of 100.6 and has been hemodynamically stable.  Labs show he is without leukocytosis with a white blood cell count of 8.88 and a neutrophilia of 76.9%.  Most recent BUN/creatinine is 15 and 0.88 with a GFR of 84.  1/2 blood cultures are positive in 4/4 bottles for enterococcus not VRE identified by bio fire report.  Repeat blood cultures on 1/3 are currently " positive and 1/4 bottles for gram-positive cocci in pairs and chains.    He has a listed allergy to doxycycline with shortness of breath listed as a reaction and is currently receiving vancomycin IV.  ID has been consulted for further evaluation and treatment as well as antimicrobial therapy management.    Of note: TTE was performed yesterday and is currently pending radiologist read.  Preliminary results show no vegetations and some TR.    The patient has experienced a 30 lbs weight loss over the last several months. He has additionally been perisistently nauseated. No abdominal pain. No bloody BMs. Last colonoscopy was about 3 years ago per patient and was ok.    Subjective:  1/5/20: CT abdomen and pelvis was done today showing a large splenic infarct but no other intra-abdominal pathology.  Cardiology is evaluating the patient and recommends a MARLON which I agree with. Tmax was 99.6°F overnight.  White count remained stable at 8.5.  Enterococcus was susceptible to ampicillin.     Past Medical History:   Diagnosis Date   • Alcohol abuse    • Aneurysm of thoracic aorta (CMS/HCC)    • Anxiety    • Ascending aortic aneurysm (CMS/HCC)     4.8 to 4.9 cm    • Atherosclerotic heart disease    • Atrial fibrillation (CMS/HCC)    • Blood thinned due to long-term anticoagulant use    • Body mass index (BMI) 20.0-20.9, adult    • CAD (coronary artery disease)    • Cancer (CMS/HCC)     skin   • Cardiac pacemaker    • Cellulitis    • Chronic bronchitis (CMS/HCC)    • Chronic cough    • COPD (chronic obstructive pulmonary disease) (CMS/HCC)    • Depression    • Difficulty breathing    • Emphysema lung (CMS/HCC)    • Encounter for long-term current use of medication    • Encounter for screening for malignant neoplasm of prostate    • Fatigue    • Fingertip amputation    • TAMIKO (generalized anxiety disorder)    • History of alcohol abuse    • History of cardiac catheterization    • History of chest x-ray 02/11/2014    Chronic  interstitial changes seen throughout the lung fields w/ no radiographic evidence of acute parenchymal disease.No definite LT-sided rib fracture present.   • History of chest x-ray 01/10/2014    No acute cardiopulmonary process.Dual lead LT subclavian pacemaker is in place.Aortic ectasia. Hyperinflation of lungs, suggesting COPD   • History of chest x-ray 10/01/2012    Ill-defined RT middle lobe opacity which likley reflects a pneumonia. FU to radiographic resolution is recommended.   • History of Doppler echocardiogram    • History of echocardiogram 09/26/2013    EF 55-60%. Mod concentric LVH. Abnormal LT VT diastolic filling is observed, consistent w/ impaired relaxation.Mild MR/TR. Trace ID. RVSP 44 mmHg.   • History of stress test     ECG performed   • HTN (hypertension)    • HX: long term anticoagulant use     Blood thinned due to long-term anticoagulant use (V58.61) (Z79.01)   • Hypercholesterolemia    • LVH (left ventricular hypertrophy)    • Mitral and aortic valve disease    • Myocardial infarction (CMS/HCC)    • Nonvenomous insect bite of multiple sites    • Onychomycosis    • Pacemaker at end of battery life    • Pneumonia    • Pneumothorax    • Pre-syncope    • Pulmonary nodule    • Rib pain on left side    • Sick sinus syndrome (CMS/HCC)    • SOB (shortness of breath)    • Upper respiratory infection    • Vitamin D deficiency        Past Surgical History:   Procedure Laterality Date   • AMPUTATION  08/31/2015    metacarpal and index finger   • CARDIAC PACEMAKER PLACEMENT  06/01/2008   • CATARACT EXTRACTION     • CORONARY STENT PLACEMENT      History of Cath Placement Of Stent 1  · Coronary stents   • EYE SURGERY     • SKIN CANCER EXCISION         Family History   Problem Relation Age of Onset   • Coronary artery disease Other    • Diabetes Other    • Hypertension Other        Social History     Socioeconomic History   • Marital status:      Spouse name: Not on file   • Number of children: Not on  file   • Years of education: Not on file   • Highest education level: Not on file   Tobacco Use   • Smoking status: Former Smoker     Packs/day: 3.00     Years: 45.00     Pack years: 135.00     Last attempt to quit: 2008     Years since quittin.0   • Smokeless tobacco: Never Used   Substance and Sexual Activity   • Alcohol use: No   • Drug use: No   • Sexual activity: Defer       Allergies   Allergen Reactions   • Doxycycline Shortness Of Breath         Medication:    Current Facility-Administered Medications:   •  ! Vancomycin Trough  @1530. Please hold 1600 dose until pharmacy verifies., , Does not apply, Once, Lis Romero, [facility]  •  acetaminophen (TYLENOL) tablet 650 mg, 650 mg, Oral, Q4H PRN **OR** acetaminophen (TYLENOL) 160 MG/5ML solution 650 mg, 650 mg, Oral, Q4H PRN, 649.6 mg at 20 0025 **OR** acetaminophen (TYLENOL) suppository 650 mg, 650 mg, Rectal, Q4H PRN, Stacey Padilla APRN  •  ampicillin 2 g/100 mL 0.9% NS (MBP), 2 g, Intravenous, Q4H, Sarcinella, Cortes A, APRN, 2 g at 20 1248  •  aspirin chewable tablet 324 mg, 324 mg, Oral, Daily, Mario Donato MD, 324 mg at 20 1245  •  atorvastatin (LIPITOR) tablet 80 mg, 80 mg, Oral, Nightly, Stacey Padilla APRN, 80 mg at 20 2117  •  azelastine (ASTELIN) nasal spray 2 spray, 2 spray, Each Nare, BID, Stacey Padilla APRN, 2 spray at 20 0802  •  budesonide-formoterol (SYMBICORT) 80-4.5 MCG/ACT inhaler 2 puff, 2 puff, Inhalation, BID, Stacey Padilla APRN, 2 puff at 20 1031  •  cefTRIAXone (ROCEPHIN) 2 g/100 mL 0.9% NS VTB (SUSAN), 2 g, Intravenous, Q12H, Sarcinella, Cortes A, APRN, 2 g at 20 0609  •  cetirizine (zyrTEC) tablet 10 mg, 10 mg, Oral, Daily, Iris Hsu MD, 10 mg at 20 08  •  clopidogrel (PLAVIX) tablet 75 mg, 75 mg, Oral, Daily, Fern Patel MD, 75 mg at 20 08  •  cycloSPORINE (RESTASIS) 0.05 % ophthalmic emulsion 1 drop, 1 drop, Both Eyes, BID, Valerie,  DAVID Daniel, 1 drop at 01/05/20 0802  •  docusate sodium (COLACE) capsule 100 mg, 100 mg, Oral, BID, Mario Donato MD  •  dronedarone (MULTAQ) tablet 400 mg, 400 mg, Oral, BID, Stacey Padilla APRN, 400 mg at 01/05/20 0802  •  heparin (porcine) 5000 UNIT/ML injection 5,000 Units, 5,000 Units, Subcutaneous, Q8H, Iris Hsu MD, 5,000 Units at 01/05/20 0609  •  ipratropium (ATROVENT) nebulizer solution 0.5 mg, 500 mcg, Nebulization, 4x Daily PRN, Stacey Padilla APRN  •  ipratropium-albuterol (DUO-NEB) nebulizer solution 3 mL, 3 mL, Nebulization, Q6H PRN, Chiquita Mcmahan APRN, 3 mL at 01/03/20 2123  •  ondansetron (ZOFRAN) injection 4 mg, 4 mg, Intravenous, Q6H PRN, Georgina Pompa II, DO, 4 mg at 01/05/20 0944  •  pantoprazole (PROTONIX) EC tablet 40 mg, 40 mg, Oral, QAM, Stacey Padilla APRN, 40 mg at 01/05/20 0610  •  polyethylene glycol 3350 powder (packet), 17 g, Oral, BID, Mario Donato MD  •  potassium chloride (MICRO-K) CR capsule 40 mEq, 40 mEq, Oral, PRN **OR** potassium chloride (KLOR-CON) packet 40 mEq, 40 mEq, Oral, PRN **OR** potassium chloride 10 mEq in 100 mL IVPB, 10 mEq, Intravenous, Q1H PRN, Mario Donato MD  •  primidone (MYSOLINE) tablet 50 mg, 50 mg, Oral, BID, Stacey Padilla APRN, 50 mg at 01/05/20 0802  •  QUEtiapine (SEROquel) tablet 25 mg, 25 mg, Oral, Nightly PRN, Fern Patel MD  •  roflumilast (DALIRESP) tablet 500 mcg, 500 mcg, Oral, Daily, Stacey Padilla APRN, 500 mcg at 01/05/20 0802  •  sertraline (ZOLOFT) tablet 50 mg, 50 mg, Oral, Daily, Stacey Padilla APRN, 50 mg at 01/05/20 0802  •  sodium chloride 0.9 % flush 10 mL, 10 mL, Intravenous, Q12H, Stacey Padilla APRN, 10 mL at 01/05/20 0803  •  sodium chloride 0.9 % flush 10 mL, 10 mL, Intravenous, PRN, Stacey Padilla APRN  •  tamsulosin (FLOMAX) 24 hr capsule 0.4 mg, 0.4 mg, Oral, Nightly, Stacey Padilla APRN, 0.4 mg at 01/04/20 2117    Antibiotics:  Anti-Infectives (From admission,  onward)    Ordered     Dose/Rate Route Frequency Start Stop    20 1451  ampicillin 2 g/100 mL 0.9% NS (MBP)  Review   Ordering Provider:  Cortes Vidal APRN    2 g  over 30 Minutes Intravenous Every 4 Hours 20 1600 20 1559    20 1451  cefTRIAXone (ROCEPHIN) 2 g/100 mL 0.9% NS VTB (SUSAN)  Review   Ordering Provider:  Cortes Vidal APRN    2 g  over 30 Minutes Intravenous Every 12 Hours 20 1600 20 0559    20 1502  vancomycin 1750 mg/500 mL 0.9% NS IVPB ([facility])     Ordering Provider:  Lis Romero RPH    1,750 mg  over 120 Minutes Intravenous Once 20 1600 20 2245            Review of Systems:  Constitutional-- No Fever, chills or sweats.  Poor appetite. 30 lbs weight loss. + fatigue.  HEENT-- No new vision, hearing or throat complaints.  No epistaxis or oral sores.  Denies odynophagia or dysphagia. No headache, photophobia or neck stiffness.  CV-- No chest pain, palpitation or syncope  Resp-- +chronic SOB. No cough/Hemoptysis  GI- + nausea, no vomiting, no diarrhea.  No hematochezia, melena, or hematemesis. Denies jaundice or chronic liver disease.  -- No dysuria, hematuria, or flank pain.  Denies hesitancy, urgency or flank pain.  Lymph- no swollen lymph nodes in neck/axilla or groin.   Heme- No active bruising or bleeding; no Hx of DVT or PE.  MS--generalized left-sided weakness.  Neuro--left-sided numbness, tingling, weakness and slurred speech  Skin--No rashes or lesions      Physical Exam:   Vital Signs  Temp (24hrs), Av.6 °F (37 °C), Min:98 °F (36.7 °C), Max:99.6 °F (37.6 °C)    Temp  Min: 98 °F (36.7 °C)  Max: 99.6 °F (37.6 °C)  BP  Min: 117/52  Max: 144/65  Pulse  Min: 69  Max: 82  Resp  Min: 18  Max: 22  SpO2  Min: 93 %  Max: 98 %    GENERAL: Awake and alert, in mild distress. Frail appearing  HEENT: Normocephalic, atraumatic.  No conjunctival injection. No icterus. Oropharynx clear without evidence of thrush or exudate. No evidence  of peridontal disease.    Chest: PPM pocket over left chest wall is without erythema, warmth, or tenderness  HEART: RRR; No murmur, rubs, gallops.   LUNGS: decreased breath sounds throughout but no wheezing or crackles.Labored breathing on nasal cannula  ABDOMEN: Soft, nontender, nondistended. Positive bowel sounds. No rebound or guarding. NO mass or HSM.  EXT:  No cyanosis, clubbing or edema. No cord.  :  Without Denny catheter.  MSK: FROM without joint effusions noted arms/legs.    SKIN: Warm and dry without cutaneous eruptions on Inspection/palpation.    NEURO: Oriented to PPT. Weakness of RUE and RLE compared to left side. Slurred speech. aphasia  PSYCHIATRIC: Normal insight and judgement. Cooperative with PE    Laboratory Data    Results from last 7 days   Lab Units 01/05/20  0633 01/03/20  0719 01/02/20  1344   WBC 10*3/mm3 8.51 8.88 9.75   HEMOGLOBIN g/dL 7.4* 7.9* 9.4*   HEMATOCRIT % 27.1* 27.7* 32.7*   PLATELETS 10*3/mm3 154 155 166     Results from last 7 days   Lab Units 01/05/20  0632 01/04/20  0438   SODIUM mmol/L  --  140   POTASSIUM mmol/L 3.3* 3.5   CHLORIDE mmol/L  --  99   CO2 mmol/L  --  29.0   BUN mg/dL  --  15   CREATININE mg/dL  --  0.88   GLUCOSE mg/dL  --  136*   CALCIUM mg/dL  --  8.8     Results from last 7 days   Lab Units 01/03/20  0719   ALK PHOS U/L 112   BILIRUBIN mg/dL 0.3   ALT (SGPT) U/L 8   AST (SGOT) U/L 16                         Estimated Creatinine Clearance: 69.5 mL/min (by C-G formula based on SCr of 0.88 mg/dL).      Microbiology:  Microbiology Results (last 10 days)     Procedure Component Value - Date/Time    Blood Culture - Blood, Arm, Left [[id number]]  (Abnormal) Collected:  01/03/20 1506    Lab Status:  Final result Specimen:  Blood from Arm, Left Updated:  01/05/20 1302     Blood Culture Enterococcus faecalis     Comment: Refer to previous blood culture collected on 01/02 at 2025 for CASEY's.            Gram Stain Aerobic Bottle Gram positive cocci in pairs and chains       Anaerobic Bottle Gram positive cocci in pairs and chains    Blood Culture - Blood, Arm, Right [[id number]]  (Abnormal) Collected:  01/03/20 1506    Lab Status:  Final result Specimen:  Blood from Arm, Right Updated:  01/05/20 1303     Blood Culture Enterococcus faecalis     Comment: Refer to previous blood culture collected on 01/02 at 2025 for CASEY's.          Gram Stain Aerobic Bottle Gram positive cocci in pairs and chains      Anaerobic Bottle Gram positive cocci in pairs and chains    Blood Culture - Blood, Arm, Left [[id number]]  (Abnormal)  (Susceptibility) Collected:  01/02/20 2025    Lab Status:  Final result Specimen:  Blood from Arm, Left Updated:  01/05/20 0605     Blood Culture Enterococcus faecalis     Gram Stain Aerobic Bottle Gram positive cocci in pairs and chains      Anaerobic Bottle Gram positive cocci in pairs and chains    Susceptibility      Enterococcus faecalis     CASEY     Ampicillin Susceptible     Gentamicin High Level Synergy Susceptible     Vancomycin Susceptible                    Blood Culture ID, PCR - Blood, Arm, Left [[id number]]  (Abnormal) Collected:  01/02/20 2025    Lab Status:  Final result Specimen:  Blood from Arm, Left Updated:  01/03/20 1447     BCID, PCR Enterococcus spp, not VRE. Identification by BCID PCR.    Blood Culture - Blood, Arm, Right [[id number]]  (Abnormal) Collected:  01/02/20 2019    Lab Status:  Final result Specimen:  Blood from Arm, Right Updated:  01/05/20 0606     Blood Culture Enterococcus faecalis     Comment: Refer to previous blood culture collected on 01/02 at 2025 for CASEY's.           Gram Stain Aerobic Bottle Gram positive cocci in pairs and chains      Anaerobic Bottle Gram positive cocci in pairs and chains              Radiology:  Imaging Results (Last 72 Hours)     Procedure Component Value Units Date/Time    CT Abdomen Pelvis With & Without Contrast [[id number]] Collected:  01/05/20 1250     Updated:  01/05/20 1255    Narrative:        EXAMINATION: CT ABDOMEN PELVIS W WO CONTRAST-      INDICATION: Abd pain, fever, abscess suspected     TECHNIQUE: 5 mm post IV contrast portal venous phase and delayed venous  phase images through the abdomen and pelvis.     The radiation dose reduction device was turned on for each scan per the  ALARA (As Low as Reasonably Achievable) protocol.     COMPARISON: No previous abdominal CT scan studies.     FINDINGS: Mild right basilar interstitial changes appear to represent  some subpleural scarring or atelectasis rather than pneumonia. There is  trace left basilar pleural thickening and fluid. There is trace  pericardial effusion.     Dense material in the dependent portion of gallbladder presumably  represents microlithiasis. No gallbladder inflammatory changes are  identified. There is mild fatty liver change. Pancreas, adrenal glands,  and left kidney.     Unremarkable. There appears to be a small right upper pole renal cyst  otherwise unremarkable right kidney. There is dense streak artifact from  extensive retained barium in the right colon.     Right in the spleen, there appears to be a large infarct involving  perhaps 50% of the splenic parenchyma with nonenhancement of the lateral  and dorsal margins of the spleen on both initial and delayed series.  There is minimal associated perisplenic fat stranding, no evidence of  hematoma no evidence of drainable fluid collection. No upper abdominal  inflammatory focus or free air is seen. Bowel loops are nondistended.     Regarding lower abdomen and pelvis, no mass adenopathy ascites or acute  inflammatory change is seen. Bladder is nondistended. Delayed images  show contrast opacification of normal caliber renal collecting systems  ureters, and bladder. Bony structures appear grossly intact.             Impression:       1. Normal sized spleen, but with evidence of splenic infarct involving  up to 50% of the spleen. Mild associated perisplenic fat stranding.  2.  Gallstones.  3. Retained barium in the right colon which limits fine detail in the  right mid abdomen. No definite acute inflammatory focus is seen here or  elsewhere however.          FL Video Swallow With Speech Single Contrast [[id number]] Collected:  01/03/20 1335     Updated:  01/03/20 1704    Narrative:       EXAMINATION: FL VIDEO SWALLOW W SPEECH SINGLE-CONTRAST-     INDICATION: dysphagia; M62.81-Muscle weakness (generalized);  Z74.09-Other reduced mobility     TECHNIQUE: 1 minute and 6 seconds of fluoroscopic time was used for this  exam. 1 associated image was saved. The patient was evaluated in the  seated lateral position while taking a variety of consistencies of  barium by mouth under the direction of speech pathology.     COMPARISON: NONE     FINDINGS: There was aspiration with sips of thin, nectar consistency  barium. There was no penetration and no aspiration with honey, pudding,  or solid consistency barium.          Impression:       Fluoroscopy provided for a modified barium swallow. Please  see speech therapy report for full details and recommendations.         This report was finalized on 1/3/2020 5:01 PM by Dr. Aftab Kraus.       CT Angiogram Head [[id number]] Collected:  01/02/20 2132     Updated:  01/02/20 2134    Narrative:       The CTA head results were included on the CTA neck report. Please see that report for full description of the head and neck findings    Signer Name: Sharath Ortega MD   Signed: 1/2/2020 9:32 PM   Workstation Name: [id number]    [facility]    CT Angiogram Neck [[id number]] Collected:  01/02/20 2122     Updated:  01/02/20 2124    Narrative:       CTA Neck    INDICATION:   TIA prior imaging    TECHNIQUE:   CT angiogram of the head and neck with 75 cc of Isovue 300 IV contrast. 3-D reconstructions were obtained and reviewed.  Evaluation for a significant carotid arterial stenosis is based on the NASCET criteria. Radiation dose reduction techniques  included  automated exposure control or exposure modulation based on body size. Count of known CT and cardiac nuc med studies performed in previous 12 months: 1.     COMPARISON:   None available.    FINDINGS:   CTA neck:  Great vessel origins were not included in the scan. The proximal great vessels are widely patent with calcified plaque seen in the left subclavian. Both vertebral arteries are widely patent at approximately the same size. The distal vertebrals  are unremarkable into the basilar artery. Both carotid bifurcations are widely patent with mild calcified plaque noted. The cervical internal carotids are patent up to the siphons. Extravascular structures are remarkable for a small amount of fluid in  the left maxillary sinus.    CTA head:  There is no evidence of aneurysm, vascular malformation or major branch vessel occlusion. No severe intracranial stenosis is seen. Major dural venous sinuses are patent.      Impression:       Mild plaque is seen at the carotid bifurcations and in the proximal left subclavian artery. No evidence of major branch vessel occlusion or severe intracranial stenosis.    Signer Name: Sharath Ortega MD   Signed: 1/2/2020 9:22 PM   Workstation Name: [id number]    [facility]    CT Cerebral Perfusion With & Without Contrast [[id number]] Collected:  01/02/20 2117     Updated:  01/02/20 2119    Narrative:       CT CEREBRAL PERFUSION W WO CONTRAST    HISTORY:   TIA prior to imaging    TECHNIQUE:   Axial CT images of the brain without and with intravenous contrast using cerebral perfusion protocol. Post-processing parametric maps were created using RAPID software and reviewed. Radiation dose reduction techniques included automated exposure control  or exposure modulation based on body size. CT and nuclear cardiology exams in the last 12 months: 1.    COMPARISON:       FINDINGS:   Arterial input function is optimal.     Cerebral blood flow, blood volume and  transit time are within normal limits. No significant perfusion abnormalities are seen on either side. No mismatch is identified.      Impression:       Normal brain perfusion CT.          Signer Name: Sharath Ortega MD   Signed: 1/2/2020 9:17 PM   Workstation Name: [id number]    [facility]    XR Chest 1 View [[id number]] Collected:  01/02/20 2036     Updated:  01/02/20 2038    Narrative:       Chest 1 view 1/2/2019    INDICATION:  New onset fever today.    FINDINGS: The heart is minimally enlarged. Cardiac pacemaker leads are in the right atrium and right ventricle. There is no pneumothorax. The lungs are hyperinflated with emphysematous and fibrotic changes characteristic of COPD. No airspace  consolidation is seen. There are no pleural effusions.      Impression:       Cardiomegaly. COPD. No active pulmonary disease.    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 Would normally require pacemaker extraction but patient and wife are unsure if he could tolerate this. Cardiology has evaluated and I appreciate the recommendations.  I agree with MARLON at this point. The other question is of the initial source for his septicemia. He denies any urinary symptoms or prostate issues. Other than his large splenic infarct, no other obvious sign of intra-abdominal pathology.  Possibly initially from a urinary source.    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  -- Aphasia  -- Hemiparesis  ----Nausea  ----significant weight loss  -- COPD exacerbation  -- Aneurysm of thoracic aorta  -- PAF  -- Sick sinus syndrome status post permanent cardiac pacemaker    PLAN/RECOMMENDATIONS:   Thank you for asking us to see Alessandro Mendiola, I recommend the following:  -- stop vancomycin   ----ampicillin 2g q 4 hours and start rocephin 2g iv q 12 hours  -- Continue to follow cultures. Repeat blood cultures today  -- Continue to follow physical exam and radiological reports and adjust therapies as indicated  -- Agree with neurology consult. Planning for MRI brain if pacemaker is compatible.  --- cardiology consulted for evaluation and MARLON.  Appreciate their assistance  ---continue to closely follow cbc with diff and bmp for now    Long discussion with the patient and his wife today    Very complex case requiring high level of medical decision making. Patient has significant risk for further morbidity    I discussed his case with Dr. Kohli today    Piyush Elliott MD  1/5/2020  2:00 PM

## 2020-01-06 NOTE — DISCHARGE PLACEMENT REQUEST
"Alessandro Mendiola (79 y.o. Male)   Humza Starks, RN Case Manager  [phone]    Date of Birth Social Security Number Address Home Phone MRN    1940  [address] [place] [id number] [phone] [id number]    Sabianist Marital Status          Rastafarian        Admission Date Admission Type Admitting Provider Attending Provider Department, Room/Bed    20 Urgent Mario Donato MD Schnell, Aaron, MD [facility] 3F, S313/1    Discharge Date Discharge Disposition Discharge Destination                       Attending Provider:  Mario Donato MD    Allergies:  Doxycycline    Isolation:  Droplet   Infection:  Rhinovirus  (20)   Code Status:  No CPR    Ht:  180.3 cm (70.98\")   Wt:  69.9 kg (154 lb)    Admission Cmt:  None   Principal Problem:  Muscle weakness of left upper extremity [M62.81]                 Active Insurance as of 2020     Primary Coverage     Payor Plan Insurance Group Employer/Plan Group    HUMANA MEDICARE REPLACEMENT HUMANA MEDICARE REPLACEMENT [id number]     Payor Plan Address Payor Plan Phone Number Payor Plan Fax Number Effective Dates    [address] [address] [phone]  2013 - None Entered    [facility] [id number]       Subscriber Name Subscriber Birth Date Member ID       ALESSANDRO MENDIOLA 1940 [id number]                 Emergency Contacts      (Rel.) Home Phone Work Phone Mobile Phone    Madelyn Mendiola (Spouse) [phone] -- --               History & Physical      Georgina Pompa II DO at 20 1821              [facility] Services  HISTORY AND PHYSICAL    Patient Name: Alessandro Mendiola  : 1940  MRN: [id number]  Primary Care Physician: Silvio Jacobson MD  Date of admission: 2020      Subjective   Subjective     Chief Complaint:  Left upper extremity weakness     HPI:  Alessandro Mendiola is a 79 y.o. 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  Patient's wife helps with history, stating that the patient got out of bed and went to the restroom and when he came back to sit down in his recliner he was unable to hold different objects with his left hand, dropping them.  Then he noted to her that his left arm felt weak a little numb and tingly.  Wife noticed that the patient's speech was slurred and called her son to help get patient to the ER. In ED, labs mostly unremarkable. CT head without showed no acute infarct, and CXR with no acute cardiopulmonary illness. NIHSS on arrival to ED was 9, then 3 prior to transfer to [facility] for higher level of care. Patient will be admitted to the hospital medicine group for further evaluation and treatment.     Review of Systems   Constitutional: Positive for activity change. Negative for appetite change, chills, diaphoresis, fatigue, fever and unexpected weight change.   HENT: Positive for hearing loss, trouble swallowing and voice change. Negative for congestion, dental problem, drooling, facial swelling and mouth sores.    Eyes: Negative for photophobia and visual disturbance.   Respiratory: Positive for cough, shortness of breath and wheezing. Negative for choking and chest tightness.    Cardiovascular: Negative for chest pain, palpitations and leg swelling.   Gastrointestinal: Negative for abdominal distention, abdominal pain, constipation, diarrhea, nausea and vomiting.   Endocrine: Negative for cold intolerance and heat intolerance.   Genitourinary: Negative for difficulty urinating, dysuria and flank pain.   Musculoskeletal: Positive for gait problem. Negative for arthralgias, back pain, joint swelling and myalgias.   Skin: Negative for color change, pallor, rash and wound.   Neurological: Positive for  speech difficulty, weakness and numbness. Negative for dizziness, tremors, syncope, facial asymmetry, light-headedness and headaches.   Hematological: Negative for adenopathy. Bruises/bleeds easily.   Psychiatric/Behavioral: Negative for agitation, behavioral problems and confusion. The patient is not nervous/anxious.         All other systems reviewed and are negative.     Personal History     Past Medical History:   Diagnosis Date   • Alcohol abuse    • Aneurysm of thoracic aorta (CMS/HCC)    • Anxiety    • Ascending aortic aneurysm (CMS/HCC)     4.8 to 4.9 cm    • Atherosclerotic heart disease    • Atrial fibrillation (CMS/HCC)    • Blood thinned due to long-term anticoagulant use    • Body mass index (BMI) 20.0-20.9, adult    • CAD (coronary artery disease)    • Cancer (CMS/HCC)     skin   • Cardiac pacemaker    • Cellulitis    • Chronic bronchitis (CMS/HCC)    • Chronic cough    • COPD (chronic obstructive pulmonary disease) (CMS/HCC)    • Depression    • Difficulty breathing    • Emphysema lung (CMS/HCC)    • Encounter for long-term current use of medication    • Encounter for screening for malignant neoplasm of prostate    • Fatigue    • Fingertip amputation    • TAMIKO (generalized anxiety disorder)    • History of alcohol abuse    • History of cardiac catheterization    • History of chest x-ray 02/11/2014    Chronic interstitial changes seen throughout the lung fields w/ no radiographic evidence of acute parenchymal disease.No definite LT-sided rib fracture present.   • History of chest x-ray 01/10/2014    No acute cardiopulmonary process.Dual lead LT subclavian pacemaker is in place.Aortic ectasia. Hyperinflation of lungs, suggesting COPD   • History of chest x-ray 10/01/2012    Ill-defined RT middle lobe opacity which likley reflects a pneumonia. FU to radiographic resolution is recommended.   • History of Doppler echocardiogram    • History of echocardiogram 09/26/2013    EF 55-60%. Mod concentric LVH.  Abnormal LT VT diastolic filling is observed, consistent w/ impaired relaxation.Mild MR/TR. Trace TX. RVSP 44 mmHg.   • History of stress test     ECG performed   • HTN (hypertension)    • HX: long term anticoagulant use     Blood thinned due to long-term anticoagulant use (V58.61) (Z79.01)   • Hypercholesterolemia    • LVH (left ventricular hypertrophy)    • Mitral and aortic valve disease    • Myocardial infarction (CMS/HCC)    • Nonvenomous insect bite of multiple sites    • Onychomycosis    • Pacemaker at end of battery life    • Pneumonia    • Pneumothorax    • Pre-syncope    • Pulmonary nodule    • Rib pain on left side    • Sick sinus syndrome (CMS/HCC)    • SOB (shortness of breath)    • Upper respiratory infection    • Vitamin D deficiency        Past Surgical History:   Procedure Laterality Date   • AMPUTATION  08/31/2015    metacarpal and index finger   • CARDIAC PACEMAKER PLACEMENT  06/01/2008   • CATARACT EXTRACTION     • CORONARY STENT PLACEMENT      History of Cath Placement Of Stent 1  · Coronary stents   • EYE SURGERY     • SKIN CANCER EXCISION         Family History: family history includes Coronary artery disease in an other family member; Diabetes in an other family member; Hypertension in an other family member. Otherwise pertinent FHx was reviewed and unremarkable.     Social History:  reports that he quit smoking about 12 years ago. He has a 135.00 pack-year smoking history. He has never used smokeless tobacco. He reports that he does not drink alcohol or use drugs.  Social History     Social History Narrative   • Not on file       Medications:  Available home medication information reviewed.  Medications Prior to Admission   Medication Sig Dispense Refill Last Dose   • azelastine (ASTELIN) 0.1 % nasal spray 2 sprays into the nostril(s) as directed by provider 2 (Two) Times a Day. 1 each 3 Taking   • B Complex Vitamins (VITAMIN B COMPLEX PO) Take 1 tablet by mouth Daily.   Taking   •  budesonide-formoterol (SYMBICORT) 80-4.5 MCG/ACT inhaler Inhale 2 puffs 2 (Two) Times a Day. 3 inhaler 1 Taking   • dilTIAZem (CARDIZEM) 120 MG tablet Take 1 tablet by mouth Daily. 90 tablet 3 Taking   • dronedarone (MULTAQ) 400 MG tablet Take 1 tablet by mouth 2 (Two) Times a Day. 180 tablet 1 Taking   • fluorometholone (FML) 0.1 % ophthalmic suspension INSTILL 1 DROP INTO EACH EYE THREE TIMES DAILY  2 Taking   • guaiFENesin (MUCINEX) 600 MG 12 hr tablet Take 1 tablet by mouth Every 12 (Twelve) Hours. (Patient taking differently: Take 600 mg by mouth As Needed.) 10 tablet 0 Taking   • hydrochlorothiazide (HYDRODIURIL) 25 MG tablet Take 1 tablet by mouth Daily. 90 tablet 3 Taking   • ipratropium (ATROVENT) 0.02 % nebulizer solution Take 2.5 mL by nebulization 4 (Four) Times a Day As Needed for Wheezing or Shortness of Air. 300 mL 11 Taking   • Multiple Vitamin (MULTI-VITAMINS PO) Take  by mouth.   Taking   • omeprazole (priLOSEC) 40 MG capsule Take 1 capsule by mouth Daily. 30 capsule 2    • OXYGEN-HELIUM IN Oxygen; Patient Sig: Oxygen patient is to get home oxygen through  company of his choice at 2L/Min, as well as portable oxygen at the same rate.; 1; 0; 22-Mar-2016; Active   Taking   • primidone (MYSOLINE) 50 MG tablet Take 1 tablet by mouth 2 (Two) Times a Day. 60 tablet 5 Taking   • Respiratory Therapy Supplies (FLUTTER) device 1 each Every 6 (Six) Hours While Awake. 1 each 0 Taking   • RESTASIS 0.05 % ophthalmic emulsion    Taking   • rivaroxaban (XARELTO) 20 MG tablet Take 1 tablet by mouth Daily. 30 tablet 12 Taking   • roflumilast (DALIRESP) 500 MCG tablet tablet Take 1 tablet by mouth Daily. 90 tablet 1 Taking   • sertraline (ZOLOFT) 50 MG tablet Take 1 tablet by mouth Daily. 90 tablet 3 Taking   • tamsulosin (FLOMAX) 0.4 MG capsule 24 hr capsule Take 1 capsule by mouth Every Night. 30 capsule 11 Taking   • Tetrahydrozoline-Zn Sulfate (ALLERGY RELIEF EYE DROPS OP) Apply 1 drop to eye(s) as directed by  provider 2 (Two) Times a Day As Needed.   Taking   • tiotropium bromide monohydrate (SPIRIVA RESPIMAT) 2.5 MCG/ACT aerosol solution inhaler Inhale 2 puffs Daily. 1 inhaler 5 Taking   • VENTOLIN  (90 Base) MCG/ACT inhaler Inhale 2 puffs Every 6 (Six) Hours As Needed for Wheezing. 1 inhaler 3 Taking       Allergies   Allergen Reactions   • Doxycycline Shortness Of Breath       Objective   Objective     Vital Signs:   Temp:  [98.8 °F (37.1 °C)-100.6 °F (38.1 °C)] 100.6 °F (38.1 °C)  Heart Rate:  [78-92] 78  Resp:  [18-20] 18  BP: (109-139)/(59-83) 135/64   Total (NIH Stroke Scale): 4    Physical Exam   Constitutional: Awake, alert, chronically ill   Eyes: PERRLA, sclerae anicteric, no conjunctival injection  HENT: NCAT, mucous membranes moist  Neck: Supple, no thyromegaly, no lymphadenopathy, trachea midline  Respiratory: Bibasilar rhonchi with exp wheezing, moderately labored respirations on 5LNC (baseline)/ barrel chested   Cardiovascular: Paced, no murmurs, rubs, or gallops, palpable pedal pulses bilaterally  Gastrointestinal: Positive bowel sounds, soft, nontender, nondistended  Musculoskeletal: No bilateral ankle edema, no clubbing or cyanosis to extremities  Psychiatric: Appropriate affect, cooperative, anxious   Neurologic: Oriented x 3, LUE weakness, Cranial Nerves grossly intact to confrontation, speech garbled   Skin: No rashes, multiple stages of bruising on upper extremities     Results Reviewed:  I have personally reviewed current lab and radiology data.    Results from last 7 days   Lab Units 01/02/20  1344   WBC 10*3/mm3 9.75   HEMOGLOBIN g/dL 9.4*   HEMATOCRIT % 32.7*   PLATELETS 10*3/mm3 166   INR  2.13*     Results from last 7 days   Lab Units 01/02/20  1344   SODIUM mmol/L 139   POTASSIUM mmol/L 3.7   CHLORIDE mmol/L 96*   CO2 mmol/L 28.1   BUN mg/dL 13   CREATININE mg/dL 0.76   GLUCOSE mg/dL 193*   CALCIUM mg/dL 9.1   ALT (SGPT) U/L 11   AST (SGOT) U/L 23   TROPONIN T ng/mL <0.010      Estimated Creatinine Clearance: 76.5 mL/min (by C-G formula based on SCr of 0.76 mg/dL).  Brief Urine Lab Results     None        Imaging Results (Last 24 Hours)     ** No results found for the last 24 hours. **             Assessment/Plan   Assessment & Plan     Active Hospital Problems    Diagnosis POA   • Muscle weakness of left upper extremity [M62.81] Unknown   • Aphasia [R47.01] Unknown   • COPD exacerbation (CMS/[facility]) [J44.1] Yes   • Aneurysm of thoracic aorta (CMS/HCC) [I71.2] Yes   • Coronary arteriosclerosis in native artery [I25.10] Yes   • Paroxysmal atrial fibrillation (CMS/[facility]) [I48.0] Yes   • Sick sinus syndrome (CMS/[facility]) [I49.5] Yes       Initiate ischemic stroke protocol  Cannot have MRI per wife  CTA head and neck tonight  Will possibly need carotid duplex in AM pending CTA results-- NOT ordered yet    Will repeat CT without contrast in AM   Allow permissive HTN, monitor and call provider if SBP > 150 due to multiple aneurysms   CT perfusion tonight   Continue home meds  PT/OT/SLP evaluation  Neurology consult in AM       DVT prophylaxis:  Xarelto    CODE STATUS:    Code Status and Medical Interventions:   Ordered at: 01/02/20 1916     Limited Support to NOT Include:    Intubation    Cardioversion/Defibrillation     Level Of Support Discussed With:    Patient     Code Status:    No CPR     Medical Interventions (Level of Support Prior to Arrest):    Limited       Admission Status:  I believe this patient meets INPATIENT status due to CVA.  I feel patient’s risk for adverse outcomes and need for care warrant INPATIENT evaluation and I predict the patient’s care encounter to likely last beyond 2 midnights.    Electronically signed by DAVID Betancourt, 01/02/20, 6:21 PM.      Attending   Admission Attestation       I have seen and examined the patient, performing an independent face-to-face diagnostic evaluation with plan of care reviewed and developed with the advanced practice clinician  (APC).      Brief Summary Statement:   Alessandro Mendiola is a 79 y.o. male sent from [facility] with left sided weakness/numbness. Per patient and family this started suddenly at 1200 today. At that time was numb on left side, unable to use COPD inhalers effectively, and had difficulty speaking. These symptoms are still present. Patient has known a fib and reports taking xarelto as instructed. Upon arrival to floor was febrile to 100.6 and had episode of vomiting. Has no infectious complaints at this time.    Remainder of detailed HPI is as noted by APC and has been reviewed and/or edited by me for completeness.    Attending Physical Exam:  Constitutional: Awake, alert  Eyes: PERRLA, sclerae anicteric, no conjunctival injection  HENT: NCAT, mucous membranes moist  Neck: Supple, no thyromegaly, no lymphadenopathy, trachea midline  Respiratory: Clear to auscultation bilaterally, nonlabored respirations   Cardiovascular: RRR, no murmurs, rubs, or gallops, palpable pedal pulses bilaterally  Gastrointestinal: Positive bowel sounds, soft, nontender, nondistended  Musculoskeletal: No bilateral ankle edema, no clubbing or cyanosis to extremities  Psychiatric: Appropriate affect, cooperative  Neurologic: Oriented x 3, diminished sensation on left, mild dysarthria, mild left facial droop.  Skin: No rashes    CT head OSH personally reviewed without acute disease. Agree with interpretation.    Brief Assessment/Plan :    78 y/o male with a fib on xarelto presenting from [facility] with left sided weakness, dysarthria concerning for CVA. Also found to be febrile here upon arrival.  --Start asa suppository now. Resume xarelto and lipitor when appropriate (failed bedside swallow.)  --Proceed with CT perfusion, CTA head and neck now. Cannot undergo MRI due to PPM. Echocardiogram tomorrow along with neurology eval, PT/OT, SLP.  --Blood cultures, cxr, u/a, flu swab for fever. No abx for now.     See detailed assessment and plan developed with APC  which I have reviewed and/or edited for completeness.    Electronically signed by Georgina Pompa II, DO, 01/02/20, 7:37 PM.                Electronically signed by Georgina Pompa II, DO at 01/02/20 1952       Vital Signs (last day)     Date/Time   Temp   Temp src   Pulse   Resp   BP   Patient Position   SpO2    01/06/20 0732   98.7 (37.1)   Oral   71   20   147/61   Lying   92    01/06/20 0640   --   --   69   --   --   --   91    01/06/20 0354   99.6 (37.6)   Oral   69   22   134/55   Lying   94    01/06/20 0017   (!) 100.8 (38.2)   Oral   73   22   105/46   Lying   96    01/05/20 2122   99.6 (37.6)   Oral   76   22   136/65   Lying   95    01/05/20 2009   --   --   73   22   --   --   99    01/05/20 1727   --   --   75   20   --   --   91    01/05/20 1500   99.3 (37.4)   Oral   --   18   136/64   Lying   --    01/05/20 1218   98.5 (36.9)   Oral   --   18   121/69   Lying   --    01/05/20 1031   --   --   78   20   --   --   95    01/05/20 0700   98.2 (36.8)   Oral   --   18   117/52   Lying   --    01/05/20 0338   98.6 (37)   Axillary   69   18   131/72   Lying   94                Current Facility-Administered Medications   Medication Dose Route Frequency Provider Last Rate Last Dose   • acetaminophen (TYLENOL) tablet 650 mg  650 mg Oral Q4H PRN Stacey Padilla APRN   650 mg at 01/06/20 0025    Or   • acetaminophen (TYLENOL) 160 MG/5ML solution 650 mg  650 mg Oral Q4H PRN Stacey Padilla APRN   649.6 mg at 01/04/20 0025    Or   • acetaminophen (TYLENOL) suppository 650 mg  650 mg Rectal Q4H PRN Stacey Padilla APRN       • ampicillin 2 g/100 mL 0.9% NS (MBP)  2 g Intravenous Q4H Cortes Vidal APRN   2 g at 01/06/20 0528   • aspirin chewable tablet 324 mg  324 mg Oral Daily Mario Donato MD   324 mg at 01/05/20 1245   • atorvastatin (LIPITOR) tablet 80 mg  80 mg Oral Nightly Stacey Padilla APRN   80 mg at 01/05/20 2153   • azelastine (ASTELIN) nasal spray 2 spray  2 spray Each Nare BID  Stacey Padilla APRN   2 spray at 01/05/20 2100   • budesonide-formoterol (SYMBICORT) 80-4.5 MCG/ACT inhaler 2 puff  2 puff Inhalation BID Stacey Padilla APRN   2 puff at 01/05/20 2009   • cefTRIAXone (ROCEPHIN) 2 g/100 mL 0.9% NS VTB (SUSAN)  2 g Intravenous Q12H Cortes Vidal APRN   2 g at 01/06/20 0528   • cetirizine (zyrTEC) tablet 10 mg  10 mg Oral Daily Iris Hsu MD   10 mg at 01/05/20 0802   • clopidogrel (PLAVIX) tablet 75 mg  75 mg Oral Daily Fern Patel MD   75 mg at 01/05/20 0802   • cycloSPORINE (RESTASIS) 0.05 % ophthalmic emulsion 1 drop  1 drop Both Eyes BID Stacey Padilla APRN   1 drop at 01/05/20 2153   • docusate sodium (COLACE) capsule 100 mg  100 mg Oral BID Mario Donato MD   100 mg at 01/05/20 2153   • dronedarone (MULTAQ) tablet 400 mg  400 mg Oral BID Stacey Padilla APRN   400 mg at 01/05/20 2153   • heparin (porcine) 5000 UNIT/ML injection 5,000 Units  5,000 Units Subcutaneous Q8H Iris Hsu MD   5,000 Units at 01/06/20 0529   • ipratropium (ATROVENT) nebulizer solution 0.5 mg  500 mcg Nebulization 4x Daily PRN Stacey Padilla APRN       • ipratropium-albuterol (DUO-NEB) nebulizer solution 3 mL  3 mL Nebulization Q6H PRN Chiquita Mcmahan APRN   3 mL at 01/06/20 0639   • ondansetron (ZOFRAN) injection 4 mg  4 mg Intravenous Q6H PRN Georgina Pompa II, DO   4 mg at 01/05/20 0944   • pantoprazole (PROTONIX) EC tablet 40 mg  40 mg Oral QAM Stacey Padilla APRN   40 mg at 01/06/20 0529   • polyethylene glycol 3350 powder (packet)  17 g Oral BID Mario Donato MD   17 g at 01/05/20 1411   • potassium chloride (MICRO-K) CR capsule 40 mEq  40 mEq Oral PRN Mario Donato MD   40 mEq at 01/05/20 1818    Or   • potassium chloride (KLOR-CON) packet 40 mEq  40 mEq Oral PRN Mario Donato MD        Or   • potassium chloride 10 mEq in 100 mL IVPB  10 mEq Intravenous Q1H PRN Mario Donato MD       • primidone (MYSOLINE) tablet 50 mg  50 mg Oral BID  Stacey Padilla, APRN   50 mg at 20   • QUEtiapine (SEROquel) tablet 25 mg  25 mg Oral Nightly PRN Fern Patel MD   25 mg at 20   • roflumilast (DALIRESP) tablet 500 mcg  500 mcg Oral Daily Stacey Padilla, APRN   500 mcg at 20   • sertraline (ZOLOFT) tablet 50 mg  50 mg Oral Daily Stacey Padilla, APRN   50 mg at 20   • sodium chloride 0.9 % flush 10 mL  10 mL Intravenous Q12H Stacey Padilla, APRN   10 mL at 20   • sodium chloride 0.9 % flush 10 mL  10 mL Intravenous PRN Stacey Padilla, APRN       • tamsulosin (FLOMAX) 24 hr capsule 0.4 mg  0.4 mg Oral Nightly Stacey Padilla, APRN   0.4 mg at 20        Physician Progress Notes (last 24 hours) (Notes from 20 0930 through 20 0930)      Mario Donato MD at 20              [facility] Medicine Services  PROGRESS NOTE    Patient Name: Alessandro Mendiola  : 1940  MRN: [id number]    Date of Admission: 2020  Primary Care Physician: Silvio Jacobson MD    Subjective   Subjective     CC: Transfer from [facility] for neuro evaluation    HPI: Wife in room today.  Patient appears comfortable.  He is denying any chest pain or shortness of breath.      Review of Systems    Gen- No fevers, chills  CV- No chest pain, palpitations  Resp- No cough, dyspnea  GI- No N/V/D, abd pain    Objective   Objective     Vital Signs:   Temp:  [98.2 °F (36.8 °C)-99.3 °F (37.4 °C)] 99.3 °F (37.4 °C)  Heart Rate:  [69-82] 73  Resp:  [18-22] 22  BP: (117-144)/(52-72) 136/64  Total (NIH Stroke Scale): 6     Physical Exam:    Constitutional: No acute distress, awake, alert  HENT: NCAT, dry tongue  Respiratory: poor inspiratory effort, no wheezing  Cardiovascular: RRR, no murmurs, rubs, or gallops, palpable pedal pulses bilaterally  Gastrointestinal: Positive bowel sounds, soft, nontender, nondistended  Musculoskeletal: No bilateral ankle  edema  Psychiatric: Appropriate affect, cooperative  Neurologic: Oriented x 3, strength symmetric in all extremities, Cranial Nerves grossly intact to confrontation, speech clear  Skin: No rashes      Results Reviewed:    Results from last 7 days   Lab Units 01/05/20  0633 01/03/20  0719 01/02/20  1344   WBC 10*3/mm3 8.51 8.88 9.75   HEMOGLOBIN g/dL 7.4* 7.9* 9.4*   HEMATOCRIT % 27.1* 27.7* 32.7*   PLATELETS 10*3/mm3 154 155 166   INR   --   --  2.13*     Results from last 7 days   Lab Units 01/05/20  0632 01/04/20  0438 01/03/20  0719 01/02/20  1344   SODIUM mmol/L  --  140 139 139   POTASSIUM mmol/L 3.3* 3.5 3.4* 3.7   CHLORIDE mmol/L  --  99 98 96*   CO2 mmol/L  --  29.0 31.0* 28.1   BUN mg/dL  --  15 13 13   CREATININE mg/dL  --  0.88 0.77 0.76   GLUCOSE mg/dL  --  136* 133* 193*   CALCIUM mg/dL  --  8.8 8.7 9.1   ALT (SGPT) U/L  --   --  8 11   AST (SGOT) U/L  --   --  16 23   TROPONIN T ng/mL  --   --   --  <0.010     Estimated Creatinine Clearance: 69.5 mL/min (by C-G formula based on SCr of 0.88 mg/dL).    Microbiology Results Abnormal     Procedure Component Value - Date/Time    Respiratory Panel, PCR - Swab, Nasopharynx [[id number]]  (Abnormal) Collected:  01/05/20 1250    Lab Status:  Final result Specimen:  Swab from Nasopharynx Updated:  01/05/20 1419     ADENOVIRUS, PCR Not Detected     Coronavirus 229E Not Detected     Coronavirus HKU1 Not Detected     Coronavirus NL63 Not Detected     Coronavirus OC43 Not Detected     Human Metapneumovirus Not Detected     Human Rhinovirus/Enterovirus Detected     Influenza B PCR Not Detected     Parainfluenza Virus 1 Not Detected     Parainfluenza Virus 2 Not Detected     Parainfluenza Virus 3 Not Detected     Parainfluenza Virus 4 Not Detected     Bordetella pertussis pcr Not Detected     Influenza A H1 2009 PCR Not Detected     Chlamydophila pneumoniae PCR Not Detected     Mycoplasma pneumo by PCR Not Detected     Influenza A PCR Not Detected     Influenza A H3 Not  Detected     Influenza A H1 Not Detected     RSV, PCR Not Detected     Bordetella parapertussis PCR Not Detected    Blood Culture - Blood, Arm, Right [[id number]]  (Abnormal) Collected:  01/03/20 1506    Lab Status:  Final result Specimen:  Blood from Arm, Right Updated:  01/05/20 1303     Blood Culture Enterococcus faecalis     Comment: Refer to previous blood culture collected on 01/02 at 2025 for CASEY's.          Gram Stain Aerobic Bottle Gram positive cocci in pairs and chains      Anaerobic Bottle Gram positive cocci in pairs and chains    Blood Culture - Blood, Arm, Left [[id number]]  (Abnormal) Collected:  01/03/20 1506    Lab Status:  Final result Specimen:  Blood from Arm, Left Updated:  01/05/20 1302     Blood Culture Enterococcus faecalis     Comment: Refer to previous blood culture collected on 01/02 at 2025 for CASEY's.            Gram Stain Aerobic Bottle Gram positive cocci in pairs and chains      Anaerobic Bottle Gram positive cocci in pairs and chains    Blood Culture - Blood, Arm, Right [[id number]]  (Abnormal) Collected:  01/02/20 2019    Lab Status:  Final result Specimen:  Blood from Arm, Right Updated:  01/05/20 0606     Blood Culture Enterococcus faecalis     Comment: Refer to previous blood culture collected on 01/02 at 2025 for CASEY's.           Gram Stain Aerobic Bottle Gram positive cocci in pairs and chains      Anaerobic Bottle Gram positive cocci in pairs and chains    Blood Culture - Blood, Arm, Left [[id number]]  (Abnormal)  (Susceptibility) Collected:  01/02/20 2025    Lab Status:  Final result Specimen:  Blood from Arm, Left Updated:  01/05/20 0605     Blood Culture Enterococcus faecalis     Gram Stain Aerobic Bottle Gram positive cocci in pairs and chains      Anaerobic Bottle Gram positive cocci in pairs and chains    Susceptibility      Enterococcus faecalis     CASEY     Ampicillin Susceptible     Gentamicin High Level Synergy Susceptible     Vancomycin Susceptible                     Blood Culture ID, PCR - Blood, Arm, Left [[id number]]  (Abnormal) Collected:  01/02/20 2025    Lab Status:  Final result Specimen:  Blood from Arm, Left Updated:  01/03/20 1447     BCID, PCR Enterococcus spp, not VRE. Identification by BCID PCR.          Imaging Results (Last 24 Hours)     Procedure Component Value Units Date/Time    CT Abdomen Pelvis With & Without Contrast [[id number]] Collected:  01/05/20 1250     Updated:  01/05/20 1455    Narrative:       EXAMINATION: CT ABDOMEN/PELVIS WWO CONTRAST - 01/05/2020      INDICATION: Abdominal pain and fever.     TECHNIQUE: 5 mm post IV contrast portal venous phase and delayed venous  phase images through the abdomen and pelvis.     The radiation dose reduction device was turned on for each scan per the  ALARA (As Low as Reasonably Achievable) protocol.     COMPARISON: No previous abdominal CT scan studies.     FINDINGS: Mild right basilar interstitial changes appear to represent  some subpleural scarring or atelectasis rather than pneumonia. There is  trace left basilar pleural thickening and fluid. There is trace  pericardial effusion.     Dense material in the dependent portion of the gallbladder presumably  represents microlithiasis. No gallbladder inflammatory changes are  identified. There is mild fatty liver change. Pancreas, adrenal glands,  and left kidney appear unremarkable. There appears to be a small right  upper pole renal cyst, otherwise unremarkable right kidney. There is  dense streak artifact from extensive retained barium in the right colon.     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  There is minimal associated perisplenic fat stranding, no evidence of  hematoma and no evidence of drainable fluid collection. No upper  abdominal inflammatory focus or free air is seen. Bowel  loops are  nondistended.     Regarding the lower abdomen and pelvis, no mass, adenopathy, ascites or  acute inflammatory change is seen. Bladder is nondistended. Delayed  images show contrast opacification of normal caliber renal collecting  systems, ureters, and bladder. Bony structures appear grossly intact.       Impression:       1. 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 Mild associated perisplenic fat stranding.  2. Gallstones.  3. Retained barium in the right colon which limits fine detail in the  right mid abdomen. No definite acute inflammatory focus is seen here or  elsewhere, however.     DICTATED:   01/05/2020  EDITED/ls :   01/05/2020                 Results for orders placed during the hospital encounter of 01/02/20   Adult Transthoracic Echo Complete W/ Cont if Necessary Per Protocol (With Agitated Saline)    Narrative · Left ventricular systolic function is normal. Estimated EF appears to be   in the range of 61 - 65%.  · Left ventricular wall thickness is consistent with borderline concentric   hypertrophy.  · Electronic leads present right side of the heart  · Mild MAC is present. Mild mitral valve regurgitation is present  · Mild dilation of the aortic root is present. Measures 4.1 cm  · No valvular vegetations visualized on this study, however valves not   well seen  · Bubble study nondiagnostic due to poor visualization          I have reviewed the medications:  Scheduled Meds:  Pharmacy Consult  Does not apply Once   ampicillin 2 g Intravenous Q4H   aspirin 324 mg Oral Daily   atorvastatin 80 mg Oral Nightly   azelastine 2 spray Each Nare BID   budesonide-formoterol 2 puff Inhalation BID   cefTRIAXone 2 g Intravenous Q12H   cetirizine 10 mg Oral Daily   clopidogrel 75 mg Oral Daily   cycloSPORINE 1 drop Both Eyes BID   docusate sodium 100 mg Oral BID   dronedarone 400 mg Oral BID   heparin (porcine) 5,000 Units Subcutaneous Q8H   pantoprazole  40 mg Oral QAM   polyethylene glycol 17 g Oral BID   primidone 50 mg Oral BID   roflumilast 500 mcg Oral Daily   sertraline 50 mg Oral Daily   sodium chloride 10 mL Intravenous Q12H   tamsulosin 0.4 mg Oral Nightly     Continuous Infusions:   PRN Meds:.acetaminophen **OR** acetaminophen **OR** acetaminophen  •  ipratropium  •  ipratropium-albuterol  •  ondansetron  •  potassium chloride **OR** potassium chloride **OR** potassium chloride  •  QUEtiapine  •  sodium chloride      Assessment/Plan   Assessment & Plan     Active Hospital Problems    Diagnosis  POA   • **Muscle weakness of left upper extremity [M62.81]  Clinically Undetermined   • Aphasia [R47.01]  Clinically Undetermined   • COPD exacerbation (CMS/HCC) [J44.1]  Yes   • 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        Brief Hospital Course to date:  Alessandro Mendiola is a 79 y.o. male admitted with left upper extremity weakness    Left-sided weakness  -Concern for endocarditis  -MARLON  -MRI  -Hold Xarelto  -Dual antiplatelet therapy  Sleep disorder  -High risk for delirium  Enterococcus bacteremia  -MARLON  Sick sinus syndrome  -[employer] pacemaker  End-stage COPD  -On supplemental oxygen  Atrial fibrillation  -Chronic anticoagulation  Depression  Thoracic aortic aneurysm  -MARLON  Rhinovirus  - Droplet    BiPAP as needed and for sleep    MRI planned for Monday    DVT Prophylaxis: Heparin    Disposition: I expect the patient to be discharged TBD    CODE STATUS:   Code Status and Medical Interventions:   Ordered at: 01/02/20 1916     Limited Support to NOT Include:    Intubation    Cardioversion/Defibrillation     Level Of Support Discussed With:    Patient     Code Status:    No CPR     Medical Interventions (Level of  Support Prior to Arrest):    Limited         Electronically signed by Mario Donato MD, 20, 8:43 PM.      Electronically signed by Mario Donato MD at 20 8603          Physical Therapy Notes (last 48 hours) (Notes from 20 0930 through 20 0930)      Jamia Huddleston PT at 20 1040  Version 1 of 1         Problem: Patient Care Overview  Goal: Plan of Care Review  Flowsheets (Taken 2020 1126)  Plan of Care Reviewed With: patient; spouse  Outcome Summary: Patient able to ambulate in room with moderate assistance to control walker on turns. Recommend Acute rehab       Electronically signed by Jamia Huddleston PT at 20 1128     Jamia Huddleston PT at 20 1040  Version 1 of 1         Patient Name: Alessandro Mendiola  : 1940    MRN: [id number]                              Today's Date: 2020       Admit Date: 2020    Visit Dx:     ICD-10-CM ICD-9-CM   1. Muscle weakness of left upper extremity M62.81 728.87   2. Impaired mobility and ADLs Z74.09 799.89   3. Oropharyngeal dysphagia R13.12 787.22   4. Dysarthria R47.1 784.51     Patient Active Problem List   Diagnosis   • Aneurysm of thoracic aorta (CMS/HCC)   • Coronary arteriosclerosis in native artery   • Paroxysmal atrial fibrillation (CMS/HCC)   • Sick sinus syndrome (CMS/HCC)   • Vitamin D deficiency   • COPD exacerbation (CMS/HCC)   • HTN (hypertension)   • Mood disorder (CMS/HCC)   • Simple chronic bronchitis (CMS/HCC)   • Muscle weakness of left upper extremity   • Aphasia     Past Medical History:   Diagnosis Date   • Alcohol abuse    • Aneurysm of thoracic aorta (CMS/HCC)    • Anxiety    • Ascending aortic aneurysm (CMS/HCC)     4.8 to 4.9 cm    • Atherosclerotic heart disease    • Atrial fibrillation (CMS/HCC)    • Blood thinned due to long-term anticoagulant use    • Body mass index (BMI) 20.0-20.9, adult    • CAD (coronary artery disease)    • Cancer (CMS/HCC)     skin   • Cardiac pacemaker    •  Cellulitis    • Chronic bronchitis (CMS/HCC)    • Chronic cough    • COPD (chronic obstructive pulmonary disease) (CMS/HCC)    • Depression    • Difficulty breathing    • Emphysema lung (CMS/HCC)    • Encounter for long-term current use of medication    • Encounter for screening for malignant neoplasm of prostate    • Fatigue    • Fingertip amputation    • TAMIKO (generalized anxiety disorder)    • History of alcohol abuse    • History of cardiac catheterization    • History of chest x-ray 02/11/2014    Chronic interstitial changes seen throughout the lung fields w/ no radiographic evidence of acute parenchymal disease.No definite LT-sided rib fracture present.   • History of chest x-ray 01/10/2014    No acute cardiopulmonary process.Dual lead LT subclavian pacemaker is in place.Aortic ectasia. Hyperinflation of lungs, suggesting COPD   • History of chest x-ray 10/01/2012    Ill-defined RT middle lobe opacity which likley reflects a pneumonia. FU to radiographic resolution is recommended.   • History of Doppler echocardiogram    • History of echocardiogram 09/26/2013    EF 55-60%. Mod concentric LVH. Abnormal LT VT diastolic filling is observed, consistent w/ impaired relaxation.Mild MR/TR. Trace ND. RVSP 44 mmHg.   • History of stress test     ECG performed   • HTN (hypertension)    • HX: long term anticoagulant use     Blood thinned due to long-term anticoagulant use (V58.61) (Z79.01)   • Hypercholesterolemia    • LVH (left ventricular hypertrophy)    • Mitral and aortic valve disease    • Myocardial infarction (CMS/HCC)    • Nonvenomous insect bite of multiple sites    • Onychomycosis    • Pacemaker at end of battery life    • Pneumonia    • Pneumothorax    • Pre-syncope    • Pulmonary nodule    • Rib pain on left side    • Sick sinus syndrome (CMS/HCC)    • SOB (shortness of breath)    • Upper respiratory infection    • Vitamin D deficiency      Past Surgical History:   Procedure Laterality Date   • AMPUTATION   08/31/2015    metacarpal and index finger   • CARDIAC PACEMAKER PLACEMENT  06/01/2008   • CATARACT EXTRACTION     • CORONARY STENT PLACEMENT      History of Cath Placement Of Stent 1  · Coronary stents   • EYE SURGERY     • SKIN CANCER EXCISION       General Information     [facility] Name 01/04/20 1117          PT Evaluation Time/Intention    Document Type  therapy note (daily note)  -SC     Mode of Treatment  physical therapy  -[facility] Name 01/04/20 1117          General Information    Patient Profile Reviewed?  yes  -SC     Existing Precautions/Restrictions  fall;oxygen therapy device and L/min  -[facility] Name 01/04/20 1117          Cognitive Assessment/Intervention- PT/OT    Orientation Status (Cognition)  oriented x 4  -SC     Cognitive Assessment/Intervention Comment  alert, following commands with tactile cues and repetition  -[facility] Name 01/04/20 1117          Safety Issues, Functional Mobility    Safety Issues Affecting Function (Mobility)  ability to follow commands;insight into deficits/self awareness;judgment  -SC     Impairments Affecting Function (Mobility)  balance;endurance/activity tolerance;grasp;shortness of breath;strength  -SC       User Key  (r) = Recorded By, (t) = Taken By, (c) = Cosigned By    Initials Name Provider Type    SC Jamia Huddleston, PT Physical Therapist        Mobility     [facility] Name 01/04/20 1118          Bed Mobility Assessment/Treatment    Scooting/Bridging Max (Bed Mobility)  independent  -SC     Supine-Sit Max (Bed Mobility)  conditional independence  -SC     Assistive Device (Bed Mobility)  bed rails;head of bed elevated  -SC     Comment (Bed Mobility)  cues for hand placement and getting up to edge of bed  -[facility] Name 01/04/20 1118          Transfer Assessment/Treatment    Comment (Transfers)  stood from edge of bed with cues to push up with R UE  -[facility] Name 01/04/20 1118          Sit-Stand Transfer    Sit-Stand Max (Transfers)   contact guard;verbal cues  -SC     Assistive Device (Sit-Stand Transfers)  walker, front-wheeled  -SC     Row Name 01/04/20 1128 01/04/20 1118       Gait/Stairs Assessment/Training    Gait/Stairs Assessment/Training  --  gait/ambulation independence  -SC    [place] Level (Gait)  --  moderate assist (50% patient effort)  -SC    Assistive Device (Gait)  --  walker, front-wheeled  -SC    Distance in Feet (Gait)  10  -SC  --    Pattern (Gait)  --  step-through  -SC    Deviations/Abnormal Patterns (Gait)  --  gait speed decreased;ataxic;base of support, narrow  -SC    Bilateral Gait Deviations  --  forward flexed posture  -SC    Comment (Gait/Stairs)  --  Gt training focused on controling walker . Encouraged to use his vision to place legs correctily and avoid crossing over.   -SC      User Key  (r) = Recorded By, (t) = Taken By, (c) = Cosigned By    Initials Name Provider Type    SC Jamia Huddleston, PT Physical Therapist        Obj/Interventions     Row Name 01/04/20 1122          Therapeutic Exercise    Lower Extremity (Therapeutic Exercise)  gastroc stretch, bilateral;heel slides, bilateral;LAQ (long arc quad), bilateral;SLR (straight leg raise), bilateral  -SC     Lower Extremity Range of Motion (Therapeutic Exercise)  hip abduction/adduction, bilateral  -SC     Exercise Type (Therapeutic Exercise)  AROM (active range of motion)  -SC     Position (Therapeutic Exercise)  seated;supine  -SC     Sets/Reps (Therapeutic Exercise)  10  -SC     Row Name 01/04/20 1122          Dynamic Standing Balance    Level of [place], Reaches Outside Midline (Standing, Dynamic Balance)  moderate assist, 50 to 74% patient effort  -SC     Assistive Device Utilized (Supported Standing, Dynamic Balance)  walker, rolling  -SC       User Key  (r) = Recorded By, (t) = Taken By, (c) = Cosigned By    Initials Name Provider Type    Jamia York, PT Physical Therapist        Goals/Plan    No documentation.       Clinical Impression      Row Name 01/04/20 1129          Pain Assessment    Additional Documentation  Pain Scale: FACES Pre/Post-Treatment (Group)  -SC     Row Name 01/04/20 1129          Pain Scale: Numbers Pre/Post-Treatment    Pain Scale: Numbers, Pretreatment  0/10 - no pain  -SC     Pain Scale: Numbers, Post-Treatment  0/10 - no pain  -SC     Row Name 01/04/20 1123          Plan of Care Review    Plan of Care Reviewed With  patient  -SC     Progress  improving  -SC     Row Name 01/04/20 1129 01/04/20 1123       Physical Therapy Clinical Impression    Criteria for Skilled Interventions Met (PT Clinical Impression)  yes;treatment indicated  -SC  yes;treatment indicated  -SC    Rehab Potential (PT Clinical Summary)  good, to achieve stated therapy goals  -SC  good, to achieve stated therapy goals  -SC    Row Name 01/04/20 1123          Vital Signs    Post Systolic BP Rehab  112  -SC     Post Treatment Diastolic BP  70  -SC     Post SpO2 (%)  92  -SC     O2 Delivery Post Treatment  supplemental O2  -SC     Row Name 01/04/20 1123          Positioning and Restraints    Pre-Treatment Position  in bed  -SC     Post Treatment Position  chair  -SC     In Bed  sitting;supine;notified nsg;call light within reach  -SC       User Key  (r) = Recorded By, (t) = Taken By, (c) = Cosigned By    Initials Name Provider Type    Jamia York, PT Physical Therapist        Outcome Measures     Row Name 01/04/20 1125          How much help from another person do you currently need...    Turning from your back to your side while in flat bed without using bedrails?  4  -SC     Moving from lying on back to sitting on the side of a flat bed without bedrails?  3  -SC     Moving to and from a bed to a chair (including a wheelchair)?  3  -SC     Standing up from a chair using your arms (e.g., wheelchair, bedside chair)?  3  -SC     Climbing 3-5 steps with a railing?  2  -SC     To walk in hospital room?  2  -SC     AM-PAC 6 Clicks Score (PT)  17  -SC     Manfred  Name 01/04/20 1125          Functional Assessment    Outcome Measure Options  AM-PAC 6 Clicks Basic Mobility (PT)  -SC       User Key  (r) = Recorded By, (t) = Taken By, (c) = Cosigned By    Initials Name Provider Type    Jamia York PT Physical Therapist          PT Recommendation and Plan     Outcome Summary/Treatment Plan (PT)  Anticipated Discharge Disposition (PT): inpatient rehabilitation facility  Plan of Care Reviewed With: patient, spouse  Progress: improving  Outcome Summary: Patient able to ambulate in room with moderat assistance to control walker on turns. Recommend Acute rehab     Time Calculation:   PT Charges     Row Name 01/04/20 1040             Time Calculation    Start Time  1040  -SC      PT Received On  01/04/20  -SC      PT Goal Re-Cert Due Date  01/13/20  -SC         Time Calculation- PT    Total Timed Code Minutes- PT  33 minute(s)  -SC         Timed Charges    80290 - PT Therapeutic Exercise Minutes  13  -SC      94624 - Gait Training Minutes   10  -SC      43548 - PT Therapeutic Activity Minutes  10  -SC        User Key  (r) = Recorded By, (t) = Taken By, (c) = Cosigned By    Initials Name Provider Type    SC Jamia Huddleston PT Physical Therapist        Therapy Charges for Today     Code Description Service Date Service Provider Modifiers Qty    55330398288 HC PT THER PROC EA 15 MIN 1/4/2020 Jamia Huddleston PT GP 1    03525143417 HC GAIT TRAINING EA 15 MIN 1/4/2020 Jamia Huddleston PT GP 1          PT G-Codes  Outcome Measure Options: AM-PAC 6 Clicks Basic Mobility (PT)  AM-PAC 6 Clicks Score (PT): 17  AM-PAC 6 Clicks Score (OT): 17  Modified Amite Scale: 4 - Moderately severe disability.  Unable to walk without assistance, and unable to attend to own bodily needs without assistance.    Jamia Huddleston PT  1/4/2020         Electronically signed by Jamia Huddleston PT at 01/04/20 1131       Occupational Therapy Notes (last 48 hours) (Notes from 01/04/20 0930 through 01/06/20  0930)    No notes exist for this encounter.          Speech Language Pathology Notes (most recent note)      Marilyn Zuñiga, MS CCC-SLP at 20 1620          Acute Care - Speech Language Pathology   Swallow Treatment Note  Cy     Patient Name: Alessandro Mendiola  : 1940  MRN: [id number]  Today's Date: 2020               Admit Date: 2020    Visit Dx:      ICD-10-CM ICD-9-CM   1. Muscle weakness of left upper extremity M62.81 728.87   2. Impaired mobility and ADLs Z74.09 799.89   3. Oropharyngeal dysphagia R13.12 787.22   4. Dysarthria R47.1 784.51     Patient Active Problem List   Diagnosis   • Aneurysm of thoracic aorta (CMS/HCC)   • Coronary arteriosclerosis in native artery   • Paroxysmal atrial fibrillation (CMS/HCC)   • Sick sinus syndrome (CMS/HCC)   • Vitamin D deficiency   • COPD exacerbation (CMS/HCC)   • HTN (hypertension)   • Mood disorder (CMS/HCC)   • Simple chronic bronchitis (CMS/HCC)   • Muscle weakness of left upper extremity   • Aphasia       Therapy Treatment  Rehabilitation Treatment Summary     Row Name 20 1545             Treatment Time/Intention    Discipline  speech language pathologist  -SM      Document Type  therapy note (daily note)  -SM      Subjective Information  no complaints  -SM      Care Plan Review  evaluation/treatment results reviewed;care plan/treatment goals reviewed;risks/benefits reviewed;patient/other agree to care plan  -SM      Care Plan Review, Other Participant(s)  spouse;family  -SM      Patient Effort  good  -SM      Comment  [place]  -SM      Recorded by [SM] Marilyn Zuñiga MS CCC-SLP 20 1616      Row Name 20 1545             Pain Scale: Numbers Pre/Post-Treatment    Pain Scale: Numbers, Pretreatment  0/10 - no pain  -SM      Pain Scale: Numbers, Post-Treatment  0/10 - no pain  -SM      Recorded by [SM] Marilyn Zuñiga MS CCC-SLP 20 1616      Row Name 20 1545             Outcome Summary/Treatment  Plan (SLP)    Daily Summary of Progress (SLP)  progress toward functional goals as expected  -SM      Plan for Continued Treatment (SLP)  Continue dysphagia tx. Has copy of exercises to use between sessions. Ok for ice chips between meals, max 6-8 chips every 2 hours. Will need repeat MBS prior to upgrade though needs more tx before considering repeat. Pt/family in agreement with all.   -      Anticipated Dischage Disposition  home with home health;anticipate therapy at next level of care  -SM      Recorded by [SM] Marilyn Zuñiga, MS CCC-SLP 01/04/20 1616        User Key  (r) = Recorded By, (t) = Taken By, (c) = Cosigned By    Initials Name Effective Dates Discipline     Marilyn Zuñiga, MS CCC-SLP 06/22/15 -  SLP          Outcome Summary  Outcome Summary/Treatment Plan (SLP)  Daily Summary of Progress (SLP): progress toward functional goals as expected (01/04/20 1545 : Marilyn Zuñiga, MS CCC-SLP)  Plan for Continued Treatment (SLP): Continue dysphagia tx. Has copy of exercises to use between sessions. Ok for ice chips between meals, max 6-8 chips every 2 hours. Will need repeat MBS prior to upgrade though needs more tx before considering repeat. Pt/family in agreement with all.  (01/04/20 1545 : Marilyn Zuñiga, MS CCC-SLP)  Anticipated Dischage Disposition: home with home health, anticipate therapy at next level of care (01/04/20 1545 : Marilyn Zuñiga, MS [facility]-SLP)      SLP GOALS     Row Name 01/04/20 1545 01/03/20 0845          Oral Nutrition/Hydration Goal 1 (SLP)    Oral Nutrition/Hydration Goal 1, SLP  LTG: Pt will return to regular diet & thin liquid and tolerate w/o s/sxs aspiration w/ 100% acc w/o cues.  -SM  LTG: Pt will return to regular diet & thin liquid and tolerate w/o s/sxs aspiration w/ 100% acc w/o cues.  -AC     Time Frame (Oral Nutrition/Hydration Goal 1, SLP)  by discharge  -SM  by discharge  -AC     Progress/Outcomes (Oral Nutrition/Hydration Goal 1, SLP)   continuing progress toward goal  -SM  --        Oral Nutrition/Hydration Goal 2 (SLP)    Oral Nutrition/Hydration Goal 2, SLP  Pt will tolerate trials of soft solids & honey-thick liquids w/o s/sxs aspiration w/ 100% acc w/o cues.  -SM  Pt will tolerate trials of soft solids & honey-thick liquids w/o s/sxs aspiration w/ 100% acc w/o cues.  -AC     Time Frame (Oral Nutrition/Hydration Goal 2, SLP)  short term goal (STG)  -SM  short term goal (STG)  -AC     Progress/Outcomes (Oral Nutrition/Hydration Goal 2, SLP)  good progress toward goal  -SM  --        Oral Nutrition/Hydration Goal (SLP)    Oral Nutrition/Hydration Goal, SLP  Pt will tolerate therapeutic trials of ice/thin H2O w/o s/sxs aspiration or distress w/ 70% acc w/o cues.  -SM  Pt will tolerate therapeutic trials of ice/thin H2O w/o s/sxs aspiration or distress w/ 70% acc w/o cues.  -AC     Time Frame (Oral Nutrition/Hydration Goal, SLP)  short term goal (STG)  -SM  short term goal (STG)  -AC     Barriers (Oral Nutrition/Hydration Goal, SLP)  Intermittent coughing throughout  -SM  --     Progress/Outcomes (Oral Nutrition/Hydration Goal, [facility])  continuing progress toward goal  -SM  --        Lingual Strengthening Goal 1 (SLP)    Activity (Lingual Strengthening Goal 1, SLP)  increase tongue back strength  -SM  increase tongue back strength  -AC     Increase Tongue Back Strength  lingual resistance exercises  -SM  lingual resistance exercises  -AC     [place]/Accuracy (Lingual Strengthening Goal 1, SLP)  with minimal cues (75-90% accuracy)  -SM  with minimal cues (75-90% accuracy)  -AC     Time Frame (Lingual Strengthening Goal 1, SLP)  short term goal (STG)  -SM  short term goal (STG)  -AC     Progress/Outcomes (Lingual Strengthening Goal 1, SLP)  good progress toward goal  -SM  --        Pharyngeal Strengthening Exercise Goal 1 (SLP)    Activity (Pharyngeal Strengthening Goal 1, SLP)  increase timing;increase superior movement of the hyolaryngeal  complex;increase closure at entrance to airway/closure of airway at glottis  -SM  increase timing  -AC     Increase Timing  prepping - 3 second prep or suck swallow or 3-step swallow  -SM  prepping - 3 second prep or suck swallow or 3-step swallow  -AC     Increase Superior Movement of the Hyolaryngeal Complex  effortful pitch glide (falsetto + pharyngeal squeeze)  -SM  --     Increase Closure at Entrance to Airway/Closure of Airway at Glottis  supraglottic swallow  -SM  --     [place]/Accuracy (Pharyngeal Strengthening Goal 1, SLP)  with minimal cues (75-90% accuracy)  -SM  with minimal cues (75-90% accuracy)  -AC     Time Frame (Pharyngeal Strengthening Goal 1, SLP)  short term goal (STG)  -SM  short term goal (STG)  -AC     Barriers (Pharyngeal Strengthening Goal 1, SLP)  Provided copy of exercises and reviewed with pt. Pt demonstrated each exercises x2-3 with min cues (mainly r/t [place]). Did not target cog-comm goals 2' emphasis of dysphagia. Simple communication needs being met.   -SM  --     Progress/Outcomes (Pharyngeal Strengthening Goal 1, SLP)  good progress toward goal  -SM  --        Respiratory Support Goal (SLP)    Improve Respiratory Support Goal (SLP)  --  Pt will use shorter length of utterance per breath (2-3 words per breath) for improved speech intelligibility at sentence level w/ 80% acc w/ min cues.  -AC     Time Frame (Respiratory Support Goal, SLP)  --  short term goal (STG)  -AC        Articulation Goal 1 (SLP)    Improve Articulation Goal 1 (SLP)  --  by over-articulating at word level;80%;with minimal cues (75-90%)  -AC     Time Frame (Articulation Goal 1, SLP)  --  short term goal (STG)  -AC        Additional Goal 1 (SLP)    Additional Goal 1, SLP  --  LTG: Pt will improve cognitive-communication skills in order to participate in care in hospital setting w/ 100% acc w/ min cues.  -AC     Time Frame (Additional Goal 1, SLP)  --  by discharge  -AC       User Key  (r) = Recorded By, (t) =  Taken By, (c) = Cosigned By    Initials Name Provider Type    Marilyn Monteiro MS CCC-SLP Speech and Language Pathologist    AC Sherice Ash, MS CCC-SLP Speech and Language Pathologist          EDUCATION  The patient has been educated in the following areas:   Home Exercise Program (HEP) Dysphagia (Swallowing Impairment) Oral Care/Hydration Modified Diet Instruction.    SLP Recommendation and Plan  Daily Summary of Progress (SLP): progress toward functional goals as expected     Plan for Continued Treatment (SLP): Continue dysphagia tx. Has copy of exercises to use between sessions. Ok for ice chips between meals, max 6-8 chips every 2 hours. Will need repeat MBS prior to upgrade though needs more tx before considering repeat. Pt/family in agreement with all.   Anticipated Dischage Disposition: home with home health, anticipate therapy at next level of care                    Time Calculation:   Time Calculation- SLP     Row Name 01/04/20 1620             Time Calculation- SLP    SLP Start Time  1545  -      SLP Received On  01/04/20  -        User Key  (r) = Recorded By, (t) = Taken By, (c) = Cosigned By    Initials Name Provider Type    Marilyn Monteiro MS CCC-SLP Speech and Language Pathologist          Therapy Charges for Today     Code Description Service Date Service Provider Modifiers Qty    39267547308 HC ST TREATMENT SWALLOW 3 1/4/2020 Marilyn Zuñiga MS CCC-SLP GN 1                 Marilyn Zuñiga MS CCC-SLP  1/4/2020    Electronically signed by Marilyn Zuñiga MS CCC-SLP at 01/04/20 5059

## 2020-01-06 NOTE — PLAN OF CARE
Problem: Patient Care Overview  Goal: Plan of Care Review  Outcome: Ongoing (interventions implemented as appropriate)  Flowsheets  Taken 1/6/2020 1151  Progress: declining  Taken 1/6/2020 1146  Plan of Care Reviewed With: patient;spouse  Outcome Summary: PT LIMITED BY RESPIRATORY STATUS, FATIGUE, WEAKNESS. NOW ON HI FLOW O2. ABLE TO COMPLET THER EX OF EXTREMITIES SUPINE. SCHEDULED FOR TRANSTHORACIC ECHO, AND COMPLICATED MRI LATER TODAY.

## 2020-01-06 NOTE — CONSULTS
NN spoke with pt and wife at BS.  Pt is currently on 45% HFNC and is in the process of being transported to procedure.  Before leaving the room pt was able to demonstrate IS up to 500 mL X 5 reps and had some nonproductive cough with it.  Pt at this time refuses rehab.  No other questions or concerns voiced at this time.  NN will continue to follow.

## 2020-01-06 NOTE — PLAN OF CARE
Problem: Patient Care Overview  Goal: Plan of Care Review  Outcome: Ongoing (interventions implemented as appropriate)  Flowsheets (Taken 1/6/2020 1115)  Progress: no change  Plan of Care Reviewed With: patient; spouse  Note:   SLP treatment completed. Will continue to address oropharyngeal dysphagia and cognitive-communication deficit during treatment. Please see note for further details and recommendations.

## 2020-01-06 NOTE — THERAPY TREATMENT NOTE
Acute Care - Speech Language Pathology Treatment Note  [facility]     Patient Name: Alessandro Mendiola  : 1940  MRN: [id number]  Today's Date: 2020         Admit Date: 2020    Visit Dx:      ICD-10-CM ICD-9-CM   1. Muscle weakness of left upper extremity M62.81 728.87   2. Impaired mobility and ADLs Z74.09 799.89   3. Oropharyngeal dysphagia R13.12 787.22   4. Dysarthria R47.1 784.51     Patient Active Problem List   Diagnosis   • Aneurysm of thoracic aorta (CMS/HCC)   • Coronary arteriosclerosis in native artery   • Paroxysmal atrial fibrillation (CMS/HCC)   • Sick sinus syndrome (CMS/HCC)   • Vitamin D deficiency   • COPD exacerbation (CMS/HCC)   • HTN (hypertension)   • Mood disorder (CMS/HCC)   • Simple chronic bronchitis (CMS/HCC)   • Muscle weakness of left upper extremity   • Aphasia        Therapy Treatment  Rehabilitation Treatment Summary     Row Name 20 1115             Treatment Time/Intention    Discipline  speech language pathologist  -RD      Document Type  therapy note (daily note)  -RD      Subjective Information  complains of;fatigue hunger  -RD      Mode of Treatment  speech-language pathology;individual therapy  -RD      Patient/Family Observations  Pt's spouse present  -RD      Care Plan Review  evaluation/treatment results reviewed;care plan/treatment goals reviewed;risks/benefits reviewed;current/potential barriers reviewed;patient/other agree to care plan  -RD      Care Plan Review, Other Participant(s)  spouse  -RD      Therapy Frequency (Swallow)  5 days per week  -RD      Therapy Frequency (SLP SLC)  5 days per week  -RD      Patient Effort  adequate  -RD      Recorded by [RD] Katia Moore MS CCC-SLP 20 1147      Row Name 20 1115             Pain Scale: Numbers Pre/Post-Treatment    Pain Scale: Numbers, Pretreatment  0/10 - no pain  -RD      Pain Scale: Numbers, Post-Treatment  0/10 - no pain  -RD      Recorded by [RD] Katia Moore MS CCC-SLP  01/06/20 1147      Row Name 01/06/20 1115             Outcome Summary/Treatment Plan (SLP)    Daily Summary of Progress (SLP)  progress toward functional goals as expected  -RD      Barriers to Overall Progress (SLP)  Pt NPO for MARLON today  -RD      Plan for Continued Treatment (SLP)  Continue dysphagia & cognitive-communication treatment. Reviewed and completed swallowing exercises. Min reps 2' pt c/o fatigue and hunger. Pt has been NPO for a MARLON today, so unable to give PO trials. Spouse reports that pt has had limited PO intake on current diet. Reviewed compensations for breath control w/ speech and overarticulation. Continue to address.   -RD      Anticipated Dischage Disposition  home with home health;anticipate therapy at next level of care  -RD      Recorded by [RD] Katia Moore MS CCC-SLP 01/06/20 1147        User Key  (r) = Recorded By, (t) = Taken By, (c) = Cosigned By    Initials Name Effective Dates Discipline    RD Katia Moore MS CCC-SLP 04/03/18 -  SLP          EDUCATION  The patient has been educated in the following areas:   Cognitive Impairment Communication Impairment Dysphagia (Swallowing Impairment) Oral Care/Hydration Modified Diet Instruction.    SLP Recommendation and Plan  Daily Summary of Progress (SLP): progress toward functional goals as expected  Barriers to Overall Progress (SLP): Pt NPO for MARLON today  Plan for Continued Treatment (SLP): Continue dysphagia & cognitive-communication treatment. Reviewed and completed swallowing exercises. Min reps 2' pt c/o fatigue and hunger. Pt has been NPO for a MARLON today, so unable to give PO trials. Spouse reports that pt has had limited PO intake on current diet. Reviewed compensations for breath control w/ speech and overarticulation. Continue to address.   Anticipated Dischage Disposition: home with home health, anticipate therapy at next level of care             SLP GOALS     Row Name 01/06/20 1115 01/04/20 1545          Oral  Nutrition/Hydration Goal 1 (SLP)    Oral Nutrition/Hydration Goal 1, SLP  LTG: Pt will return to regular diet & thin liquid and tolerate w/o s/sxs aspiration w/ 100% acc w/o cues.  -RD  LTG: Pt will return to regular diet & thin liquid and tolerate w/o s/sxs aspiration w/ 100% acc w/o cues.  -SM     Time Frame (Oral Nutrition/Hydration Goal 1, SLP)  by discharge  -RD  by discharge  -SM     Progress/Outcomes (Oral Nutrition/Hydration Goal 1, SLP)  continuing progress toward goal  -RD  continuing progress toward goal  -SM        Oral Nutrition/Hydration Goal 2 (SLP)    Oral Nutrition/Hydration Goal 2, SLP  Pt will tolerate trials of soft solids & honey-thick liquids w/o s/sxs aspiration w/ 100% acc w/o cues.  -RD  Pt will tolerate trials of soft solids & honey-thick liquids w/o s/sxs aspiration w/ 100% acc w/o cues.  -SM     Time Frame (Oral Nutrition/Hydration Goal 2, SLP)  short term goal (STG);by discharge  -RD  short term goal (STG)  -SM     Barriers (Oral Nutrition/Hydration Goal 2, SLP)  Pt currently NPO for MARLON today, so unable to test PO trials. Spouse reports minimal PO intake yesterday on modified diet.   -RD  --     Progress/Outcomes (Oral Nutrition/Hydration Goal 2, SLP)  goal ongoing  -RD  good progress toward goal  -SM        Oral Nutrition/Hydration Goal (SLP)    Oral Nutrition/Hydration Goal, SLP  Pt will tolerate therapeutic trials of ice/thin H2O w/o s/sxs aspiration or distress w/ 70% acc w/o cues.  -RD  Pt will tolerate therapeutic trials of ice/thin H2O w/o s/sxs aspiration or distress w/ 70% acc w/o cues.  -SM     Time Frame (Oral Nutrition/Hydration Goal, SLP)  short term goal (STG);by discharge  -RD  short term goal (STG)  -SM     Barriers (Oral Nutrition/Hydration Goal, SLP)  unable to test 2' NPO for procedure  -RD  Intermittent coughing throughout  -SM     Progress/Outcomes (Oral Nutrition/Hydration Goal, SLP)  goal ongoing  -RD  continuing progress toward goal  -SM        Lingual  Strengthening Goal 1 (SLP)    Activity (Lingual Strengthening Goal 1, SLP)  increase tongue back strength  -RD  increase tongue back strength  -SM     Increase Tongue Back Strength  lingual resistance exercises  -RD  lingual resistance exercises  -SM     [place]/Accuracy (Lingual Strengthening Goal 1, SLP)  with minimal cues (75-90% accuracy)  -RD  with minimal cues (75-90% accuracy)  -SM     Time Frame (Lingual Strengthening Goal 1, SLP)  short term goal (STG);by discharge  -RD  short term goal (STG)  -SM     Barriers (Lingual Strengthening Goal 1, SLP)  Reviewed lingual resistance exercises w/ forceful /k/ & /g/ words. Needed frequent cues for forceful production  -RD  --     Progress/Outcomes (Lingual Strengthening Goal 1, SLP)  continuing progress toward goal  -RD  good progress toward goal  -SM        Pharyngeal Strengthening Exercise Goal 1 (SLP)    Activity (Pharyngeal Strengthening Goal 1, SLP)  increase timing;increase superior movement of the hyolaryngeal complex;increase closure at entrance to airway/closure of airway at glottis  -RD  increase timing;increase superior movement of the hyolaryngeal complex;increase closure at entrance to airway/closure of airway at glottis  -SM     Increase Timing  prepping - 3 second prep or suck swallow or 3-step swallow  -RD  prepping - 3 second prep or suck swallow or 3-step swallow  -SM     Increase Superior Movement of the Hyolaryngeal Complex  effortful pitch glide (falsetto + pharyngeal squeeze)  -RD  effortful pitch glide (falsetto + pharyngeal squeeze)  -SM     Increase Closure at Entrance to Airway/Closure of Airway at Glottis  supraglottic swallow  -RD  supraglottic swallow  -SM     [place]/Accuracy (Pharyngeal Strengthening Goal 1, SLP)  with minimal cues (75-90% accuracy)  -RD  with minimal cues (75-90% accuracy)  -SM     Time Frame (Pharyngeal Strengthening Goal 1, SLP)  short term goal (STG);by discharge  -RD  short term goal (STG)  -SM      Barriers (Pharyngeal Strengthening Goal 1, SLP)  Reviewed and completed exercises. x2-3 reps each, minimal 2' pt c/o of fatigue, hunger and lethargy  -RD  Provided copy of exercises and reviewed with pt. Pt demonstrated each exercises x2-3 with min cues (mainly r/t [place]). Did not target cog-comm goals 2' emphasis of dysphagia. Simple communication needs being met.   -SM     Progress/Outcomes (Pharyngeal Strengthening Goal 1, SLP)  continuing progress toward goal  -RD  good progress toward goal  -SM        Respiratory Support Goal (SLP)    Improve Respiratory Support Goal (SLP)  Pt will use shorter length of utterance per breath (2-3 words per breath) for improved speech intelligibility at sentence level w/ 80% acc w/ min cues.  -RD  --     Time Frame (Respiratory Support Goal, SLP)  short term goal (STG);by discharge  -RD  --     Progress (Respiratory Support Goal, SLP)  30%;with minimal cues (75-90%)  -RD  --     Progress/Outcomes (Respiratory Support at Paragraph Level Goal, SLP)  continuing progress toward goal  -RD  --     Comment (Respiratory Support Goal, SLP)  Educated on use of strategy to improve intelligibility and breath support.   -RD  --        Articulation Goal 1 (SLP)    Improve Articulation Goal 1 (SLP)  by over-articulating at word level;80%;with minimal cues (75-90%)  -RD  --     Time Frame (Articulation Goal 1, SLP)  short term goal (STG);by discharge  -RD  --     Progress (Articulation Goal 1, SLP)  20%;with minimal cues (75-90%)  -RD  --     Progress/Outcomes (Articulation Goal 1, SLP)  continuing progress toward goal  -RD  --     Comment (Articulation Goal 1, SLP)  Continue to address. educated on strategy and how to complete, pt lethargic and did use appropriately, even when given mod cues  -RD  --        Additional Goal 1 (SLP)    Additional Goal 1, SLP  LTG: Pt will improve cognitive-communication skills in order to participate in care in hospital setting w/ 100% acc w/ min cues.  -RD  --      Time Frame (Additional Goal 1, SLP)  by discharge  -RD  --     Progress/Outcomes (Additional Goal 1, SLP)  continuing progress toward goal  -RD  --       User Key  (r) = Recorded By, (t) = Taken By, (c) = Cosigned By    Initials Name Provider Type    Marilyn Monteiro, MS CCC-SLP Speech and Language Pathologist    Katia Chaudhry MS CCC-SLP Speech and Language Pathologist              Time Calculation:     Time Calculation- SLP     Row Name 01/06/20 1152             Time Calculation- SLP    SLP Start Time  1115  -RD      SLP Received On  01/06/20  -RD        User Key  (r) = Recorded By, (t) = Taken By, (c) = Cosigned By    Initials Name Provider Type    Katia Chaudhry MS CCC-SLP Speech and Language Pathologist          Therapy Charges for Today     Code Description Service Date Service Provider Modifiers Qty    54254080367 HC ST TREATMENT SWALLOW 2 1/6/2020 Katia Moore MS CCC-SLP GN 1    [id number] HC ST TREATMENT SPEECH 2 1/6/2020 Katia Moore MS CCC-SLP GN 1                     Katia Moore MS CCC-KENJI  1/6/2020

## 2020-01-06 NOTE — PROGRESS NOTES
"INFECTIOUS DISEASE CONSULT/INITIAL HOSPITAL VISIT    Alessandro Mendiola  1940  [id number]    Date of Consult: 1/6/2020    Admission Date: 1/2/2020      Requesting Provider: Georgina Pompa II, DO  Evaluating Physician: Piyush Elliott MD    Reason for Consultation: Enterococcus bacteremia    Chief complaint: Left upper extremity weakness    Current antimicrobial therapy: Vancomycin IV    History of present illness:    Alessandro Mendiola is a 79 y.o. male being evaluated for enterococcus bacteremia.  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  He presented to the ED yesterday with complaints of left upper extremity weakness and speech difficulties.  Per documentation, family states that the patient went to the restroom after getting up out of bed on the morning of 1/2 and upon returning and sitting down in his recliner he was unable to hold objects in his left hand and drop them.  Then he stated to his wife that his left arm felt \"weak, numb and tingly\".  Wife also noted that his speech was a little slurred and called her son to help get the patient to the ER ([facility]). Work-up in the ED showed a CT of the head without acute infarct and a chest x-ray with no acute cardiopulmonary illness.  NIHSS on arrival to ED was 9 and then 3 prior to transfer to [facility] for higher level of care.    Since arrival here patient has had a T-max of 100.6 and has been hemodynamically stable.  Labs show he is without leukocytosis with a white blood cell count of 8.88 and a neutrophilia of 76.9%.  Most recent BUN/creatinine is 15 and 0.88 with a GFR of 84.  1/2 blood cultures are positive in 4/4 bottles for enterococcus not VRE identified by bio fire report.  Repeat blood cultures on 1/3 are currently " positive and 1/4 bottles for gram-positive cocci in pairs and chains.    He has a listed allergy to doxycycline with shortness of breath listed as a reaction and is currently receiving vancomycin IV.  ID has been consulted for further evaluation and treatment as well as antimicrobial therapy management.    Of note: TTE was performed yesterday and is currently pending radiologist read.  Preliminary results show no vegetations and some TR.    The patient has experienced a 30 lbs weight loss over the last several months. He has additionally been perisistently nauseated. No abdominal pain. No bloody BMs. Last colonoscopy was about 3 years ago per patient and was ok.    Subjective:  1/5/20: CT abdomen and pelvis was done today showing a large splenic infarct but no other intra-abdominal pathology.  Cardiology is evaluating the patient and recommends a MARLON which I agree with. Tmax was 99.6°F overnight.  White count remained stable at 8.5.  Enterococcus was susceptible to ampicillin.     1/6/20: somewhat more short of breath today. On HFNC. NO diarrhea or nausea. tmax 100.8F. RPP +rhionovirus.     Past Medical History:   Diagnosis Date   • Alcohol abuse    • Aneurysm of thoracic aorta (CMS/HCC)    • Anxiety    • Ascending aortic aneurysm (CMS/HCC)     4.8 to 4.9 cm    • Atherosclerotic heart disease    • Atrial fibrillation (CMS/HCC)    • Blood thinned due to long-term anticoagulant use    • Body mass index (BMI) 20.0-20.9, adult    • CAD (coronary artery disease)    • Cancer (CMS/HCC)     skin   • Cardiac pacemaker    • Cellulitis    • Chronic bronchitis (CMS/HCC)    • Chronic cough    • COPD (chronic obstructive pulmonary disease) (CMS/HCC)    • Depression    • Difficulty breathing    • Emphysema lung (CMS/HCC)    • Encounter for long-term current use of medication    • Encounter for screening for malignant neoplasm of prostate    • Fatigue    • Fingertip amputation    • TAMIKO (generalized anxiety disorder)    • History of  alcohol abuse    • History of cardiac catheterization    • History of chest x-ray 02/11/2014    Chronic interstitial changes seen throughout the lung fields w/ no radiographic evidence of acute parenchymal disease.No definite LT-sided rib fracture present.   • History of chest x-ray 01/10/2014    No acute cardiopulmonary process.Dual lead LT subclavian pacemaker is in place.Aortic ectasia. Hyperinflation of lungs, suggesting COPD   • History of chest x-ray 10/01/2012    Ill-defined RT middle lobe opacity which likley reflects a pneumonia. FU to radiographic resolution is recommended.   • History of Doppler echocardiogram    • History of echocardiogram 09/26/2013    EF 55-60%. Mod concentric LVH. Abnormal LT VT diastolic filling is observed, consistent w/ impaired relaxation.Mild MR/TR. Trace NC. RVSP 44 mmHg.   • History of stress test     ECG performed   • HTN (hypertension)    • HX: long term anticoagulant use     Blood thinned due to long-term anticoagulant use (V58.61) (Z79.01)   • Hypercholesterolemia    • LVH (left ventricular hypertrophy)    • Mitral and aortic valve disease    • Myocardial infarction (CMS/HCC)    • Nonvenomous insect bite of multiple sites    • Onychomycosis    • Pacemaker at end of battery life    • Pneumonia    • Pneumothorax    • Pre-syncope    • Pulmonary nodule    • Rib pain on left side    • Sick sinus syndrome (CMS/HCC)    • SOB (shortness of breath)    • Upper respiratory infection    • Vitamin D deficiency        Past Surgical History:   Procedure Laterality Date   • AMPUTATION  08/31/2015    metacarpal and index finger   • CARDIAC PACEMAKER PLACEMENT  06/01/2008   • CATARACT EXTRACTION     • CORONARY STENT PLACEMENT      History of Cath Placement Of Stent 1  · Coronary stents   • EYE SURGERY     • SKIN CANCER EXCISION         Family History   Problem Relation Age of Onset   • Coronary artery disease Other    • Diabetes Other    • Hypertension Other        Social History      Socioeconomic History   • Marital status:      Spouse name: Not on file   • Number of children: Not on file   • Years of education: Not on file   • Highest education level: Not on file   Tobacco Use   • Smoking status: Former Smoker     Packs/day: 3.00     Years: 45.00     Pack years: 135.00     Last attempt to quit: 2008     Years since quittin.0   • Smokeless tobacco: Never Used   Substance and Sexual Activity   • Alcohol use: No   • Drug use: No   • Sexual activity: Defer       Allergies   Allergen Reactions   • Doxycycline Shortness Of Breath         Medication:    Current Facility-Administered Medications:   •  acetaminophen (TYLENOL) tablet 650 mg, 650 mg, Oral, Q4H PRN, 650 mg at 20 0025 **OR** acetaminophen (TYLENOL) 160 MG/5ML solution 650 mg, 650 mg, Oral, Q4H PRN, 649.6 mg at 20 0025 **OR** acetaminophen (TYLENOL) suppository 650 mg, 650 mg, Rectal, Q4H PRN, Stacey Padilla APRN  •  ampicillin 2 g/100 mL 0.9% NS (MBP), 2 g, Intravenous, Q4H, Sarcinella, Cortes A, APRN, 2 g at 20 1207  •  aspirin chewable tablet 324 mg, 324 mg, Oral, Daily, Mario Donato MD, 324 mg at 20 0959  •  atorvastatin (LIPITOR) tablet 80 mg, 80 mg, Oral, Nightly, Stacey Padilla APRN, 80 mg at 20 2153  •  azelastine (ASTELIN) nasal spray 2 spray, 2 spray, Each Nare, BID, Stacey Padilla APRN, 2 spray at 20 1002  •  budesonide-formoterol (SYMBICORT) 80-4.5 MCG/ACT inhaler 2 puff, 2 puff, Inhalation, BID, Stacey Padilla APRN, 2 puff at 20  •  cefTRIAXone (ROCEPHIN) 2 g/100 mL 0.9% NS VTB (SUSAN), 2 g, Intravenous, Q12H, Sarcinella, Cortes A, APRN, 2 g at 20 0528  •  cetirizine (zyrTEC) tablet 10 mg, 10 mg, Oral, Daily, Iris Hsu MD, 10 mg at 20 1000  •  clopidogrel (PLAVIX) tablet 75 mg, 75 mg, Oral, Daily, Fern Patel MD, 75 mg at 20 1000  •  cycloSPORINE (RESTASIS) 0.05 % ophthalmic emulsion 1 drop, 1 drop, Both Eyes,  BID, Stacey Padilla APRN, 1 drop at 01/06/20 1000  •  docusate sodium (COLACE) capsule 100 mg, 100 mg, Oral, BID, Mario Donato MD, 100 mg at 01/06/20 1000  •  docusate sodium (COLACE) capsule 100 mg, 100 mg, Oral, BID, Mario Donato MD  •  dronedarone (MULTAQ) tablet 400 mg, 400 mg, Oral, BID, Stacey Padilla, APRN, 400 mg at 01/06/20 1000  •  heparin (porcine) 5000 UNIT/ML injection 5,000 Units, 5,000 Units, Subcutaneous, Q8H, Iris Hsu MD, 5,000 Units at 01/06/20 0529  •  ipratropium (ATROVENT) nebulizer solution 0.5 mg, 500 mcg, Nebulization, 4x Daily PRN, Stacey Padilla APRN, 0.5 mg at 01/06/20 1008  •  ipratropium-albuterol (DUO-NEB) nebulizer solution 3 mL, 3 mL, Nebulization, Q6H PRN, Chiquita Mcmahan APRN, 3 mL at 01/06/20 0639  •  lactulose (CHRONULAC) 10 GM/15ML solution 10 g, 10 g, Oral, Once, Mario Donato MD  •  ondansetron (ZOFRAN) injection 4 mg, 4 mg, Intravenous, Q6H PRN, Georgina Pompa II, DO, 4 mg at 01/05/20 0944  •  pantoprazole (PROTONIX) EC tablet 40 mg, 40 mg, Oral, QAM, Stacey Padilla, APRN, 40 mg at 01/06/20 1000  •  polyethylene glycol 3350 powder (packet), 17 g, Oral, BID, Mario Donato MD, 17 g at 01/06/20 1000  •  potassium chloride (MICRO-K) CR capsule 40 mEq, 40 mEq, Oral, PRN, 40 mEq at 01/05/20 1818 **OR** potassium chloride (KLOR-CON) packet 40 mEq, 40 mEq, Oral, PRN **OR** potassium chloride 10 mEq in 100 mL IVPB, 10 mEq, Intravenous, Q1H PRN, Mario Donato MD  •  primidone (MYSOLINE) tablet 50 mg, 50 mg, Oral, BID, Stacey Padilla APRN, 50 mg at 01/06/20 1000  •  QUEtiapine (SEROquel) tablet 25 mg, 25 mg, Oral, Nightly PRN, Fern Patel MD, 25 mg at 01/05/20 2153  •  roflumilast (DALIRESP) tablet 500 mcg, 500 mcg, Oral, Daily, Stacey Padilla APRN, 500 mcg at 01/06/20 1000  •  sertraline (ZOLOFT) tablet 50 mg, 50 mg, Oral, Daily, Stacey Padilla APRN, 50 mg at 01/06/20 1000  •  sodium chloride 0.9 % flush 10 mL, 10 mL, Intravenous,  Q12H, Stacey Padilla, APRN, 10 mL at 20 1001  •  sodium chloride 0.9 % flush 10 mL, 10 mL, Intravenous, PRN, Stacey Padilla APRN  •  tamsulosin (FLOMAX) 24 hr capsule 0.4 mg, 0.4 mg, Oral, Nightly, Stacey Padilla, APRN, 0.4 mg at 20 2153    Antibiotics:  Anti-Infectives (From admission, onward)    Ordered     Dose/Rate Route Frequency Start Stop    20 1451  ampicillin 2 g/100 mL 0.9% NS (MBP)  Review   Ordering Provider:  Cortes Vidal, APRN    2 g  over 30 Minutes Intravenous Every 4 Hours 20 1600 20 1559    20 1451  cefTRIAXone (ROCEPHIN) 2 g/100 mL 0.9% NS VTB (SUSAN)  Review   Ordering Provider:  Cortes Vidal, APRN    2 g  over 30 Minutes Intravenous Every 12 Hours 20 1600 20 0559    20 1502  vancomycin 1750 mg/500 mL 0.9% NS IVPB ([facility])     Ordering Provider:  Lis Romero RPH    1,750 mg  over 120 Minutes Intravenous Once 20 1600 20 2245            Review of Systems:  As above        Physical Exam:   Vital Signs  Temp (24hrs), Av.6 °F (37.6 °C), Min:98.7 °F (37.1 °C), Max:100.8 °F (38.2 °C)    Temp  Min: 98.7 °F (37.1 °C)  Max: 100.8 °F (38.2 °C)  BP  Min: 105/46  Max: 147/61  Pulse  Min: 66  Max: 76  Resp  Min: 18  Max: 22  SpO2  Min: 89 %  Max: 99 %    GENERAL: Awake and alert, in mild distress. Frail appearing  HEENT: Normocephalic, atraumatic.  No conjunctival injection. No icterus. Oropharynx clear without evidence of thrush or exudate. No evidence of peridontal disease.    Chest: PPM pocket over left chest wall is without erythema, warmth, or tenderness  HEART: RRR; No murmur, rubs, gallops.   LUNGS: decreased breath sounds throughout but no wheezing or crackles.Labored breathing on HFNC  ABDOMEN: Soft, nontender, nondistended. Positive bowel sounds. No rebound or guarding. NO mass or HSM.  EXT:  No cyanosis, clubbing or edema. No cord.  :  Without Denny catheter.  MSK: FROM without joint effusions noted  arms/legs.    SKIN: Warm and dry without cutaneous eruptions on Inspection/palpation.    NEURO: Oriented to PPT. Weakness of RUE and RLE compared to left side. Slurred speech. aphasia  PSYCHIATRIC: Normal insight and judgement. Cooperative with PE    Laboratory Data    Results from last 7 days   Lab Units 01/05/20  0633 01/03/20  0719 01/02/20  1344   WBC 10*3/mm3 8.51 8.88 9.75   HEMOGLOBIN g/dL 7.4* 7.9* 9.4*   HEMATOCRIT % 27.1* 27.7* 32.7*   PLATELETS 10*3/mm3 154 155 166     Results from last 7 days   Lab Units 01/05/20  2224  01/04/20  0438   SODIUM mmol/L  --   --  140   POTASSIUM mmol/L 3.7   < > 3.5   CHLORIDE mmol/L  --   --  99   CO2 mmol/L  --   --  29.0   BUN mg/dL  --   --  15   CREATININE mg/dL  --   --  0.88   GLUCOSE mg/dL  --   --  136*   CALCIUM mg/dL  --   --  8.8    < > = values in this interval not displayed.     Results from last 7 days   Lab Units 01/03/20  0719   ALK PHOS U/L 112   BILIRUBIN mg/dL 0.3   ALT (SGPT) U/L 8   AST (SGOT) U/L 16                         Estimated Creatinine Clearance: 69.5 mL/min (by C-G formula based on SCr of 0.88 mg/dL).      Microbiology:  Microbiology Results (last 10 days)     Procedure Component Value - Date/Time    Respiratory Panel, PCR - Swab, Nasopharynx [[id number]]  (Abnormal) Collected:  01/05/20 1250    Lab Status:  Final result Specimen:  Swab from Nasopharynx Updated:  01/05/20 1419     ADENOVIRUS, PCR Not Detected     Coronavirus 229E Not Detected     Coronavirus HKU1 Not Detected     Coronavirus NL63 Not Detected     Coronavirus OC43 Not Detected     Human Metapneumovirus Not Detected     Human Rhinovirus/Enterovirus Detected     Influenza B PCR Not Detected     Parainfluenza Virus 1 Not Detected     Parainfluenza Virus 2 Not Detected     Parainfluenza Virus 3 Not Detected     Parainfluenza Virus 4 Not Detected     Bordetella pertussis pcr Not Detected     Influenza A H1 2009 PCR Not Detected     Chlamydophila pneumoniae PCR Not Detected      Mycoplasma pneumo by PCR Not Detected     Influenza A PCR Not Detected     Influenza A H3 Not Detected     Influenza A H1 Not Detected     RSV, PCR Not Detected     Bordetella parapertussis PCR Not Detected    Blood Culture - Blood, Arm, Left [[id number]]  (Abnormal) Collected:  01/03/20 1506    Lab Status:  Final result Specimen:  Blood from Arm, Left Updated:  01/05/20 1302     Blood Culture Enterococcus faecalis     Comment: Refer to previous blood culture collected on 01/02 at 2025 for CASEY's.            Gram Stain Aerobic Bottle Gram positive cocci in pairs and chains      Anaerobic Bottle Gram positive cocci in pairs and chains    Blood Culture - Blood, Arm, Right [[id number]]  (Abnormal) Collected:  01/03/20 1506    Lab Status:  Final result Specimen:  Blood from Arm, Right Updated:  01/05/20 1303     Blood Culture Enterococcus faecalis     Comment: Refer to previous blood culture collected on 01/02 at 2025 for CASEY's.          Gram Stain Aerobic Bottle Gram positive cocci in pairs and chains      Anaerobic Bottle Gram positive cocci in pairs and chains    Blood Culture - Blood, Arm, Left [[id number]]  (Abnormal)  (Susceptibility) Collected:  01/02/20 2025    Lab Status:  Final result Specimen:  Blood from Arm, Left Updated:  01/05/20 0605     Blood Culture Enterococcus faecalis     Gram Stain Aerobic Bottle Gram positive cocci in pairs and chains      Anaerobic Bottle Gram positive cocci in pairs and chains    Susceptibility      Enterococcus faecalis     CASEY     Ampicillin Susceptible     Gentamicin High Level Synergy Susceptible     Vancomycin Susceptible                    Blood Culture ID, PCR - Blood, Arm, Left [[id number]]  (Abnormal) Collected:  01/02/20 2025    Lab Status:  Final result Specimen:  Blood from Arm, Left Updated:  01/03/20 1447     BCID, PCR Enterococcus spp, not VRE. Identification by BCID PCR.    Blood Culture - Blood, Arm, Right [[id number]]  (Abnormal) Collected:  01/02/20 2019    Lab  Status:  Final result Specimen:  Blood from Arm, Right Updated:  01/05/20 0606     Blood Culture Enterococcus faecalis     Comment: Refer to previous blood culture collected on 01/02 at 2025 for CASEY's.           Gram Stain Aerobic Bottle Gram positive cocci in pairs and chains      Anaerobic Bottle Gram positive cocci in pairs and chains              Radiology:  Imaging Results (Last 72 Hours)     Procedure Component Value Units Date/Time    CT Abdomen Pelvis With & Without Contrast [[id number]] Collected:  01/05/20 1250     Updated:  01/06/20 0955    Narrative:       EXAMINATION: CT ABDOMEN/PELVIS WWO CONTRAST - 01/05/2020      INDICATION: Abdominal pain and fever.     TECHNIQUE: 5 mm post IV contrast portal venous phase and delayed venous  phase images through the abdomen and pelvis.     The radiation dose reduction device was turned on for each scan per the  ALARA (As Low as Reasonably Achievable) protocol.     COMPARISON: No previous abdominal CT scan studies.     FINDINGS: Mild right basilar interstitial changes appear to represent  some subpleural scarring or atelectasis rather than pneumonia. There is  trace left basilar pleural thickening and fluid. There is trace  pericardial effusion.     Dense material in the dependent portion of the gallbladder presumably  represents microlithiasis. No gallbladder inflammatory changes are  identified. There is mild fatty liver change. Pancreas, adrenal glands,  and left kidney appear unremarkable. There appears to be a small right  upper pole renal cyst, otherwise unremarkable right kidney. There is  dense streak artifact from extensive retained barium in the right colon.     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  There is minimal associated perisplenic fat stranding, no evidence of  hematoma and  no evidence of drainable fluid collection. No upper  abdominal inflammatory focus or free air is seen. Bowel loops are  nondistended.     Regarding the lower abdomen and pelvis, no mass, adenopathy, ascites or  acute inflammatory change is seen. Bladder is nondistended. Delayed  images show contrast opacification of normal caliber renal collecting  systems, ureters, and bladder. Bony structures appear grossly intact.       Impression:       1. 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 Mild associated perisplenic fat stranding.  2. Gallstones.  3. Retained barium in the right colon which limits fine detail in the  right mid abdomen. No definite acute inflammatory focus is seen here or  elsewhere, however.     DICTATED:   01/05/2020  EDITED/ls :   01/05/2020         This report was finalized on 1/6/2020 9:52 AM by Dr. Martin Soni MD.       FL Video Swallow With Speech Single Contrast [[id number]] Collected:  01/03/20 1335     Updated:  01/03/20 1704    Narrative:       EXAMINATION: FL VIDEO SWALLOW W SPEECH SINGLE-CONTRAST-     INDICATION: dysphagia; M62.81-Muscle weakness (generalized);  Z74.09-Other reduced mobility     TECHNIQUE: 1 minute and 6 seconds of fluoroscopic time was used for this  exam. 1 associated image was saved. The patient was evaluated in the  seated lateral position while taking a variety of consistencies of  barium by mouth under the direction of speech pathology.     COMPARISON: NONE     FINDINGS: There was aspiration with sips of thin, nectar consistency  barium. There was no penetration and no aspiration with honey, pudding,  or solid consistency barium.          Impression:       Fluoroscopy provided for a modified barium swallow. Please  see speech therapy report for full details and recommendations.         This report was finalized on 1/3/2020 5:01 PM by Dr. Aftab Kraus.               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 Would normally require pacemaker extraction but patient and wife are unsure if he could tolerate this. Cardiology has evaluated and I appreciate the recommendations.  I agree with MARLON at this point. The other question is of the initial source for his septicemia. He denies any urinary symptoms or prostate issues. Other than his large splenic infarct, no other obvious sign of intra-abdominal pathology.  Possibly initially from a urinary source.    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  -- Aphasia  -- Hemiparesis  ----Nausea  ----significant weight loss  -- COPD exacerbation  -- Aneurysm of thoracic aorta  -- PAF  -- Sick sinus syndrome status post permanent cardiac pacemaker  ---Rhinovirus infection (new)    PLAN/RECOMMENDATIONS:   Thank you for asking us to see Alessandro Mendiola, I recommend the following:    ----ampicillin 2g q 4 hours and rocephin 2g iv q 12 hours (for synergy)  -- Continue to follow cultures. Repeat blood cultures -in process  -- Continue to follow physical exam and radiological reports and adjust therapies as indicated  -- Agree with neurology consult. Planning for MRI brain if pacemaker is compatible.  --- Plan for MARLON today  ---continue to closely follow cbc with diff and bmp for now    Long discussion with the patient and his wife today    Very complex case requiring high level of medical decision making. Patient has significant risk for  further morbidity      Piyush Elliott MD  1/6/2020  12:55 PM

## 2020-01-06 NOTE — THERAPY TREATMENT NOTE
Patient Name: Alessandro Mendiola  : 1940    MRN: [id number]                              Today's Date: 2020       Admit Date: 2020    Visit Dx:     ICD-10-CM ICD-9-CM   1. Muscle weakness of left upper extremity M62.81 728.87   2. Impaired mobility and ADLs Z74.09 799.89   3. Oropharyngeal dysphagia R13.12 787.22   4. Dysarthria R47.1 784.51     Patient Active Problem List   Diagnosis   • Aneurysm of thoracic aorta (CMS/HCC)   • Coronary arteriosclerosis in native artery   • Paroxysmal atrial fibrillation (CMS/HCC)   • Sick sinus syndrome (CMS/HCC)   • Vitamin D deficiency   • COPD exacerbation (CMS/HCC)   • HTN (hypertension)   • Mood disorder (CMS/HCC)   • Simple chronic bronchitis (CMS/HCC)   • Muscle weakness of left upper extremity   • Aphasia     Past Medical History:   Diagnosis Date   • Alcohol abuse    • Aneurysm of thoracic aorta (CMS/HCC)    • Anxiety    • Ascending aortic aneurysm (CMS/HCC)     4.8 to 4.9 cm    • Atherosclerotic heart disease    • Atrial fibrillation (CMS/HCC)    • Blood thinned due to long-term anticoagulant use    • Body mass index (BMI) 20.0-20.9, adult    • CAD (coronary artery disease)    • Cancer (CMS/HCC)     skin   • Cardiac pacemaker    • Cellulitis    • Chronic bronchitis (CMS/HCC)    • Chronic cough    • COPD (chronic obstructive pulmonary disease) (CMS/HCC)    • Depression    • Difficulty breathing    • Emphysema lung (CMS/HCC)    • Encounter for long-term current use of medication    • Encounter for screening for malignant neoplasm of prostate    • Fatigue    • Fingertip amputation    • TAMIKO (generalized anxiety disorder)    • History of alcohol abuse    • History of cardiac catheterization    • History of chest x-ray 2014    Chronic interstitial changes seen throughout the lung fields w/ no radiographic evidence of acute parenchymal disease.No definite LT-sided rib fracture present.   • History of chest x-ray 01/10/2014    No acute cardiopulmonary  process.Dual lead LT subclavian pacemaker is in place.Aortic ectasia. Hyperinflation of lungs, suggesting COPD   • History of chest x-ray 10/01/2012    Ill-defined RT middle lobe opacity which likley reflects a pneumonia. FU to radiographic resolution is recommended.   • History of Doppler echocardiogram    • History of echocardiogram 09/26/2013    EF 55-60%. Mod concentric LVH. Abnormal LT VT diastolic filling is observed, consistent w/ impaired relaxation.Mild MR/TR. Trace CA. RVSP 44 mmHg.   • History of stress test     ECG performed   • HTN (hypertension)    • HX: long term anticoagulant use     Blood thinned due to long-term anticoagulant use (V58.61) (Z79.01)   • Hypercholesterolemia    • LVH (left ventricular hypertrophy)    • Mitral and aortic valve disease    • Myocardial infarction (CMS/HCC)    • Nonvenomous insect bite of multiple sites    • Onychomycosis    • Pacemaker at end of battery life    • Pneumonia    • Pneumothorax    • Pre-syncope    • Pulmonary nodule    • Rib pain on left side    • Sick sinus syndrome (CMS/HCC)    • SOB (shortness of breath)    • Upper respiratory infection    • Vitamin D deficiency      Past Surgical History:   Procedure Laterality Date   • AMPUTATION  08/31/2015    metacarpal and index finger   • CARDIAC PACEMAKER PLACEMENT  06/01/2008   • CATARACT EXTRACTION     • CORONARY STENT PLACEMENT      History of Cath Placement Of Stent 1  · Coronary stents   • EYE SURGERY     • SKIN CANCER EXCISION       General Information     Row Name 01/06/20 1142          PT Evaluation Time/Intention    Document Type  therapy note (daily note)  -CD     Mode of Treatment  physical therapy  -CD     Row Name 01/06/20 1142          General Information    Patient Profile Reviewed?  yes  -CD     Existing Precautions/Restrictions  fall;oxygen therapy device and L/min PT NOW ON HI-FLOW O2. SCHEDULCED FOR TRANSTHORACIC ECHO AND COMPLICATED MRI LATER TODAY.   -CD     Barriers to Rehab  medically complex   -CD     Row Name 01/06/20 1142          Cognitive Assessment/Intervention- PT/OT    Orientation Status (Cognition)  oriented to;person  -CD     Cognitive Assessment/Intervention Comment  LETHARGIC BUT FOLLOWING COMMANDS, ON HI-FLOW O2 AND [place].   -CD     Row Name 01/06/20 1142          Safety Issues, Functional Mobility    Safety Issues Affecting Function (Mobility)  insight into deficits/self awareness  -CD     Impairments Affecting Function (Mobility)  balance;endurance/activity tolerance;shortness of breath;strength  -CD       User Key  (r) = Recorded By, (t) = Taken By, (c) = Cosigned By    Initials Name Provider Type    CD Rachel Ferraro, PT Physical Therapist        Mobility     Row Name 01/06/20 1144          Bed Mobility Assessment/Treatment    Comment (Bed Mobility)  PT DECLINED SITTING EOB DUE TO WEAKNESS/FATIGUE. AGREED TO THER EX OF EXTREMITIES.   -CD     Row Name 01/06/20 1144          Gait/Stairs Assessment/Training    Comment (Gait/Stairs)  DEFERRED DUE TO WEAKNESS. PT ON HIGH FLOW.   -CD       User Key  (r) = Recorded By, (t) = Taken By, (c) = Cosigned By    Initials Name Provider Type    Rachel Chaudhary PT Physical Therapist        Obj/Interventions     Row Name 01/06/20 1145          Therapeutic Exercise    Upper Extremity Range of Motion (Therapeutic Exercise)  shoulder flexion/extension, bilateral;elbow flexion/extension, bilateral  -CD     Lower Extremity (Therapeutic Exercise)  heel slides, bilateral;SLR (straight leg raise), bilateral  -CD     Lower Extremity Range of Motion (Therapeutic Exercise)  ankle dorsiflexion/plantar flexion, bilateral;hip abduction/adduction, bilateral  -CD       User Key  (r) = Recorded By, (t) = Taken By, (c) = Cosigned By    Initials Name Provider Type    Rachel Chaudhary, PT Physical Therapist        Goals/Plan    No documentation.       Clinical Impression     Row Name 01/06/20 1146          Pain Scale: Numbers Pre/Post-Treatment    Pain Scale: Numbers,  Pretreatment  0/10 - no pain  -CD     Pain Scale: Numbers, Post-Treatment  0/10 - no pain  -CD     Row Name 01/06/20 1146          Plan of Care Review    Plan of Care Reviewed With  patient;spouse  -CD     Outcome Summary  PT LIMITED BY RESPIRATORY STATUS, FATIGUE, WEAKNESS. NOW ON HI FLOW O2. ABLE TO COMPLET THER EX OF EXTREMITIES SUPINE. SCHEDULED FOR TRANSTHORACIC ECHO, AND COMPLICATED MRI LATER TODAY.   -CD     Row Name 01/06/20 1146          Physical Therapy Clinical Impression    Patient/Family Goals Statement (PT Clinical Impression)  SNF AT D/C   -CD     Criteria for Skilled Interventions Met (PT Clinical Impression)  yes  -CD     Rehab Potential (PT Clinical Summary)  good, to achieve stated therapy goals  -CD     Row Name 01/06/20 1146          Vital Signs    Pre Systolic BP Rehab  -- VSS. NSG CLEARED FOR TREATMENT.   -CD     Pre SpO2 (%)  94  -CD     O2 Delivery Pre Treatment  hi-flow  -CD     Intra SpO2 (%)  89  -CD     O2 Delivery Intra Treatment  hi-flow  -CD     Post SpO2 (%)  93  -CD     O2 Delivery Post Treatment  hi-flow  -CD     Pre Patient Position  Supine  -CD     Intra Patient Position  Supine  -CD     Post Patient Position  Supine  -CD     Row Name 01/06/20 1146          Positioning and Restraints    Pre-Treatment Position  in bed  -CD     Post Treatment Position  bed  -CD     In Bed  notified nsg;call light within reach;encouraged to call for assist;fowlers ON SPECIALTY BED AND HI-FLOW O2.   -CD       User Key  (r) = Recorded By, (t) = Taken By, (c) = Cosigned By    Initials Name Provider Type    CD Rachel Ferraro, PT Physical Therapist        Outcome Measures     Row Name 01/06/20 1142          How much help from another person do you currently need...    Turning from your back to your side while in flat bed without using bedrails?  2  -CD     Moving from lying on back to sitting on the side of a flat bed without bedrails?  2  -CD     Moving to and from a bed to a chair (including a  wheelchair)?  2  -CD     Standing up from a chair using your arms (e.g., wheelchair, bedside chair)?  2  -CD     Climbing 3-5 steps with a railing?  1  -CD     To walk in hospital room?  2  -CD     AM-PAC 6 Clicks Score (PT)  11  -CD     Row Name 01/06/20 1149          Modified [place] Scale    Modified [place] Scale  4 - Moderately severe disability.  Unable to walk without assistance, and unable to attend to own bodily needs without assistance.  -CD     Row Name 01/06/20 1149          Functional Assessment    Outcome Measure Options  AM-PAC 6 Clicks Basic Mobility (PT)  -CD       User Key  (r) = Recorded By, (t) = Taken By, (c) = Cosigned By    Initials Name Provider Type    Rachel Chaudhary, PT Physical Therapist          PT Recommendation and Plan     Outcome Summary/Treatment Plan (PT)  Anticipated Discharge Disposition (PT): skilled nursing facility  Plan of Care Reviewed With: patient, spouse  Progress: declining  Outcome Summary: PT LIMITED BY RESPIRATORY STATUS, FATIGUE, WEAKNESS. NOW ON HI FLOW O2. ABLE TO COMPLET THER EX OF EXTREMITIES SUPINE. SCHEDULED FOR TRANSTHORACIC ECHO, AND COMPLICATED MRI LATER TODAY.      Time Calculation:   PT Charges     Row Name 01/06/20 1152             Time Calculation    Start Time  0923  -CD      PT Received On  01/06/20  -CD      PT Goal Re-Cert Due Date  01/13/20  -CD         Time Calculation- PT    Total Timed Code Minutes- PT  17 minute(s)  -CD         Timed Charges    82400 - PT Therapeutic Exercise Minutes  17  -CD        User Key  (r) = Recorded By, (t) = Taken By, (c) = Cosigned By    Initials Name Provider Type    Rachel Chaudhary, PT Physical Therapist        Therapy Charges for Today     Code Description Service Date Service Provider Modifiers Qty    [id number]  PT THER PROC EA 15 MIN 1/6/2020 Rachel Ferraro, PT GP 1          PT G-Codes  Outcome Measure Options: AM-PAC 6 Clicks Basic Mobility (PT)  AM-PAC 6 Clicks Score (PT): 11  AM-PAC 6 Clicks Score (OT):  17  Modified [place] Scale: 4 - Moderately severe disability.  Unable to walk without assistance, and unable to attend to own bodily needs without assistance.    Rachel Ferraro, PT  1/6/2020

## 2020-01-06 NOTE — PROGRESS NOTES
Clinical Nutrition   Reason For Visit: Follow-up protocol    Patient Name: Alessandro Mendiola  YOB: 1940  MRN: [id number]  Date of Encounter: 01/06/20 2:21 PM  Admission date: 1/2/2020      Nutrition Assessment     Admission Problem List:  CVA  Left hemiparesis  Aphasia  COPD exacerbation  PAF/SSS  Bacteremia  Dysphagia      Applicable PMH:  CAD  End-stage COPD  PAF  AAA  SSS      Applicable medical tests/procedures since admission:  (1/3) SLP MBS - mechanical soft with no mixed consistencies / honey thickened liquids / no straws      Reported/Observed/Food/Nutrition Related History   Patient and wife present during visit. Wife reports that patient has not been eating well, drinks some of the Boost Plus supplements he receives twice daily. Enjoys apple sauce and pudding but hasn't been eating much of the main meal (25% or less at meals overall). Requests that patient receive vanilla Boost pudding to try. Enjoyed his oatmeal this morning. Does not want scrambled eggs.      Anthropometrics   Height: 71 in  Weight: 154 lbs (stated wt 1/2 per nsg doc) - RD obtained bed scale weight of 170 lbs during visit - ?accuracy  BMI: 21.5  BMI classification: Normal: 18.5-24.9kg/m2   IBW: 172 lbs    UBW: 185 lbs (per patient) - per EMR pt weighed 180 lbs on 7/1/19 at [facility]  Weight change: Based on documented EMR weight hx and patient-reported current weight, patient has unintentionally lost 26 lbs (14.4%) within the past 5 months - RD unable to verify, need standing weight.      Labs reviewed   Labs reviewed: Yes    Medications reviewed   Medications reviewed: Yes  Pertinent: rocephin, colace, protonix  PRN: [email or web address]    Current Nutrition Prescription   PO: Diet Dysphagia; IV - Mechanical Soft No Mixed Consistencies; Honey Thick; No Straws; Cardiac  Oral Nutrition Supplement: Boost Plus 2x daily    Evaluation of Received Nutrient/Fluid Intake: 30% / 3 meals    Nutrition Diagnosis     Problem Inadequate oral intake    Etiology Poor appetite   Signs/Symptoms PO intake: 30% / 3 meals; wife reports patient eating 25% or less at meals recently, drinking a small amount of Boost Plus       Intervention   Intervention: Follow treatment progress, Care plan reviewed, Interview for preferences, Encourage intake, Supplement provided     RD ordered Boost pudding 2x daily.  RD communicate food preferences to kitchen.      Goal:   General: Nutrition support treatment  PO: Increase intake     Monitoring/Evaluation:       Monitoring/Evaluation: Per protocol, PO intake, Supplement intake, Weight  Will Continue to follow per protocol  Ella Slaughter RD  Time Spent: 30 min

## 2020-01-06 NOTE — PROGRESS NOTES
"Neurology       Patient Care Team:  Silvio Jacobson MD as PCP - General (Internal Medicine)  Dagoberto Rasmussen MD as Consulting Physician (General Surgery)    Chief complaint left-sided weakness      Subjective .     History: Feels that this morning.  Shortness of breath about the same.  Feels weak, but no new lateralized weakness.  Believes he can lie flat for the MRI.    ROS: No vision changes or chest pain.    Objective     Vital Signs   Blood pressure 136/57, pulse 69, temperature 99.5 °F (37.5 °C), temperature source Oral, resp. rate 20, height 180.3 cm (70.98\"), weight 69.9 kg (154 lb), SpO2 98 %.    Physical Exam:              Neuro: Elderly white man, chronically ill-appearing, mild respiratory distress, fluent short sentences  EOMI, unchanged diminished left nasolabial fold with normal grimace  Motor: Stable mild left upper extremity weakness compared to right, but also generally weak at 4/5    Results Review:              Large splenic infarct on CT of the abdomen  UA negative  Respiratory panel PCR panel positive for rhinovirus/enterovirus    Assessment/Plan     Left-sided weakness consistent with ischemic stroke-concern for cardioembolic etiology in patient with strong concern for endocarditis.  Awaiting MARLON and MRI.  Holding Xarelto, on dual antiplatelet therapy and high-dose statin.    I discussed the patients findings and my recommendations with patient and family    Fern Patel MD  01/06/20  12:00 PM      "

## 2020-01-06 NOTE — PROGRESS NOTES
Continued Stay Note  [facility]     Patient Name: Alessandro Mendiola  MRN: [id number]  Today's Date: 1/6/2020    Admit Date: 1/2/2020    Discharge Plan     Row Name 01/06/20 0931       Plan    Plan  [facility]    Provided post acute provider list?  Yes    Post Acute Provider Lists  Inpatient Rehab    Post Acuite Provider List  Delivered    Delivered To  Support Person    Method of Delivery  In person    Patient/Family in Agreement with Plan  yes    Plan Comments  Spoke with patient and wife at bedside. They want a referral to [facility] in [place]. Called and faxed referral to [facility]. CM will continue to follow.    Final Discharge Disposition Code  03 - skilled nursing facility (SNF)        Discharge Codes    No documentation.             Flaco Starks RN

## 2020-01-06 NOTE — PROGRESS NOTES
[facility] Medicine Services  PROGRESS NOTE    Patient Name: Alessandro Mendiola  : 1940  MRN: [id number]    Date of Admission: 2020  Primary Care Physician: Silvio Jacobson MD    Subjective   Subjective     CC: Transfer from [facility] for neuro evaluation    HPI: Wife in room today.  Patient appears comfortable.  He is denying any chest pain or shortness of breath.      Review of Systems    Gen- No fevers, chills  CV- No chest pain, palpitations  Resp- No cough, dyspnea  GI- No N/V/D, abd pain    Objective   Objective     Vital Signs:   Temp:  [98.2 °F (36.8 °C)-99.3 °F (37.4 °C)] 99.3 °F (37.4 °C)  Heart Rate:  [69-82] 73  Resp:  [18-22] 22  BP: (117-144)/(52-72) 136/64  Total (NIH Stroke Scale): 6     Physical Exam:    Constitutional: No acute distress, awake, alert  HENT: NCAT, dry tongue  Respiratory: poor inspiratory effort, no wheezing  Cardiovascular: RRR, no murmurs, rubs, or gallops, palpable pedal pulses bilaterally  Gastrointestinal: Positive bowel sounds, soft, nontender, nondistended  Musculoskeletal: No bilateral ankle edema  Psychiatric: Appropriate affect, cooperative  Neurologic: Oriented x 3, strength symmetric in all extremities, Cranial Nerves grossly intact to confrontation, speech clear  Skin: No rashes      Results Reviewed:    Results from last 7 days   Lab Units 20  0633 20  0720  1344   WBC 10*3/mm3 8.51 8.88 9.75   HEMOGLOBIN g/dL 7.4* 7.9* 9.4*   HEMATOCRIT % 27.1* 27.7* 32.7*   PLATELETS 10*3/mm3 154 155 166   INR   --   --  2.13*     Results from last 7 days   Lab Units 20  0632 20  0438 20  0719 20  1344   SODIUM mmol/L  --  140 139 139   POTASSIUM mmol/L 3.3* 3.5 3.4* 3.7   CHLORIDE mmol/L  --  99 98 96*   CO2 mmol/L  --  29.0 31.0* 28.1   BUN mg/dL  --  15 13 13   CREATININE mg/dL  --  0.88 0.77 0.76   GLUCOSE mg/dL  --  136* 133* 193*   CALCIUM mg/dL  --  8.8 8.7 9.1   ALT (SGPT) U/L  --    --  8 11   AST (SGOT) U/L  --   --  16 23   TROPONIN T ng/mL  --   --   --  <0.010     Estimated Creatinine Clearance: 69.5 mL/min (by C-G formula based on SCr of 0.88 mg/dL).    Microbiology Results Abnormal     Procedure Component Value - Date/Time    Respiratory Panel, PCR - Swab, Nasopharynx [[id number]]  (Abnormal) Collected:  01/05/20 1250    Lab Status:  Final result Specimen:  Swab from Nasopharynx Updated:  01/05/20 1419     ADENOVIRUS, PCR Not Detected     Coronavirus 229E Not Detected     Coronavirus HKU1 Not Detected     Coronavirus NL63 Not Detected     Coronavirus OC43 Not Detected     Human Metapneumovirus Not Detected     Human Rhinovirus/Enterovirus Detected     Influenza B PCR Not Detected     Parainfluenza Virus 1 Not Detected     Parainfluenza Virus 2 Not Detected     Parainfluenza Virus 3 Not Detected     Parainfluenza Virus 4 Not Detected     Bordetella pertussis pcr Not Detected     Influenza A H1 2009 PCR Not Detected     Chlamydophila pneumoniae PCR Not Detected     Mycoplasma pneumo by PCR Not Detected     Influenza A PCR Not Detected     Influenza A H3 Not Detected     Influenza A H1 Not Detected     RSV, PCR Not Detected     Bordetella parapertussis PCR Not Detected    Blood Culture - Blood, Arm, Right [[id number]]  (Abnormal) Collected:  01/03/20 1506    Lab Status:  Final result Specimen:  Blood from Arm, Right Updated:  01/05/20 1303     Blood Culture Enterococcus faecalis     Comment: Refer to previous blood culture collected on 01/02 at 2025 for CASEY's.          Gram Stain Aerobic Bottle Gram positive cocci in pairs and chains      Anaerobic Bottle Gram positive cocci in pairs and chains    Blood Culture - Blood, Arm, Left [[id number]]  (Abnormal) Collected:  01/03/20 1506    Lab Status:  Final result Specimen:  Blood from Arm, Left Updated:  01/05/20 1302     Blood Culture Enterococcus faecalis     Comment: Refer to previous blood culture collected on 01/02 at 2025 for CASEY's.             Gram Stain Aerobic Bottle Gram positive cocci in pairs and chains      Anaerobic Bottle Gram positive cocci in pairs and chains    Blood Culture - Blood, Arm, Right [[id number]]  (Abnormal) Collected:  01/02/20 2019    Lab Status:  Final result Specimen:  Blood from Arm, Right Updated:  01/05/20 0606     Blood Culture Enterococcus faecalis     Comment: Refer to previous blood culture collected on 01/02 at 2025 for CASEY's.           Gram Stain Aerobic Bottle Gram positive cocci in pairs and chains      Anaerobic Bottle Gram positive cocci in pairs and chains    Blood Culture - Blood, Arm, Left [[id number]]  (Abnormal)  (Susceptibility) Collected:  01/02/20 2025    Lab Status:  Final result Specimen:  Blood from Arm, Left Updated:  01/05/20 0605     Blood Culture Enterococcus faecalis     Gram Stain Aerobic Bottle Gram positive cocci in pairs and chains      Anaerobic Bottle Gram positive cocci in pairs and chains    Susceptibility      Enterococcus faecalis     CASEY     Ampicillin Susceptible     Gentamicin High Level Synergy Susceptible     Vancomycin Susceptible                    Blood Culture ID, PCR - Blood, Arm, Left [[id number]]  (Abnormal) Collected:  01/02/20 2025    Lab Status:  Final result Specimen:  Blood from Arm, Left Updated:  01/03/20 1447     BCID, PCR Enterococcus spp, not VRE. Identification by BCID PCR.          Imaging Results (Last 24 Hours)     Procedure Component Value Units Date/Time    CT Abdomen Pelvis With & Without Contrast [[id number]] Collected:  01/05/20 1250     Updated:  01/05/20 1455    Narrative:       EXAMINATION: CT ABDOMEN/PELVIS WWO CONTRAST - 01/05/2020      INDICATION: Abdominal pain and fever.     TECHNIQUE: 5 mm post IV contrast portal venous phase and delayed venous  phase images through the abdomen and pelvis.     The radiation dose reduction device was turned on for each scan per the  ALARA (As Low as Reasonably Achievable) protocol.     COMPARISON: No previous abdominal CT  scan studies.     FINDINGS: Mild right basilar interstitial changes appear to represent  some subpleural scarring or atelectasis rather than pneumonia. There is  trace left basilar pleural thickening and fluid. There is trace  pericardial effusion.     Dense material in the dependent portion of the gallbladder presumably  represents microlithiasis. No gallbladder inflammatory changes are  identified. There is mild fatty liver change. Pancreas, adrenal glands,  and left kidney appear unremarkable. There appears to be a small right  upper pole renal cyst, otherwise unremarkable right kidney. There is  dense streak artifact from extensive retained barium in the right colon.     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  There is minimal associated perisplenic fat stranding, no evidence of  hematoma and no evidence of drainable fluid collection. No upper  abdominal inflammatory focus or free air is seen. Bowel loops are  nondistended.     Regarding the lower abdomen and pelvis, no mass, adenopathy, ascites or  acute inflammatory change is seen. Bladder is nondistended. Delayed  images show contrast opacification of normal caliber renal collecting  systems, ureters, and bladder. Bony structures appear grossly intact.       Impression:       1. 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 Mild associated perisplenic fat stranding.  2. Gallstones.  3. Retained barium in the right colon which limits fine detail in the  right mid abdomen. No definite acute inflammatory focus is seen here or  elsewhere, however.     DICTATED:   01/05/2020  EDITED/ls :   01/05/2020                 Results for orders placed during the hospital encounter of 01/02/20   Adult Transthoracic Echo Complete W/ Cont if Necessary Per  Protocol (With Agitated Saline)    Narrative · Left ventricular systolic function is normal. Estimated EF appears to be   in the range of 61 - 65%.  · Left ventricular wall thickness is consistent with borderline concentric   hypertrophy.  · Electronic leads present right side of the heart  · Mild MAC is present. Mild mitral valve regurgitation is present  · Mild dilation of the aortic root is present. Measures 4.1 cm  · No valvular vegetations visualized on this study, however valves not   well seen  · Bubble study nondiagnostic due to poor visualization          I have reviewed the medications:  Scheduled Meds:  Pharmacy Consult  Does not apply Once   ampicillin 2 g Intravenous Q4H   aspirin 324 mg Oral Daily   atorvastatin 80 mg Oral Nightly   azelastine 2 spray Each Nare BID   budesonide-formoterol 2 puff Inhalation BID   cefTRIAXone 2 g Intravenous Q12H   cetirizine 10 mg Oral Daily   clopidogrel 75 mg Oral Daily   cycloSPORINE 1 drop Both Eyes BID   docusate sodium 100 mg Oral BID   dronedarone 400 mg Oral BID   heparin (porcine) 5,000 Units Subcutaneous Q8H   pantoprazole 40 mg Oral QAM   polyethylene glycol 17 g Oral BID   primidone 50 mg Oral BID   roflumilast 500 mcg Oral Daily   sertraline 50 mg Oral Daily   sodium chloride 10 mL Intravenous Q12H   tamsulosin 0.4 mg Oral Nightly     Continuous Infusions:   PRN Meds:.acetaminophen **OR** acetaminophen **OR** acetaminophen  •  ipratropium  •  ipratropium-albuterol  •  ondansetron  •  potassium chloride **OR** potassium chloride **OR** potassium chloride  •  QUEtiapine  •  sodium chloride      Assessment/Plan   Assessment & Plan     Active Hospital Problems    Diagnosis  POA   • **Muscle weakness of left upper extremity [M62.81]  Clinically Undetermined   • Aphasia [R47.01]  Clinically Undetermined   • COPD exacerbation (CMS/HCC) [J44.1]  Yes   • Aneurysm of thoracic aorta (CMS/HCC) [I71.2]  Yes   • Coronary arteriosclerosis in native artery [I25.10]  Yes    • Paroxysmal atrial fibrillation (CMS/HCC) [I48.0]  Yes   • Sick sinus syndrome (CMS/HCC) [I49.5]  Yes      Resolved Hospital Problems   No resolved problems to display.        Brief Hospital Course to date:  Alessandro Mendiola is a 79 y.o. male admitted with left upper extremity weakness    Left-sided weakness  -Concern for endocarditis  -MARLON  -MRI  -Hold Xarelto  -Dual antiplatelet therapy  Sleep disorder  -High risk for delirium  Enterococcus bacteremia  -MARLON  Sick sinus syndrome  -[employer] pacemaker  End-stage COPD  -On supplemental oxygen  Atrial fibrillation  -Chronic anticoagulation  Depression  Thoracic aortic aneurysm  -MARLON  Rhinovirus  - Droplet    BiPAP as needed and for sleep    MRI planned for Monday    DVT Prophylaxis: Heparin    Disposition: I expect the patient to be discharged TBD    CODE STATUS:   Code Status and Medical Interventions:   Ordered at: 01/02/20 1916     Limited Support to NOT Include:    Intubation    Cardioversion/Defibrillation     Level Of Support Discussed With:    Patient     Code Status:    No CPR     Medical Interventions (Level of Support Prior to Arrest):    Limited         Electronically signed by Mario Donato MD, 01/05/20, 8:43 PM.

## 2020-01-06 NOTE — PROGRESS NOTES
[facility] Medicine Services  PROGRESS NOTE    Patient Name: Alessandro Mendiola  : 1940  MRN: [id number]    Date of Admission: 2020  Primary Care Physician: Silvio Jacobson MD    Subjective   Subjective     CC:     Sent from [facility] with left-sided weakness    HPI:   On high flow nasal cannula today.  Had low-grade temperature of 100.8.  Plan for MARLON today.  Was told this afternoon it was deferred reevaluation    Review of Systems    Gen- No fevers, chills  CV- No chest pain, palpitations  Resp- No cough, dyspnea  GI- No N/V/D, abd pain    Objective   Objective     Vital Signs:   Temp:  [98 °F (36.7 °C)-100.8 °F (38.2 °C)] 98.6 °F (37 °C)  Heart Rate:  [66-86] 86  Resp:  [20-30] 30  BP: (105-147)/(46-67) 131/50  Total (NIH Stroke Scale): 6     Physical Exam:    Constitutional: No acute distress, awake, alert  HENT: NCAT, no JVD  Respiratory: Clear to auscultation bilaterally, respiratory effort normal   Cardiovascular: RRR, s1 and s2  Gastrointestinal: Positive bowel sounds, soft, nontender, nondistended  Musculoskeletal: No bilateral ankle edema  Psychiatric: Appropriate affect, cooperative  Neurologic: Oriented x 3, right-sided weakness  Skin: dry, + skin tenting    Results Reviewed:    Results from last 7 days   Lab Units 20  0633 20  0719 20  1344   WBC 10*3/mm3 8.51 8.88 9.75   HEMOGLOBIN g/dL 7.4* 7.9* 9.4*   HEMATOCRIT % 27.1* 27.7* 32.7*   PLATELETS 10*3/mm3 154 155 166   INR   --   --  2.13*     Results from last 7 days   Lab Units 20  2224 20  0632 20  0438 20  0719 20  1344   SODIUM mmol/L  --   --  140 139 139   POTASSIUM mmol/L 3.7 3.3* 3.5 3.4* 3.7   CHLORIDE mmol/L  --   --  99 98 96*   CO2 mmol/L  --   --  29.0 31.0* 28.1   BUN mg/dL  --   --  15 13 13   CREATININE mg/dL  --   --  0.88 0.77 0.76   GLUCOSE mg/dL  --   --  136* 133* 193*   CALCIUM mg/dL  --   --  8.8 8.7 9.1   ALT (SGPT) U/L  --   --    --  8 11   AST (SGOT) U/L  --   --   --  16 23   TROPONIN T ng/mL  --   --   --   --  <0.010     Estimated Creatinine Clearance: 69.5 mL/min (by C-G formula based on SCr of 0.88 mg/dL).    Microbiology Results Abnormal     Procedure Component Value - Date/Time    Blood Culture - Blood, Arm, Left [[id number]] Collected:  01/05/20 1421    Lab Status:  Preliminary result Specimen:  Blood from Arm, Left Updated:  01/06/20 1600     Blood Culture No growth at 24 hours    Blood Culture - Blood, Arm, Right [[id number]] Collected:  01/05/20 1421    Lab Status:  Preliminary result Specimen:  Blood from Arm, Right Updated:  01/06/20 1600     Blood Culture No growth at 24 hours    Respiratory Panel, PCR - Swab, Nasopharynx [[id number]]  (Abnormal) Collected:  01/05/20 1250    Lab Status:  Final result Specimen:  Swab from Nasopharynx Updated:  01/05/20 1419     ADENOVIRUS, PCR Not Detected     Coronavirus 229E Not Detected     Coronavirus HKU1 Not Detected     Coronavirus NL63 Not Detected     Coronavirus OC43 Not Detected     Human Metapneumovirus Not Detected     Human Rhinovirus/Enterovirus Detected     Influenza B PCR Not Detected     Parainfluenza Virus 1 Not Detected     Parainfluenza Virus 2 Not Detected     Parainfluenza Virus 3 Not Detected     Parainfluenza Virus 4 Not Detected     Bordetella pertussis pcr Not Detected     Influenza A H1 2009 PCR Not Detected     Chlamydophila pneumoniae PCR Not Detected     Mycoplasma pneumo by PCR Not Detected     Influenza A PCR Not Detected     Influenza A H3 Not Detected     Influenza A H1 Not Detected     RSV, PCR Not Detected     Bordetella parapertussis PCR Not Detected    Blood Culture - Blood, Arm, Right [[id number]]  (Abnormal) Collected:  01/03/20 1506    Lab Status:  Final result Specimen:  Blood from Arm, Right Updated:  01/05/20 1303     Blood Culture Enterococcus faecalis     Comment: Refer to previous blood culture collected on 01/02 at 2025 for CASEY's.          Gram Stain  Aerobic Bottle Gram positive cocci in pairs and chains      Anaerobic Bottle Gram positive cocci in pairs and chains    Blood Culture - Blood, Arm, Left [[id number]]  (Abnormal) Collected:  01/03/20 1506    Lab Status:  Final result Specimen:  Blood from Arm, Left Updated:  01/05/20 1302     Blood Culture Enterococcus faecalis     Comment: Refer to previous blood culture collected on 01/02 at 2025 for CASEY's.            Gram Stain Aerobic Bottle Gram positive cocci in pairs and chains      Anaerobic Bottle Gram positive cocci in pairs and chains    Blood Culture - Blood, Arm, Right [[id number]]  (Abnormal) Collected:  01/02/20 2019    Lab Status:  Final result Specimen:  Blood from Arm, Right Updated:  01/05/20 0606     Blood Culture Enterococcus faecalis     Comment: Refer to previous blood culture collected on 01/02 at 2025 for CASEY's.           Gram Stain Aerobic Bottle Gram positive cocci in pairs and chains      Anaerobic Bottle Gram positive cocci in pairs and chains    Blood Culture - Blood, Arm, Left [[id number]]  (Abnormal)  (Susceptibility) Collected:  01/02/20 2025    Lab Status:  Final result Specimen:  Blood from Arm, Left Updated:  01/05/20 0605     Blood Culture Enterococcus faecalis     Gram Stain Aerobic Bottle Gram positive cocci in pairs and chains      Anaerobic Bottle Gram positive cocci in pairs and chains    Susceptibility      Enterococcus faecalis     CASEY     Ampicillin Susceptible     Gentamicin High Level Synergy Susceptible     Vancomycin Susceptible                    Blood Culture ID, PCR - Blood, Arm, Left [[id number]]  (Abnormal) Collected:  01/02/20 2025    Lab Status:  Final result Specimen:  Blood from Arm, Left Updated:  01/03/20 1447     BCID, PCR Enterococcus spp, not VRE. Identification by BCID PCR.          Imaging Results (Last 24 Hours)     Procedure Component Value Units Date/Time    CT Abdomen Pelvis With & Without Contrast [[id number]] Collected:  01/05/20 1250     Updated:   01/06/20 0955    Narrative:       EXAMINATION: CT ABDOMEN/PELVIS WWO CONTRAST - 01/05/2020      INDICATION: Abdominal pain and fever.     TECHNIQUE: 5 mm post IV contrast portal venous phase and delayed venous  phase images through the abdomen and pelvis.     The radiation dose reduction device was turned on for each scan per the  ALARA (As Low as Reasonably Achievable) protocol.     COMPARISON: No previous abdominal CT scan studies.     FINDINGS: Mild right basilar interstitial changes appear to represent  some subpleural scarring or atelectasis rather than pneumonia. There is  trace left basilar pleural thickening and fluid. There is trace  pericardial effusion.     Dense material in the dependent portion of the gallbladder presumably  represents microlithiasis. No gallbladder inflammatory changes are  identified. There is mild fatty liver change. Pancreas, adrenal glands,  and left kidney appear unremarkable. There appears to be a small right  upper pole renal cyst, otherwise unremarkable right kidney. There is  dense streak artifact from extensive retained barium in the right colon.     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  There is minimal associated perisplenic fat stranding, no evidence of  hematoma and no evidence of drainable fluid collection. No upper  abdominal inflammatory focus or free air is seen. Bowel loops are  nondistended.     Regarding the lower abdomen and pelvis, no mass, adenopathy, ascites or  acute inflammatory change is seen. Bladder is nondistended. Delayed  images show contrast opacification of normal caliber renal collecting  systems, ureters, and bladder. Bony structures appear grossly intact.       Impression:       1. 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  Mild associated perisplenic fat stranding.  2. Gallstones.  3. Retained barium in the right colon which limits fine detail in the  right mid abdomen. No definite acute inflammatory focus is seen here or  elsewhere, however.     DICTATED:   01/05/2020  EDITED/ls :   01/05/2020         This report was finalized on 1/6/2020 9:52 AM by Dr. Martin Soni MD.             Results for orders placed during the hospital encounter of 01/02/20   Adult Transthoracic Echo Complete W/ Cont if Necessary Per Protocol (With Agitated Saline)    Narrative · Left ventricular systolic function is normal. Estimated EF appears to be   in the range of 61 - 65%.  · Left ventricular wall thickness is consistent with borderline concentric   hypertrophy.  · Electronic leads present right side of the heart  · Mild MAC is present. Mild mitral valve regurgitation is present  · Mild dilation of the aortic root is present. Measures 4.1 cm  · No valvular vegetations visualized on this study, however valves not   well seen  · Bubble study nondiagnostic due to poor visualization          I have reviewed the medications:  Scheduled Meds:  ampicillin 2 g Intravenous Q4H   aspirin 324 mg Oral Daily   atorvastatin 80 mg Oral Nightly   azelastine 2 spray Each Nare BID   budesonide-formoterol 2 puff Inhalation BID   cefTRIAXone 2 g Intravenous Q12H   cetirizine 10 mg Oral Daily   clopidogrel 75 mg Oral Daily   cycloSPORINE 1 drop Both Eyes BID   docusate sodium 100 mg Oral BID   docusate sodium 100 mg Oral BID   dronedarone 400 mg Oral BID   heparin (porcine) 5,000 Units Subcutaneous Q8H   pantoprazole 40 mg Oral QAM   polyethylene glycol 17 g Oral BID   primidone 50 mg Oral BID   roflumilast 500 mcg Oral Daily   sertraline 50 mg Oral Daily   sodium chloride 10 mL Intravenous Q12H   tamsulosin 0.4 mg Oral Nightly     Continuous Infusions:   PRN Meds:.•  acetaminophen **OR** acetaminophen **OR** acetaminophen  •  ipratropium  •  ipratropium-albuterol  •   ondansetron  •  potassium chloride **OR** potassium chloride **OR** potassium chloride  •  QUEtiapine  •  sodium chloride      Assessment/Plan   Assessment & Plan     Active Hospital Problems    Diagnosis  POA   • **Muscle weakness of left upper extremity [M62.81]  Clinically Undetermined   • Aphasia [R47.01]  Clinically Undetermined   • COPD exacerbation (CMS/HCC) [J44.1]  Yes   • 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        Brief Hospital Course to date:  Alessandro Mendiola is a 79 y.o. male transferred from outside hospital due to concerns about infective endocarditis and possible pacemaker infection.  He has bacteremia.  High-grade enterococcus septicemia    Left-sided weakness  -Concern for endocarditis  -MARLON planned  -Working on MRI  -Holding Xarelto  -Current antiplatelet therapy  Thoracic aortic aneurysm  -MARLON for better look  Sleep disorder  -Clear if he has undiagnosed sleep apnea  -High risk for delirium  -Would benefit from BiPAP therapy  -Working on interface  Enterococcus bacteremia  -MARLON planned  SSS  -Pacemaker  End-stage COPD  -Oxygen as needed  -Too much oxygen may suppress breathing  Atrial fibrillation  -Chronic anticoagulation  Rhinovirus  -Precautions  -Rhinovirus picked up on respiratory panel    BiPAP as needed and for sleep -working on interface  Wean high flow nasal cannula -goal oxygenation should be 90.  Too much oxygen can suppress breathing with COPD    MRI planned for Tuesday    DVT Prophylaxis: Heparin    Disposition: I expect the patient to be discharged TBD    CODE STATUS:   Code Status and Medical Interventions:   Ordered at: 01/02/20 1916     Limited Support to NOT Include:    Intubation    Cardioversion/Defibrillation     Level Of Support  Discussed With:    Patient     Code Status:    No CPR     Medical Interventions (Level of Support Prior to Arrest):    Limited     Electronically signed by Mario Donato MD, 01/06/20, 6:38 PM.

## 2020-01-07 NOTE — NURSING NOTE
Pt tolerated MRI with pacemaker. Pacemaker rep present at bedside, pacemaker turned back on and pt back to room.

## 2020-01-07 NOTE — PROGRESS NOTES
"INFECTIOUS DISEASE CONSULT/INITIAL HOSPITAL VISIT    Alessandro Mendiola  1940  [id number]    Date of Consult: 1/7/2020    Admission Date: 1/2/2020      Requesting Provider: Georgina Pompa II, DO  Evaluating Physician: Piyush Elliott MD    Reason for Consultation: Enterococcus bacteremia    Chief complaint: Left upper extremity weakness    Current antimicrobial therapy: Vancomycin IV    History of present illness:    Alessandro Mendiola is a 79 y.o. male being evaluated for enterococcus bacteremia.  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  He presented to the ED yesterday with complaints of left upper extremity weakness and speech difficulties.  Per documentation, family states that the patient went to the restroom after getting up out of bed on the morning of 1/2 and upon returning and sitting down in his recliner he was unable to hold objects in his left hand and drop them.  Then he stated to his wife that his left arm felt \"weak, numb and tingly\".  Wife also noted that his speech was a little slurred and called her son to help get the patient to the ER ([facility]). Work-up in the ED showed a CT of the head without acute infarct and a chest x-ray with no acute cardiopulmonary illness.  NIHSS on arrival to ED was 9 and then 3 prior to transfer to [facility] for higher level of care.    Since arrival here patient has had a T-max of 100.6 and has been hemodynamically stable.  Labs show he is without leukocytosis with a white blood cell count of 8.88 and a neutrophilia of 76.9%.  Most recent BUN/creatinine is 15 and 0.88 with a GFR of 84.  1/2 blood cultures are positive in 4/4 bottles for enterococcus not VRE identified by bio fire report.  Repeat blood cultures on 1/3 are currently " positive and 1/4 bottles for gram-positive cocci in pairs and chains.    He has a listed allergy to doxycycline with shortness of breath listed as a reaction and is currently receiving vancomycin IV.  ID has been consulted for further evaluation and treatment as well as antimicrobial therapy management.    Of note: TTE was performed yesterday and is currently pending radiologist read.  Preliminary results show no vegetations and some TR.    The patient has experienced a 30 lbs weight loss over the last several months. He has additionally been perisistently nauseated. No abdominal pain. No bloody BMs. Last colonoscopy was about 3 years ago per patient and was ok.    Subjective:  1/5/20: CT abdomen and pelvis was done today showing a large splenic infarct but no other intra-abdominal pathology.  Cardiology is evaluating the patient and recommends a MALRON which I agree with. Tmax was 99.6°F overnight.  White count remained stable at 8.5.  Enterococcus was susceptible to ampicillin.     1/6/20: somewhat more short of breath today. On HFNC. NO diarrhea or nausea. tmax 100.8F. RPP +rhionovirus.     1/7/20: Still short of breath and on BiPAP in his room.  MARLON has been postponed given his respiratory issues.  MRI brain showed signs of multiple acute/subacute infarcts.  The patient denies any diarrhea or nausea.  No abdominal pain. Tmax was 100.7F. Repeat blood cultures from 1/5 are no growth to date.    Past Medical History:   Diagnosis Date   • Alcohol abuse    • Aneurysm of thoracic aorta (CMS/HCC)    • Anxiety    • Ascending aortic aneurysm (CMS/HCC)     4.8 to 4.9 cm    • Atherosclerotic heart disease    • Atrial fibrillation (CMS/HCC)    • Blood thinned due to long-term anticoagulant use    • Body mass index (BMI) 20.0-20.9, adult    • CAD (coronary artery disease)    • Cancer (CMS/HCC)     skin   • Cardiac pacemaker    • Cellulitis    • Chronic bronchitis (CMS/HCC)    • Chronic cough    • COPD (chronic obstructive  pulmonary disease) (CMS/HCC)    • Depression    • Difficulty breathing    • Emphysema lung (CMS/HCC)    • Encounter for long-term current use of medication    • Encounter for screening for malignant neoplasm of prostate    • Fatigue    • Fingertip amputation    • TAMIKO (generalized anxiety disorder)    • History of alcohol abuse    • History of cardiac catheterization    • History of chest x-ray 02/11/2014    Chronic interstitial changes seen throughout the lung fields w/ no radiographic evidence of acute parenchymal disease.No definite LT-sided rib fracture present.   • History of chest x-ray 01/10/2014    No acute cardiopulmonary process.Dual lead LT subclavian pacemaker is in place.Aortic ectasia. Hyperinflation of lungs, suggesting COPD   • History of chest x-ray 10/01/2012    Ill-defined RT middle lobe opacity which likley reflects a pneumonia. FU to radiographic resolution is recommended.   • History of Doppler echocardiogram    • History of echocardiogram 09/26/2013    EF 55-60%. Mod concentric LVH. Abnormal LT VT diastolic filling is observed, consistent w/ impaired relaxation.Mild MR/TR. Trace MD. RVSP 44 mmHg.   • History of stress test     ECG performed   • HTN (hypertension)    • HX: long term anticoagulant use     Blood thinned due to long-term anticoagulant use (V58.61) (Z79.01)   • Hypercholesterolemia    • LVH (left ventricular hypertrophy)    • Mitral and aortic valve disease    • Myocardial infarction (CMS/HCC)    • Nonvenomous insect bite of multiple sites    • Onychomycosis    • Pacemaker at end of battery life    • Pneumonia    • Pneumothorax    • Pre-syncope    • Pulmonary nodule    • Rib pain on left side    • Sick sinus syndrome (CMS/HCC)    • SOB (shortness of breath)    • Upper respiratory infection    • Vitamin D deficiency        Past Surgical History:   Procedure Laterality Date   • AMPUTATION  08/31/2015    metacarpal and index finger   • CARDIAC PACEMAKER PLACEMENT  06/01/2008   •  CATARACT EXTRACTION     • CORONARY STENT PLACEMENT      History of Cath Placement Of Stent 1  · Coronary stents   • EYE SURGERY     • SKIN CANCER EXCISION         Family History   Problem Relation Age of Onset   • Coronary artery disease Other    • Diabetes Other    • Hypertension Other        Social History     Socioeconomic History   • Marital status:      Spouse name: Not on file   • Number of children: Not on file   • Years of education: Not on file   • Highest education level: Not on file   Tobacco Use   • Smoking status: Former Smoker     Packs/day: 3.00     Years: 45.00     Pack years: 135.00     Last attempt to quit: 2008     Years since quittin.0   • Smokeless tobacco: Never Used   Substance and Sexual Activity   • Alcohol use: No   • Drug use: No   • Sexual activity: Defer       Allergies   Allergen Reactions   • Doxycycline Shortness Of Breath         Medication:    Current Facility-Administered Medications:   •  acetaminophen (TYLENOL) tablet 650 mg, 650 mg, Oral, Q4H PRN, 650 mg at 20 0136 **OR** acetaminophen (TYLENOL) 160 MG/5ML solution 650 mg, 650 mg, Oral, Q4H PRN, 649.6 mg at 20 0025 **OR** acetaminophen (TYLENOL) suppository 650 mg, 650 mg, Rectal, Q4H PRN, Stacey Padilla, APRN  •  ampicillin 2 g/100 mL 0.9% NS (MBP), 2 g, Intravenous, Q4H, Cortes Vidal, APRN, 2 g at 20 1405  •  aspirin chewable tablet 324 mg, 324 mg, Oral, Daily, Mario Donato MD, 324 mg at 20 1030  •  atorvastatin (LIPITOR) tablet 80 mg, 80 mg, Oral, Nightly, Stacey Padilla APRN, 80 mg at 20 2128  •  azelastine (ASTELIN) nasal spray 2 spray, 2 spray, Each Nare, BID, Stacey Padilla APRN, 2 spray at 20 1044  •  budesonide-formoterol (SYMBICORT) 80-4.5 MCG/ACT inhaler 2 puff, 2 puff, Inhalation, BID, Stacey Padilla APRN, 2 puff at 20 1009  •  cefTRIAXone (ROCEPHIN) 2 g/100 mL 0.9% NS VTB (SUSAN), 2 g, Intravenous, Q12H, Cortes Vidal APRN, 2 g  at 01/07/20 0550  •  cetirizine (zyrTEC) tablet 10 mg, 10 mg, Oral, Daily, Iris Hsu MD, 10 mg at 01/07/20 1031  •  clopidogrel (PLAVIX) tablet 75 mg, 75 mg, Oral, Daily, Fern Patel MD, 75 mg at 01/07/20 1030  •  cycloSPORINE (RESTASIS) 0.05 % ophthalmic emulsion 1 drop, 1 drop, Both Eyes, BID, Stacey Padilla APRN, 1 drop at 01/07/20 1406  •  dronedarone (MULTAQ) tablet 400 mg, 400 mg, Oral, BID, Stacey Padilla APRN, 400 mg at 01/07/20 1405  •  heparin (porcine) 5000 UNIT/ML injection 5,000 Units, 5,000 Units, Subcutaneous, Q8H, Iris sHu MD, 5,000 Units at 01/07/20 1407  •  ipratropium (ATROVENT) nebulizer solution 0.5 mg, 500 mcg, Nebulization, 4x Daily PRN, Stacey Padilla APRN, 0.5 mg at 01/06/20 1008  •  ipratropium-albuterol (DUO-NEB) nebulizer solution 3 mL, 3 mL, Nebulization, Q6H PRN, Chiquita Mcmahan APRN, 3 mL at 01/07/20 1219  •  ondansetron (ZOFRAN) injection 4 mg, 4 mg, Intravenous, Q6H PRN, Georgina Pompa II, DO, 4 mg at 01/07/20 1030  •  pantoprazole (PROTONIX) EC tablet 40 mg, 40 mg, Oral, QAM, Stacey Padilla APRN, 40 mg at 01/07/20 0549  •  potassium chloride (MICRO-K) CR capsule 40 mEq, 40 mEq, Oral, PRN, 40 mEq at 01/05/20 1818 **OR** potassium chloride (KLOR-CON) packet 40 mEq, 40 mEq, Oral, PRN **OR** potassium chloride 10 mEq in 100 mL IVPB, 10 mEq, Intravenous, Q1H PRN, Mario Donato MD  •  primidone (MYSOLINE) tablet 50 mg, 50 mg, Oral, BID, Stacey Padilla APRN, 50 mg at 01/07/20 1406  •  QUEtiapine (SEROquel) tablet 25 mg, 25 mg, Oral, Nightly PRN, Fern Patel MD, 25 mg at 01/06/20 2128  •  roflumilast (DALIRESP) tablet 500 mcg, 500 mcg, Oral, Daily, Stacey Padilla APRN, 500 mcg at 01/07/20 1406  •  sennosides-docusate (PERICOLACE) 8.6-50 MG per tablet 2 tablet, 2 tablet, Oral, BID, Mario Donato MD, 2 tablet at 01/07/20 1407  •  sertraline (ZOLOFT) tablet 50 mg, 50 mg, Oral, Daily, Stacey Padilla APRN, 50 mg at 01/07/20  1030  •  sodium chloride 0.9 % flush 10 mL, 10 mL, Intravenous, Q12H, Stacey Padilla, APRN, 10 mL at 20 1001  •  sodium chloride 0.9 % flush 10 mL, 10 mL, Intravenous, PRN, Stacey Padilla, APRN  •  tamsulosin (FLOMAX) 24 hr capsule 0.4 mg, 0.4 mg, Oral, Nightly, Stacey Padilla, APRN, 0.4 mg at 20 2128    Antibiotics:  Anti-Infectives (From admission, onward)    Ordered     Dose/Rate Route Frequency Start Stop    20 1451  ampicillin 2 g/100 mL 0.9% NS (MBP)     Tiff Chambers RPH reviewed the order on 20 1533.   Ordering Provider:  Cortes Vidal, APRN    2 g  over 30 Minutes Intravenous Every 4 Hours 20 1600 20 1359    20 1451  cefTRIAXone (ROCEPHIN) 2 g/100 mL 0.9% NS VTB (SUSAN)     Tiff Chambers RPH reviewed the order on 20 1533.   Ordering Provider:  Cortes Vidal, APRN    2 g  over 30 Minutes Intravenous Every 12 Hours 20 1600 20 0559    20 1502  vancomycin 1750 mg/500 mL 0.9% NS IVPB ([facility])     Ordering Provider:  Lis Romero RPH    1,750 mg  over 120 Minutes Intravenous Once 20 1600 20 2245            Review of Systems:  As above        Physical Exam:   Vital Signs  Temp (24hrs), Av.7 °F (37.1 °C), Min:97.6 °F (36.4 °C), Max:100.7 °F (38.2 °C)    Temp  Min: 97.6 °F (36.4 °C)  Max: 100.7 °F (38.2 °C)  BP  Min: 123/58  Max: 143/64  Pulse  Min: 51  Max: 98  Resp  Min: 16  Max: 30  SpO2  Min: 91 %  Max: 100 %    GENERAL: Awake and alert, in mild distress. Frail appearing  HEENT: Normocephalic, atraumatic.  No conjunctival injection. No icterus. Oropharynx clear without evidence of thrush or exudate. No evidence of peridontal disease.    Chest: PPM pocket over left chest wall is without erythema, warmth, or tenderness  HEART: RRR; No murmur, rubs, gallops.   LUNGS: decreased breath sounds throughout but no wheezing or crackles.Labored breathing on HFNC  ABDOMEN: Soft, nontender, nondistended. Positive  bowel sounds. No rebound or guarding. NO mass or HSM.  EXT:  No cyanosis, clubbing or edema. No cord.  :  Without Denny catheter.  MSK: FROM without joint effusions noted arms/legs.    SKIN: Warm and dry without cutaneous eruptions on Inspection/palpation.    NEURO: Oriented to PPT. Weakness of RUE and RLE compared to left side. Slurred speech. aphasia  PSYCHIATRIC: Normal insight and judgement. Cooperative with PE    Laboratory Data    Results from last 7 days   Lab Units 01/05/20  0633 01/03/20  0719 01/02/20  1344   WBC 10*3/mm3 8.51 8.88 9.75   HEMOGLOBIN g/dL 7.4* 7.9* 9.4*   HEMATOCRIT % 27.1* 27.7* 32.7*   PLATELETS 10*3/mm3 154 155 166     Results from last 7 days   Lab Units 01/05/20  2224  01/04/20  0438   SODIUM mmol/L  --   --  140   POTASSIUM mmol/L 3.7   < > 3.5   CHLORIDE mmol/L  --   --  99   CO2 mmol/L  --   --  29.0   BUN mg/dL  --   --  15   CREATININE mg/dL  --   --  0.88   GLUCOSE mg/dL  --   --  136*   CALCIUM mg/dL  --   --  8.8    < > = values in this interval not displayed.     Results from last 7 days   Lab Units 01/03/20  0719   ALK PHOS U/L 112   BILIRUBIN mg/dL 0.3   ALT (SGPT) U/L 8   AST (SGOT) U/L 16                         Estimated Creatinine Clearance: 69.5 mL/min (by C-G formula based on SCr of 0.88 mg/dL).      Microbiology:  Microbiology Results (last 10 days)     Procedure Component Value - Date/Time    Blood Culture - Blood, Arm, Left [[id number]] Collected:  01/05/20 1421    Lab Status:  Preliminary result Specimen:  Blood from Arm, Left Updated:  01/06/20 1600     Blood Culture No growth at 24 hours    Blood Culture - Blood, Arm, Right [[id number]] Collected:  01/05/20 1421    Lab Status:  Preliminary result Specimen:  Blood from Arm, Right Updated:  01/06/20 1600     Blood Culture No growth at 24 hours    Respiratory Panel, PCR - Swab, Nasopharynx [[id number]]  (Abnormal) Collected:  01/05/20 1250    Lab Status:  Final result Specimen:  Swab from Nasopharynx Updated:   01/05/20 1419     ADENOVIRUS, PCR Not Detected     Coronavirus 229E Not Detected     Coronavirus HKU1 Not Detected     Coronavirus NL63 Not Detected     Coronavirus OC43 Not Detected     Human Metapneumovirus Not Detected     Human Rhinovirus/Enterovirus Detected     Influenza B PCR Not Detected     Parainfluenza Virus 1 Not Detected     Parainfluenza Virus 2 Not Detected     Parainfluenza Virus 3 Not Detected     Parainfluenza Virus 4 Not Detected     Bordetella pertussis pcr Not Detected     Influenza A H1 2009 PCR Not Detected     Chlamydophila pneumoniae PCR Not Detected     Mycoplasma pneumo by PCR Not Detected     Influenza A PCR Not Detected     Influenza A H3 Not Detected     Influenza A H1 Not Detected     RSV, PCR Not Detected     Bordetella parapertussis PCR Not Detected    Blood Culture - Blood, Arm, Left [[id number]]  (Abnormal) Collected:  01/03/20 1506    Lab Status:  Final result Specimen:  Blood from Arm, Left Updated:  01/05/20 1302     Blood Culture Enterococcus faecalis     Comment: Refer to previous blood culture collected on 01/02 at 2025 for CASEY's.            Gram Stain Aerobic Bottle Gram positive cocci in pairs and chains      Anaerobic Bottle Gram positive cocci in pairs and chains    Blood Culture - Blood, Arm, Right [[id number]]  (Abnormal) Collected:  01/03/20 1506    Lab Status:  Final result Specimen:  Blood from Arm, Right Updated:  01/05/20 1303     Blood Culture Enterococcus faecalis     Comment: Refer to previous blood culture collected on 01/02 at 2025 for CASEY's.          Gram Stain Aerobic Bottle Gram positive cocci in pairs and chains      Anaerobic Bottle Gram positive cocci in pairs and chains    Blood Culture - Blood, Arm, Left [[id number]]  (Abnormal)  (Susceptibility) Collected:  01/02/20 2025    Lab Status:  Final result Specimen:  Blood from Arm, Left Updated:  01/05/20 0605     Blood Culture Enterococcus faecalis     Gram Stain Aerobic Bottle Gram positive cocci in pairs  and chains      Anaerobic Bottle Gram positive cocci in pairs and chains    Susceptibility      Enterococcus faecalis     CASEY     Ampicillin Susceptible     Gentamicin High Level Synergy Susceptible     Vancomycin Susceptible                    Blood Culture ID, PCR - Blood, Arm, Left [[id number]]  (Abnormal) Collected:  01/02/20 2025    Lab Status:  Final result Specimen:  Blood from Arm, Left Updated:  01/03/20 1447     BCID, PCR Enterococcus spp, not VRE. Identification by BCID PCR.    Blood Culture - Blood, Arm, Right [[id number]]  (Abnormal) Collected:  01/02/20 2019    Lab Status:  Final result Specimen:  Blood from Arm, Right Updated:  01/05/20 0606     Blood Culture Enterococcus faecalis     Comment: Refer to previous blood culture collected on 01/02 at 2025 for CASEY's.           Gram Stain Aerobic Bottle Gram positive cocci in pairs and chains      Anaerobic Bottle Gram positive cocci in pairs and chains              Radiology:  Imaging Results (Last 72 Hours)     Procedure Component Value Units Date/Time    MRI Brain Without Contrast [[id number]] Collected:  01/07/20 1057     Updated:  01/07/20 1135    Narrative:       EXAMINATION: MRI BRAIN WO CONTRAST-     INDICATION: Stroke; M62.81-Muscle weakness (generalized); Z74.09-Other  reduced mobility; R13.12-Dysphagia, oropharyngeal phase;  R47.1-Dysarthria and anarthria.      TECHNIQUE: Sagittal and axial images of brain are displayed. No  intravenous contrast was utilized.     COMPARISON: None.     FINDINGS: There are several areas of abnormal signal consistent with  acute or subacute infarcts. The areas involved include a large lesion in  the right cerebellum, smaller lesions in the leftward aspect of the  cerebellum, a 1 cm abnormality in the right thalamic region, small areas  of abnormal signal in the occipital lobes, and a small area in the left  parietal convexity. There is also a punctate area of abnormal signal in  the leftward posterior aspect of the  lary.       Impression:       Multifocal acute or subacute infarcts as described above,  most significant which appear to include the right thalamic region and  the cerebellar hemispheres, right greater than left.      D:  01/07/2020  E:  01/07/2020     This report was finalized on 1/7/2020 11:31 AM by Dr. Lukasz Galvez MD.       XR Chest 1 View [[id number]] Collected:  01/07/20 0045     Updated:  01/07/20 0047    Narrative:       CR Chest 1 Vw 1/7/2020    INDICATION:   Chest and abdomen pain and fever today. Aspiration pneumonia. Dysphagia. Benign essential hypertension and atrial fibrillation. COPD.     COMPARISON:    1/2/2020    FINDINGS:  Single portable AP view(s) of the chest.    Support lines and tubes are unchanged.    The heart is enlarged but stable. The lungs are hyperinflated with emphysematous and fibrotic changes characteristic of COPD. Atelectasis or infiltrate medial left lower lobe. Discoid atelectasis or linear fibrosis right base. There are no pleural  effusions. No pneumothorax.       Impression:       Cardiomegaly and COPD. Minimal atelectasis or infiltrate medial left lower lobe.    Signer Name: Jared Lal MD   Signed: 1/7/2020 12:45 AM   Workstation Name: [id number]    [facility]    CT Abdomen Pelvis With & Without Contrast [[id number]] Collected:  01/05/20 1250     Updated:  01/06/20 0955    Narrative:       EXAMINATION: CT ABDOMEN/PELVIS WWO CONTRAST - 01/05/2020      INDICATION: Abdominal pain and fever.     TECHNIQUE: 5 mm post IV contrast portal venous phase and delayed venous  phase images through the abdomen and pelvis.     The radiation dose reduction device was turned on for each scan per the  ALARA (As Low as Reasonably Achievable) protocol.     COMPARISON: No previous abdominal CT scan studies.     FINDINGS: Mild right basilar interstitial changes appear to represent  some subpleural scarring or atelectasis rather than pneumonia. There is  trace left basilar  pleural thickening and fluid. There is trace  pericardial effusion.     Dense material in the dependent portion of the gallbladder presumably  represents microlithiasis. No gallbladder inflammatory changes are  identified. There is mild fatty liver change. Pancreas, adrenal glands,  and left kidney appear unremarkable. There appears to be a small right  upper pole renal cyst, otherwise unremarkable right kidney. There is  dense streak artifact from extensive retained barium in the right colon.     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  There is minimal associated perisplenic fat stranding, no evidence of  hematoma and no evidence of drainable fluid collection. No upper  abdominal inflammatory focus or free air is seen. Bowel loops are  nondistended.     Regarding the lower abdomen and pelvis, no mass, adenopathy, ascites or  acute inflammatory change is seen. Bladder is nondistended. Delayed  images show contrast opacification of normal caliber renal collecting  systems, ureters, and bladder. Bony structures appear grossly intact.       Impression:       1. 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 Mild associated perisplenic fat stranding.  2. Gallstones.  3. Retained barium in the right colon which limits fine detail in the  right mid abdomen. No definite acute inflammatory focus is seen here or  elsewhere, however.     DICTATED:   01/05/2020  EDITED/ls :   01/05/2020         This report was finalized on 1/6/2020 9:52 AM by Dr. Martin Soni MD.         read his x-ray from 1/7/2020-minute some mild atelectasis but no consolidation noted.  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 Would normally require pacemaker extraction but patient and wife are unsure if he could tolerate this. Cardiology has evaluated and I appreciate the recommendations.  MARLON when stable enough. The other question is of the initial source for his septicemia. He denies any urinary symptoms or prostate issues. Other than his large splenic infarct, no other obvious sign of intra-abdominal pathology.  Possibly initially from a urinary source.    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  -- Aphasia  -- Hemiparesis  ----Nausea  ----significant weight loss  -- COPD exacerbation  ----acute hypoxic respiratory failure/atelectasis  -- Aneurysm of thoracic aorta  -- PAF  -- Sick sinus syndrome status post permanent cardiac pacemaker  ---Rhinovirus infection   ---microcytic anemia- we'll need to monitor especially given splenic infarct. Fairly stable since 1/3    PLAN/RECOMMENDATIONS:   Thank you for asking us to see Alessandro Mendiola, I recommend the following:    ----ampicillin 2g q 4 hours and rocephin 2g iv q 12 hours (for synergy)  -- Continue to follow cultures. Repeat blood cultures from 1/5 are no growth to date  -- neurology following.  MRI brain noted  --- MARLON when he is stable enough  ---continue to closely follow cbc with diff and bmp for now    Long discussion with the patient and his wife today    Very complex case requiring high level of medical decision making. Patient has significant risk for further morbidity      Piyush Elliott MD  1/7/2020  2:18 PM

## 2020-01-07 NOTE — PLAN OF CARE
Problem: Patient Care Overview  Goal: Plan of Care Review  Outcome: Ongoing (interventions implemented as appropriate)  Flowsheets (Taken 1/7/2020 6594)  Plan of Care Reviewed With: patient; spouse  Note:   SLP treatment completed. Will plan for repeat MBS tomorrow to determine any possibility of diet upgrade . Please see note for further details and recommendations.

## 2020-01-07 NOTE — CONSULTS
Order criteria not met for diabetes education consult. Current A1c is 6.3, noted during chart review no home meds for DM, noted previous A1c's since 2015 have been within prediabetes range. Please reconsult for prediabetes education if appropriate. Thank you.

## 2020-01-07 NOTE — PROGRESS NOTES
"Neurology       Patient Care Team:  Silvio Jacobson MD as PCP - General (Internal Medicine)  Dagoberto Rasmussen MD as Consulting Physician (General Surgery)    Chief complaint Left-sided weakness      Subjective .     History: Slept poorly, agitated in the night.  Very strong when he wants to be per his wife.    ROS: Short of breath, now on BiPAP with nasal mask.  No diarrhea or nausea or abdominal pain    Objective     Vital Signs   Blood pressure 143/70, pulse 95, temperature 97.1 °F (36.2 °C), temperature source Axillary, resp. rate 22, height 180.3 cm (70.98\"), weight 69.9 kg (154 lb), SpO2 91 %.    Physical Exam:              Neuro: Elderly white man sleeping with nasal   BiPAP mask.  Heart RRR, no murmurs  Lungs: Labored breathing, symmetric chest wall expansion  Abdomen soft, NT, ND with positive bowel sounds  Skin warm and dry    Results Review:              MRI images reviewed, show multiple areas of infarction including bilateral cerebellar hemispheres, bilateral occipital lobes, right thalamus and left frontal region- i.e. multiple vascular territories    Assessment/Plan     Multifocal strokes consistent with cardioembolic source-patient with possible infective carditis/possible pacemaker lead infection/high-grade enterococcus septicemia, strongly suspect septic emboli.  Plan has been for MARLON when stable enough, but per cardiology notes, long-term antibiotics is only reasonable choice.  It is not clear that patient would tolerate pacemaker extraction.  Continue to hold anticoagulation, but continue antiplatelet therapy and patient with intermittent A. fib.  Wife at bedside, discussed risk of recurrent stroke from either A. fib or recurrent septic emboli.  Discussed treatment dilemma and risk of hemorrhage with anticoagulation.  Discussed sequelae of current stroke, which would not be devastating without additional illness, but in his current state will be very difficult to recover from.  Doubt he could " tolerate any rehab.  Agree with palliative consult.        Fern Patel MD  01/07/20  4:15 PM

## 2020-01-07 NOTE — THERAPY TREATMENT NOTE
Acute Care - Speech Language Pathology   Swallow Treatment Note [facility]     Patient Name: Alessandro Mendiola  : 1940  MRN: [id number]  Today's Date: 2020               Admit Date: 2020    Visit Dx:      ICD-10-CM ICD-9-CM   1. Muscle weakness of left upper extremity M62.81 728.87   2. Impaired mobility and ADLs Z74.09 799.89   3. Oropharyngeal dysphagia R13.12 787.22   4. Dysarthria R47.1 784.51     Patient Active Problem List   Diagnosis   • Aneurysm of thoracic aorta (CMS/HCC)   • Coronary arteriosclerosis in native artery   • Paroxysmal atrial fibrillation (CMS/HCC)   • Sick sinus syndrome (CMS/HCC)   • Vitamin D deficiency   • COPD exacerbation (CMS/HCC)   • HTN (hypertension)   • Mood disorder (CMS/HCC)   • Simple chronic bronchitis (CMS/HCC)   • Muscle weakness of left upper extremity   • Aphasia       Therapy Treatment  Rehabilitation Treatment Summary     Row Name 20 1430             Treatment Time/Intention    Discipline  speech language pathologist  -MP      Document Type  therapy note (daily note)  -MP      Subjective Information  no complaints  -MP      Mode of Treatment  individual therapy;speech-language pathology  -MP      Patient/Family Observations  Spouse present  -MP      Care Plan Review  care plan/treatment goals reviewed;patient/other agree to care plan  -MP      Care Plan Review, Other Participant(s)  spouse  -MP      Therapy Frequency (Swallow)  5 days per week  -MP      Therapy Frequency (SLP SLC)  5 days per week  -MP      Patient Effort  adequate  -MP      Recorded by [MP] Estiven Conde MS CCC-SLP 20 1444      Row Name 20 1430             Pain Assessment    Additional Documentation  Pain Scale: FACES Pre/Post-Treatment (Group)  -MP      Recorded by [MP] Estiven Conde MS CCC-SLP 20 1444      Row Name 20 1430             Pain Scale: FACES Pre/Post-Treatment    Pain: FACES Scale, Pretreatment  2-->hurts little bit  -MP      Pain: FACES  Scale, Post-Treatment  2-->hurts little bit  -MP      Recorded by [MP] Estiven Conde MS CCC-SLP 01/07/20 1444      Row Name 01/07/20 1430             Outcome Summary/Treatment Plan (SLP)    Daily Summary of Progress (SLP)  progress toward functional goals as expected  -MP      Plan for Continued Treatment (SLP)  Repeat MBS tomorrow to determine any possibility of diet upgrade. Until MBS, continue dysphagia level IV diet w/ HTL, no straws.  -MP      Anticipated Dischage Disposition  home with home health;anticipate therapy at next level of care  -MP      Recorded by [MP] Estiven Conde MS CCC-SLP 01/07/20 1444        User Key  (r) = Recorded By, (t) = Taken By, (c) = Cosigned By    Initials Name Effective Dates Discipline    MP Estiven Conde MS CCC-SLP 06/19/19 -  SLP          Outcome Summary  Outcome Summary/Treatment Plan (SLP)  Daily Summary of Progress (SLP): progress toward functional goals as expected (01/07/20 1430 : Estiven Conde MS CCC-SLP)  Plan for Continued Treatment (SLP): Repeat MBS tomorrow to determine any possibility of diet upgrade. Until MBS, continue dysphagia level IV diet w/ HTL, no straws. (01/07/20 1430 : Estiven Conde MS CCC-SLP)  Anticipated Dischage Disposition: home with home health, anticipate therapy at next level of care (01/07/20 1430 : Estiven Conde MS CCC-SLP)      SLP GOALS     Row Name 01/07/20 1430 01/06/20 1115 01/04/20 1545       Oral Nutrition/Hydration Goal 1 (SLP)    Oral Nutrition/Hydration Goal 1, SLP  LTG: Pt will return to regular diet & thin liquid and tolerate w/o s/sxs aspiration w/ 100% acc w/o cues.  -MP  LTG: Pt will return to regular diet & thin liquid and tolerate w/o s/sxs aspiration w/ 100% acc w/o cues.  -RD  LTG: Pt will return to regular diet & thin liquid and tolerate w/o s/sxs aspiration w/ 100% acc w/o cues.  -SM    Time Frame (Oral Nutrition/Hydration Goal 1, SLP)  by discharge  -MP  by discharge  -RD  by discharge  -SM     Progress/Outcomes (Oral Nutrition/Hydration Goal 1, SLP)  continuing progress toward goal  -MP  continuing progress toward goal  -RD  continuing progress toward goal  -SM       Oral Nutrition/Hydration Goal 2 (SLP)    Oral Nutrition/Hydration Goal 2, SLP  Pt will tolerate trials of soft solids & honey-thick liquids w/o s/sxs aspiration w/ 100% acc w/o cues.  -MP  Pt will tolerate trials of soft solids & honey-thick liquids w/o s/sxs aspiration w/ 100% acc w/o cues.  -RD  Pt will tolerate trials of soft solids & honey-thick liquids w/o s/sxs aspiration w/ 100% acc w/o cues.  -SM    Time Frame (Oral Nutrition/Hydration Goal 2, SLP)  short term goal (STG);by discharge  -MP  short term goal (STG);by discharge  -RD  short term goal (STG)  -SM    Barriers (Oral Nutrition/Hydration Goal 2, SLP)  Tolerated trials of HTL w/ no s/sxs  -MP  Pt currently NPO for MARLON today, so unable to test PO trials. Spouse reports minimal PO intake yesterday on modified diet.   -RD  --    Progress/Outcomes (Oral Nutrition/Hydration Goal 2, SLP)  continuing progress toward goal  -MP  goal ongoing  -RD  good progress toward goal  -SM       Oral Nutrition/Hydration Goal (SLP)    Oral Nutrition/Hydration Goal, SLP  Pt will tolerate therapeutic trials of ice/thin H2O w/o s/sxs aspiration or distress w/ 70% acc w/o cues.  -MP  Pt will tolerate therapeutic trials of ice/thin H2O w/o s/sxs aspiration or distress w/ 70% acc w/o cues.  -RD  Pt will tolerate therapeutic trials of ice/thin H2O w/o s/sxs aspiration or distress w/ 70% acc w/o cues.  -SM    Time Frame (Oral Nutrition/Hydration Goal, SLP)  short term goal (STG);by discharge  -MP  short term goal (STG);by discharge  -RD  short term goal (STG)  -SM    Barriers (Oral Nutrition/Hydration Goal, SLP)  Cough @ baseline & intermittently w/ trials - difficult to determine if related to PO  -MP  unable to test 2' NPO for procedure  -RD  Intermittent coughing throughout  -SM    Progress/Outcomes (Oral  Nutrition/Hydration Goal, SLP)  continuing progress toward goal  -MP  goal ongoing  -RD  continuing progress toward goal  -SM       Lingual Strengthening Goal 1 (SLP)    Activity (Lingual Strengthening Goal 1, SLP)  increase tongue back strength  -MP  increase tongue back strength  -RD  increase tongue back strength  -SM    Increase Tongue Back Strength  lingual resistance exercises  -MP  lingual resistance exercises  -RD  lingual resistance exercises  -SM    [place]/Accuracy (Lingual Strengthening Goal 1, SLP)  with minimal cues (75-90% accuracy)  -MP  with minimal cues (75-90% accuracy)  -RD  with minimal cues (75-90% accuracy)  -SM    Time Frame (Lingual Strengthening Goal 1, SLP)  short term goal (STG);by discharge  -MP  short term goal (STG);by discharge  -RD  short term goal (STG)  -SM    Barriers (Lingual Strengthening Goal 1, SLP)  --  Reviewed lingual resistance exercises w/ forceful /k/ & /g/ words. Needed frequent cues for forceful production  -RD  --    Progress/Outcomes (Lingual Strengthening Goal 1, SLP)  goal ongoing  -MP  continuing progress toward goal  -RD  good progress toward goal  -SM       Pharyngeal Strengthening Exercise Goal 1 (SLP)    Activity (Pharyngeal Strengthening Goal 1, SLP)  increase timing;increase superior movement of the hyolaryngeal complex;increase closure at entrance to airway/closure of airway at glottis  -MP  increase timing;increase superior movement of the hyolaryngeal complex;increase closure at entrance to airway/closure of airway at glottis  -RD  increase timing;increase superior movement of the hyolaryngeal complex;increase closure at entrance to airway/closure of airway at glottis  -SM    Increase Timing  prepping - 3 second prep or suck swallow or 3-step swallow  -MP  prepping - 3 second prep or suck swallow or 3-step swallow  -RD  prepping - 3 second prep or suck swallow or 3-step swallow  -SM    Increase Superior Movement of the Hyolaryngeal Complex  effortful  pitch glide (falsetto + pharyngeal squeeze)  -MP  effortful pitch glide (falsetto + pharyngeal squeeze)  -RD  effortful pitch glide (falsetto + pharyngeal squeeze)  -SM    Increase Closure at Entrance to Airway/Closure of Airway at Glottis  supraglottic swallow  -MP  supraglottic swallow  -RD  supraglottic swallow  -SM    [place]/Accuracy (Pharyngeal Strengthening Goal 1, SLP)  with minimal cues (75-90% accuracy)  -MP  with minimal cues (75-90% accuracy)  -RD  with minimal cues (75-90% accuracy)  -SM    Time Frame (Pharyngeal Strengthening Goal 1, SLP)  short term goal (STG);by discharge  -MP  short term goal (STG);by discharge  -RD  short term goal (STG)  -SM    Barriers (Pharyngeal Strengthening Goal 1, SLP)  --  Reviewed and completed exercises. x2-3 reps each, minimal 2' pt c/o of fatigue, hunger and lethargy  -RD  Provided copy of exercises and reviewed with pt. Pt demonstrated each exercises x2-3 with min cues (mainly r/t [place]). Did not target cog-comm goals 2' emphasis of dysphagia. Simple communication needs being met.   -SM    Progress/Outcomes (Pharyngeal Strengthening Goal 1, SLP)  goal ongoing  -MP  continuing progress toward goal  -RD  good progress toward goal  -SM       Respiratory Support Goal (SLP)    Improve Respiratory Support Goal (SLP)  Pt will use shorter length of utterance per breath (2-3 words per breath) for improved speech intelligibility at sentence level w/ 80% acc w/ min cues.  -MP  Pt will use shorter length of utterance per breath (2-3 words per breath) for improved speech intelligibility at sentence level w/ 80% acc w/ min cues.  -RD  --    Time Frame (Respiratory Support Goal, SLP)  short term goal (STG);by discharge  -MP  short term goal (STG);by discharge  -RD  --    Progress (Respiratory Support Goal, SLP)  --  30%;with minimal cues (75-90%)  -RD  --    Progress/Outcomes (Respiratory Support at Paragraph Level Goal, SLP)  goal ongoing  -MP  continuing progress toward goal   -RD  --    Comment (Respiratory Support Goal, SLP)  --  Educated on use of strategy to improve intelligibility and breath support.   -RD  --       Articulation Goal 1 (SLP)    Improve Articulation Goal 1 (SLP)  by over-articulating at word level;80%;with minimal cues (75-90%)  -MP  by over-articulating at word level;80%;with minimal cues (75-90%)  -RD  --    Time Frame (Articulation Goal 1, SLP)  short term goal (STG);by discharge  -MP  short term goal (STG);by discharge  -RD  --    Progress (Articulation Goal 1, SLP)  --  20%;with minimal cues (75-90%)  -RD  --    Progress/Outcomes (Articulation Goal 1, SLP)  goal ongoing  -MP  continuing progress toward goal  -RD  --    Comment (Articulation Goal 1, SLP)  --  Continue to address. educated on strategy and how to complete, pt lethargic and did use appropriately, even when given mod cues  -RD  --       Additional Goal 1 (SLP)    Additional Goal 1, SLP  LTG: Pt will improve cognitive-communication skills in order to participate in care in hospital setting w/ 100% acc w/ min cues.  -MP  LTG: Pt will improve cognitive-communication skills in order to participate in care in hospital setting w/ 100% acc w/ min cues.  -RD  --    Time Frame (Additional Goal 1, SLP)  by discharge  -MP  by discharge  -RD  --    Progress/Outcomes (Additional Goal 1, SLP)  goal ongoing  -MP  continuing progress toward goal  -RD  --      User Key  (r) = Recorded By, (t) = Taken By, (c) = Cosigned By    Initials Name Provider Type    Marilyn Monteiro, MS CCC-SLP Speech and Language Pathologist    RD Katia Moore, MS CCC-SLP Speech and Language Pathologist    Estiven Carpenter, MS CCC-SLP Speech and Language Pathologist          EDUCATION  The patient has been educated in the following areas:   Dysphagia (Swallowing Impairment) Modified Diet Instruction.    SLP Recommendation and Plan  Daily Summary of Progress (SLP): progress toward functional goals as expected     Plan for  Continued Treatment (SLP): Repeat MBS tomorrow to determine any possibility of diet upgrade. Until MBS, continue dysphagia level IV diet w/ HTL, no straws.  Anticipated Dischage Disposition: home with home health, anticipate therapy at next level of care                    Time Calculation:   Time Calculation- SLP     Row Name 01/07/20 1447             Time Calculation- SLP    SLP Start Time  1430  -MP      SLP Received On  01/07/20  -        User Key  (r) = Recorded By, (t) = Taken By, (c) = Cosigned By    Initials Name Provider Type    Estiven Carpenter, MS CCC-SLP Speech and Language Pathologist          Therapy Charges for Today     Code Description Service Date Service Provider Modifiers Qty    [id number] HC ST TREATMENT SWALLOW 3 1/7/2020 Estiven Conde MS CCC-SLP GN 1                 Estiven Conde MS CCC-KENJI  1/7/2020

## 2020-01-07 NOTE — PROGRESS NOTES
"[facility] Progress Note     LOS: 3 days   Patient Care Team:  Silvio Jacobson MD as PCP - General (Internal Medicine)  Dagoberto Rasmussen MD as Consulting Physician (General Surgery)  PCP:  Silvio Jacobson MD    Chief Complaint:  Afib, sss s/p ppm, possible endocarditis, cardioembolic cva    SUBJECTIVE:   MARLON was deferred yesterday d/t hypoxia requiring high flow O2.  Restless night.  Sundowning vs agitation unable to rest trying to get out of bed incessantly last pm per wife.  Had head MRI this am      OBJECTIVE:    Vital Sign Min/Max for last 24 hours  Temp  Min: 97.7 °F (36.5 °C)  Max: 100.7 °F (38.2 °C)   BP  Min: 123/58  Max: 143/64   Pulse  Min: 66  Max: 98   Resp  Min: 16  Max: 30   SpO2  Min: 89 %  Max: 100 %   Flow (L/min)  Min: 50  Max: 60   No data recorded     Flowsheet Rows      First Filed Value   Admission Height  180.3 cm (70.98\") Documented at 01/02/2020 1813   Admission Weight  69.9 kg (154 lb 1.6 oz) Documented at 01/02/2020 1813          Telemetry: sr w pac/pvc      Intake/Output Summary (Last 24 hours) at 1/7/2020 0824  Last data filed at 1/7/2020 0533  Gross per 24 hour   Intake 518 ml   Output 300 ml   Net 218 ml     Intake & Output (last 3 days)       01/04 0701 - 01/05 0700 01/05 0701 - 01/06 0700 01/06 0701 - 01/07 0700 01/07 0701 - 01/08 0700    P.O. 360  418     IV Piggyback 300 200 100     Total Intake(mL/kg) 660 (9.4) 200 (2.9) 518 (7.4)     Urine (mL/kg/hr)   300 (0.2)     Total Output   300     Net +660 +200 +218             Urine Unmeasured Occurrence 2 x 1 x 4 x            Physical Exam:  Physical Exam  Awake high flow O2  Restless  irreg s1s2 hsm  Chest diffuse crackle /rhonchi  abd mild distension, nontender  Ext 1+ edema     LABS/DIAGNOSTIC DATA:  Results from last 7 days   Lab Units 01/05/20  0633 01/03/20  0719 01/02/20  1344   WBC 10*3/mm3 8.51 8.88 9.75   HEMOGLOBIN g/dL 7.4* 7.9* 9.4*   HEMATOCRIT % 27.1* 27.7* 32.7*   PLATELETS " 10*3/mm3 154 155 166     Lab Results   Lab Value Date/Time    TROPONINT <0.010 01/02/2020 1344    TROPONINT <0.010 12/11/2019 0933    TROPONINT <0.010 09/14/2019 1148     Results from last 7 days   Lab Units 01/02/20  1344   INR  2.13*   APTT seconds 50.0*     Results from last 7 days   Lab Units 01/05/20  2224 01/05/20  0632 01/04/20  0438 01/03/20  0719 01/02/20  1344   SODIUM mmol/L  --   --  140 139 139   POTASSIUM mmol/L 3.7 3.3* 3.5 3.4* 3.7   CHLORIDE mmol/L  --   --  99 98 96*   CO2 mmol/L  --   --  29.0 31.0* 28.1   BUN mg/dL  --   --  15 13 13   CREATININE mg/dL  --   --  0.88 0.77 0.76   CALCIUM mg/dL  --   --  8.8 8.7 9.1   BILIRUBIN mg/dL  --   --   --  0.3 0.4   ALK PHOS U/L  --   --   --  112 130*   ALT (SGPT) U/L  --   --   --  8 11   AST (SGOT) U/L  --   --   --  16 23   GLUCOSE mg/dL  --   --  136* 133* 193*     Results from last 7 days   Lab Units 01/03/20  0719   HEMOGLOBIN A1C % 6.30*     Results from last 7 days   Lab Units 01/03/20  0719   CHOLESTEROL mg/dL 118   TRIGLYCERIDES mg/dL 76   HDL CHOL mg/dL 36*   LDL CHOL mg/dL 67     Results from last 7 days   Lab Units 01/03/20  0719   TSH uIU/mL 0.824           Medication Review:     ampicillin 2 g Intravenous Q4H   aspirin 324 mg Oral Daily   atorvastatin 80 mg Oral Nightly   azelastine 2 spray Each Nare BID   budesonide-formoterol 2 puff Inhalation BID   cefTRIAXone 2 g Intravenous Q12H   cetirizine 10 mg Oral Daily   clopidogrel 75 mg Oral Daily   cycloSPORINE 1 drop Both Eyes BID   docusate sodium 100 mg Oral BID   docusate sodium 100 mg Oral BID   dronedarone 400 mg Oral BID   heparin (porcine) 5,000 Units Subcutaneous Q8H   pantoprazole 40 mg Oral QAM   polyethylene glycol 17 g Oral BID   primidone 50 mg Oral BID   roflumilast 500 mcg Oral Daily   sertraline 50 mg Oral Daily   sodium chloride 10 mL Intravenous Q12H   tamsulosin 0.4 mg Oral Nightly              Muscle weakness of left upper extremity    Aneurysm of thoracic aorta  (CMS/HCC)    Coronary arteriosclerosis in native artery    Paroxysmal atrial fibrillation (CMS/HCC)    Sick sinus syndrome (CMS/HCC)    COPD exacerbation (CMS/HCC)    Aphasia      ASSESSMENT/PLAN:  Suspected Ischemic CVA  - neuro following  - L sided weakness  - awaiting MRI d/t ppm  - continue DAPT    Enterococcus Bacteremia  - ID following, abx per ID  - CT AP shows splenic infarct further supporting concern for endocarditis and possible cardiogenic cva d/t endocarditis   - MARLON deferred as would require intubation  to tolerate    Rhinovirus Infection    PAF  SSS  - S/p [facility] ppm  - holding Xarelto     HTN  - allowing for permissive HTN    HLD  - atorvastatin     End Stage COPD  - baseline 4-5L O2 via nc    Anemia- acutely worsened w concomitant 50% splenic infarct    Dismal prognosis as will likely need ventilation in short order.  Status of no CPR reiterated.  Splenic infarct complicates clinical scenario.  Question if shower embolization of vegetation occurred as was on NOAC at time of issue.  Not candidate for device extraction/ valvular surgery irrespective of future MARLON findings so long term antibiotics only reasonable choice at current.      Electronically signed by Bessy Roy PA-C, 01/07/20, 8:25 AM.

## 2020-01-07 NOTE — CONSULTS
NN spoke with pt and wife at BS.  O2 sat 92% on 6L NC.  Discussion about inpatient rehab.  Pt agreeable at this point.  Relayed to primary RN and CM.  No new questions or concerns at this time.  NN will continue to follow.

## 2020-01-07 NOTE — PROGRESS NOTES
Continued Stay Note  [facility]     Patient Name: Alessandro Mendiola  MRN: [id number]  Today's Date: 1/7/2020    Admit Date: 1/2/2020    Discharge Plan     Row Name 01/07/20 0926       Plan    Plan  [facility]    Patient/Family in Agreement with Plan  yes    Plan Comments  Spoke to wife at bedside. Patient is off the floor. Plan is [facility] at discharge. CM will continue to follow.    Final Discharge Disposition Code  03 - skilled nursing facility (SNF)        Discharge Codes    No documentation.             Flaco Starks RN

## 2020-01-08 NOTE — PROGRESS NOTES
"    [facility]  PROGRESS NOTE    Patient Name: Alessandro Mendiola  : 1940  MRN: [id number]    Date of Admission: 2020  Primary Care Physician: Silvio Jacobson MD    Subjective   Subjective     CC:     Sent from [facility] with left-sided weakness    HPI:   Work of breathing up.  He does not want to be intubated or kept on \"life support\".  Wife in room.  He says MRI was bad experience.  Reports of him refusing to use bipap.  Discussed palliative which he agreed to    Review of Systems    Gen- No fevers, chills  CV- No chest pain, palpitations  Resp- No cough, dyspnea  GI- No N/V/D, abd pain    Objective   Objective     Vital Signs:   Temp:  [97.1 °F (36.2 °C)-100.7 °F (38.2 °C)] 97.1 °F (36.2 °C)  Heart Rate:  [] 101  Resp:  [16-30] 21  BP: (123-143)/(58-93) 143/70  Total (NIH Stroke Scale): 7     Physical Exam:    Constitutional: No acute distress, awake, alert  HENT: NCAT, no JVD  Respiratory: increased work of breathing, junky breath sounds bilaterally  Cardiovascular: RRR, s1 and s2  Gastrointestinal: Positive bowel sounds, soft, nontender, obese abdomen with abdominal breathing  Musculoskeletal: No bilateral ankle edema  Psychiatric: Appropriate affect, cooperative  Neurologic: Oriented x 3, right-sided weakness  Skin: dry, + skin tenting    Results Reviewed:    Results from last 7 days   Lab Units 20  0633 20  0719 20  1344   WBC 10*3/mm3 8.51 8.88 9.75   HEMOGLOBIN g/dL 7.4* 7.9* 9.4*   HEMATOCRIT % 27.1* 27.7* 32.7*   PLATELETS 10*3/mm3 154 155 166   INR   --   --  2.13*     Results from last 7 days   Lab Units 20  2224 20  0632 20  0438 20  0719 20  1344   SODIUM mmol/L  --   --  140 139 139   POTASSIUM mmol/L 3.7 3.3* 3.5 3.4* 3.7   CHLORIDE mmol/L  --   --  99 98 96*   CO2 mmol/L  --   --  29.0 31.0* 28.1   BUN mg/dL  --   --  15 13 13   CREATININE mg/dL  --   --  0.88 0.77 0.76   GLUCOSE " mg/dL  --   --  136* 133* 193*   CALCIUM mg/dL  --   --  8.8 8.7 9.1   ALT (SGPT) U/L  --   --   --  8 11   AST (SGOT) U/L  --   --   --  16 23   TROPONIN T ng/mL  --   --   --   --  <0.010     Estimated Creatinine Clearance: 69.5 mL/min (by C-G formula based on SCr of 0.88 mg/dL).    Microbiology Results Abnormal     Procedure Component Value - Date/Time    Blood Culture - Blood, Arm, Left [[id number]] Collected:  01/05/20 1421    Lab Status:  Preliminary result Specimen:  Blood from Arm, Left Updated:  01/07/20 1600     Blood Culture No growth at 2 days    Blood Culture - Blood, Arm, Right [[id number]] Collected:  01/05/20 1421    Lab Status:  Preliminary result Specimen:  Blood from Arm, Right Updated:  01/07/20 1600     Blood Culture No growth at 2 days    Respiratory Panel, PCR - Swab, Nasopharynx [[id number]]  (Abnormal) Collected:  01/05/20 1250    Lab Status:  Final result Specimen:  Swab from Nasopharynx Updated:  01/05/20 1419     ADENOVIRUS, PCR Not Detected     Coronavirus 229E Not Detected     Coronavirus HKU1 Not Detected     Coronavirus NL63 Not Detected     Coronavirus OC43 Not Detected     Human Metapneumovirus Not Detected     Human Rhinovirus/Enterovirus Detected     Influenza B PCR Not Detected     Parainfluenza Virus 1 Not Detected     Parainfluenza Virus 2 Not Detected     Parainfluenza Virus 3 Not Detected     Parainfluenza Virus 4 Not Detected     Bordetella pertussis pcr Not Detected     Influenza A H1 2009 PCR Not Detected     Chlamydophila pneumoniae PCR Not Detected     Mycoplasma pneumo by PCR Not Detected     Influenza A PCR Not Detected     Influenza A H3 Not Detected     Influenza A H1 Not Detected     RSV, PCR Not Detected     Bordetella parapertussis PCR Not Detected    Blood Culture - Blood, Arm, Right [[id number]]  (Abnormal) Collected:  01/03/20 1506    Lab Status:  Final result Specimen:  Blood from Arm, Right Updated:  01/05/20 1303     Blood Culture Enterococcus faecalis      Comment: Refer to previous blood culture collected on 01/02 at 2025 for CASEY's.          Gram Stain Aerobic Bottle Gram positive cocci in pairs and chains      Anaerobic Bottle Gram positive cocci in pairs and chains    Blood Culture - Blood, Arm, Left [[id number]]  (Abnormal) Collected:  01/03/20 1506    Lab Status:  Final result Specimen:  Blood from Arm, Left Updated:  01/05/20 1302     Blood Culture Enterococcus faecalis     Comment: Refer to previous blood culture collected on 01/02 at 2025 for CASEY's.            Gram Stain Aerobic Bottle Gram positive cocci in pairs and chains      Anaerobic Bottle Gram positive cocci in pairs and chains    Blood Culture - Blood, Arm, Right [[id number]]  (Abnormal) Collected:  01/02/20 2019    Lab Status:  Final result Specimen:  Blood from Arm, Right Updated:  01/05/20 0606     Blood Culture Enterococcus faecalis     Comment: Refer to previous blood culture collected on 01/02 at 2025 for CASEY's.           Gram Stain Aerobic Bottle Gram positive cocci in pairs and chains      Anaerobic Bottle Gram positive cocci in pairs and chains    Blood Culture - Blood, Arm, Left [[id number]]  (Abnormal)  (Susceptibility) Collected:  01/02/20 2025    Lab Status:  Final result Specimen:  Blood from Arm, Left Updated:  01/05/20 0605     Blood Culture Enterococcus faecalis     Gram Stain Aerobic Bottle Gram positive cocci in pairs and chains      Anaerobic Bottle Gram positive cocci in pairs and chains    Susceptibility      Enterococcus faecalis     CASEY     Ampicillin Susceptible     Gentamicin High Level Synergy Susceptible     Vancomycin Susceptible                    Blood Culture ID, PCR - Blood, Arm, Left [[id number]]  (Abnormal) Collected:  01/02/20 2025    Lab Status:  Final result Specimen:  Blood from Arm, Left Updated:  01/03/20 1447     BCID, PCR Enterococcus spp, not VRE. Identification by BCID PCR.          Imaging Results (Last 24 Hours)     Procedure Component Value Units Date/Time     MRI Brain Without Contrast [[id number]] Collected:  01/07/20 1057     Updated:  01/07/20 1135    Narrative:       EXAMINATION: MRI BRAIN WO CONTRAST-     INDICATION: Stroke; M62.81-Muscle weakness (generalized); Z74.09-Other  reduced mobility; R13.12-Dysphagia, oropharyngeal phase;  R47.1-Dysarthria and anarthria.      TECHNIQUE: Sagittal and axial images of brain are displayed. No  intravenous contrast was utilized.     COMPARISON: None.     FINDINGS: There are several areas of abnormal signal consistent with  acute or subacute infarcts. The areas involved include a large lesion in  the right cerebellum, smaller lesions in the leftward aspect of the  cerebellum, a 1 cm abnormality in the right thalamic region, small areas  of abnormal signal in the occipital lobes, and a small area in the left  parietal convexity. There is also a punctate area of abnormal signal in  the leftward posterior aspect of the lary.       Impression:       Multifocal acute or subacute infarcts as described above,  most significant which appear to include the right thalamic region and  the cerebellar hemispheres, right greater than left.      D:  01/07/2020  E:  01/07/2020     This report was finalized on 1/7/2020 11:31 AM by Dr. Lukasz Galvez MD.       XR Chest 1 View [[id number]] Collected:  01/07/20 0045     Updated:  01/07/20 0047    Narrative:       CR Chest 1 Vw 1/7/2020    INDICATION:   Chest and abdomen pain and fever today. Aspiration pneumonia. Dysphagia. Benign essential hypertension and atrial fibrillation. COPD.     COMPARISON:    1/2/2020    FINDINGS:  Single portable AP view(s) of the chest.    Support lines and tubes are unchanged.    The heart is enlarged but stable. The lungs are hyperinflated with emphysematous and fibrotic changes characteristic of COPD. Atelectasis or infiltrate medial left lower lobe. Discoid atelectasis or linear fibrosis right base. There are no pleural  effusions. No pneumothorax.       Impression:        Cardiomegaly and COPD. Minimal atelectasis or infiltrate medial left lower lobe.    Signer Name: Jared Lal MD   Signed: 1/7/2020 12:45 AM   Workstation Name: [id number]    [facility]          Results for orders placed during the hospital encounter of 01/02/20   Adult Transthoracic Echo Complete W/ Cont if Necessary Per Protocol (With Agitated Saline)    Narrative · Left ventricular systolic function is normal. Estimated EF appears to be   in the range of 61 - 65%.  · Left ventricular wall thickness is consistent with borderline concentric   hypertrophy.  · Electronic leads present right side of the heart  · Mild MAC is present. Mild mitral valve regurgitation is present  · Mild dilation of the aortic root is present. Measures 4.1 cm  · No valvular vegetations visualized on this study, however valves not   well seen  · Bubble study nondiagnostic due to poor visualization          I have reviewed the medications:  Scheduled Meds:    ampicillin 2 g Intravenous Q4H   aspirin 324 mg Oral Daily   atorvastatin 80 mg Oral Nightly   azelastine 2 spray Each Nare BID   budesonide-formoterol 2 puff Inhalation BID   cefTRIAXone 2 g Intravenous Q12H   cetirizine 10 mg Oral Daily   clopidogrel 75 mg Oral Daily   cycloSPORINE 1 drop Both Eyes BID   dronedarone 400 mg Oral BID   heparin (porcine) 5,000 Units Subcutaneous Q8H   pantoprazole 40 mg Oral QAM   primidone 50 mg Oral BID   roflumilast 500 mcg Oral Daily   sennosides-docusate 2 tablet Oral BID   sertraline 50 mg Oral Daily   sodium chloride 10 mL Intravenous Q12H   tamsulosin 0.4 mg Oral Nightly     Continuous Infusions:   PRN Meds:.•  acetaminophen **OR** acetaminophen **OR** acetaminophen  •  ipratropium  •  ipratropium-albuterol  •  ondansetron  •  potassium chloride **OR** potassium chloride **OR** potassium chloride  •  QUEtiapine  •  sodium chloride      Assessment/Plan   Assessment & Plan     Active Hospital Problems    Diagnosis   POA   • **Muscle weakness of left upper extremity [M62.81]  Clinically Undetermined   • Aphasia [R47.01]  Clinically Undetermined   • COPD exacerbation (CMS/HCC) [J44.1]  Yes   • 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        Brief Hospital Course to date:  Alessandro Mendiola is a 79 y.o. male transferred from outside hospital due to concerns about infective endocarditis and possible pacemaker infection.  He has bacteremia.  High-grade enterococcus septicemia    Acute Respiratory Distress  - BiPAP for work of breathing  - he says he does not want to be on life support  - palliative evaluation  Left-sided weakness  -Concern for endocarditis  -MARLON planned  -Working on MRI  -Holding Xarelto  -Current antiplatelet therapy  Thoracic aortic aneurysm  - we may have a better look with a MARLON  Sleep disorder  -Clear if he has undiagnosed sleep apnea  -High risk for delirium  -Would benefit from BiPAP therapy  -Working on interface  Enterococcus bacteremia  -deferred MARLON for now  SSS  -Pacemaker  End-stage COPD  -minimize oxygen therapy so it is not counter productive  -Too much oxygen may suppress breathing  Atrial fibrillation  -Chronic anticoagulation  Rhinovirus  -Precautions  -Rhinovirus picked up on respiratory panel    Discussed case with Dr. Patel face to face.  Difficult situation.  Seen by Cardiology and cannot do MARLON with current respiratory status    Palliative Consulted    High Complexity Case    DVT Prophylaxis: Heparin    Disposition: I expect the patient to be discharged TBD    CODE STATUS:   Code Status and Medical Interventions:   Ordered at: 01/02/20 1916     Limited Support to NOT Include:    Intubation    Cardioversion/Defibrillation     Level Of Support Discussed  With:    Patient     Code Status:    No CPR     Medical Interventions (Level of Support Prior to Arrest):    Limited     Electronically signed by Mario Donato MD, 01/07/20, 8:11 PM.

## 2020-01-08 NOTE — SIGNIFICANT NOTE
Notified by nurse of increased ectopy on tele monitor, ordered EKG along with BMP, Mg, CBC; EKG showing AFIB, rate on monitor running 110's; looking over chart, pt has a history of AFIB and is on cardizem at home; pt is unable to take oral meds at this time, will start cardizem IV drip if sustains HR > 120; awaiting labs to see if electrolyte replacement is needed.

## 2020-01-08 NOTE — SIGNIFICANT NOTE
01/08/20 0844   SLP Deferred Reason   SLP Deferred Reason Unable to evaluate, medical status change  (Pt not appropriate for further dysphagia evaluation this AM per RN. Currently on BiPAP and lethargic. Will hold MBS and check back this PM. Otherwise, defer SLP at this time. Notify if any change in status.)

## 2020-01-08 NOTE — PLAN OF CARE
Problem: Patient Care Overview  Goal: Interprofessional Rounds/Family Conf  Flowsheets (Taken 1/8/2020 1300)  Summary: new palliative consult. Met with wife Madelyn and 3 children, dtr in law and grandkids. Family verbalize decline and wife asked how long pt has to live. Discussed that with pt's decline in abiliyt to eat and drink in addition to decline in respiratory status estimated days. Family wants to continue current level of care. Pt denies pain,dyspnea, anxiety, and nausea although pt is grimacing, using abd muscles to breathe and restless. prn meds ordered for comfort. continue goals discussion with family.  Note:   Palliative Team meeting 1300: Deric Gloria DO, Claudia HERNANDEZ, Barb Jaramillo RN, CHPN, Lisa Corral RN CHPN, Jenelle CONNELLYW, Crystal Bobo RN, CHPN, Trinidad Ventura RN     Problem: Palliative Care (Adult)  Intervention: Support/Optimize Psychosocial Response  Flowsheets (Taken 1/8/2020 1220)  Supportive Measures: decision-making supported; active listening utilized; goal setting facilitated; self-responsibility promoted; self-reflection promoted; self-care encouraged; verbalization of feelings encouraged  Family/Support System Care: caregiver stress acknowledged; involvement promoted; presence promoted; self-care encouraged; support provided

## 2020-01-08 NOTE — CONSULTS
NN spoke with wife and family at .  Family has decided after speaking to physicians to make patient palliative care.  O2 sat 95% on 50% HFNC.  Family states no needs at this time.  NN will continue to follow.

## 2020-01-08 NOTE — PLAN OF CARE
Problem: Patient Care Overview  Goal: Plan of Care Review  Outcome: Ongoing (interventions implemented as appropriate)  Flowsheets  Taken 1/8/2020 0968  Plan of Care Reviewed With: patient;spouse;daughter;family  Taken 1/8/2020 8376  Outcome Summary: pt remains is not alert, purposeful movement/localizing to pain, responds to repeated stimulation. has been on high-flow nasal cannula at 50% oxygen and 50 liters. SpO2 remains greater than 90%. morphine given x 3 for labored breathing. appears to be effective. turned q 2 hr. incontinent of bladder. family remains at bedside.

## 2020-01-08 NOTE — PLAN OF CARE
Pt SPO2 75-80% on BiPap with nose piece, switched pt to face mask and turned O2 up to 55% then SPO2 came up to 92%. Pt had a long run of SVT. On the monitor pt is sinus arrhythmia in the 120's throwing frequest multifocal PVCs. Provider was notified. EKG ordered, showed A. Fib. Cardizem drip was ordered if HR remained above 120. As of now HR is varying from . Will continue to monitor.

## 2020-01-08 NOTE — CONSULTS
Silvio Jacobson MD  Consulting physician: Mario Donato  Reason for referral: goc discussion  No chief complaint on file.    HPI:   79 y.o. 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 Patient has been managed for acute hypoxic respiratory failure, infective endocarditis, enterococcus septicemia, acute exacerbation COPD, A fib, large splenic infarct and multi cardio embolic septic stroke.   1/7/2020 MRI brain w/o contrast showed multifocal acute or subacute infarcts.  Symptoms:   Patient able to answer yes/no questions minimally after BiPAP removed.   Denies any pain, dyspnea, anxiety or nausea.   Family concerned because patient keeps moving his RLE , like something is bothering him  Baseline functional status, patient was dependent in all ADLs and only moving from bed to chair primarily.  Was not able to stand for any bathing, wife giving sponge bath at home.   Advance care planning discussed: yes  Code Status:   Current Code Status     Date Active Code Status Order ID Comments User Context       1/2/2020 1916 No CPR [id number]  Stacey Padilla APRN Inpatient       Questions for Current Code Status     Question Answer Comment    Code Status No CPR     Medical Interventions (Level of Support Prior to Arrest) Limited     Limited Support to NOT Include Intubation      Cardioversion/Defibrillation     Level Of Support Discussed With Patient         Advance Directive: none on medical record  Surrogate decision maker: wifeMadelyn  Past Medical History:   Diagnosis Date   • Alcohol abuse    • Aneurysm of thoracic aorta (CMS/HCC)    • Anxiety    • Ascending aortic aneurysm (CMS/HCC)     4.8 to 4.9 cm    • Atherosclerotic heart disease    • Atrial fibrillation (CMS/HCC)    • Blood thinned due to long-term anticoagulant use    •  Body mass index (BMI) 20.0-20.9, adult    • CAD (coronary artery disease)    • Cancer (CMS/HCC)     skin   • Cardiac pacemaker    • Cellulitis    • Chronic bronchitis (CMS/HCC)    • Chronic cough    • COPD (chronic obstructive pulmonary disease) (CMS/HCC)    • Depression    • Difficulty breathing    • Emphysema lung (CMS/HCC)    • Encounter for long-term current use of medication    • Encounter for screening for malignant neoplasm of prostate    • Fatigue    • Fingertip amputation    • TAMIKO (generalized anxiety disorder)    • History of alcohol abuse    • History of cardiac catheterization    • History of chest x-ray 02/11/2014    Chronic interstitial changes seen throughout the lung fields w/ no radiographic evidence of acute parenchymal disease.No definite LT-sided rib fracture present.   • History of chest x-ray 01/10/2014    No acute cardiopulmonary process.Dual lead LT subclavian pacemaker is in place.Aortic ectasia. Hyperinflation of lungs, suggesting COPD   • History of chest x-ray 10/01/2012    Ill-defined RT middle lobe opacity which likley reflects a pneumonia. FU to radiographic resolution is recommended.   • History of Doppler echocardiogram    • History of echocardiogram 09/26/2013    EF 55-60%. Mod concentric LVH. Abnormal LT VT diastolic filling is observed, consistent w/ impaired relaxation.Mild MR/TR. Trace MI. RVSP 44 mmHg.   • History of stress test     ECG performed   • HTN (hypertension)    • HX: long term anticoagulant use     Blood thinned due to long-term anticoagulant use (V58.61) (Z79.01)   • Hypercholesterolemia    • LVH (left ventricular hypertrophy)    • Mitral and aortic valve disease    • Myocardial infarction (CMS/HCC)    • Nonvenomous insect bite of multiple sites    • Onychomycosis    • Pacemaker at end of battery life    • Pneumonia    • Pneumothorax    • Pre-syncope    • Pulmonary nodule    • Rib pain on left side    • Sick sinus syndrome (CMS/HCC)    • SOB (shortness of breath)     • Upper respiratory infection    • Vitamin D deficiency      Past Surgical History:   Procedure Laterality Date   • AMPUTATION  08/31/2015    metacarpal and index finger   • CARDIAC PACEMAKER PLACEMENT  06/01/2008   • CATARACT EXTRACTION     • CORONARY STENT PLACEMENT      History of Cath Placement Of Stent 1  · Coronary stents   • EYE SURGERY     • SKIN CANCER EXCISION         Reviewed current scheduled and prn medications for route, type, dose and frequency.    Current Facility-Administered Medications   Medication Dose Route Frequency Provider Last Rate Last Dose   • acetaminophen (TYLENOL) tablet 650 mg  650 mg Oral Q4H PRN Stacey Padilla APRN   650 mg at 01/07/20 0136    Or   • acetaminophen (TYLENOL) 160 MG/5ML solution 650 mg  650 mg Oral Q4H PRN Stacey Padilla APRN   649.6 mg at 01/04/20 0025    Or   • acetaminophen (TYLENOL) suppository 650 mg  650 mg Rectal Q4H PRN Stacey Padilla APRN       • ampicillin 2 g/100 mL 0.9% NS (MBP)  2 g Intravenous Q4H Cortes Vidal APRN   2 g at 01/08/20 0938   • aspirin chewable tablet 324 mg  324 mg Oral Daily Mario Donato MD   324 mg at 01/07/20 1030   • atorvastatin (LIPITOR) tablet 80 mg  80 mg Oral Nightly Stacey Padilla APRN   80 mg at 01/07/20 2041   • azelastine (ASTELIN) nasal spray 2 spray  2 spray Each Nare BID Stacey Padilla APRN   2 spray at 01/07/20 1044   • budesonide-formoterol (SYMBICORT) 80-4.5 MCG/ACT inhaler 2 puff  2 puff Inhalation BID Stacey Padilla APRN   2 puff at 01/07/20 1937   • cefTRIAXone (ROCEPHIN) 2 g/100 mL 0.9% NS VTB (SUSAN)  2 g Intravenous Q12H Cortes Vidal APRN   2 g at 01/08/20 0434   • cetirizine (zyrTEC) tablet 10 mg  10 mg Oral Daily Iris Hsu MD   10 mg at 01/07/20 1031   • clopidogrel (PLAVIX) tablet 75 mg  75 mg Oral Daily Fern Patel MD   75 mg at 01/07/20 1030   • cycloSPORINE (RESTASIS) 0.05 % ophthalmic emulsion 1 drop  1 drop Both Eyes BID Stacey Padilla, APRN   1  drop at 01/07/20 2041   • dilTIAZem (CARDIZEM) 125 mg in 125 mL 0.7% sodium chloride (1 mg/mL) infusion  5-15 mg/hr Intravenous Titrated Erika Angel APRN   Stopped at 01/08/20 0936   • dronedarone (MULTAQ) tablet 400 mg  400 mg Oral BID Stacey Padilla APRN   400 mg at 01/07/20 1405   • haloperidol lactate (HALDOL) injection 1 mg  1 mg Intravenous Q6H PRN Erika Angel APRN   1 mg at 01/07/20 2221   • heparin (porcine) 5000 UNIT/ML injection 5,000 Units  5,000 Units Subcutaneous Q8H Iris Hsu MD   5,000 Units at 01/08/20 0436   • ipratropium (ATROVENT) nebulizer solution 0.5 mg  500 mcg Nebulization 4x Daily PRN Stacey Padilla APRN   0.5 mg at 01/06/20 1008   • ipratropium-albuterol (DUO-NEB) nebulizer solution 3 mL  3 mL Nebulization Q6H PRN Chiquita Mcmahan APRN   3 mL at 01/07/20 1219   • ondansetron (ZOFRAN) injection 4 mg  4 mg Intravenous Q6H PRN Georgina Pompa II DO   4 mg at 01/07/20 1030   • pantoprazole (PROTONIX) EC tablet 40 mg  40 mg Oral QAM Staecy Padilla APRN   Stopped at 01/08/20 0700   • potassium chloride (MICRO-K) CR capsule 40 mEq  40 mEq Oral PRN Mario Donato MD   40 mEq at 01/05/20 1818    Or   • potassium chloride (KLOR-CON) packet 40 mEq  40 mEq Oral PRN Mario Donato MD        Or   • potassium chloride 10 mEq in 100 mL IVPB  10 mEq Intravenous Q1H PRN Mario Donato MD       • primidone (MYSOLINE) tablet 50 mg  50 mg Oral BID Stacey Padilla APRN   50 mg at 01/07/20 1406   • QUEtiapine (SEROquel) tablet 25 mg  25 mg Oral Nightly PRN Fern Patel MD   25 mg at 01/06/20 2128   • roflumilast (DALIRESP) tablet 500 mcg  500 mcg Oral Daily Stacey Padilla APRN   500 mcg at 01/07/20 1406   • sennosides-docusate (PERICOLACE) 8.6-50 MG per tablet 2 tablet  2 tablet Oral BID Mario Donato MD   2 tablet at 01/07/20 1407   • sertraline (ZOLOFT) tablet 50 mg  50 mg Oral Daily Stacey Padilla APRN   50 mg at 01/07/20 1030   • sodium chloride 0.9 % flush 10  "mL  10 mL Intravenous Q12H Stacey Padilla APRN   10 mL at 20 0937   • sodium chloride 0.9 % flush 10 mL  10 mL Intravenous PRN Stacey Padilla APRN       • tamsulosin (FLOMAX) 24 hr capsule 0.4 mg  0.4 mg Oral Nightly Stacey Padilla APRN   0.4 mg at 20       dilTIAZem 5-15 mg/hr Last Rate: Stopped (20 0936)     •  acetaminophen **OR** acetaminophen **OR** acetaminophen  •  haloperidol lactate  •  ipratropium  •  ipratropium-albuterol  •  ondansetron  •  potassium chloride **OR** potassium chloride **OR** potassium chloride  •  QUEtiapine  •  sodium chloride  Allergies   Allergen Reactions   • Doxycycline Shortness Of Breath     Family History   Problem Relation Age of Onset   • Coronary artery disease Other    • Diabetes Other    • Hypertension Other      Social History     Socioeconomic History   • Marital status:      Spouse name: Not on file   • Number of children: Not on file   • Years of education: Not on file   • Highest education level: Not on file   Tobacco Use   • Smoking status: Former Smoker     Packs/day: 3.00     Years: 45.00     Pack years: 135.00     Last attempt to quit: 2008     Years since quittin.0   • Smokeless tobacco: Never Used   Substance and Sexual Activity   • Alcohol use: No   • Drug use: No   • Sexual activity: Defer       Review of Systems - +/- per HPI and symptom review.    PPS: 10%  /70 (BP Location: Right arm, Patient Position: Lying)   Pulse 95   Temp 97.5 °F (36.4 °C) (Axillary)   Resp 20   Ht 180.3 cm (70.98\")   Wt 69.9 kg (154 lb)   SpO2 99%   BMI 21.49 kg/m²   69.9 kg (154 lb) Body mass index is 21.49 kg/m².  Intake & Output (last day)       701 -  -  07    P.O. 0     IV Piggyback      Total Intake(mL/kg) 0 (0)     Urine (mL/kg/hr)      Total Output      Net 0           Urine Unmeasured Occurrence 3 x 1 x          Physical Exam:  General Appearance: No acute distress, ill appearing, " frail  Head: Normocephalic without obvious abnormality, atraumatic  Eyes: Conjunctivae and sclerae normal, no icterus, LUPE  Throat: No oral lesions, no thrush, oral mucosa dry  Lungs: Clear to auscultation, respirations regular, even and non-labored  Heart: Regular rhythm and normal rate, normal S1  Abdomen: Normal bowel sounds, soft, non-distended, non-tender  Extremities: no redness, no cyanosis, no edema  Pulses: Distal pulses palpable and equal bilaterally  Skin: Warm and dry  Neurological: keeps eyes closed, answers yes/no questions minimally no myoclonus, moves Right side extremities  Reviewed labs and diagnostic results.  Lab Results   Component Value Date    HGBA1C 6.30 (H) 01/03/2020     Results from last 7 days   Lab Units 01/08/20  0714   WBC 10*3/mm3 8.12   HEMOGLOBIN g/dL 7.8*   HEMATOCRIT % 28.0*   PLATELETS 10*3/mm3 241     Results from last 7 days   Lab Units 01/08/20  0714  01/03/20  0719   SODIUM mmol/L 144   < > 139   POTASSIUM mmol/L 3.5   < > 3.4*   CHLORIDE mmol/L 97*   < > 98   CO2 mmol/L 32.0*   < > 31.0*   BUN mg/dL 12   < > 13   CREATININE mg/dL 0.76   < > 0.77   CALCIUM mg/dL 8.7   < > 8.7   BILIRUBIN mg/dL  --   --  0.3   ALK PHOS U/L  --   --  112   ALT (SGPT) U/L  --   --  8   AST (SGOT) U/L  --   --  16   GLUCOSE mg/dL 143*   < > 133*    < > = values in this interval not displayed.     Results from last 7 days   Lab Units 01/08/20  0714   SODIUM mmol/L 144   POTASSIUM mmol/L 3.5   CHLORIDE mmol/L 97*   CO2 mmol/L 32.0*   BUN mg/dL 12   CREATININE mg/dL 0.76   GLUCOSE mg/dL 143*   CALCIUM mg/dL 8.7     Imaging Results (Last 72 Hours)     Procedure Component Value Units Date/Time    MRI Brain Without Contrast [[id number]] Collected:  01/07/20 1057     Updated:  01/07/20 1135    Narrative:       EXAMINATION: MRI BRAIN WO CONTRAST-     INDICATION: Stroke; M62.81-Muscle weakness (generalized); Z74.09-Other  reduced mobility; R13.12-Dysphagia, oropharyngeal phase;  R47.1-Dysarthria and  anarthria.      TECHNIQUE: Sagittal and axial images of brain are displayed. No  intravenous contrast was utilized.     COMPARISON: None.     FINDINGS: There are several areas of abnormal signal consistent with  acute or subacute infarcts. The areas involved include a large lesion in  the right cerebellum, smaller lesions in the leftward aspect of the  cerebellum, a 1 cm abnormality in the right thalamic region, small areas  of abnormal signal in the occipital lobes, and a small area in the left  parietal convexity. There is also a punctate area of abnormal signal in  the leftward posterior aspect of the lary.       Impression:       Multifocal acute or subacute infarcts as described above,  most significant which appear to include the right thalamic region and  the cerebellar hemispheres, right greater than left.      D:  01/07/2020  E:  01/07/2020     This report was finalized on 1/7/2020 11:31 AM by Dr. Lukasz Galvez MD.       XR Chest 1 View [[id number]] Collected:  01/07/20 0045     Updated:  01/07/20 0047    Narrative:       CR Chest 1 Vw 1/7/2020    INDICATION:   Chest and abdomen pain and fever today. Aspiration pneumonia. Dysphagia. Benign essential hypertension and atrial fibrillation. COPD.     COMPARISON:    1/2/2020    FINDINGS:  Single portable AP view(s) of the chest.    Support lines and tubes are unchanged.    The heart is enlarged but stable. The lungs are hyperinflated with emphysematous and fibrotic changes characteristic of COPD. Atelectasis or infiltrate medial left lower lobe. Discoid atelectasis or linear fibrosis right base. There are no pleural  effusions. No pneumothorax.       Impression:       Cardiomegaly and COPD. Minimal atelectasis or infiltrate medial left lower lobe.    Signer Name: Jared Lal MD   Signed: 1/7/2020 12:45 AM   Workstation Name: [id number]    [facility]    CT Abdomen Pelvis With & Without Contrast [[id number]] Collected:  01/05/20 7904      Updated:  01/06/20 0955    Narrative:       EXAMINATION: CT ABDOMEN/PELVIS WWO CONTRAST - 01/05/2020      INDICATION: Abdominal pain and fever.     TECHNIQUE: 5 mm post IV contrast portal venous phase and delayed venous  phase images through the abdomen and pelvis.     The radiation dose reduction device was turned on for each scan per the  ALARA (As Low as Reasonably Achievable) protocol.     COMPARISON: No previous abdominal CT scan studies.     FINDINGS: Mild right basilar interstitial changes appear to represent  some subpleural scarring or atelectasis rather than pneumonia. There is  trace left basilar pleural thickening and fluid. There is trace  pericardial effusion.     Dense material in the dependent portion of the gallbladder presumably  represents microlithiasis. No gallbladder inflammatory changes are  identified. There is mild fatty liver change. Pancreas, adrenal glands,  and left kidney appear unremarkable. There appears to be a small right  upper pole renal cyst, otherwise unremarkable right kidney. There is  dense streak artifact from extensive retained barium in the right colon.     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  There is minimal associated perisplenic fat stranding, no evidence of  hematoma and no evidence of drainable fluid collection. No upper  abdominal inflammatory focus or free air is seen. Bowel loops are  nondistended.     Regarding the lower abdomen and pelvis, no mass, adenopathy, ascites or  acute inflammatory change is seen. Bladder is nondistended. Delayed  images show contrast opacification of normal caliber renal collecting  systems, ureters, and bladder. Bony structures appear grossly intact.       Impression:       1. Normal-sized spleen but with evidence of splenic infarct involving up  to 50% of  the spleen. Mild associated perisplenic fat stranding.  2. Gallstones.  3. Retained barium in the right colon which limits fine detail in the  right mid abdomen. No definite acute inflammatory focus is seen here or  elsewhere, however.     DICTATED:   01/05/2020  EDITED/ls :   01/05/2020         This report was finalized on 1/6/2020 9:52 AM by Dr. Martin Soni MD.         ECHO: 1/3/2020  Interpretation Summary     · Left ventricular systolic function is normal. Estimated EF appears to be in the range of 61 - 65%.  · Left ventricular wall thickness is consistent with borderline concentric hypertrophy.  · Electronic leads present right side of the heart  · Mild MAC is present. Mild mitral valve regurgitation is present  · Mild dilation of the aortic root is present. Measures 4.1 cm  · No valvular vegetations visualized on this study, however valves not well seen  · Bubble study nondiagnostic due to poor visualization       Impression: 79 y.o. male with enterococcus septicemia, acute exacerbation COPD, acute hypoxia respiratory failure   Plan:   Dyspnea, restlessness - alternate between HFNC and BiPAP to provide an opportunity for mouth care and any po intake.   Prn morphine iv    Anxiety - wife reports patient does not like BiPAP mask, makes him feel claustrophobic   Prn lorazepam 0.25mg, low dose    Goals of care - patient was able to respond to young grandsons at bedside after BiPAP removed.    Wife, Madelyn, two daughters and son with his family at the bedside, as well two co workers from [employer].    Wife has some difficulty with hearing.   Discussed current clinical status with increase oxygen supplement required, diminished po intake, decrease wakefulness and using prn meds to assist with management of symptoms.   Wife and family are intermittently tearful and have the recognition that the patient may be nearing the end of his life, especially with minimal po intake and if he requires to be on BiPAP support.     Continue  current plan of care and medical management.     Palliative care provide support to patient and family.    Claudia James, DAVID  [phone]  01/08/20  10:16 AM      Time: 60  minutes spent reviewing medical and medication records, assessing and examining patient, discussing with patient, family and nursing staff, answering questions, formulating a plan and documentation of care. > 50% time spent face to face

## 2020-01-08 NOTE — PROGRESS NOTES
"INFECTIOUS DISEASE CONSULT/INITIAL HOSPITAL VISIT    Alessandro Mendiola  1940  [id number]    Date of Consult: 1/8/2020    Admission Date: 1/2/2020      Requesting Provider: Georgina Pompa II, DO  Evaluating Physician: Piyush Elliott MD    Reason for Consultation: Enterococcus bacteremia    Chief complaint: Left upper extremity weakness    Current antimicrobial therapy: Vancomycin IV    History of present illness:    Alessandro Mendiola is a 79 y.o. male being evaluated for enterococcus bacteremia.  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  He presented to the ED yesterday with complaints of left upper extremity weakness and speech difficulties.  Per documentation, family states that the patient went to the restroom after getting up out of bed on the morning of 1/2 and upon returning and sitting down in his recliner he was unable to hold objects in his left hand and drop them.  Then he stated to his wife that his left arm felt \"weak, numb and tingly\".  Wife also noted that his speech was a little slurred and called her son to help get the patient to the ER ([facility]). Work-up in the ED showed a CT of the head without acute infarct and a chest x-ray with no acute cardiopulmonary illness.  NIHSS on arrival to ED was 9 and then 3 prior to transfer to [facility] for higher level of care.    Since arrival here patient has had a T-max of 100.6 and has been hemodynamically stable.  Labs show he is without leukocytosis with a white blood cell count of 8.88 and a neutrophilia of 76.9%.  Most recent BUN/creatinine is 15 and 0.88 with a GFR of 84.  1/2 blood cultures are positive in 4/4 bottles for enterococcus not VRE identified by bio fire report.  Repeat blood cultures on 1/3 are currently " positive and 1/4 bottles for gram-positive cocci in pairs and chains.    He has a listed allergy to doxycycline with shortness of breath listed as a reaction and is currently receiving vancomycin IV.  ID has been consulted for further evaluation and treatment as well as antimicrobial therapy management.    Of note: TTE was performed yesterday and is currently pending radiologist read.  Preliminary results show no vegetations and some TR.    The patient has experienced a 30 lbs weight loss over the last several months. He has additionally been perisistently nauseated. No abdominal pain. No bloody BMs. Last colonoscopy was about 3 years ago per patient and was ok.    Subjective:  1/5/20: CT abdomen and pelvis was done today showing a large splenic infarct but no other intra-abdominal pathology.  Cardiology is evaluating the patient and recommends a MARLON which I agree with. Tmax was 99.6°F overnight.  White count remained stable at 8.5.  Enterococcus was susceptible to ampicillin.     1/6/20: somewhat more short of breath today. On HFNC. NO diarrhea or nausea. tmax 100.8F. RPP +rhionovirus.     1/7/20: Still short of breath and on BiPAP in his room.  MARLON has been postponed given his respiratory issues.  MRI brain showed signs of multiple acute/subacute infarcts.  The patient denies any diarrhea or nausea.  No abdominal pain. Tmax was 100.7F. Repeat blood cultures from 1/5 are no growth to date.    1/8/20: Patient has clinically declined.  Respiratory status declining and on high flow nasal cannula.  Patient is not awake or alert or responding to commands.  No fevers over the last 24 hours.  Blood cultures from 1/5 remain no growth to date.  Family is considering comfort measures.  Palliative is talking to the family. Patient's wife discusses with me and states that she does not want any aggressive measures but does want to continue antibiotics for now.    Past Medical History:   Diagnosis Date   • Alcohol abuse    •  Aneurysm of thoracic aorta (CMS/HCC)    • Anxiety    • Ascending aortic aneurysm (CMS/HCC)     4.8 to 4.9 cm    • Atherosclerotic heart disease    • Atrial fibrillation (CMS/HCC)    • Blood thinned due to long-term anticoagulant use    • Body mass index (BMI) 20.0-20.9, adult    • CAD (coronary artery disease)    • Cancer (CMS/HCC)     skin   • Cardiac pacemaker    • Cellulitis    • Chronic bronchitis (CMS/HCC)    • Chronic cough    • COPD (chronic obstructive pulmonary disease) (CMS/HCC)    • Depression    • Difficulty breathing    • Emphysema lung (CMS/HCC)    • Encounter for long-term current use of medication    • Encounter for screening for malignant neoplasm of prostate    • Fatigue    • Fingertip amputation    • TAMIKO (generalized anxiety disorder)    • History of alcohol abuse    • History of cardiac catheterization    • History of chest x-ray 02/11/2014    Chronic interstitial changes seen throughout the lung fields w/ no radiographic evidence of acute parenchymal disease.No definite LT-sided rib fracture present.   • History of chest x-ray 01/10/2014    No acute cardiopulmonary process.Dual lead LT subclavian pacemaker is in place.Aortic ectasia. Hyperinflation of lungs, suggesting COPD   • History of chest x-ray 10/01/2012    Ill-defined RT middle lobe opacity which likley reflects a pneumonia. FU to radiographic resolution is recommended.   • History of Doppler echocardiogram    • History of echocardiogram 09/26/2013    EF 55-60%. Mod concentric LVH. Abnormal LT VT diastolic filling is observed, consistent w/ impaired relaxation.Mild MR/TR. Trace GA. RVSP 44 mmHg.   • History of stress test     ECG performed   • HTN (hypertension)    • HX: long term anticoagulant use     Blood thinned due to long-term anticoagulant use (V58.61) (Z79.01)   • Hypercholesterolemia    • LVH (left ventricular hypertrophy)    • Mitral and aortic valve disease    • Myocardial infarction (CMS/HCC)    • Nonvenomous insect bite of  multiple sites    • Onychomycosis    • Pacemaker at end of battery life    • Pneumonia    • Pneumothorax    • Pre-syncope    • Pulmonary nodule    • Rib pain on left side    • Sick sinus syndrome (CMS/HCC)    • SOB (shortness of breath)    • Upper respiratory infection    • Vitamin D deficiency        Past Surgical History:   Procedure Laterality Date   • AMPUTATION  2015    metacarpal and index finger   • CARDIAC PACEMAKER PLACEMENT  2008   • CATARACT EXTRACTION     • CORONARY STENT PLACEMENT      History of Cath Placement Of Stent 1  · Coronary stents   • EYE SURGERY     • SKIN CANCER EXCISION         Family History   Problem Relation Age of Onset   • Coronary artery disease Other    • Diabetes Other    • Hypertension Other        Social History     Socioeconomic History   • Marital status:      Spouse name: Not on file   • Number of children: Not on file   • Years of education: Not on file   • Highest education level: Not on file   Tobacco Use   • Smoking status: Former Smoker     Packs/day: 3.00     Years: 45.00     Pack years: 135.00     Last attempt to quit: 2008     Years since quittin.0   • Smokeless tobacco: Never Used   Substance and Sexual Activity   • Alcohol use: No   • Drug use: No   • Sexual activity: Defer       Allergies   Allergen Reactions   • Doxycycline Shortness Of Breath         Medication:    Current Facility-Administered Medications:   •  acetaminophen (TYLENOL) tablet 650 mg, 650 mg, Oral, Q4H PRN, 650 mg at 20 0136 **OR** acetaminophen (TYLENOL) 160 MG/5ML solution 650 mg, 650 mg, Oral, Q4H PRN, 649.6 mg at 20 0025 **OR** acetaminophen (TYLENOL) suppository 650 mg, 650 mg, Rectal, Q4H PRN, Stacey Padilla APRN  •  ampicillin 2 g/100 mL 0.9% NS (MBP), 2 g, Intravenous, Q4H, Cortes Vidal APRN, 2 g at 20 1703  •  aspirin chewable tablet 324 mg, 324 mg, Oral, Daily, Mario Donato MD, 324 mg at 20 1030  •  atorvastatin (LIPITOR)  tablet 80 mg, 80 mg, Oral, Nightly, Stacey Padilla APRN, 80 mg at 01/07/20 2041  •  azelastine (ASTELIN) nasal spray 2 spray, 2 spray, Each Nare, BID, Stacey Padilla APRN, 2 spray at 01/07/20 1044  •  bisacodyl (DULCOLAX) suppository 10 mg, 10 mg, Rectal, Daily PRN, Claudia James APRN  •  budesonide-formoterol (SYMBICORT) 80-4.5 MCG/ACT inhaler 2 puff, 2 puff, Inhalation, BID, Stacey Padilla APRN, 2 puff at 01/07/20 1937  •  cefTRIAXone (ROCEPHIN) 2 g/100 mL 0.9% NS VTB (SUSAN), 2 g, Intravenous, Q12H, Cortes Vidal APRN, 2 g at 01/08/20 1753  •  cetirizine (zyrTEC) tablet 10 mg, 10 mg, Oral, Daily, Iris Hsu MD, 10 mg at 01/07/20 1031  •  clopidogrel (PLAVIX) tablet 75 mg, 75 mg, Oral, Daily, Fern Patel MD, 75 mg at 01/07/20 1030  •  cycloSPORINE (RESTASIS) 0.05 % ophthalmic emulsion 1 drop, 1 drop, Both Eyes, BID, Stacey Padilla APRN, 1 drop at 01/07/20 2041  •  dilTIAZem (CARDIZEM) 125 mg in 125 mL 0.7% sodium chloride (1 mg/mL) infusion, 5-15 mg/hr, Intravenous, Titrated, Erika Angel APRN, Stopped at 01/08/20 0936  •  dronedarone (MULTAQ) tablet 400 mg, 400 mg, Oral, BID, Stacey Padilla APRN, 400 mg at 01/07/20 1405  •  haloperidol lactate (HALDOL) injection 1 mg, 1 mg, Intravenous, Q6H PRN, Erika Angel APRN, 1 mg at 01/07/20 2221  •  heparin (porcine) 5000 UNIT/ML injection 5,000 Units, 5,000 Units, Subcutaneous, Q8H, Iris Hsu MD, 5,000 Units at 01/08/20 1425  •  ipratropium (ATROVENT) nebulizer solution 0.5 mg, 500 mcg, Nebulization, 4x Daily PRN, Stacey Padilla, APRN, 0.5 mg at 01/06/20 1008  •  ipratropium-albuterol (DUO-NEB) nebulizer solution 3 mL, 3 mL, Nebulization, Q6H PRN, Chiquita Mcmahan APRN, 3 mL at 01/07/20 1219  •  LORazepam (ATIVAN) injection 0.25 mg, 0.25 mg, Intravenous, Q4H PRN, Claudia James APRN  •  morphine injection 1 mg, 1 mg, Intravenous, Q2H PRN, Claudia James APRN, 1 mg at 01/08/20 1712  •  ondansetron (ZOFRAN)  injection 4 mg, 4 mg, Intravenous, Q6H PRN, Georgina Pompa II, DO, 4 mg at 01/07/20 1030  •  palliative care oral rinse, , Mouth/Throat, 4x Daily, Claudia James, APRN  •  pantoprazole (PROTONIX) EC tablet 40 mg, 40 mg, Oral, QAM, Stacey Padilla APRN, Stopped at 01/08/20 0700  •  potassium chloride (MICRO-K) CR capsule 40 mEq, 40 mEq, Oral, PRN, 40 mEq at 01/05/20 1818 **OR** potassium chloride (KLOR-CON) packet 40 mEq, 40 mEq, Oral, PRN **OR** potassium chloride 10 mEq in 100 mL IVPB, 10 mEq, Intravenous, Q1H PRN, Mario Donato MD  •  primidone (MYSOLINE) tablet 50 mg, 50 mg, Oral, BID, Stacey Padilla APRN, 50 mg at 01/07/20 1406  •  QUEtiapine (SEROquel) tablet 25 mg, 25 mg, Oral, Nightly PRN, Fern Patel MD, 25 mg at 01/06/20 2128  •  roflumilast (DALIRESP) tablet 500 mcg, 500 mcg, Oral, Daily, Stacey Padilla APRN, 500 mcg at 01/07/20 1406  •  sennosides-docusate (PERICOLACE) 8.6-50 MG per tablet 2 tablet, 2 tablet, Oral, BID, Mario Donato MD, 2 tablet at 01/07/20 1407  •  sertraline (ZOLOFT) tablet 50 mg, 50 mg, Oral, Daily, Stacey Padilla APRN, 50 mg at 01/07/20 1030  •  sodium chloride 0.9 % flush 10 mL, 10 mL, Intravenous, Q12H, Stacey Padilla APRN, 10 mL at 01/08/20 0937  •  sodium chloride 0.9 % flush 10 mL, 10 mL, Intravenous, PRN, Stacey Padilla, APRN  •  tamsulosin (FLOMAX) 24 hr capsule 0.4 mg, 0.4 mg, Oral, Nightly, Stacey Padilla APRN, 0.4 mg at 01/06/20 2128    Antibiotics:  Anti-Infectives (From admission, onward)    Ordered     Dose/Rate Route Frequency Start Stop    01/04/20 1451  ampicillin 2 g/100 mL 0.9% NS (MBP)     Tiff Chambers RPH reviewed the order on 01/06/20 1533.   Ordering Provider:  Cortes Vidal APRN    2 g  over 30 Minutes Intravenous Every 4 Hours 01/04/20 1600 01/12/20 1359    01/04/20 1451  cefTRIAXone (ROCEPHIN) 2 g/100 mL 0.9% NS VTB (SUSAN)     Tiff Chambers RPH reviewed the order on 01/06/20 1533.   Ordering Provider:   MarcyCortes APRN    2 g  over 30 Minutes Intravenous Every 12 Hours 20 1600 20 0559    20 1502  vancomycin 1750 mg/500 mL 0.9% NS IVPB ([facility])     Ordering Provider:  Lis Romero RPH    1,750 mg  over 120 Minutes Intravenous Once 20 1600 20 5914            Review of Systems:  As above        Physical Exam:   Vital Signs  Temp (24hrs), Av.9 °F (36.6 °C), Min:97.5 °F (36.4 °C), Max:98.6 °F (37 °C)    Temp  Min: 97.5 °F (36.4 °C)  Max: 98.6 °F (37 °C)  BP  Min: 123/58  Max: 145/65  Pulse  Min: 83  Max: 101  Resp  Min: 18  Max: 22  SpO2  Min: 92 %  Max: 99 %    GENERAL: appears critically ill and not responsive.  HEENT: Normocephalic, atraumatic.  No conjunctival injection. No icterus.  Chest: PPM pocket over left chest wall is without erythema  HEART: RRR; No murmur, rubs, gallops.   LUNGS: coarse breath sounds anteriorly. Very labored breathing on HFNC  ABDOMEN: Soft, nontender, nondistended. Positive bowel sounds.  EXT:  No cyanosis, clubbing or edema. No cord.  :  Without Denny catheter.  MSK: FROM without joint effusions noted arms/legs.    SKIN: Warm and dry without cutaneous eruptions on Inspection/palpation.    NEURO: obtunded.unable to fully assess      Laboratory Data    Results from last 7 days   Lab Units 20  0714 20  0633 20  0719   WBC 10*3/mm3 8.12 8.51 8.88   HEMOGLOBIN g/dL 7.8* 7.4* 7.9*   HEMATOCRIT % 28.0* 27.1* 27.7*   PLATELETS 10*3/mm3 241 154 155     Results from last 7 days   Lab Units 20  0714   SODIUM mmol/L 144   POTASSIUM mmol/L 3.5   CHLORIDE mmol/L 97*   CO2 mmol/L 32.0*   BUN mg/dL 12   CREATININE mg/dL 0.76   GLUCOSE mg/dL 143*   CALCIUM mg/dL 8.7     Results from last 7 days   Lab Units 20  0719   ALK PHOS U/L 112   BILIRUBIN mg/dL 0.3   ALT (SGPT) U/L 8   AST (SGOT) U/L 16                         Estimated Creatinine Clearance: 76.5 mL/min (by C-G formula based on SCr of 0.76  mg/dL).      Microbiology:  Microbiology Results (last 10 days)     Procedure Component Value - Date/Time    Blood Culture - Blood, Arm, Left [[id number]] Collected:  01/05/20 1421    Lab Status:  Preliminary result Specimen:  Blood from Arm, Left Updated:  01/08/20 1600     Blood Culture No growth at 3 days    Blood Culture - Blood, Arm, Right [[id number]] Collected:  01/05/20 1421    Lab Status:  Preliminary result Specimen:  Blood from Arm, Right Updated:  01/08/20 1601     Blood Culture No growth at 3 days    Respiratory Panel, PCR - Swab, Nasopharynx [[id number]]  (Abnormal) Collected:  01/05/20 1250    Lab Status:  Final result Specimen:  Swab from Nasopharynx Updated:  01/05/20 1419     ADENOVIRUS, PCR Not Detected     Coronavirus 229E Not Detected     Coronavirus HKU1 Not Detected     Coronavirus NL63 Not Detected     Coronavirus OC43 Not Detected     Human Metapneumovirus Not Detected     Human Rhinovirus/Enterovirus Detected     Influenza B PCR Not Detected     Parainfluenza Virus 1 Not Detected     Parainfluenza Virus 2 Not Detected     Parainfluenza Virus 3 Not Detected     Parainfluenza Virus 4 Not Detected     Bordetella pertussis pcr Not Detected     Influenza A H1 2009 PCR Not Detected     Chlamydophila pneumoniae PCR Not Detected     Mycoplasma pneumo by PCR Not Detected     Influenza A PCR Not Detected     Influenza A H3 Not Detected     Influenza A H1 Not Detected     RSV, PCR Not Detected     Bordetella parapertussis PCR Not Detected    Blood Culture - Blood, Arm, Left [[id number]]  (Abnormal) Collected:  01/03/20 1506    Lab Status:  Final result Specimen:  Blood from Arm, Left Updated:  01/05/20 1302     Blood Culture Enterococcus faecalis     Comment: Refer to previous blood culture collected on 01/02 at 2025 for CASEY's.            Gram Stain Aerobic Bottle Gram positive cocci in pairs and chains      Anaerobic Bottle Gram positive cocci in pairs and chains    Blood Culture - Blood, Arm, Right  [[id number]]  (Abnormal) Collected:  01/03/20 1506    Lab Status:  Final result Specimen:  Blood from Arm, Right Updated:  01/05/20 1303     Blood Culture Enterococcus faecalis     Comment: Refer to previous blood culture collected on 01/02 at 2025 for CASEY's.          Gram Stain Aerobic Bottle Gram positive cocci in pairs and chains      Anaerobic Bottle Gram positive cocci in pairs and chains    Blood Culture - Blood, Arm, Left [[id number]]  (Abnormal)  (Susceptibility) Collected:  01/02/20 2025    Lab Status:  Final result Specimen:  Blood from Arm, Left Updated:  01/05/20 0605     Blood Culture Enterococcus faecalis     Gram Stain Aerobic Bottle Gram positive cocci in pairs and chains      Anaerobic Bottle Gram positive cocci in pairs and chains    Susceptibility      Enterococcus faecalis     CASEY     Ampicillin Susceptible     Gentamicin High Level Synergy Susceptible     Vancomycin Susceptible                    Blood Culture ID, PCR - Blood, Arm, Left [[id number]]  (Abnormal) Collected:  01/02/20 2025    Lab Status:  Final result Specimen:  Blood from Arm, Left Updated:  01/03/20 1447     BCID, PCR Enterococcus spp, not VRE. Identification by BCID PCR.    Blood Culture - Blood, Arm, Right [[id number]]  (Abnormal) Collected:  01/02/20 2019    Lab Status:  Final result Specimen:  Blood from Arm, Right Updated:  01/05/20 0606     Blood Culture Enterococcus faecalis     Comment: Refer to previous blood culture collected on 01/02 at 2025 for CASEY's.           Gram Stain Aerobic Bottle Gram positive cocci in pairs and chains      Anaerobic Bottle Gram positive cocci in pairs and chains              Radiology:  Imaging Results (Last 72 Hours)     Procedure Component Value Units Date/Time    MRI Brain Without Contrast [[id number]] Collected:  01/07/20 1057     Updated:  01/07/20 1135    Narrative:       EXAMINATION: MRI BRAIN WO CONTRAST-     INDICATION: Stroke; M62.81-Muscle weakness (generalized); Z74.09-Other  reduced  mobility; R13.12-Dysphagia, oropharyngeal phase;  R47.1-Dysarthria and anarthria.      TECHNIQUE: Sagittal and axial images of brain are displayed. No  intravenous contrast was utilized.     COMPARISON: None.     FINDINGS: There are several areas of abnormal signal consistent with  acute or subacute infarcts. The areas involved include a large lesion in  the right cerebellum, smaller lesions in the leftward aspect of the  cerebellum, a 1 cm abnormality in the right thalamic region, small areas  of abnormal signal in the occipital lobes, and a small area in the left  parietal convexity. There is also a punctate area of abnormal signal in  the leftward posterior aspect of the lary.       Impression:       Multifocal acute or subacute infarcts as described above,  most significant which appear to include the right thalamic region and  the cerebellar hemispheres, right greater than left.      D:  01/07/2020  E:  01/07/2020     This report was finalized on 1/7/2020 11:31 AM by Dr. Lukasz Galvez MD.       XR Chest 1 View [[id number]] Collected:  01/07/20 0045     Updated:  01/07/20 0047    Narrative:       CR Chest 1 Vw 1/7/2020    INDICATION:   Chest and abdomen pain and fever today. Aspiration pneumonia. Dysphagia. Benign essential hypertension and atrial fibrillation. COPD.     COMPARISON:    1/2/2020    FINDINGS:  Single portable AP view(s) of the chest.    Support lines and tubes are unchanged.    The heart is enlarged but stable. The lungs are hyperinflated with emphysematous and fibrotic changes characteristic of COPD. Atelectasis or infiltrate medial left lower lobe. Discoid atelectasis or linear fibrosis right base. There are no pleural  effusions. No pneumothorax.       Impression:       Cardiomegaly and COPD. Minimal atelectasis or infiltrate medial left lower lobe.    Signer Name: Jared Lal MD   Signed: 1/7/2020 12:45 AM   Workstation Name: [id number]    [facility]    CT Abdomen  Pelvis With & Without Contrast [[id number]] Collected:  01/05/20 1250     Updated:  01/06/20 0955    Narrative:       EXAMINATION: CT ABDOMEN/PELVIS WWO CONTRAST - 01/05/2020      INDICATION: Abdominal pain and fever.     TECHNIQUE: 5 mm post IV contrast portal venous phase and delayed venous  phase images through the abdomen and pelvis.     The radiation dose reduction device was turned on for each scan per the  ALARA (As Low as Reasonably Achievable) protocol.     COMPARISON: No previous abdominal CT scan studies.     FINDINGS: Mild right basilar interstitial changes appear to represent  some subpleural scarring or atelectasis rather than pneumonia. There is  trace left basilar pleural thickening and fluid. There is trace  pericardial effusion.     Dense material in the dependent portion of the gallbladder presumably  represents microlithiasis. No gallbladder inflammatory changes are  identified. There is mild fatty liver change. Pancreas, adrenal glands,  and left kidney appear unremarkable. There appears to be a small right  upper pole renal cyst, otherwise unremarkable right kidney. There is  dense streak artifact from extensive retained barium in the right colon.     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  There is minimal associated perisplenic fat stranding, no evidence of  hematoma and no evidence of drainable fluid collection. No upper  abdominal inflammatory focus or free air is seen. Bowel loops are  nondistended.     Regarding the lower abdomen and pelvis, no mass, adenopathy, ascites or  acute inflammatory change is seen. Bladder is nondistended. Delayed  images show contrast opacification of normal caliber renal collecting  systems, ureters, and bladder. Bony structures appear grossly intact.       Impression:       1.  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 Mild associated perisplenic fat stranding.  2. Gallstones.  3. Retained barium in the right colon which limits fine detail in the  right mid abdomen. No definite acute inflammatory focus is seen here or  elsewhere, however.     DICTATED:   01/05/2020  EDITED/ls :   01/05/2020         This report was finalized on 1/6/2020 9:52 AM by Dr. Martin Soni MD.         read his x-ray from 1/7/2020-minute some mild atelectasis but no consolidation noted.  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 Patient unable to tolerate MARLON.  Not currently a candidate for pacemaker extraction given his clinical condition.  Family is discussing with palliative care and considering comfort measures given decline in his respiratory status and mental status.    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  -- Aphasia  -- Hemiparesis  ----Nausea  ----significant weight loss  -- COPD exacerbation  ----acute hypoxic respiratory failure/atelectasis-worse  --- Acute encephalopathy-possibly related to COPD versus infection versus CNS etiology.  At risk for CNS bleed/mycotic aneurysm in the setting of his likely embolic strokes from infective endocarditis.  -- Aneurysm of thoracic aorta  -- PAF  -- Sick sinus syndrome status post permanent cardiac pacemaker  ---Rhinovirus infection   ---microcytic anemia- we'll need to monitor especially given splenic infarct. Fairly stable since 1/3    PLAN/RECOMMENDATIONS:   Thank you for asking us to see  Alessandro Mendiola, I recommend the following:    ----ampicillin 2g q 4 hours and rocephin 2g iv q 12 hours (for synergy)  -- Continue to follow cultures. Repeat blood cultures from 1/5 are no growth to date  -- unable to obtain MARLON given his poor clinical condition.  Additionally given he may be made comfort care will hold off on any aggressive measures at this time  Per the family's wishes.  ---I agree with palliative care consultation and I do think that the patient would be a good candidate for comfort care/inpatient hospice.    Long discussion with the patient's family including his wife today.    Very complex case requiring high level of medical decision making. Patient has significant risk for further morbidity    Overall prognosis is very poor    I discussed with Dr. Donato today      Piyush Elliott MD  1/8/2020  6:51 PM

## 2020-01-08 NOTE — PLAN OF CARE
Problem: Palliative Care (Adult)  Goal: Identify Related Risk Factors and Signs and Symptoms  Flowsheets (Taken 1/8/2020 1220)  Palliative Care: Related Risk Factors: injury (severe and disabling); worsening symptoms  Palliative Care: Signs and Symptoms: fatigue; confusion; breathlessness; anxiety; loss of appetite; pain     Problem: Breathing Pattern Ineffective (Adult)  Goal: Identify Related Risk Factors and Signs and Symptoms  Outcome: Outcome(s) achieved  Flowsheets (Taken 1/8/2020 1220)  Related Risk Factors (Breathing Pattern Ineffective): fatigue; infection  Signs and Symptoms (Breathing Pattern Ineffective): accessory muscle use; appetite change; breathing pattern altered; restlessness; cognition impaired; retractions

## 2020-01-08 NOTE — DISCHARGE PLACEMENT REQUEST
"Alessandro Mendiola (79 y.o. Male)   Humza Starks, RN Case Manager  [phone]    Date of Birth Social Security Number Address Home Phone MRN    1940  [address] [place] [id number] [phone] [id number]    Jain Marital Status          U[place]        Admission Date Admission Type Admitting Provider Attending Provider Department, Room/Bed    20 Urgent Mario Donato MD Schnell, Aaron, MD [facility] 3F, S313/1    Discharge Date Discharge Disposition Discharge Destination                       Attending Provider:  Mario Donato MD    Allergies:  Doxycycline    Isolation:  Droplet   Infection:  Rhinovirus  (20)   Code Status:  No CPR    Ht:  180.3 cm (70.98\")   Wt:  69.9 kg (154 lb)    Admission Cmt:  None   Principal Problem:  Muscle weakness of left upper extremity [M62.81]                 Active Insurance as of 2020     Primary Coverage     Payor Plan Insurance Group Employer/Plan Group    HUMANA MEDICARE REPLACEMENT HUMANA MEDICARE REPLACEMENT [id number]     Payor Plan Address Payor Plan Phone Number Payor Plan Fax Number Effective Dates    [address] [address] [phone]  2013 - None Entered    [facility] [id number]       Subscriber Name Subscriber Birth Date Member ID       ALESSANDRO MENDIOLA 1940 [id number]                 Emergency Contacts      (Rel.) Home Phone Work Phone Mobile Phone    Madelyn Mendiola (Spouse) [phone] -- --               History & Physical      Georgina Pompa II DO at 20 1821              [facility] Medicine Services  HISTORY AND PHYSICAL    Patient Name: Alessandro Mendiola  : 1940  MRN: [id number]  Primary Care Physician: Silvio Jacobson MD  Date of admission: 2020      Subjective   Subjective     Chief Complaint:  Left upper extremity weakness     HPI:  Alessandro Mendiola is a 79 y.o. 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  Patient's wife helps with history, stating that the patient got out of bed and went to the restroom and when he came back to sit down in his recliner he was unable to hold different objects with his left hand, dropping them.  Then he noted to her that his left arm felt weak a little numb and tingly.  Wife noticed that the patient's speech was slurred and called her son to help get patient to the ER. In ED, labs mostly unremarkable. CT head without showed no acute infarct, and CXR with no acute cardiopulmonary illness. NIHSS on arrival to ED was 9, then 3 prior to transfer to [facility] for higher level of care. Patient will be admitted to the hospital medicine group for further evaluation and treatment.     Review of Systems   Constitutional: Positive for activity change. Negative for appetite change, chills, diaphoresis, fatigue, fever and unexpected weight change.   HENT: Positive for hearing loss, trouble swallowing and voice change. Negative for congestion, dental problem, drooling, facial swelling and mouth sores.    Eyes: Negative for photophobia and visual disturbance.   Respiratory: Positive for cough, shortness of breath and wheezing. Negative for choking and chest tightness.    Cardiovascular: Negative for chest pain, palpitations and leg swelling.   Gastrointestinal: Negative for abdominal distention, abdominal pain, constipation, diarrhea, nausea and vomiting.   Endocrine: Negative for cold intolerance and heat intolerance.   Genitourinary: Negative for difficulty urinating, dysuria and flank pain.   Musculoskeletal: Positive for gait problem. Negative for arthralgias, back pain, joint swelling and myalgias.   Skin: Negative for color change, pallor, rash and wound.   Neurological: Positive for  speech difficulty, weakness and numbness. Negative for dizziness, tremors, syncope, facial asymmetry, light-headedness and headaches.   Hematological: Negative for adenopathy. Bruises/bleeds easily.   Psychiatric/Behavioral: Negative for agitation, behavioral problems and confusion. The patient is not nervous/anxious.         All other systems reviewed and are negative.     Personal History     Past Medical History:   Diagnosis Date   • Alcohol abuse    • Aneurysm of thoracic aorta (CMS/HCC)    • Anxiety    • Ascending aortic aneurysm (CMS/HCC)     4.8 to 4.9 cm    • Atherosclerotic heart disease    • Atrial fibrillation (CMS/HCC)    • Blood thinned due to long-term anticoagulant use    • Body mass index (BMI) 20.0-20.9, adult    • CAD (coronary artery disease)    • Cancer (CMS/HCC)     skin   • Cardiac pacemaker    • Cellulitis    • Chronic bronchitis (CMS/HCC)    • Chronic cough    • COPD (chronic obstructive pulmonary disease) (CMS/HCC)    • Depression    • Difficulty breathing    • Emphysema lung (CMS/HCC)    • Encounter for long-term current use of medication    • Encounter for screening for malignant neoplasm of prostate    • Fatigue    • Fingertip amputation    • TAMIKO (generalized anxiety disorder)    • History of alcohol abuse    • History of cardiac catheterization    • History of chest x-ray 02/11/2014    Chronic interstitial changes seen throughout the lung fields w/ no radiographic evidence of acute parenchymal disease.No definite LT-sided rib fracture present.   • History of chest x-ray 01/10/2014    No acute cardiopulmonary process.Dual lead LT subclavian pacemaker is in place.Aortic ectasia. Hyperinflation of lungs, suggesting COPD   • History of chest x-ray 10/01/2012    Ill-defined RT middle lobe opacity which likley reflects a pneumonia. FU to radiographic resolution is recommended.   • History of Doppler echocardiogram    • History of echocardiogram 09/26/2013    EF 55-60%. Mod concentric LVH.  Abnormal LT VT diastolic filling is observed, consistent w/ impaired relaxation.Mild MR/TR. Trace VA. RVSP 44 mmHg.   • History of stress test     ECG performed   • HTN (hypertension)    • HX: long term anticoagulant use     Blood thinned due to long-term anticoagulant use (V58.61) (Z79.01)   • Hypercholesterolemia    • LVH (left ventricular hypertrophy)    • Mitral and aortic valve disease    • Myocardial infarction (CMS/HCC)    • Nonvenomous insect bite of multiple sites    • Onychomycosis    • Pacemaker at end of battery life    • Pneumonia    • Pneumothorax    • Pre-syncope    • Pulmonary nodule    • Rib pain on left side    • Sick sinus syndrome (CMS/HCC)    • SOB (shortness of breath)    • Upper respiratory infection    • Vitamin D deficiency        Past Surgical History:   Procedure Laterality Date   • AMPUTATION  08/31/2015    metacarpal and index finger   • CARDIAC PACEMAKER PLACEMENT  06/01/2008   • CATARACT EXTRACTION     • CORONARY STENT PLACEMENT      History of Cath Placement Of Stent 1  · Coronary stents   • EYE SURGERY     • SKIN CANCER EXCISION         Family History: family history includes Coronary artery disease in an other family member; Diabetes in an other family member; Hypertension in an other family member. Otherwise pertinent FHx was reviewed and unremarkable.     Social History:  reports that he quit smoking about 12 years ago. He has a 135.00 pack-year smoking history. He has never used smokeless tobacco. He reports that he does not drink alcohol or use drugs.  Social History     Social History Narrative   • Not on file       Medications:  Available home medication information reviewed.  Medications Prior to Admission   Medication Sig Dispense Refill Last Dose   • azelastine (ASTELIN) 0.1 % nasal spray 2 sprays into the nostril(s) as directed by provider 2 (Two) Times a Day. 1 each 3 Taking   • B Complex Vitamins (VITAMIN B COMPLEX PO) Take 1 tablet by mouth Daily.   Taking   •  budesonide-formoterol (SYMBICORT) 80-4.5 MCG/ACT inhaler Inhale 2 puffs 2 (Two) Times a Day. 3 inhaler 1 Taking   • dilTIAZem (CARDIZEM) 120 MG tablet Take 1 tablet by mouth Daily. 90 tablet 3 Taking   • dronedarone (MULTAQ) 400 MG tablet Take 1 tablet by mouth 2 (Two) Times a Day. 180 tablet 1 Taking   • fluorometholone (FML) 0.1 % ophthalmic suspension INSTILL 1 DROP INTO EACH EYE THREE TIMES DAILY  2 Taking   • guaiFENesin (MUCINEX) 600 MG 12 hr tablet Take 1 tablet by mouth Every 12 (Twelve) Hours. (Patient taking differently: Take 600 mg by mouth As Needed.) 10 tablet 0 Taking   • hydrochlorothiazide (HYDRODIURIL) 25 MG tablet Take 1 tablet by mouth Daily. 90 tablet 3 Taking   • ipratropium (ATROVENT) 0.02 % nebulizer solution Take 2.5 mL by nebulization 4 (Four) Times a Day As Needed for Wheezing or Shortness of Air. 300 mL 11 Taking   • Multiple Vitamin (MULTI-VITAMINS PO) Take  by mouth.   Taking   • omeprazole (priLOSEC) 40 MG capsule Take 1 capsule by mouth Daily. 30 capsule 2    • OXYGEN-HELIUM IN Oxygen; Patient Sig: Oxygen patient is to get home oxygen through  company of his choice at 2L/Min, as well as portable oxygen at the same rate.; 1; 0; 22-Mar-2016; Active   Taking   • primidone (MYSOLINE) 50 MG tablet Take 1 tablet by mouth 2 (Two) Times a Day. 60 tablet 5 Taking   • Respiratory Therapy Supplies (FLUTTER) device 1 each Every 6 (Six) Hours While Awake. 1 each 0 Taking   • RESTASIS 0.05 % ophthalmic emulsion    Taking   • rivaroxaban (XARELTO) 20 MG tablet Take 1 tablet by mouth Daily. 30 tablet 12 Taking   • roflumilast (DALIRESP) 500 MCG tablet tablet Take 1 tablet by mouth Daily. 90 tablet 1 Taking   • sertraline (ZOLOFT) 50 MG tablet Take 1 tablet by mouth Daily. 90 tablet 3 Taking   • tamsulosin (FLOMAX) 0.4 MG capsule 24 hr capsule Take 1 capsule by mouth Every Night. 30 capsule 11 Taking   • Tetrahydrozoline-Zn Sulfate (ALLERGY RELIEF EYE DROPS OP) Apply 1 drop to eye(s) as directed by  provider 2 (Two) Times a Day As Needed.   Taking   • tiotropium bromide monohydrate (SPIRIVA RESPIMAT) 2.5 MCG/ACT aerosol solution inhaler Inhale 2 puffs Daily. 1 inhaler 5 Taking   • VENTOLIN  (90 Base) MCG/ACT inhaler Inhale 2 puffs Every 6 (Six) Hours As Needed for Wheezing. 1 inhaler 3 Taking       Allergies   Allergen Reactions   • Doxycycline Shortness Of Breath       Objective   Objective     Vital Signs:   Temp:  [98.8 °F (37.1 °C)-100.6 °F (38.1 °C)] 100.6 °F (38.1 °C)  Heart Rate:  [78-92] 78  Resp:  [18-20] 18  BP: (109-139)/(59-83) 135/64   Total (NIH Stroke Scale): 4    Physical Exam   Constitutional: Awake, alert, chronically ill   Eyes: PERRLA, sclerae anicteric, no conjunctival injection  HENT: NCAT, mucous membranes moist  Neck: Supple, no thyromegaly, no lymphadenopathy, trachea midline  Respiratory: Bibasilar rhonchi with exp wheezing, moderately labored respirations on 5LNC (baseline)/ barrel chested   Cardiovascular: Paced, no murmurs, rubs, or gallops, palpable pedal pulses bilaterally  Gastrointestinal: Positive bowel sounds, soft, nontender, nondistended  Musculoskeletal: No bilateral ankle edema, no clubbing or cyanosis to extremities  Psychiatric: Appropriate affect, cooperative, anxious   Neurologic: Oriented x 3, LUE weakness, Cranial Nerves grossly intact to confrontation, speech garbled   Skin: No rashes, multiple stages of bruising on upper extremities     Results Reviewed:  I have personally reviewed current lab and radiology data.    Results from last 7 days   Lab Units 01/02/20  1344   WBC 10*3/mm3 9.75   HEMOGLOBIN g/dL 9.4*   HEMATOCRIT % 32.7*   PLATELETS 10*3/mm3 166   INR  2.13*     Results from last 7 days   Lab Units 01/02/20  1344   SODIUM mmol/L 139   POTASSIUM mmol/L 3.7   CHLORIDE mmol/L 96*   CO2 mmol/L 28.1   BUN mg/dL 13   CREATININE mg/dL 0.76   GLUCOSE mg/dL 193*   CALCIUM mg/dL 9.1   ALT (SGPT) U/L 11   AST (SGOT) U/L 23   TROPONIN T ng/mL <0.010      Estimated Creatinine Clearance: 76.5 mL/min (by C-G formula based on SCr of 0.76 mg/dL).  Brief Urine Lab Results     None        Imaging Results (Last 24 Hours)     ** No results found for the last 24 hours. **             Assessment/Plan   Assessment & Plan     Active Hospital Problems    Diagnosis POA   • Muscle weakness of left upper extremity [M62.81] Unknown   • Aphasia [R47.01] Unknown   • COPD exacerbation (CMS/[facility]) [J44.1] Yes   • Aneurysm of thoracic aorta (CMS/HCC) [I71.2] Yes   • Coronary arteriosclerosis in native artery [I25.10] Yes   • Paroxysmal atrial fibrillation (CMS/[facility]) [I48.0] Yes   • Sick sinus syndrome (CMS/[facility]) [I49.5] Yes       Initiate ischemic stroke protocol  Cannot have MRI per wife  CTA head and neck tonight  Will possibly need carotid duplex in AM pending CTA results-- NOT ordered yet    Will repeat CT without contrast in AM   Allow permissive HTN, monitor and call provider if SBP > 150 due to multiple aneurysms   CT perfusion tonight   Continue home meds  PT/OT/SLP evaluation  Neurology consult in AM       DVT prophylaxis:  Xarelto    CODE STATUS:    Code Status and Medical Interventions:   Ordered at: 01/02/20 1916     Limited Support to NOT Include:    Intubation    Cardioversion/Defibrillation     Level Of Support Discussed With:    Patient     Code Status:    No CPR     Medical Interventions (Level of Support Prior to Arrest):    Limited       Admission Status:  I believe this patient meets INPATIENT status due to CVA.  I feel patient’s risk for adverse outcomes and need for care warrant INPATIENT evaluation and I predict the patient’s care encounter to likely last beyond 2 midnights.    Electronically signed by DAVID Betancourt, 01/02/20, 6:21 PM.      Attending   Admission Attestation       I have seen and examined the patient, performing an independent face-to-face diagnostic evaluation with plan of care reviewed and developed with the advanced practice clinician  (APC).      Brief Summary Statement:   Alessandro Mendiola is a 79 y.o. male sent from [facility] with left sided weakness/numbness. Per patient and family this started suddenly at 1200 today. At that time was numb on left side, unable to use COPD inhalers effectively, and had difficulty speaking. These symptoms are still present. Patient has known a fib and reports taking xarelto as instructed. Upon arrival to floor was febrile to 100.6 and had episode of vomiting. Has no infectious complaints at this time.    Remainder of detailed HPI is as noted by APC and has been reviewed and/or edited by me for completeness.    Attending Physical Exam:  Constitutional: Awake, alert  Eyes: PERRLA, sclerae anicteric, no conjunctival injection  HENT: NCAT, mucous membranes moist  Neck: Supple, no thyromegaly, no lymphadenopathy, trachea midline  Respiratory: Clear to auscultation bilaterally, nonlabored respirations   Cardiovascular: RRR, no murmurs, rubs, or gallops, palpable pedal pulses bilaterally  Gastrointestinal: Positive bowel sounds, soft, nontender, nondistended  Musculoskeletal: No bilateral ankle edema, no clubbing or cyanosis to extremities  Psychiatric: Appropriate affect, cooperative  Neurologic: Oriented x 3, diminished sensation on left, mild dysarthria, mild left facial droop.  Skin: No rashes    CT head OSH personally reviewed without acute disease. Agree with interpretation.    Brief Assessment/Plan :    80 y/o male with a fib on xarelto presenting from [facility] with left sided weakness, dysarthria concerning for CVA. Also found to be febrile here upon arrival.  --Start asa suppository now. Resume xarelto and lipitor when appropriate (failed bedside swallow.)  --Proceed with CT perfusion, CTA head and neck now. Cannot undergo MRI due to PPM. Echocardiogram tomorrow along with neurology eval, PT/OT, SLP.  --Blood cultures, cxr, u/a, flu swab for fever. No abx for now.     See detailed assessment and plan developed with APC  which I have reviewed and/or edited for completeness.    Electronically signed by Georgina Pompa II, DO, 01/02/20, 7:37 PM.                Electronically signed by Georgina Pompa II, DO at 01/02/20 1952       Vital Signs (last day)     Date/Time   Temp   Temp src   Pulse   Resp   BP   Patient Position   SpO2    01/08/20 0031   98.6 (37)   Axillary   91   18   145/65   Lying   93    01/07/20 1937   --   --   101   21   --   --   93    01/07/20 1930   --   Axillary   92   20   132/69   Lying   99    01/07/20 1900   --   --   90   --   --   --   96    01/07/20 1550   97.1 (36.2)   Axillary   95   22   143/70   Lying   91    01/07/20 1503   --   --   119   --   --   --   90    01/07/20 1500   --   --   (!) 123   --   --   --   (!) 79    01/07/20 1219   --   --   92   16   --   --   92    01/07/20 1115   97.6 (36.4)   Axillary   88   20   124/83   Lying   91    01/07/20 1010   --   --   80   28   --   --   96    01/07/20 09:37:35   --   --   59   18   --   --   96 01/07/20 09:33:47   --   --   56   18   --   --   95    01/07/20 09:30:16   --   --   61   18   --   --   98    01/07/20 09:27:01   --   --   56   18   --   --   97    01/07/20 09:23:42   --   --   51   18   --   --   96    01/07/20 09:18:47   --   --   55   18   --   --   96    01/07/20 09:16:43   --   --   59   18   --   --   96    01/07/20 09:16:33   --   --   53   19   --   --   97    01/07/20 0720   98.6 (37)   Axillary   80   16   123/58   Lying   98    01/07/20 0533   97.7 (36.5)   Axillary   76   --   141/93   Lying   100    01/07/20 0046   --   --   98   28   --   --   91    01/07/20 0025   99.4 (37.4)   Oral   --   --   --   --   --                Current Facility-Administered Medications   Medication Dose Route Frequency Provider Last Rate Last Dose   • acetaminophen (TYLENOL) tablet 650 mg  650 mg Oral Q4H PRN Stacey Padilla APRN   650 mg at 01/07/20 0136    Or   • acetaminophen (TYLENOL) 160 MG/5ML solution 650 mg  650 mg Oral Q4H PRN  Stacey Padilla APRN   649.6 mg at 01/04/20 0025    Or   • acetaminophen (TYLENOL) suppository 650 mg  650 mg Rectal Q4H PRN Stacey Padilla APRN       • ampicillin 2 g/100 mL 0.9% NS (MBP)  2 g Intravenous Q4H Cortes Vidal APRN   2 g at 01/08/20 0430   • aspirin chewable tablet 324 mg  324 mg Oral Daily Mario Donato MD   324 mg at 01/07/20 1030   • atorvastatin (LIPITOR) tablet 80 mg  80 mg Oral Nightly Stacey Padilla APRN   80 mg at 01/07/20 2041   • azelastine (ASTELIN) nasal spray 2 spray  2 spray Each Nare BID Stacey Padilla APRN   2 spray at 01/07/20 1044   • budesonide-formoterol (SYMBICORT) 80-4.5 MCG/ACT inhaler 2 puff  2 puff Inhalation BID Stacey Padilla APRN   2 puff at 01/07/20 1937   • cefTRIAXone (ROCEPHIN) 2 g/100 mL 0.9% NS VTB (SUSAN)  2 g Intravenous Q12H Cortes Vidal APRN   2 g at 01/08/20 0434   • cetirizine (zyrTEC) tablet 10 mg  10 mg Oral Daily Iris Hsu MD   10 mg at 01/07/20 1031   • clopidogrel (PLAVIX) tablet 75 mg  75 mg Oral Daily Fenr Patel MD   75 mg at 01/07/20 1030   • cycloSPORINE (RESTASIS) 0.05 % ophthalmic emulsion 1 drop  1 drop Both Eyes BID Stacey Padilla APRN   1 drop at 01/07/20 2041   • dilTIAZem (CARDIZEM) 125 mg in 125 mL 0.7% sodium chloride (1 mg/mL) infusion  5-15 mg/hr Intravenous Titrated Erika Angel APRN       • dronedarone (MULTAQ) tablet 400 mg  400 mg Oral BID Stacey Padilla APRN   400 mg at 01/07/20 1405   • haloperidol lactate (HALDOL) injection 1 mg  1 mg Intravenous Q6H PRN Erika Angel APRN   1 mg at 01/07/20 2221   • heparin (porcine) 5000 UNIT/ML injection 5,000 Units  5,000 Units Subcutaneous Q8H Iris Hsu MD   5,000 Units at 01/08/20 0436   • ipratropium (ATROVENT) nebulizer solution 0.5 mg  500 mcg Nebulization 4x Daily PRN Stacey Padilla APRN   0.5 mg at 01/06/20 1008   • ipratropium-albuterol (DUO-NEB) nebulizer solution 3 mL  3 mL Nebulization Q6H PRN Chiquita Mcmahan APRN    3 mL at 01/07/20 1219   • ondansetron (ZOFRAN) injection 4 mg  4 mg Intravenous Q6H PRN Georgina Pompa II, DO   4 mg at 01/07/20 1030   • pantoprazole (PROTONIX) EC tablet 40 mg  40 mg Oral QAM Stacey Padilla APRN   Stopped at 01/08/20 0700   • potassium chloride (MICRO-K) CR capsule 40 mEq  40 mEq Oral PRN Mario Donato MD   40 mEq at 01/05/20 1818    Or   • potassium chloride (KLOR-CON) packet 40 mEq  40 mEq Oral PRN Mario Donato MD        Or   • potassium chloride 10 mEq in 100 mL IVPB  10 mEq Intravenous Q1H PRN Mario Donato MD       • primidone (MYSOLINE) tablet 50 mg  50 mg Oral BID Stacey Padilla APRN   50 mg at 01/07/20 1406   • QUEtiapine (SEROquel) tablet 25 mg  25 mg Oral Nightly PRN Fern Patel MD   25 mg at 01/06/20 2128   • roflumilast (DALIRESP) tablet 500 mcg  500 mcg Oral Daily Stacey Padilla APRN   500 mcg at 01/07/20 1406   • sennosides-docusate (PERICOLACE) 8.6-50 MG per tablet 2 tablet  2 tablet Oral BID Mario Donato MD   2 tablet at 01/07/20 1407   • sertraline (ZOLOFT) tablet 50 mg  50 mg Oral Daily Stacey Padilla APRN   50 mg at 01/07/20 1030   • sodium chloride 0.9 % flush 10 mL  10 mL Intravenous Q12H Stacey Padilla APRN   10 mL at 01/07/20 1431   • sodium chloride 0.9 % flush 10 mL  10 mL Intravenous PRN Stacey Padilla APRN       • tamsulosin (FLOMAX) 24 hr capsule 0.4 mg  0.4 mg Oral Nightly Stacey Padilla APRN   0.4 mg at 01/06/20 2128       Lab Results (last 24 hours)     Procedure Component Value Units Date/Time    Blood Culture - Blood, Arm, Left [[id number]] Collected:  01/05/20 1421    Specimen:  Blood from Arm, Left Updated:  01/07/20 1600     Blood Culture No growth at 2 days    Blood Culture - Blood, Arm, Right [[id number]] Collected:  01/05/20 1421    Specimen:  Blood from Arm, Right Updated:  01/07/20 1600     Blood Culture No growth at 2 days    POC Glucose Once [[id number]]  (Abnormal) Collected:  01/07/20 1206    Specimen:  Blood  "Updated:  20 1225     Glucose 146 mg/dL            Physician Progress Notes (last 24 hours) (Notes from 20 0741 through 20)      Mario Donato MD at 20              [facility] Medicine Services  PROGRESS NOTE    Patient Name: Alessandro Mendiola  : 1940  MRN: [id number]    Date of Admission: 2020  Primary Care Physician: Silvio Jacobson MD    Subjective   Subjective     CC:     Sent from [facility] with left-sided weakness    HPI:   Work of breathing up.  He does not want to be intubated or kept on \"life support\".  Wife in room.  He says MRI was bad experience.  Reports of him refusing to use bipap.  Discussed palliative which he agreed to    Review of Systems    Gen- No fevers, chills  CV- No chest pain, palpitations  Resp- No cough, dyspnea  GI- No N/V/D, abd pain    Objective   Objective     Vital Signs:   Temp:  [97.1 °F (36.2 °C)-100.7 °F (38.2 °C)] 97.1 °F (36.2 °C)  Heart Rate:  [] 101  Resp:  [16-30] 21  BP: (123-143)/(58-93) 143/70  Total (NIH Stroke Scale): 7     Physical Exam:    Constitutional: No acute distress, awake, alert  HENT: NCAT, no JVD  Respiratory: increased work of breathing, junky breath sounds bilaterally  Cardiovascular: RRR, s1 and s2  Gastrointestinal: Positive bowel sounds, soft, nontender, obese abdomen with abdominal breathing  Musculoskeletal: No bilateral ankle edema  Psychiatric: Appropriate affect, cooperative  Neurologic: Oriented x 3, right-sided weakness  Skin: dry, + skin tenting    Results Reviewed:    Results from last 7 days   Lab Units 20  0633 20  0719 20  1344   WBC 10*3/mm3 8.51 8.88 9.75   HEMOGLOBIN g/dL 7.4* 7.9* 9.4*   HEMATOCRIT % 27.1* 27.7* 32.7*   PLATELETS 10*3/mm3 154 155 166   INR   --   --  2.13*     Results from last 7 days   Lab Units 20  2224 20  0632 20  0438 20  0719 20  1344   SODIUM mmol/L  --   --  140 139 139 "   POTASSIUM mmol/L 3.7 3.3* 3.5 3.4* 3.7   CHLORIDE mmol/L  --   --  99 98 96*   CO2 mmol/L  --   --  29.0 31.0* 28.1   BUN mg/dL  --   --  15 13 13   CREATININE mg/dL  --   --  0.88 0.77 0.76   GLUCOSE mg/dL  --   --  136* 133* 193*   CALCIUM mg/dL  --   --  8.8 8.7 9.1   ALT (SGPT) U/L  --   --   --  8 11   AST (SGOT) U/L  --   --   --  16 23   TROPONIN T ng/mL  --   --   --   --  <0.010     Estimated Creatinine Clearance: 69.5 mL/min (by C-G formula based on SCr of 0.88 mg/dL).    Microbiology Results Abnormal     Procedure Component Value - Date/Time    Blood Culture - Blood, Arm, Left [[id number]] Collected:  01/05/20 1421    Lab Status:  Preliminary result Specimen:  Blood from Arm, Left Updated:  01/07/20 1600     Blood Culture No growth at 2 days    Blood Culture - Blood, Arm, Right [[id number]] Collected:  01/05/20 1421    Lab Status:  Preliminary result Specimen:  Blood from Arm, Right Updated:  01/07/20 1600     Blood Culture No growth at 2 days    Respiratory Panel, PCR - Swab, Nasopharynx [[id number]]  (Abnormal) Collected:  01/05/20 1250    Lab Status:  Final result Specimen:  Swab from Nasopharynx Updated:  01/05/20 1419     ADENOVIRUS, PCR Not Detected     Coronavirus 229E Not Detected     Coronavirus HKU1 Not Detected     Coronavirus NL63 Not Detected     Coronavirus OC43 Not Detected     Human Metapneumovirus Not Detected     Human Rhinovirus/Enterovirus Detected     Influenza B PCR Not Detected     Parainfluenza Virus 1 Not Detected     Parainfluenza Virus 2 Not Detected     Parainfluenza Virus 3 Not Detected     Parainfluenza Virus 4 Not Detected     Bordetella pertussis pcr Not Detected     Influenza A H1 2009 PCR Not Detected     Chlamydophila pneumoniae PCR Not Detected     Mycoplasma pneumo by PCR Not Detected     Influenza A PCR Not Detected     Influenza A H3 Not Detected     Influenza A H1 Not Detected     RSV, PCR Not Detected     Bordetella parapertussis PCR Not Detected    Blood Culture -  Blood, Arm, Right [[id number]]  (Abnormal) Collected:  01/03/20 1506    Lab Status:  Final result Specimen:  Blood from Arm, Right Updated:  01/05/20 1303     Blood Culture Enterococcus faecalis     Comment: Refer to previous blood culture collected on 01/02 at 2025 for CASEY's.          Gram Stain Aerobic Bottle Gram positive cocci in pairs and chains      Anaerobic Bottle Gram positive cocci in pairs and chains    Blood Culture - Blood, Arm, Left [[id number]]  (Abnormal) Collected:  01/03/20 1506    Lab Status:  Final result Specimen:  Blood from Arm, Left Updated:  01/05/20 1302     Blood Culture Enterococcus faecalis     Comment: Refer to previous blood culture collected on 01/02 at 2025 for CASEY's.            Gram Stain Aerobic Bottle Gram positive cocci in pairs and chains      Anaerobic Bottle Gram positive cocci in pairs and chains    Blood Culture - Blood, Arm, Right [[id number]]  (Abnormal) Collected:  01/02/20 2019    Lab Status:  Final result Specimen:  Blood from Arm, Right Updated:  01/05/20 0606     Blood Culture Enterococcus faecalis     Comment: Refer to previous blood culture collected on 01/02 at 2025 for CASEY's.           Gram Stain Aerobic Bottle Gram positive cocci in pairs and chains      Anaerobic Bottle Gram positive cocci in pairs and chains    Blood Culture - Blood, Arm, Left [[id number]]  (Abnormal)  (Susceptibility) Collected:  01/02/20 2025    Lab Status:  Final result Specimen:  Blood from Arm, Left Updated:  01/05/20 0605     Blood Culture Enterococcus faecalis     Gram Stain Aerobic Bottle Gram positive cocci in pairs and chains      Anaerobic Bottle Gram positive cocci in pairs and chains    Susceptibility      Enterococcus faecalis     CASEY     Ampicillin Susceptible     Gentamicin High Level Synergy Susceptible     Vancomycin Susceptible                    Blood Culture ID, PCR - Blood, Arm, Left [[id number]]  (Abnormal) Collected:  01/02/20 2025    Lab Status:  Final result Specimen:   Blood from Arm, Left Updated:  01/03/20 1447     BCID, PCR Enterococcus spp, not VRE. Identification by BCID PCR.          Imaging Results (Last 24 Hours)     Procedure Component Value Units Date/Time    MRI Brain Without Contrast [[id number]] Collected:  01/07/20 1057     Updated:  01/07/20 1135    Narrative:       EXAMINATION: MRI BRAIN WO CONTRAST-     INDICATION: Stroke; M62.81-Muscle weakness (generalized); Z74.09-Other  reduced mobility; R13.12-Dysphagia, oropharyngeal phase;  R47.1-Dysarthria and anarthria.      TECHNIQUE: Sagittal and axial images of brain are displayed. No  intravenous contrast was utilized.     COMPARISON: None.     FINDINGS: There are several areas of abnormal signal consistent with  acute or subacute infarcts. The areas involved include a large lesion in  the right cerebellum, smaller lesions in the leftward aspect of the  cerebellum, a 1 cm abnormality in the right thalamic region, small areas  of abnormal signal in the occipital lobes, and a small area in the left  parietal convexity. There is also a punctate area of abnormal signal in  the leftward posterior aspect of the lary.       Impression:       Multifocal acute or subacute infarcts as described above,  most significant which appear to include the right thalamic region and  the cerebellar hemispheres, right greater than left.      D:  01/07/2020  E:  01/07/2020     This report was finalized on 1/7/2020 11:31 AM by Dr. Lukasz Galvez MD.       XR Chest 1 View [[id number]] Collected:  01/07/20 0045     Updated:  01/07/20 0047    Narrative:       CR Chest 1 Vw 1/7/2020    INDICATION:   Chest and abdomen pain and fever today. Aspiration pneumonia. Dysphagia. Benign essential hypertension and atrial fibrillation. COPD.     COMPARISON:    1/2/2020    FINDINGS:  Single portable AP view(s) of the chest.    Support lines and tubes are unchanged.    The heart is enlarged but stable. The lungs are hyperinflated with emphysematous and fibrotic  changes characteristic of COPD. Atelectasis or infiltrate medial left lower lobe. Discoid atelectasis or linear fibrosis right base. There are no pleural  effusions. No pneumothorax.       Impression:       Cardiomegaly and COPD. Minimal atelectasis or infiltrate medial left lower lobe.    Signer Name: Jared Lal MD   Signed: 1/7/2020 12:45 AM   Workstation Name: [id number]    [facility]          Results for orders placed during the hospital encounter of 01/02/20   Adult Transthoracic Echo Complete W/ Cont if Necessary Per Protocol (With Agitated Saline)    Narrative · Left ventricular systolic function is normal. Estimated EF appears to be   in the range of 61 - 65%.  · Left ventricular wall thickness is consistent with borderline concentric   hypertrophy.  · Electronic leads present right side of the heart  · Mild MAC is present. Mild mitral valve regurgitation is present  · Mild dilation of the aortic root is present. Measures 4.1 cm  · No valvular vegetations visualized on this study, however valves not   well seen  · Bubble study nondiagnostic due to poor visualization          I have reviewed the medications:  Scheduled Meds:    ampicillin 2 g Intravenous Q4H   aspirin 324 mg Oral Daily   atorvastatin 80 mg Oral Nightly   azelastine 2 spray Each Nare BID   budesonide-formoterol 2 puff Inhalation BID   cefTRIAXone 2 g Intravenous Q12H   cetirizine 10 mg Oral Daily   clopidogrel 75 mg Oral Daily   cycloSPORINE 1 drop Both Eyes BID   dronedarone 400 mg Oral BID   heparin (porcine) 5,000 Units Subcutaneous Q8H   pantoprazole 40 mg Oral QAM   primidone 50 mg Oral BID   roflumilast 500 mcg Oral Daily   sennosides-docusate 2 tablet Oral BID   sertraline 50 mg Oral Daily   sodium chloride 10 mL Intravenous Q12H   tamsulosin 0.4 mg Oral Nightly     Continuous Infusions:   PRN Meds:.•  acetaminophen **OR** acetaminophen **OR** acetaminophen  •  ipratropium  •  ipratropium-albuterol  •   ondansetron  •  potassium chloride **OR** potassium chloride **OR** potassium chloride  •  QUEtiapine  •  sodium chloride      Assessment/Plan   Assessment & Plan     Active Hospital Problems    Diagnosis  POA   • **Muscle weakness of left upper extremity [M62.81]  Clinically Undetermined   • Aphasia [R47.01]  Clinically Undetermined   • COPD exacerbation (CMS/HCC) [J44.1]  Yes   • 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        Brief Hospital Course to date:  Alessandro Mendiola is a 79 y.o. male transferred from outside hospital due to concerns about infective endocarditis and possible pacemaker infection.  He has bacteremia.  High-grade enterococcus septicemia    Acute Respiratory Distress  - BiPAP for work of breathing  - he says he does not want to be on life support  - palliative evaluation  Left-sided weakness  -Concern for endocarditis  -MARLON planned  -Working on MRI  -Holding Xarelto  -Current antiplatelet therapy  Thoracic aortic aneurysm  - we may have a better look with a MARLON  Sleep disorder  -Clear if he has undiagnosed sleep apnea  -High risk for delirium  -Would benefit from BiPAP therapy  -Working on interface  Enterococcus bacteremia  -deferred MARLON for now  SSS  -Pacemaker  End-stage COPD  -minimize oxygen therapy so it is not counter productive  -Too much oxygen may suppress breathing  Atrial fibrillation  -Chronic anticoagulation  Rhinovirus  -Precautions  -Rhinovirus picked up on respiratory panel    Discussed case with Dr. Patel face to face.  Difficult situation.  Seen by Cardiology and cannot do MARLON with current respiratory status    Palliative Consulted    High Complexity Case    DVT Prophylaxis: Heparin    Disposition: I expect the patient to be discharged  "TBD    CODE STATUS:   Code Status and Medical Interventions:   Ordered at: 01/02/20 1916     Limited Support to NOT Include:    Intubation    Cardioversion/Defibrillation     Level Of Support Discussed With:    Patient     Code Status:    No CPR     Medical Interventions (Level of Support Prior to Arrest):    Limited     Electronically signed by Mario Donato MD, 01/07/20, 8:11 PM.      Electronically signed by Mario Donato MD at 01/07/20 2016     Fern Patel MD at 01/07/20 1615        Neurology       Patient Care Team:  Silvio Jacobson MD as PCP - General (Internal Medicine)  Dagoberto Rasmussen MD as Consulting Physician (General Surgery)    Chief complaint Left-sided weakness      Subjective .     History: Slept poorly, agitated in the night.  Very strong when he wants to be per his wife.    ROS: Short of breath, now on BiPAP with nasal mask.  No diarrhea or nausea or abdominal pain    Objective     Vital Signs   Blood pressure 143/70, pulse 95, temperature 97.1 °F (36.2 °C), temperature source Axillary, resp. rate 22, height 180.3 cm (70.98\"), weight 69.9 kg (154 lb), SpO2 91 %.    Physical Exam:              Neuro: Elderly white man sleeping with nasal   BiPAP mask.  Heart RRR, no murmurs  Lungs: Labored breathing, symmetric chest wall expansion  Abdomen soft, NT, ND with positive bowel sounds  Skin warm and dry    Results Review:              MRI images reviewed, show multiple areas of infarction including bilateral cerebellar hemispheres, bilateral occipital lobes, right thalamus and left frontal region- i.e. multiple vascular territories    Assessment/Plan     Multifocal strokes consistent with cardioembolic source-patient with possible infective carditis/possible pacemaker lead infection/high-grade enterococcus septicemia, strongly suspect septic emboli.  Plan has been for MARLON when stable enough, but per cardiology notes, long-term antibiotics is only reasonable choice.  It is not clear that " "patient would tolerate pacemaker extraction.  Continue to hold anticoagulation, but continue antiplatelet therapy and patient with intermittent A. fib.  Wife at bedside, discussed risk of recurrent stroke from either A. fib or recurrent septic emboli.  Discussed treatment dilemma and risk of hemorrhage with anticoagulation.  Discussed sequelae of current stroke, which would not be devastating without additional illness, but in his current state will be very difficult to recover from.  Doubt he could tolerate any rehab.  Agree with palliative consult.        Fern Patel MD  01/07/20  4:15 PM        Electronically signed by Fern Patel MD at 01/07/20 0735     Piyush Elliott MD at 01/07/20 1418          INFECTIOUS DISEASE CONSULT/INITIAL HOSPITAL VISIT    Alessandro Mendiola  1940  [id number]    Date of Consult: 1/7/2020    Admission Date: 1/2/2020      Requesting Provider: Georgina Pompa II, DO  Evaluating Physician: Piyush Elliott MD    Reason for Consultation: Enterococcus bacteremia    Chief complaint: Left upper extremity weakness    Current antimicrobial therapy: Vancomycin IV    History of present illness:    Alessandro Mendiola is a 79 y.o. male being evaluated for enterococcus bacteremia.  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  He presented to the ED yesterday with complaints of left upper extremity weakness and speech difficulties.  Per documentation, family states that the patient went to the restroom after getting up out of bed on the morning of 1/2 and upon returning and sitting down in his recliner he was unable to hold objects in his left hand and drop them.  Then he stated to his wife that his left arm felt \"weak, numb and tingly\".  Wife also noted " that his speech was a little slurred and called her son to help get the patient to the ER ([facility]). Work-up in the ED showed a CT of the head without acute infarct and a chest x-ray with no acute cardiopulmonary illness.  NIHSS on arrival to ED was 9 and then 3 prior to transfer to [facility] for higher level of care.    Since arrival here patient has had a T-max of 100.6 and has been hemodynamically stable.  Labs show he is without leukocytosis with a white blood cell count of 8.88 and a neutrophilia of 76.9%.  Most recent BUN/creatinine is 15 and 0.88 with a GFR of 84.  1/2 blood cultures are positive in 4/4 bottles for enterococcus not VRE identified by bio fire report.  Repeat blood cultures on 1/3 are currently positive and 1/4 bottles for gram-positive cocci in pairs and chains.    He has a listed allergy to doxycycline with shortness of breath listed as a reaction and is currently receiving vancomycin IV.  ID has been consulted for further evaluation and treatment as well as antimicrobial therapy management.    Of note: TTE was performed yesterday and is currently pending radiologist read.  Preliminary results show no vegetations and some TR.    The patient has experienced a 30 lbs weight loss over the last several months. He has additionally been perisistently nauseated. No abdominal pain. No bloody BMs. Last colonoscopy was about 3 years ago per patient and was ok.    Subjective:  1/5/20: CT abdomen and pelvis was done today showing a large splenic infarct but no other intra-abdominal pathology.  Cardiology is evaluating the patient and recommends a MARLON which I agree with. Tmax was 99.6°F overnight.  White count remained stable at 8.5.  Enterococcus was susceptible to ampicillin.     1/6/20: somewhat more short of breath today. On HFNC. NO diarrhea or nausea. tmax 100.8F. RPP +rhionovirus.     1/7/20: Still short of breath and on BiPAP in his room.  MARLON has been postponed given his respiratory  issues.  MRI brain showed signs of multiple acute/subacute infarcts.  The patient denies any diarrhea or nausea.  No abdominal pain. Tmax was 100.7F. Repeat blood cultures from 1/5 are no growth to date.    Past Medical History:   Diagnosis Date   • Alcohol abuse    • Aneurysm of thoracic aorta (CMS/HCC)    • Anxiety    • Ascending aortic aneurysm (CMS/HCC)     4.8 to 4.9 cm    • Atherosclerotic heart disease    • Atrial fibrillation (CMS/HCC)    • Blood thinned due to long-term anticoagulant use    • Body mass index (BMI) 20.0-20.9, adult    • CAD (coronary artery disease)    • Cancer (CMS/HCC)     skin   • Cardiac pacemaker    • Cellulitis    • Chronic bronchitis (CMS/HCC)    • Chronic cough    • COPD (chronic obstructive pulmonary disease) (CMS/HCC)    • Depression    • Difficulty breathing    • Emphysema lung (CMS/HCC)    • Encounter for long-term current use of medication    • Encounter for screening for malignant neoplasm of prostate    • Fatigue    • Fingertip amputation    • TAMIKO (generalized anxiety disorder)    • History of alcohol abuse    • History of cardiac catheterization    • History of chest x-ray 02/11/2014    Chronic interstitial changes seen throughout the lung fields w/ no radiographic evidence of acute parenchymal disease.No definite LT-sided rib fracture present.   • History of chest x-ray 01/10/2014    No acute cardiopulmonary process.Dual lead LT subclavian pacemaker is in place.Aortic ectasia. Hyperinflation of lungs, suggesting COPD   • History of chest x-ray 10/01/2012    Ill-defined RT middle lobe opacity which likley reflects a pneumonia. FU to radiographic resolution is recommended.   • History of Doppler echocardiogram    • History of echocardiogram 09/26/2013    EF 55-60%. Mod concentric LVH. Abnormal LT VT diastolic filling is observed, consistent w/ impaired relaxation.Mild MR/TR. Trace TX. RVSP 44 mmHg.   • History of stress test     ECG performed   • HTN (hypertension)    • HX:  long term anticoagulant use     Blood thinned due to long-term anticoagulant use (V58.61) (Z79.01)   • Hypercholesterolemia    • LVH (left ventricular hypertrophy)    • Mitral and aortic valve disease    • Myocardial infarction (CMS/HCC)    • Nonvenomous insect bite of multiple sites    • Onychomycosis    • Pacemaker at end of battery life    • Pneumonia    • Pneumothorax    • Pre-syncope    • Pulmonary nodule    • Rib pain on left side    • Sick sinus syndrome (CMS/HCC)    • SOB (shortness of breath)    • Upper respiratory infection    • Vitamin D deficiency        Past Surgical History:   Procedure Laterality Date   • AMPUTATION  2015    metacarpal and index finger   • CARDIAC PACEMAKER PLACEMENT  2008   • CATARACT EXTRACTION     • CORONARY STENT PLACEMENT      History of Cath Placement Of Stent 1  · Coronary stents   • EYE SURGERY     • SKIN CANCER EXCISION         Family History   Problem Relation Age of Onset   • Coronary artery disease Other    • Diabetes Other    • Hypertension Other        Social History     Socioeconomic History   • Marital status:      Spouse name: Not on file   • Number of children: Not on file   • Years of education: Not on file   • Highest education level: Not on file   Tobacco Use   • Smoking status: Former Smoker     Packs/day: 3.00     Years: 45.00     Pack years: 135.00     Last attempt to quit: 2008     Years since quittin.0   • Smokeless tobacco: Never Used   Substance and Sexual Activity   • Alcohol use: No   • Drug use: No   • Sexual activity: Defer       Allergies   Allergen Reactions   • Doxycycline Shortness Of Breath         Medication:    Current Facility-Administered Medications:   •  acetaminophen (TYLENOL) tablet 650 mg, 650 mg, Oral, Q4H PRN, 650 mg at 20 0136 **OR** acetaminophen (TYLENOL) 160 MG/5ML solution 650 mg, 650 mg, Oral, Q4H PRN, 649.6 mg at 20 0025 **OR** acetaminophen (TYLENOL) suppository 650 mg, 650 mg, Rectal, Q4H  PRN, Stacey Padilla APRN  •  ampicillin 2 g/100 mL 0.9% NS (MBP), 2 g, Intravenous, Q4H, Cortes Vidal APRN, 2 g at 01/07/20 1405  •  aspirin chewable tablet 324 mg, 324 mg, Oral, Daily, Mario Donato MD, 324 mg at 01/07/20 1030  •  atorvastatin (LIPITOR) tablet 80 mg, 80 mg, Oral, Nightly, Stacey Padilla APRN, 80 mg at 01/06/20 2128  •  azelastine (ASTELIN) nasal spray 2 spray, 2 spray, Each Nare, BID, Stacey Padilla APRN, 2 spray at 01/07/20 1044  •  budesonide-formoterol (SYMBICORT) 80-4.5 MCG/ACT inhaler 2 puff, 2 puff, Inhalation, BID, Stacey Padilla APRN, 2 puff at 01/07/20 1009  •  cefTRIAXone (ROCEPHIN) 2 g/100 mL 0.9% NS VTB (SUSAN), 2 g, Intravenous, Q12H, Cortes Vidal APRN, 2 g at 01/07/20 0550  •  cetirizine (zyrTEC) tablet 10 mg, 10 mg, Oral, Daily, Iris Hsu MD, 10 mg at 01/07/20 1031  •  clopidogrel (PLAVIX) tablet 75 mg, 75 mg, Oral, Daily, Fern Patel MD, 75 mg at 01/07/20 1030  •  cycloSPORINE (RESTASIS) 0.05 % ophthalmic emulsion 1 drop, 1 drop, Both Eyes, BID, Stacey Padilla APRN, 1 drop at 01/07/20 1406  •  dronedarone (MULTAQ) tablet 400 mg, 400 mg, Oral, BID, Stacey Padilla APRN, 400 mg at 01/07/20 1405  •  heparin (porcine) 5000 UNIT/ML injection 5,000 Units, 5,000 Units, Subcutaneous, Q8H, Iris Hsu MD, 5,000 Units at 01/07/20 1407  •  ipratropium (ATROVENT) nebulizer solution 0.5 mg, 500 mcg, Nebulization, 4x Daily PRN, Stacey Padilla, APRN, 0.5 mg at 01/06/20 1008  •  ipratropium-albuterol (DUO-NEB) nebulizer solution 3 mL, 3 mL, Nebulization, Q6H PRN, Chiquita Mcmahan APRN, 3 mL at 01/07/20 1219  •  ondansetron (ZOFRAN) injection 4 mg, 4 mg, Intravenous, Q6H PRN, Georgina Pompa II, DO, 4 mg at 01/07/20 1030  •  pantoprazole (PROTONIX) EC tablet 40 mg, 40 mg, Oral, QAM, Stacey Padilla, APRN, 40 mg at 01/07/20 0541  •  potassium chloride (MICRO-K) CR capsule 40 mEq, 40 mEq, Oral, PRN, 40 mEq at 01/05/20 4608 **OR**  potassium chloride (KLOR-CON) packet 40 mEq, 40 mEq, Oral, PRN **OR** potassium chloride 10 mEq in 100 mL IVPB, 10 mEq, Intravenous, Q1H PRN, Mario Donato MD  •  primidone (MYSOLINE) tablet 50 mg, 50 mg, Oral, BID, Stacey Padilla APRN, 50 mg at 20 1406  •  QUEtiapine (SEROquel) tablet 25 mg, 25 mg, Oral, Nightly PRN, Fern Patel MD, 25 mg at 20 2128  •  roflumilast (DALIRESP) tablet 500 mcg, 500 mcg, Oral, Daily, Stacey Padilla APRN, 500 mcg at 20 1406  •  sennosides-docusate (PERICOLACE) 8.6-50 MG per tablet 2 tablet, 2 tablet, Oral, BID, Mario Donato MD, 2 tablet at 20 1407  •  sertraline (ZOLOFT) tablet 50 mg, 50 mg, Oral, Daily, Stacey Padilla APRN, 50 mg at 20 1030  •  sodium chloride 0.9 % flush 10 mL, 10 mL, Intravenous, Q12H, Stacey Padilla APRN, 10 mL at 20 1001  •  sodium chloride 0.9 % flush 10 mL, 10 mL, Intravenous, PRN, Stacey Padilla APRN  •  tamsulosin (FLOMAX) 24 hr capsule 0.4 mg, 0.4 mg, Oral, Nightly, Stacey Padilla APRN, 0.4 mg at 208    Antibiotics:  Anti-Infectives (From admission, onward)    Ordered     Dose/Rate Route Frequency Start Stop    20 1451  ampicillin 2 g/100 mL 0.9% NS (MBP)     Tiff Chambers [facility] reviewed the order on 20 1533.   Ordering Provider:  Cortes Vidal APRN    2 g  over 30 Minutes Intravenous Every 4 Hours 20 1600 20 1359    20 1451  cefTRIAXone (ROCEPHIN) 2 g/100 mL 0.9% NS VTB (SUSAN)     Tiff Chambers RPH reviewed the order on 20 1533.   Ordering Provider:  Cortes Vidal APRN    2 g  over 30 Minutes Intravenous Every 12 Hours 20 1600 20 0559    20 1502  vancomycin 1750 mg/500 mL 0.9% NS IVPB ([facility])     Ordering Provider:  Lis Romero RPH    1,750 mg  over 120 Minutes Intravenous Once 20 1600 20 2245            Review of Systems:  As above        Physical Exam:   Vital Signs  Temp (24hrs), Av.7  °F (37.1 °C), Min:97.6 °F (36.4 °C), Max:100.7 °F (38.2 °C)    Temp  Min: 97.6 °F (36.4 °C)  Max: 100.7 °F (38.2 °C)  BP  Min: 123/58  Max: 143/64  Pulse  Min: 51  Max: 98  Resp  Min: 16  Max: 30  SpO2  Min: 91 %  Max: 100 %    GENERAL: Awake and alert, in mild distress. Frail appearing  HEENT: Normocephalic, atraumatic.  No conjunctival injection. No icterus. Oropharynx clear without evidence of thrush or exudate. No evidence of peridontal disease.    Chest: PPM pocket over left chest wall is without erythema, warmth, or tenderness  HEART: RRR; No murmur, rubs, gallops.   LUNGS: decreased breath sounds throughout but no wheezing or crackles.Labored breathing on HFNC  ABDOMEN: Soft, nontender, nondistended. Positive bowel sounds. No rebound or guarding. NO mass or HSM.  EXT:  No cyanosis, clubbing or edema. No cord.  :  Without Denny catheter.  MSK: FROM without joint effusions noted arms/legs.    SKIN: Warm and dry without cutaneous eruptions on Inspection/palpation.    NEURO: Oriented to PPT. Weakness of RUE and RLE compared to left side. Slurred speech. aphasia  PSYCHIATRIC: Normal insight and judgement. Cooperative with PE    Laboratory Data    Results from last 7 days   Lab Units 01/05/20  0633 01/03/20  0719 01/02/20  1344   WBC 10*3/mm3 8.51 8.88 9.75   HEMOGLOBIN g/dL 7.4* 7.9* 9.4*   HEMATOCRIT % 27.1* 27.7* 32.7*   PLATELETS 10*3/mm3 154 155 166     Results from last 7 days   Lab Units 01/05/20  2224  01/04/20  0438   SODIUM mmol/L  --   --  140   POTASSIUM mmol/L 3.7   < > 3.5   CHLORIDE mmol/L  --   --  99   CO2 mmol/L  --   --  29.0   BUN mg/dL  --   --  15   CREATININE mg/dL  --   --  0.88   GLUCOSE mg/dL  --   --  136*   CALCIUM mg/dL  --   --  8.8    < > = values in this interval not displayed.     Results from last 7 days   Lab Units 01/03/20  0719   ALK PHOS U/L 112   BILIRUBIN mg/dL 0.3   ALT (SGPT) U/L 8   AST (SGOT) U/L 16                         Estimated Creatinine Clearance: 69.5 mL/min (by  C-G formula based on SCr of 0.88 mg/dL).      Microbiology:  Microbiology Results (last 10 days)     Procedure Component Value - Date/Time    Blood Culture - Blood, Arm, Left [[id number]] Collected:  01/05/20 1421    Lab Status:  Preliminary result Specimen:  Blood from Arm, Left Updated:  01/06/20 1600     Blood Culture No growth at 24 hours    Blood Culture - Blood, Arm, Right [[id number]] Collected:  01/05/20 1421    Lab Status:  Preliminary result Specimen:  Blood from Arm, Right Updated:  01/06/20 1600     Blood Culture No growth at 24 hours    Respiratory Panel, PCR - Swab, Nasopharynx [[id number]]  (Abnormal) Collected:  01/05/20 1250    Lab Status:  Final result Specimen:  Swab from Nasopharynx Updated:  01/05/20 1419     ADENOVIRUS, PCR Not Detected     Coronavirus 229E Not Detected     Coronavirus HKU1 Not Detected     Coronavirus NL63 Not Detected     Coronavirus OC43 Not Detected     Human Metapneumovirus Not Detected     Human Rhinovirus/Enterovirus Detected     Influenza B PCR Not Detected     Parainfluenza Virus 1 Not Detected     Parainfluenza Virus 2 Not Detected     Parainfluenza Virus 3 Not Detected     Parainfluenza Virus 4 Not Detected     Bordetella pertussis pcr Not Detected     Influenza A H1 2009 PCR Not Detected     Chlamydophila pneumoniae PCR Not Detected     Mycoplasma pneumo by PCR Not Detected     Influenza A PCR Not Detected     Influenza A H3 Not Detected     Influenza A H1 Not Detected     RSV, PCR Not Detected     Bordetella parapertussis PCR Not Detected    Blood Culture - Blood, Arm, Left [[id number]]  (Abnormal) Collected:  01/03/20 1506    Lab Status:  Final result Specimen:  Blood from Arm, Left Updated:  01/05/20 1302     Blood Culture Enterococcus faecalis     Comment: Refer to previous blood culture collected on 01/02 at 2025 for CASEY's.            Gram Stain Aerobic Bottle Gram positive cocci in pairs and chains      Anaerobic Bottle Gram positive cocci in pairs and chains     Blood Culture - Blood, Arm, Right [[id number]]  (Abnormal) Collected:  01/03/20 1506    Lab Status:  Final result Specimen:  Blood from Arm, Right Updated:  01/05/20 1303     Blood Culture Enterococcus faecalis     Comment: Refer to previous blood culture collected on 01/02 at 2025 for CASEY's.          Gram Stain Aerobic Bottle Gram positive cocci in pairs and chains      Anaerobic Bottle Gram positive cocci in pairs and chains    Blood Culture - Blood, Arm, Left [[id number]]  (Abnormal)  (Susceptibility) Collected:  01/02/20 2025    Lab Status:  Final result Specimen:  Blood from Arm, Left Updated:  01/05/20 0605     Blood Culture Enterococcus faecalis     Gram Stain Aerobic Bottle Gram positive cocci in pairs and chains      Anaerobic Bottle Gram positive cocci in pairs and chains    Susceptibility      Enterococcus faecalis     CASEY     Ampicillin Susceptible     Gentamicin High Level Synergy Susceptible     Vancomycin Susceptible                    Blood Culture ID, PCR - Blood, Arm, Left [[id number]]  (Abnormal) Collected:  01/02/20 2025    Lab Status:  Final result Specimen:  Blood from Arm, Left Updated:  01/03/20 1447     BCID, PCR Enterococcus spp, not VRE. Identification by BCID PCR.    Blood Culture - Blood, Arm, Right [[id number]]  (Abnormal) Collected:  01/02/20 2019    Lab Status:  Final result Specimen:  Blood from Arm, Right Updated:  01/05/20 0606     Blood Culture Enterococcus faecalis     Comment: Refer to previous blood culture collected on 01/02 at 2025 for CASEY's.           Gram Stain Aerobic Bottle Gram positive cocci in pairs and chains      Anaerobic Bottle Gram positive cocci in pairs and chains              Radiology:  Imaging Results (Last 72 Hours)     Procedure Component Value Units Date/Time    MRI Brain Without Contrast [[id number]] Collected:  01/07/20 1057     Updated:  01/07/20 1135    Narrative:       EXAMINATION: MRI BRAIN WO CONTRAST-     INDICATION: Stroke; M62.81-Muscle weakness  (generalized); Z74.09-Other  reduced mobility; R13.12-Dysphagia, oropharyngeal phase;  R47.1-Dysarthria and anarthria.      TECHNIQUE: Sagittal and axial images of brain are displayed. No  intravenous contrast was utilized.     COMPARISON: None.     FINDINGS: There are several areas of abnormal signal consistent with  acute or subacute infarcts. The areas involved include a large lesion in  the right cerebellum, smaller lesions in the leftward aspect of the  cerebellum, a 1 cm abnormality in the right thalamic region, small areas  of abnormal signal in the occipital lobes, and a small area in the left  parietal convexity. There is also a punctate area of abnormal signal in  the leftward posterior aspect of the lary.       Impression:       Multifocal acute or subacute infarcts as described above,  most significant which appear to include the right thalamic region and  the cerebellar hemispheres, right greater than left.      D:  01/07/2020  E:  01/07/2020     This report was finalized on 1/7/2020 11:31 AM by Dr. Lukasz Galvez MD.       XR Chest 1 View [[id number]] Collected:  01/07/20 0045     Updated:  01/07/20 0047    Narrative:       CR Chest 1 Vw 1/7/2020    INDICATION:   Chest and abdomen pain and fever today. Aspiration pneumonia. Dysphagia. Benign essential hypertension and atrial fibrillation. COPD.     COMPARISON:    1/2/2020    FINDINGS:  Single portable AP view(s) of the chest.    Support lines and tubes are unchanged.    The heart is enlarged but stable. The lungs are hyperinflated with emphysematous and fibrotic changes characteristic of COPD. Atelectasis or infiltrate medial left lower lobe. Discoid atelectasis or linear fibrosis right base. There are no pleural  effusions. No pneumothorax.       Impression:       Cardiomegaly and COPD. Minimal atelectasis or infiltrate medial left lower lobe.    Signer Name: Jared Lal MD   Signed: 1/7/2020 12:45 AM   Workstation Name: [employer]    Radiology  [facility]    CT Abdomen Pelvis With & Without Contrast [[id number]] Collected:  01/05/20 1250     Updated:  01/06/20 0955    Narrative:       EXAMINATION: CT ABDOMEN/PELVIS WWO CONTRAST - 01/05/2020      INDICATION: Abdominal pain and fever.     TECHNIQUE: 5 mm post IV contrast portal venous phase and delayed venous  phase images through the abdomen and pelvis.     The radiation dose reduction device was turned on for each scan per the  ALARA (As Low as Reasonably Achievable) protocol.     COMPARISON: No previous abdominal CT scan studies.     FINDINGS: Mild right basilar interstitial changes appear to represent  some subpleural scarring or atelectasis rather than pneumonia. There is  trace left basilar pleural thickening and fluid. There is trace  pericardial effusion.     Dense material in the dependent portion of the gallbladder presumably  represents microlithiasis. No gallbladder inflammatory changes are  identified. There is mild fatty liver change. Pancreas, adrenal glands,  and left kidney appear unremarkable. There appears to be a small right  upper pole renal cyst, otherwise unremarkable right kidney. There is  dense streak artifact from extensive retained barium in the right colon.     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  There is minimal associated perisplenic fat stranding, no evidence of  hematoma and no evidence of drainable fluid collection. No upper  abdominal inflammatory focus or free air is seen. Bowel loops are  nondistended.     Regarding the lower abdomen and pelvis, no mass, adenopathy, ascites or  acute inflammatory change is seen. Bladder is nondistended. Delayed  images show contrast opacification of normal caliber renal collecting  systems, ureters, and bladder. Bony structures appear grossly  intact.       Impression:       1. 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 Mild associated perisplenic fat stranding.  2. Gallstones.  3. Retained barium in the right colon which limits fine detail in the  right mid abdomen. No definite acute inflammatory focus is seen here or  elsewhere, however.     DICTATED:   01/05/2020  EDITED/ls :   01/05/2020         This report was finalized on 1/6/2020 9:52 AM by Dr. Martin Soni MD.         read his x-ray from 1/7/2020-minute some mild atelectasis but no consolidation noted.  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 Would normally require pacemaker extraction but patient and wife are unsure if he could tolerate this. Cardiology has evaluated and I appreciate the recommendations.  MARLON when stable enough. The other question is of the initial source for his septicemia. He denies any urinary symptoms or prostate issues. Other than his large splenic infarct, no other obvious sign of intra-abdominal pathology.  Possibly initially from a urinary source.    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  -- Aphasia  -- Hemiparesis  ----Nausea  ----significant weight loss  -- COPD exacerbation  ----acute hypoxic respiratory failure/atelectasis  -- Aneurysm of thoracic aorta  -- PAF  -- Sick sinus syndrome status post permanent cardiac pacemaker  ---Rhinovirus infection   ---microcytic anemia- we'll need to monitor especially given splenic infarct. Fairly stable since 1/3    PLAN/RECOMMENDATIONS:   Thank you for  "asking us to see Alessandro Mendiola, I recommend the following:    ----ampicillin 2g q 4 hours and rocephin 2g iv q 12 hours (for synergy)  -- Continue to follow cultures. Repeat blood cultures from 1/5 are no growth to date  -- neurology following.  MRI brain noted  --- MARLON when he is stable enough  ---continue to closely follow cbc with diff and bmp for now    Long discussion with the patient and his wife today    Very complex case requiring high level of medical decision making. Patient has significant risk for further morbidity      Piyush Elliott MD  1/7/2020  2:18 PM                    Electronically signed by Piyush Elliott MD at 01/07/20 1424     Mikey Obrien MD at 01/07/20 0824          [facility] at [facility] Progress Note     LOS: 3 days   Patient Care Team:  Silvio Jacobson MD as PCP - General (Internal Medicine)  Dagoberto Rasmussen MD as Consulting Physician (General Surgery)  PCP:  Silvio Jacobson MD    Chief Complaint:  Afib, sss s/p ppm, possible endocarditis, cardioembolic cva    SUBJECTIVE:   MARLON was deferred yesterday d/t hypoxia requiring high flow O2.  Restless night.  Sundowning vs agitation unable to rest trying to get out of bed incessantly last pm per wife.  Had head MRI this am      OBJECTIVE:    Vital Sign Min/Max for last 24 hours  Temp  Min: 97.7 °F (36.5 °C)  Max: 100.7 °F (38.2 °C)   BP  Min: 123/58  Max: 143/64   Pulse  Min: 66  Max: 98   Resp  Min: 16  Max: 30   SpO2  Min: 89 %  Max: 100 %   Flow (L/min)  Min: 50  Max: 60   No data recorded     Flowsheet Rows      First Filed Value   Admission Height  180.3 cm (70.98\") Documented at 01/02/2020 1813   Admission Weight  69.9 kg (154 lb 1.6 oz) Documented at 01/02/2020 1813          Telemetry: sr w pac/pvc      Intake/Output Summary (Last 24 hours) at 1/7/2020 0824  Last data filed at 1/7/2020 0533  Gross per 24 hour   Intake 518 ml   Output 300 ml   Net 218 ml     Intake & Output (last 3 days)       " 01/04 0701 - 01/05 0700 01/05 0701 - 01/06 0700 01/06 0701 - 01/07 0700 01/07 0701 - 01/08 0700    P.O. 360  418     IV Piggyback 300 200 100     Total Intake(mL/kg) 660 (9.4) 200 (2.9) 518 (7.4)     Urine (mL/kg/hr)   300 (0.2)     Total Output   300     Net +660 +200 +218             Urine Unmeasured Occurrence 2 x 1 x 4 x            Physical Exam:  Physical Exam  Awake high flow O2  Restless  irreg s1s2 hsm  Chest diffuse crackle /rhonchi  abd mild distension, nontender  Ext 1+ edema     LABS/DIAGNOSTIC DATA:  Results from last 7 days   Lab Units 01/05/20  0633 01/03/20  0719 01/02/20  1344   WBC 10*3/mm3 8.51 8.88 9.75   HEMOGLOBIN g/dL 7.4* 7.9* 9.4*   HEMATOCRIT % 27.1* 27.7* 32.7*   PLATELETS 10*3/mm3 154 155 166     Lab Results   Lab Value Date/Time    TROPONINT <0.010 01/02/2020 1344    TROPONINT <0.010 12/11/2019 0933    TROPONINT <0.010 09/14/2019 1148     Results from last 7 days   Lab Units 01/02/20  1344   INR  2.13*   APTT seconds 50.0*     Results from last 7 days   Lab Units 01/05/20  2224 01/05/20  0632 01/04/20  0438 01/03/20  0719 01/02/20  1344   SODIUM mmol/L  --   --  140 139 139   POTASSIUM mmol/L 3.7 3.3* 3.5 3.4* 3.7   CHLORIDE mmol/L  --   --  99 98 96*   CO2 mmol/L  --   --  29.0 31.0* 28.1   BUN mg/dL  --   --  15 13 13   CREATININE mg/dL  --   --  0.88 0.77 0.76   CALCIUM mg/dL  --   --  8.8 8.7 9.1   BILIRUBIN mg/dL  --   --   --  0.3 0.4   ALK PHOS U/L  --   --   --  112 130*   ALT (SGPT) U/L  --   --   --  8 11   AST (SGOT) U/L  --   --   --  16 23   GLUCOSE mg/dL  --   --  136* 133* 193*     Results from last 7 days   Lab Units 01/03/20  0719   HEMOGLOBIN A1C % 6.30*     Results from last 7 days   Lab Units 01/03/20 0719   CHOLESTEROL mg/dL 118   TRIGLYCERIDES mg/dL 76   HDL CHOL mg/dL 36*   LDL CHOL mg/dL 67     Results from last 7 days   Lab Units 01/03/20 0719   TSH uIU/mL 0.824           Medication Review:     ampicillin 2 g Intravenous Q4H   aspirin 324 mg Oral Daily    atorvastatin 80 mg Oral Nightly   azelastine 2 spray Each Nare BID   budesonide-formoterol 2 puff Inhalation BID   cefTRIAXone 2 g Intravenous Q12H   cetirizine 10 mg Oral Daily   clopidogrel 75 mg Oral Daily   cycloSPORINE 1 drop Both Eyes BID   docusate sodium 100 mg Oral BID   docusate sodium 100 mg Oral BID   dronedarone 400 mg Oral BID   heparin (porcine) 5,000 Units Subcutaneous Q8H   pantoprazole 40 mg Oral QAM   polyethylene glycol 17 g Oral BID   primidone 50 mg Oral BID   roflumilast 500 mcg Oral Daily   sertraline 50 mg Oral Daily   sodium chloride 10 mL Intravenous Q12H   tamsulosin 0.4 mg Oral Nightly              Muscle weakness of left upper extremity    Aneurysm of thoracic aorta (CMS/HCC)    Coronary arteriosclerosis in native artery    Paroxysmal atrial fibrillation (CMS/HCC)    Sick sinus syndrome (CMS/HCC)    COPD exacerbation (CMS/HCC)    Aphasia      ASSESSMENT/PLAN:  Suspected Ischemic CVA  - neuro following  - L sided weakness  - awaiting MRI d/t ppm  - continue DAPT    Enterococcus Bacteremia  - ID following, abx per ID  - CT AP shows splenic infarct further supporting concern for endocarditis and possible cardiogenic cva d/t endocarditis   - MARLON deferred as would require intubation  to tolerate    Rhinovirus Infection    PAF  SSS  - S/p St. Bc ppm  - holding Xarelto     HTN  - allowing for permissive HTN    HLD  - atorvastatin     End Stage COPD  - baseline 4-5L O2 via nc    Anemia- acutely worsened w concomitant 50% splenic infarct    Dismal prognosis as will likely need ventilation in short order.  Status of no CPR reiterated.  Splenic infarct complicates clinical scenario.  Question if shower embolization of vegetation occurred as was on NOAC at time of issue.  Not candidate for device extraction/ valvular surgery irrespective of future MARLON findings so long term antibiotics only reasonable choice at current.      Electronically signed by Bessy Roy PA-C, 01/07/20, 8:25 AM.        Electronically signed by Mikey Obrien MD at 20 1357       Physical Therapy Notes (last 24 hours) (Notes from 20 through 20)    No notes exist for this encounter.         Occupational Therapy Notes (last 24 hours) (Notes from 20 through 20)    No notes exist for this encounter.            Speech Language Pathology Notes (last 24 hours) (Notes from 20 through 20)      Estiven Conde MS CCC-SLP at 20 1447          Acute Care - Speech Language Pathology   Swallow Treatment Note  Cy     Patient Name: Alessandro Mendiola  : 1940  MRN: [id number]  Today's Date: 2020               Admit Date: 2020    Visit Dx:      ICD-10-CM ICD-9-CM   1. Muscle weakness of left upper extremity M62.81 728.87   2. Impaired mobility and ADLs Z74.09 799.89   3. Oropharyngeal dysphagia R13.12 787.22   4. Dysarthria R47.1 784.51     Patient Active Problem List   Diagnosis   • Aneurysm of thoracic aorta (CMS/HCC)   • Coronary arteriosclerosis in native artery   • Paroxysmal atrial fibrillation (CMS/HCC)   • Sick sinus syndrome (CMS/HCC)   • Vitamin D deficiency   • COPD exacerbation (CMS/HCC)   • HTN (hypertension)   • Mood disorder (CMS/HCC)   • Simple chronic bronchitis (CMS/HCC)   • Muscle weakness of left upper extremity   • Aphasia       Therapy Treatment  Rehabilitation Treatment Summary     Row Name 20 1430             Treatment Time/Intention    Discipline  speech language pathologist  -MP      Document Type  therapy note (daily note)  -MP      Subjective Information  no complaints  -MP      Mode of Treatment  individual therapy;speech-language pathology  -MP      Patient/Family Observations  Spouse present  -MP      Care Plan Review  care plan/treatment goals reviewed;patient/other agree to care plan  -MP      Care Plan Review, Other Participant(s)  spouse  -MP      Therapy Frequency (Swallow)  5 days per week  -MP       Therapy Frequency (SLP SLC)  5 days per week  -MP      Patient Effort  adequate  -MP      Recorded by [MP] Estiven Conde MS CCC-SLP 01/07/20 1444      Row Name 01/07/20 1430             Pain Assessment    Additional Documentation  Pain Scale: FACES Pre/Post-Treatment (Group)  -MP      Recorded by [MP] Estiven Conde MS CCC-SLP 01/07/20 1444      Row Name 01/07/20 1430             Pain Scale: FACES Pre/Post-Treatment    Pain: FACES Scale, Pretreatment  2-->hurts little bit  -MP      Pain: FACES Scale, Post-Treatment  2-->hurts little bit  -MP      Recorded by [MP] Estiven Conde MS CCC-SLP 01/07/20 1444      Row Name 01/07/20 1430             Outcome Summary/Treatment Plan (SLP)    Daily Summary of Progress (SLP)  progress toward functional goals as expected  -MP      Plan for Continued Treatment (SLP)  Repeat MBS tomorrow to determine any possibility of diet upgrade. Until MBS, continue dysphagia level IV diet w/ HTL, no straws.  -MP      Anticipated Dischage Disposition  home with home health;anticipate therapy at next level of care  -MP      Recorded by [MP] Estiven Conde MS CCC-SLP 01/07/20 1444        User Key  (r) = Recorded By, (t) = Taken By, (c) = Cosigned By    Initials Name Effective Dates Discipline    MP Estiven Conde MS CCC-SLP 06/19/19 -  SLP          Outcome Summary  Outcome Summary/Treatment Plan (SLP)  Daily Summary of Progress (SLP): progress toward functional goals as expected (01/07/20 1430 : Estiven Conde MS CCC-SLP)  Plan for Continued Treatment (SLP): Repeat MBS tomorrow to determine any possibility of diet upgrade. Until MBS, continue dysphagia level IV diet w/ HTL, no straws. (01/07/20 1430 : Estiven Conde MS CCC-SLP)  Anticipated Dischage Disposition: home with home health, anticipate therapy at next level of care (01/07/20 1430 : Estiven Conde, MS CCC-SLP)      SLP GOALS     Row Name 01/07/20 1430 01/06/20 1115 01/04/20 1545       Oral Nutrition/Hydration Goal  1 (SLP)    Oral Nutrition/Hydration Goal 1, SLP  LTG: Pt will return to regular diet & thin liquid and tolerate w/o s/sxs aspiration w/ 100% acc w/o cues.  -MP  LTG: Pt will return to regular diet & thin liquid and tolerate w/o s/sxs aspiration w/ 100% acc w/o cues.  -RD  LTG: Pt will return to regular diet & thin liquid and tolerate w/o s/sxs aspiration w/ 100% acc w/o cues.  -SM    Time Frame (Oral Nutrition/Hydration Goal 1, SLP)  by discharge  -MP  by discharge  -RD  by discharge  -SM    Progress/Outcomes (Oral Nutrition/Hydration Goal 1, SLP)  continuing progress toward goal  -MP  continuing progress toward goal  -RD  continuing progress toward goal  -SM       Oral Nutrition/Hydration Goal 2 (SLP)    Oral Nutrition/Hydration Goal 2, SLP  Pt will tolerate trials of soft solids & honey-thick liquids w/o s/sxs aspiration w/ 100% acc w/o cues.  -MP  Pt will tolerate trials of soft solids & honey-thick liquids w/o s/sxs aspiration w/ 100% acc w/o cues.  -RD  Pt will tolerate trials of soft solids & honey-thick liquids w/o s/sxs aspiration w/ 100% acc w/o cues.  -SM    Time Frame (Oral Nutrition/Hydration Goal 2, SLP)  short term goal (STG);by discharge  -MP  short term goal (STG);by discharge  -RD  short term goal (STG)  -SM    Barriers (Oral Nutrition/Hydration Goal 2, SLP)  Tolerated trials of HTL w/ no s/sxs  -MP  Pt currently NPO for MARLON today, so unable to test PO trials. Spouse reports minimal PO intake yesterday on modified diet.   -RD  --    Progress/Outcomes (Oral Nutrition/Hydration Goal 2, SLP)  continuing progress toward goal  -MP  goal ongoing  -RD  good progress toward goal  -SM       Oral Nutrition/Hydration Goal (SLP)    Oral Nutrition/Hydration Goal, SLP  Pt will tolerate therapeutic trials of ice/thin H2O w/o s/sxs aspiration or distress w/ 70% acc w/o cues.  -MP  Pt will tolerate therapeutic trials of ice/thin H2O w/o s/sxs aspiration or distress w/ 70% acc w/o cues.  -RD  Pt will tolerate  therapeutic trials of ice/thin H2O w/o s/sxs aspiration or distress w/ 70% acc w/o cues.  -SM    Time Frame (Oral Nutrition/Hydration Goal, SLP)  short term goal (STG);by discharge  -MP  short term goal (STG);by discharge  -RD  short term goal (STG)  -SM    Barriers (Oral Nutrition/Hydration Goal, SLP)  Cough @ baseline & intermittently w/ trials - difficult to determine if related to PO  -MP  unable to test 2' NPO for procedure  -RD  Intermittent coughing throughout  -SM    Progress/Outcomes (Oral Nutrition/Hydration Goal, SLP)  continuing progress toward goal  -MP  goal ongoing  -RD  continuing progress toward goal  -SM       Lingual Strengthening Goal 1 (SLP)    Activity (Lingual Strengthening Goal 1, SLP)  increase tongue back strength  -MP  increase tongue back strength  -RD  increase tongue back strength  -SM    Increase Tongue Back Strength  lingual resistance exercises  -MP  lingual resistance exercises  -RD  lingual resistance exercises  -SM    [place]/Accuracy (Lingual Strengthening Goal 1, SLP)  with minimal cues (75-90% accuracy)  -MP  with minimal cues (75-90% accuracy)  -RD  with minimal cues (75-90% accuracy)  -SM    Time Frame (Lingual Strengthening Goal 1, SLP)  short term goal (STG);by discharge  -MP  short term goal (STG);by discharge  -RD  short term goal (STG)  -SM    Barriers (Lingual Strengthening Goal 1, SLP)  --  Reviewed lingual resistance exercises w/ forceful /k/ & /g/ words. Needed frequent cues for forceful production  -RD  --    Progress/Outcomes (Lingual Strengthening Goal 1, SLP)  goal ongoing  -MP  continuing progress toward goal  -RD  good progress toward goal  -SM       Pharyngeal Strengthening Exercise Goal 1 (SLP)    Activity (Pharyngeal Strengthening Goal 1, SLP)  increase timing;increase superior movement of the hyolaryngeal complex;increase closure at entrance to airway/closure of airway at glottis  -MP  increase timing;increase superior movement of the hyolaryngeal  complex;increase closure at entrance to airway/closure of airway at glottis  -RD  increase timing;increase superior movement of the hyolaryngeal complex;increase closure at entrance to airway/closure of airway at glottis  -SM    Increase Timing  prepping - 3 second prep or suck swallow or 3-step swallow  -MP  prepping - 3 second prep or suck swallow or 3-step swallow  -RD  prepping - 3 second prep or suck swallow or 3-step swallow  -SM    Increase Superior Movement of the Hyolaryngeal Complex  effortful pitch glide (falsetto + pharyngeal squeeze)  -MP  effortful pitch glide (falsetto + pharyngeal squeeze)  -RD  effortful pitch glide (falsetto + pharyngeal squeeze)  -SM    Increase Closure at Entrance to Airway/Closure of Airway at Glottis  supraglottic swallow  -MP  supraglottic swallow  -RD  supraglottic swallow  -SM    [place]/Accuracy (Pharyngeal Strengthening Goal 1, SLP)  with minimal cues (75-90% accuracy)  -MP  with minimal cues (75-90% accuracy)  -RD  with minimal cues (75-90% accuracy)  -SM    Time Frame (Pharyngeal Strengthening Goal 1, SLP)  short term goal (STG);by discharge  -MP  short term goal (STG);by discharge  -RD  short term goal (STG)  -SM    Barriers (Pharyngeal Strengthening Goal 1, SLP)  --  Reviewed and completed exercises. x2-3 reps each, minimal 2' pt c/o of fatigue, hunger and lethargy  -RD  Provided copy of exercises and reviewed with pt. Pt demonstrated each exercises x2-3 with min cues (mainly r/t [place]). Did not target cog-comm goals 2' emphasis of dysphagia. Simple communication needs being met.   -SM    Progress/Outcomes (Pharyngeal Strengthening Goal 1, SLP)  goal ongoing  -MP  continuing progress toward goal  -RD  good progress toward goal  -SM       Respiratory Support Goal (SLP)    Improve Respiratory Support Goal (SLP)  Pt will use shorter length of utterance per breath (2-3 words per breath) for improved speech intelligibility at sentence level w/ 80% acc w/ min cues.  -MP   Pt will use shorter length of utterance per breath (2-3 words per breath) for improved speech intelligibility at sentence level w/ 80% acc w/ min cues.  -RD  --    Time Frame (Respiratory Support Goal, SLP)  short term goal (STG);by discharge  -MP  short term goal (STG);by discharge  -RD  --    Progress (Respiratory Support Goal, SLP)  --  30%;with minimal cues (75-90%)  -RD  --    Progress/Outcomes (Respiratory Support at Paragraph Level Goal, SLP)  goal ongoing  -MP  continuing progress toward goal  -RD  --    Comment (Respiratory Support Goal, SLP)  --  Educated on use of strategy to improve intelligibility and breath support.   -RD  --       Articulation Goal 1 (SLP)    Improve Articulation Goal 1 (SLP)  by over-articulating at word level;80%;with minimal cues (75-90%)  -MP  by over-articulating at word level;80%;with minimal cues (75-90%)  -RD  --    Time Frame (Articulation Goal 1, SLP)  short term goal (STG);by discharge  -MP  short term goal (STG);by discharge  -RD  --    Progress (Articulation Goal 1, SLP)  --  20%;with minimal cues (75-90%)  -RD  --    Progress/Outcomes (Articulation Goal 1, SLP)  goal ongoing  -MP  continuing progress toward goal  -RD  --    Comment (Articulation Goal 1, SLP)  --  Continue to address. educated on strategy and how to complete, pt lethargic and did use appropriately, even when given mod cues  -RD  --       Additional Goal 1 (SLP)    Additional Goal 1, SLP  LTG: Pt will improve cognitive-communication skills in order to participate in care in hospital setting w/ 100% acc w/ min cues.  -MP  LTG: Pt will improve cognitive-communication skills in order to participate in care in hospital setting w/ 100% acc w/ min cues.  -RD  --    Time Frame (Additional Goal 1, SLP)  by discharge  -MP  by discharge  -RD  --    Progress/Outcomes (Additional Goal 1, SLP)  goal ongoing  -MP  continuing progress toward goal  -RD  --      User Key  (r) = Recorded By, (t) = Taken By, (c) = Cosigned  By    Initials Name Provider Type    Marilyn Monteiro, MS CCC-SLP Speech and Language Pathologist    Katai Chaudhry, MS CCC-SLP Speech and Language Pathologist    Estiven Carpenter MS CCC-SLP Speech and Language Pathologist          EDUCATION  The patient has been educated in the following areas:   Dysphagia (Swallowing Impairment) Modified Diet Instruction.    SLP Recommendation and Plan  Daily Summary of Progress (SLP): progress toward functional goals as expected     Plan for Continued Treatment (SLP): Repeat MBS tomorrow to determine any possibility of diet upgrade. Until MBS, continue dysphagia level IV diet w/ HTL, no straws.  Anticipated Dischage Disposition: home with home health, anticipate therapy at next level of care                    Time Calculation:   Time Calculation- SLP     Row Name 01/07/20 1447             Time Calculation- SLP    SLP Start Time  1430  -MP      SLP Received On  01/07/20  -MP        User Key  (r) = Recorded By, (t) = Taken By, (c) = Cosigned By    Initials Name Provider Type    Estiven Carpenter MS CCC-SLP Speech and Language Pathologist          Therapy Charges for Today     Code Description Service Date Service Provider Modifiers Qty    [id number]  ST TREATMENT SWALLOW 3 1/7/2020 Estiven Conde MS CCC-SLP GN 1                 Estiven Conde MS CCC-SLP  1/7/2020    Electronically signed by Estiven Conde MS CCC-SLP at 01/07/20 1447     Estiven Conde MS CCC-SLP at 01/07/20 1447          Problem: Patient Care Overview  Goal: Plan of Care Review  Outcome: Ongoing (interventions implemented as appropriate)  Flowsheets (Taken 1/7/2020 1446)  Plan of Care Reviewed With: patient; spouse  Note:   SLP treatment completed. Will plan for repeat MBS tomorrow to determine any possibility of diet upgrade . Please see note for further details and recommendations.          Electronically signed by Estiven Conde MS CCC-SLP at 01/07/20 1449

## 2020-01-08 NOTE — PLAN OF CARE
Problem: Patient Care Overview  Goal: Plan of Care Review  Outcome: Ongoing (interventions implemented as appropriate)  Pt declining rapidly.  Pt po intake zero today.  Pt denies pain.  Pt had episode today of copious amounts of oral secretions today.  Had to oral suction patient.  Pt received 0.5mg Ativan IV x 1 today. Pt tolerated dose well and respirations were less labored.

## 2020-01-08 NOTE — PROGRESS NOTES
Continued Stay Note  [facility]     Patient Name: Alessandro Mendiola  MRN: [id number]  Today's Date: 1/8/2020    Admit Date: 1/2/2020    Discharge Plan     Row Name 01/08/20 0825       Plan    Plan  [facility]    Patient/Family in Agreement with Plan  yes    Plan Comments  Spoke to wife at bedside. Plan is [facility]. As of visit, patient is on a BIPAP and Cardizem gtt. CM will continue to follow.    Final Discharge Disposition Code  03 - skilled nursing facility (SNF)        Discharge Codes    No documentation.             Flaco Starks RN

## 2020-01-09 PROBLEM — I38 ENDOCARDITIS: Status: ACTIVE | Noted: 2020-01-01

## 2020-01-09 NOTE — H&P
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  Admitted to [facility] on 1/2/2020 as a transfer from [facility] due to concern for stroke.  Presented w/ LUE weakness and aphasia.  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    MARLON never obtained due to respiratory issues.  Was placed on HFNC on 1/6, then escalated to BiPAP support.  1/7/2020 MRI brain w/o contrast showed multifocal acute or subacute infarcts.  By 1/8, patient continued to need high level respiratory support and was no longer awake or responsive.  Basically no PO intake in the last 2 days.  Palliative Care consulted and discussed poor prognosis with family.  At that time, family wished to continue respiratory support alternating between HFNC and BiPAP.  Low dose opiate and benzo were added to help control dyspnea, restlessness, and anxiety.  Last night, an RRT was called due to patient's respiratory distress with oxygen sats in the 70s despite HFNC.  Patient was placed back on BiPAP and hypoxia resolved but family has decided to pursue full comfort care going forward.  Desire DNR, no intubation, no feeding tube, no further labs or imaging.    At baseline, patient has been dependent in all ADLs and only moving from bed to chair primarily.  Was not able to stand for any bathing, wife giving sponge bath at home. Has experienced a 30 lb weight loss over the last several months and experiencing perisistent nausea.    Covisit made with Hospice SW.  Patient seen with wife, son, and son's family (wife and 2 sons).  Patient occasionally pulls leg up or taps foot but doesn't seem restless to family.  Has appeared comfortable, though  wife worries about BiPAP mask bruising patient's face.  Not awake, not opening eyes.  No grimacing or groaning.    Meds reviewed.  PRN haldol given (x1) 2 days ago.  No PRN ativan utilized.  7mg PRN morphine given in last 24 hrs.      Past Medical History:   Diagnosis Date   • Alcohol abuse    • Aneurysm of thoracic aorta (CMS/HCC)    • Anxiety    • Ascending aortic aneurysm (CMS/HCC)     4.8 to 4.9 cm    • Atherosclerotic heart disease    • Atrial fibrillation (CMS/HCC)    • Blood thinned due to long-term anticoagulant use    • Body mass index (BMI) 20.0-20.9, adult    • CAD (coronary artery disease)    • Cancer (CMS/HCC)     skin   • Cardiac pacemaker    • Cellulitis    • Chronic bronchitis (CMS/HCC)    • Chronic cough    • COPD (chronic obstructive pulmonary disease) (CMS/HCC)    • Depression    • Difficulty breathing    • Emphysema lung (CMS/HCC)    • Encounter for long-term current use of medication    • Encounter for screening for malignant neoplasm of prostate    • Fatigue    • Fingertip amputation    • TAMIKO (generalized anxiety disorder)    • History of alcohol abuse    • History of cardiac catheterization    • History of chest x-ray 02/11/2014    Chronic interstitial changes seen throughout the lung fields w/ no radiographic evidence of acute parenchymal disease.No definite LT-sided rib fracture present.   • History of chest x-ray 01/10/2014    No acute cardiopulmonary process.Dual lead LT subclavian pacemaker is in place.Aortic ectasia. Hyperinflation of lungs, suggesting COPD   • History of chest x-ray 10/01/2012    Ill-defined RT middle lobe opacity which likley reflects a pneumonia. FU to radiographic resolution is recommended.   • History of Doppler echocardiogram    • History of echocardiogram 09/26/2013    EF 55-60%. Mod concentric LVH. Abnormal LT VT diastolic filling is observed, consistent w/ impaired relaxation.Mild MR/TR. Trace IL. RVSP 44 mmHg.   • History of stress test     ECG performed   •  HTN (hypertension)    • HX: long term anticoagulant use     Blood thinned due to long-term anticoagulant use (V58.61) (Z79.01)   • Hypercholesterolemia    • LVH (left ventricular hypertrophy)    • Mitral and aortic valve disease    • Myocardial infarction (CMS/HCC)    • Nonvenomous insect bite of multiple sites    • Onychomycosis    • Pacemaker at end of battery life    • Pneumonia    • Pneumothorax    • Pre-syncope    • Pulmonary nodule    • Rib pain on left side    • Sick sinus syndrome (CMS/HCC)    • SOB (shortness of breath)    • Upper respiratory infection    • Vitamin D deficiency      Past Surgical History:   Procedure Laterality Date   • AMPUTATION  08/31/2015    metacarpal and index finger   • CARDIAC PACEMAKER PLACEMENT  06/01/2008   • CATARACT EXTRACTION     • CORONARY STENT PLACEMENT      History of Cath Placement Of Stent 1  · Coronary stents   • EYE SURGERY     • SKIN CANCER EXCISION       Current Code Status     Date Active Code Status Order ID Comments User Context       1/9/2020 1331 No CPR [id number]  Юлия Widle MD Inpatient       Questions for Current Code Status     Question Answer Comment    Code Status No CPR     Medical Interventions (Level of Support Prior to Arrest) Comfort Measures     Level Of Support Discussed With Health Care Surrogate         Current Facility-Administered Medications   Medication Dose Route Frequency Provider Last Rate Last Dose   • acetaminophen (TYLENOL) suppository 650 mg  650 mg Rectal Q4H PRN Юлия Wilde MD       • bisacodyl (DULCOLAX) suppository 10 mg  10 mg Rectal Daily PRN Юлия Wilde MD       • glycopyrrolate (ROBINUL) injection 0.4 mg  0.4 mg Intravenous Q6H PRN Юлия Wilde MD       • haloperidol lactate (HALDOL) injection 1 mg  1 mg Intravenous Q4H PRN Юлия Wilde MD       • ketorolac (TORADOL) injection 15 mg  15 mg Intravenous Q6H PRN Юлия Wilde MD       • LORazepam (ATIVAN) injection 0.5 mg  0.5 mg Intravenous Q2H PRN  Юлия Wilde MD        Or   • LORazepam (ATIVAN) injection 1 mg  1 mg Intravenous Q2H PRN Юлия Wilde MD       • morphine injection 2 mg  2 mg Intravenous Q6H Юлия Wilde MD   2 mg at 20 1517   • morphine injection 2 mg  2 mg Intravenous Q1H PRN Юлия Wilde MD        Or   • Morphine sulfate (PF) injection 4 mg  4 mg Intravenous Q1H PRN Юлия Wilde MD       • ondansetron (ZOFRAN) injection 4 mg  4 mg Intravenous Q6H PRN Юлия Wilde MD       • palliative care oral rinse   Mouth/Throat 4x Daily Юлия Wilde MD       • palliative care oral rinse   Mouth/Throat PRN Юлия Wilde MD       • sodium chloride 0.9 % flush 10 mL  10 mL Intravenous PRN Юлия Wilde MD           Allergies   Allergen Reactions   • Doxycycline Shortness Of Breath     Family History   Problem Relation Age of Onset   • Coronary artery disease Other    • Diabetes Other    • Hypertension Other      Social History     Socioeconomic History   • Marital status:      Spouse name: Not on file   • Number of children: Not on file   • Years of education: Not on file   • Highest education level: Not on file   Tobacco Use   • Smoking status: Former Smoker     Packs/day: 3.00     Years: 45.00     Pack years: 135.00     Last attempt to quit: 2008     Years since quittin.0   • Smokeless tobacco: Never Used   Substance and Sexual Activity   • Alcohol use: No   • Drug use: No   • Sexual activity: Defer    to wife Madelyn for the past 56 years.  He's a farmer, originally from [place].  Loves animals.  Enjoyed hunting.  Has 2 sons and 2 daughters, all living in [place].  5 grandchildren and 1 great grandchild.    Surrogate decision maker: wife, Madelyn    Review of Systems - unable to obtain from obtunded patient    Pulse 93   No intake or output data in the 24 hours ending 20 1638  Physical Exam:  General: NAD, elderly male in bed, ill appearing  Head/Neck: NC/AT, trachea midline  Eyes: Closed,  normal lids and lashes  ENT: BiPAP mask in place, oral mucosa dry  Lungs: Diminished throughout, no wheeze or rhonchi, respirations regular and not labored  Heart: RRR  Abdomen: Soft, non-distended, non-tender  Extremities: No C/C/E  Skin: Warm and dry, left arm bruised  Neurological:  Not awake or alert, no response to voice or exam, spontaneous movement of right leg and foot, no myoclonus  Psych: calm, face relaxed, motor movement intermittent   PPS 10%    Imaging Results   Results from last 7 days   Lab Units 01/08/20  0714   WBC 10*3/mm3 8.12   HEMOGLOBIN g/dL 7.8*   HEMATOCRIT % 28.0*   PLATELETS 10*3/mm3 241     Results from last 7 days   Lab Units 01/08/20 0714  01/03/20  0719   SODIUM mmol/L 144   < > 139   POTASSIUM mmol/L 3.5   < > 3.4*   CHLORIDE mmol/L 97*   < > 98   CO2 mmol/L 32.0*   < > 31.0*   BUN mg/dL 12   < > 13   CREATININE mg/dL 0.76   < > 0.77   CALCIUM mg/dL 8.7   < > 8.7   BILIRUBIN mg/dL  --   --  0.3   ALK PHOS U/L  --   --  112   ALT (SGPT) U/L  --   --  8   AST (SGOT) U/L  --   --  16   GLUCOSE mg/dL 143*   < > 133*    < > = values in this interval not displayed.     Results from last 7 days   Lab Units 01/08/20  0714   SODIUM mmol/L 144   POTASSIUM mmol/L 3.5   CHLORIDE mmol/L 97*   CO2 mmol/L 32.0*   BUN mg/dL 12   CREATININE mg/dL 0.76   GLUCOSE mg/dL 143*   CALCIUM mg/dL 8.7     MRI Brain Without Contrast [[id number]] Collected:  01/07/20 1057        Updated:  01/07/20 1135     Narrative:        EXAMINATION: MRI BRAIN WO CONTRAST-     INDICATION: Stroke; M62.81-Muscle weakness (generalized); Z74.09-Other  reduced mobility; R13.12-Dysphagia, oropharyngeal phase;  R47.1-Dysarthria and anarthria.      TECHNIQUE: Sagittal and axial images of brain are displayed. No  intravenous contrast was utilized.     COMPARISON: None.     FINDINGS: There are several areas of abnormal signal consistent with  acute or subacute infarcts. The areas involved include a large lesion in  the right cerebellum,  smaller lesions in the leftward aspect of the  cerebellum, a 1 cm abnormality in the right thalamic region, small areas  of abnormal signal in the occipital lobes, and a small area in the left  parietal convexity. There is also a punctate area of abnormal signal in  the leftward posterior aspect of the lary.        Impression:        Multifocal acute or subacute infarcts as described above,  most significant which appear to include the right thalamic region and  the cerebellar hemispheres, right greater than left.      D:  01/07/2020  E:  01/07/2020     This report was finalized on 1/7/2020 11:31 AM by Dr. Lukasz Galvez MD.        XR Chest 1 View [[id number]] Collected:  01/07/20 0045       Updated:  01/07/20 0047     Narrative:        CR Chest 1 Vw 1/7/2020     INDICATION:   Chest and abdomen pain and fever today. Aspiration pneumonia. Dysphagia. Benign essential hypertension and atrial fibrillation. COPD.      COMPARISON:    1/2/2020     FINDINGS:  Single portable AP view(s) of the chest.     Support lines and tubes are unchanged.     The heart is enlarged but stable. The lungs are hyperinflated with emphysematous and fibrotic changes characteristic of COPD. Atelectasis or infiltrate medial left lower lobe. Discoid atelectasis or linear fibrosis right base. There are no pleural  effusions. No pneumothorax.         Impression:        Cardiomegaly and COPD. Minimal atelectasis or infiltrate medial left lower lobe.     Signer Name: Jared Lal MD   Signed: 1/7/2020 12:45 AM   Workstation Name: [id number]    [facility]     CT Abdomen Pelvis With & Without Contrast [[id number]] Collected:  01/05/20 1250       Updated:  01/06/20 0955     Narrative:        EXAMINATION: CT ABDOMEN/PELVIS WWO CONTRAST - 01/05/2020      INDICATION: Abdominal pain and fever.     TECHNIQUE: 5 mm post IV contrast portal venous phase and delayed venous  phase images through the abdomen and pelvis.     The radiation dose  reduction device was turned on for each scan per the  ALARA (As Low as Reasonably Achievable) protocol.     COMPARISON: No previous abdominal CT scan studies.     FINDINGS: Mild right basilar interstitial changes appear to represent  some subpleural scarring or atelectasis rather than pneumonia. There is  trace left basilar pleural thickening and fluid. There is trace  pericardial effusion.     Dense material in the dependent portion of the gallbladder presumably  represents microlithiasis. No gallbladder inflammatory changes are  identified. There is mild fatty liver change. Pancreas, adrenal glands,  and left kidney appear unremarkable. There appears to be a small right  upper pole renal cyst, otherwise unremarkable right kidney. There is  dense streak artifact from extensive retained barium in the right colon.     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  There is minimal associated perisplenic fat stranding, no evidence of  hematoma and no evidence of drainable fluid collection. No upper  abdominal inflammatory focus or free air is seen. Bowel loops are  nondistended.     Regarding the lower abdomen and pelvis, no mass, adenopathy, ascites or  acute inflammatory change is seen. Bladder is nondistended. Delayed  images show contrast opacification of normal caliber renal collecting  systems, ureters, and bladder. Bony structures appear grossly intact.        Impression:        1. 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 Mild associated perisplenic fat stranding.  2. Gallstones.  3. Retained barium in the right colon which limits fine detail in the  right mid abdomen. No definite acute inflammatory focus is seen here or  elsewhere, however.     ECHO: 1/3/2020  Interpretation Summary       · Left ventricular systolic function is normal. Estimated EF appears to be in the range of 61 - 65%.  · Left ventricular wall thickness is consistent with borderline concentric hypertrophy.  · Electronic leads present right side of the heart  · Mild MAC is present. Mild mitral valve regurgitation is present  · Mild dilation of the aortic root is present. Measures 4.1 cm  · No valvular vegetations visualized on this study, however valves not well seen  · Bubble study nondiagnostic due to poor visualization       Impression:  79 y.o. male with sepsis from enterococcus bacteremia secondary to clinical left sided infective endocarditis/possible pacemaker lead infection.  Has suffered CVA and splenic infarct from septic emboli, as well as acute hypoxic respiratory failure, compounded by acute COPD exacerbation and rhinovirus.     Symptoms:  Dyspnea  Restlessness  Anxiety  Encephalopathy  Debility    Plan:  -Admitted to Hospice GIP care today for comfort focused end of life care.  -Reviewed PRN morphine use in the last 24 hrs.  Will go ahead and schedule Morphine 2mg Q6 hrs ATC.  PRN morphine available for dyspnea and pain.  Dosing generously available in prep for weaning patient from BiPAP back to regular nasal cannula as tolerated.   -PRN ativan and haldol available for anxiety and restlessness.  -PRN medication also available in case of fever, constipation, or terminal secretions.  -Reviewed hospice plan of care w/ family.  Discussed symptom management and weaning of BiPAP to promote patient's comfort.  Educated about Hospice interdisciplinary team availability and continued care from [facility] staff in [facility] facility.  -Patient appropriate for inpatient Hospice care due to need for frequent skilled nursing assessment and intervention.  Currently requires injection medication to manage acute, end of life symptoms.  Prognosis of hours to days.      Юлия Wilde MD  01/09/20  4:38 PM

## 2020-01-09 NOTE — PROGRESS NOTES
[facility] Medicine Services  PROGRESS NOTE    Patient Name: Alessandro Mendiola  : 1940  MRN: [id number]    Date of Admission: 2020  Primary Care Physician: Silvio Jacobson MD    Subjective   Subjective     CC:     Worsening status    HPI:   Wife and multiple family members (at least 4 others) Patient appears to be declining.  Wife says he would not want a feeding tube.  Wife says he would not want to be on respiratory support or life support.    Review of Systems    Gen- No fevers, chills  CV- No chest pain, palpitations  Resp- No cough, dyspnea  GI- No N/V/D, abd pain    Objective   Objective     Vital Signs:   Temp:  [97.5 °F (36.4 °C)-98.6 °F (37 °C)] 98.6 °F (37 °C)  Heart Rate:  [] 86  Resp:  [18-22] 18  BP: (123-145)/(58-75) 137/75  Total (NIH Stroke Scale): 7     Physical Exam:    Constitutional: No acute distress, sleeping on high flow NC  HENT: NCAT, dry tongue  Respiratory: increased work of breathing, junky breath sounds bilaterally  Cardiovascular: RRR, s1 and s2  Gastrointestinal: Positive bowel sounds, soft, nontender, obese abdomen with abdominal breathing  Musculoskeletal: No bilateral ankle edema  Psychiatric: Appropriate affect, cooperative  Neurologic: Oriented x 3, right-sided weakness  Skin: dry, + skin tenting    Results Reviewed:    Results from last 7 days   Lab Units 20  0714 20  0633 20  0719 20  1344   WBC 10*3/mm3 8.12 8.51 8.88 9.75   HEMOGLOBIN g/dL 7.8* 7.4* 7.9* 9.4*   HEMATOCRIT % 28.0* 27.1* 27.7* 32.7*   PLATELETS 10*3/mm3 241 154 155 166   INR   --   --   --  2.13*     Results from last 7 days   Lab Units 20  0714 20  2224 20  0632 20  0438 20  0719 20  1344   SODIUM mmol/L 144  --   --  140 139 139   POTASSIUM mmol/L 3.5 3.7 3.3* 3.5 3.4* 3.7   CHLORIDE mmol/L 97*  --   --  99 98 96*   CO2 mmol/L 32.0*  --   --  29.0 31.0* 28.1   BUN mg/dL 12  --   --  15 13 13   CREATININE  mg/dL 0.76  --   --  0.88 0.77 0.76   GLUCOSE mg/dL 143*  --   --  136* 133* 193*   CALCIUM mg/dL 8.7  --   --  8.8 8.7 9.1   ALT (SGPT) U/L  --   --   --   --  8 11   AST (SGOT) U/L  --   --   --   --  16 23   TROPONIN T ng/mL  --   --   --   --   --  <0.010     Estimated Creatinine Clearance: 76.5 mL/min (by C-G formula based on SCr of 0.76 mg/dL).    Microbiology Results Abnormal     Procedure Component Value - Date/Time    Blood Culture - Blood, Arm, Right [[id number]] Collected:  01/05/20 1421    Lab Status:  Preliminary result Specimen:  Blood from Arm, Right Updated:  01/08/20 1601     Blood Culture No growth at 3 days    Blood Culture - Blood, Arm, Left [[id number]] Collected:  01/05/20 1421    Lab Status:  Preliminary result Specimen:  Blood from Arm, Left Updated:  01/08/20 1600     Blood Culture No growth at 3 days    Respiratory Panel, PCR - Swab, Nasopharynx [[id number]]  (Abnormal) Collected:  01/05/20 1250    Lab Status:  Final result Specimen:  Swab from Nasopharynx Updated:  01/05/20 1419     ADENOVIRUS, PCR Not Detected     Coronavirus 229E Not Detected     Coronavirus HKU1 Not Detected     Coronavirus NL63 Not Detected     Coronavirus OC43 Not Detected     Human Metapneumovirus Not Detected     Human Rhinovirus/Enterovirus Detected     Influenza B PCR Not Detected     Parainfluenza Virus 1 Not Detected     Parainfluenza Virus 2 Not Detected     Parainfluenza Virus 3 Not Detected     Parainfluenza Virus 4 Not Detected     Bordetella pertussis pcr Not Detected     Influenza A H1 2009 PCR Not Detected     Chlamydophila pneumoniae PCR Not Detected     Mycoplasma pneumo by PCR Not Detected     Influenza A PCR Not Detected     Influenza A H3 Not Detected     Influenza A H1 Not Detected     RSV, PCR Not Detected     Bordetella parapertussis PCR Not Detected    Blood Culture - Blood, Arm, Right [[id number]]  (Abnormal) Collected:  01/03/20 1506    Lab Status:  Final result Specimen:  Blood from Arm, Right  Updated:  01/05/20 1303     Blood Culture Enterococcus faecalis     Comment: Refer to previous blood culture collected on 01/02 at 2025 for CASEY's.          Gram Stain Aerobic Bottle Gram positive cocci in pairs and chains      Anaerobic Bottle Gram positive cocci in pairs and chains    Blood Culture - Blood, Arm, Left [[id number]]  (Abnormal) Collected:  01/03/20 1506    Lab Status:  Final result Specimen:  Blood from Arm, Left Updated:  01/05/20 1302     Blood Culture Enterococcus faecalis     Comment: Refer to previous blood culture collected on 01/02 at 2025 for CASEY's.            Gram Stain Aerobic Bottle Gram positive cocci in pairs and chains      Anaerobic Bottle Gram positive cocci in pairs and chains    Blood Culture - Blood, Arm, Right [[id number]]  (Abnormal) Collected:  01/02/20 2019    Lab Status:  Final result Specimen:  Blood from Arm, Right Updated:  01/05/20 0606     Blood Culture Enterococcus faecalis     Comment: Refer to previous blood culture collected on 01/02 at 2025 for CASEY's.           Gram Stain Aerobic Bottle Gram positive cocci in pairs and chains      Anaerobic Bottle Gram positive cocci in pairs and chains    Blood Culture - Blood, Arm, Left [[id number]]  (Abnormal)  (Susceptibility) Collected:  01/02/20 2025    Lab Status:  Final result Specimen:  Blood from Arm, Left Updated:  01/05/20 0605     Blood Culture Enterococcus faecalis     Gram Stain Aerobic Bottle Gram positive cocci in pairs and chains      Anaerobic Bottle Gram positive cocci in pairs and chains    Susceptibility      Enterococcus faecalis     CASEY     Ampicillin Susceptible     Gentamicin High Level Synergy Susceptible     Vancomycin Susceptible                    Blood Culture ID, PCR - Blood, Arm, Left [[id number]]  (Abnormal) Collected:  01/02/20 2025    Lab Status:  Final result Specimen:  Blood from Arm, Left Updated:  01/03/20 1447     BCID, PCR Enterococcus spp, not VRE. Identification by BCID PCR.          Imaging  Results (Last 24 Hours)     ** No results found for the last 24 hours. **          Results for orders placed during the hospital encounter of 01/02/20   Adult Transthoracic Echo Complete W/ Cont if Necessary Per Protocol (With Agitated Saline)    Narrative · Left ventricular systolic function is normal. Estimated EF appears to be   in the range of 61 - 65%.  · Left ventricular wall thickness is consistent with borderline concentric   hypertrophy.  · Electronic leads present right side of the heart  · Mild MAC is present. Mild mitral valve regurgitation is present  · Mild dilation of the aortic root is present. Measures 4.1 cm  · No valvular vegetations visualized on this study, however valves not   well seen  · Bubble study nondiagnostic due to poor visualization          I have reviewed the medications:  Scheduled Meds:    ampicillin 2 g Intravenous Q4H   aspirin 324 mg Oral Daily   atorvastatin 80 mg Oral Nightly   azelastine 2 spray Each Nare BID   budesonide-formoterol 2 puff Inhalation BID   cefTRIAXone 2 g Intravenous Q12H   cetirizine 10 mg Oral Daily   clopidogrel 75 mg Oral Daily   cycloSPORINE 1 drop Both Eyes BID   dronedarone 400 mg Oral BID   heparin (porcine) 5,000 Units Subcutaneous Q8H   palliative care oral rinse  Mouth/Throat 4x Daily   pantoprazole 40 mg Oral QAM   primidone 50 mg Oral BID   roflumilast 500 mcg Oral Daily   sennosides-docusate 2 tablet Oral BID   sertraline 50 mg Oral Daily   sodium chloride 10 mL Intravenous Q12H   tamsulosin 0.4 mg Oral Nightly     Continuous Infusions:    dilTIAZem 5-15 mg/hr Last Rate: Stopped (01/08/20 0936)     PRN Meds:.•  acetaminophen **OR** acetaminophen **OR** acetaminophen  •  bisacodyl  •  haloperidol lactate  •  ipratropium  •  ipratropium-albuterol  •  LORazepam  •  Morphine  •  ondansetron  •  potassium chloride **OR** potassium chloride **OR** potassium chloride  •  QUEtiapine  •  sodium chloride      Assessment/Plan   Assessment & Plan     Active  Hospital Problems    Diagnosis  POA   • **Muscle weakness of left upper extremity [M62.81]  Clinically Undetermined   • Aphasia [R47.01]  Clinically Undetermined   • COPD exacerbation (CMS/HCC) [J44.1]  Yes   • 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        Brief Hospital Course to date:  Alessandro Mendiola is a 79 y.o. male transferred from outside hospital due to concerns about infective endocarditis and possible pacemaker infection.  He has bacteremia.  High-grade enterococcus septicemia    Acute Respiratory Distress  - currently on high flow nasal cannula  - consider BiPAP as need for work of breathing or respiratory distress  Left-sided weakness  -endocarditis suspected  -MARLON planned  -Holding Xarelto  -Current antiplatelet therapy  Thoracic aortic aneurysm  - we may have a better look with a MARLON  Sleep disorder  -Clear if he has undiagnosed sleep apnea  -High risk for delirium  -Would benefit from BiPAP therapy  -Working on interface  Enterococcus bacteremia  -deferred MARLON for now  SSS  -Pacemaker  End-stage COPD  -minimize oxygen therapy so it is not counter productive  -Too much oxygen may suppress breathing  Atrial fibrillation  -Chronic anticoagulation  Rhinovirus  -Precautions  -Rhinovirus picked up on respiratory panel    Consult Hospice    High Complexity Case discussed with wife and Dr. Piyush Elliott today    DVT Prophylaxis: Heparin    Disposition: I expect the patient to be transitioned to inpatient hospice    CODE STATUS:   Code Status and Medical Interventions:   Ordered at: 01/02/20 9852     Limited Support to NOT Include:    Intubation    Cardioversion/Defibrillation     Level Of Support Discussed With:    Patient     Code Status:    No CPR     Medical  Interventions (Level of Support Prior to Arrest):    Limited     Electronically signed by Mario Donato MD, 01/08/20, 8:33 PM.

## 2020-01-09 NOTE — SIGNIFICANT NOTE
RRT called for respiratory distress; oxygen saturation in the 70's; VS otherwise stable.    Respiratory therapy at bedside to make BiPAP adjustments and after adjustments made saturations improved to 98%. Pt now on 100% oxygen.     Spoke w/ family in regards to POC, code status and goals. Pt is full DNR. Palliative care following and Hospice to see tomorrow. Family wish for comfort care at this time and do not wish to repeat ABG, labs, CXR at this time. Will continue w/ current POC w/ no further changes. Family verbalize understanding and have no further questions at this time.

## 2020-01-09 NOTE — SIGNIFICANT NOTE
01/09/20 0844   SLP Deferred Reason   SLP Deferred Reason Unable to evaluate, medical status change  (Reviewed chart & spoke to RN who reported pt no longer appropriate for SLP intervention. Will sign-off. If status changes, consider SLP reconsult. Thank you.)

## 2020-01-09 NOTE — PROGRESS NOTES
Pt admitted to hospice today. POC formulated and new orders received. Pt to move to 5 B when bed is available. Lots of family at bedside.

## 2020-01-09 NOTE — DISCHARGE PLACEMENT REQUEST
"Alessandro Mendiola (79 y.o. Male)     Date of Birth Social Security Number Address Home Phone MRN    1940  [address] [place] [id number] [phone] [id number]    Christianity Marital Status          Catholic        Admission Date Admission Type Admitting Provider Attending Provider Department, Room/Bed    20 Urgent Mario Donato MD Schnell, Aaron, MD [facility] 3F, S313/1    Discharge Date Discharge Disposition Discharge Destination                       Attending Provider:  Mario Donato MD    Allergies:  Doxycycline    Isolation:  Droplet   Infection:  Rhinovirus  (20)   Code Status:  No CPR    Ht:  180.3 cm (70.98\")   Wt:  69.9 kg (154 lb)    Admission Cmt:  None   Principal Problem:  Muscle weakness of left upper extremity [M62.81]                 Active Insurance as of 2020     Primary Coverage     Payor Plan Insurance Group Employer/Plan Group    HUMANA MEDICARE REPLACEMENT HUMANA MEDICARE REPLACEMENT [id number]     Payor Plan Address Payor Plan Phone Number Payor Plan Fax Number Effective Dates    [address] [address] [phone]  2013 - None Entered    [facility] [id number]       Subscriber Name Subscriber Birth Date Member ID       ALESSANDRO MENDIOLA 1940 [id number]                 Emergency Contacts      (Rel.) Home Phone Work Phone Mobile Phone    Madelyn Mendiola (Spouse) [phone] -- --            Emergency Contact Information     Name Relation Home Work Mobile    Madelyn Mendiola Spouse [phone]            Insurance Information                HUMANA MEDICARE REPLACEMENT/HUMANA MEDICARE REPLACEMENT Phone: [phone]    Subscriber: Alessandro Mendiola Subscriber#: [id number]    Group#: [id number] Precert#: Transaction [id number]             History & Physical      Georgina Pompa II, DO at 20 1821              [facility] Medicine Services  HISTORY AND PHYSICAL    Patient Name: Alessandro Mendiola  : 1940  MRN: " [id number]  Primary Care Physician: Silvio Jacobson MD  Date of admission: 1/2/2020      Subjective   Subjective     Chief Complaint:  Left upper extremity weakness     HPI:  Alessandro Mendiola is a 79 y.o. 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  Patient's wife helps with history, stating that the patient got out of bed and went to the restroom and when he came back to sit down in his recliner he was unable to hold different objects with his left hand, dropping them.  Then he noted to her that his left arm felt weak a little numb and tingly.  Wife noticed that the patient's speech was slurred and called her son to help get patient to the ER. In ED, labs mostly unremarkable. CT head without showed no acute infarct, and CXR with no acute cardiopulmonary illness. NIHSS on arrival to ED was 9, then 3 prior to transfer to [facility] for higher level of care. Patient will be admitted to the [facility] medicine group for further evaluation and treatment.     Review of Systems   Constitutional: Positive for activity change. Negative for appetite change, chills, diaphoresis, fatigue, fever and unexpected weight change.   HENT: Positive for hearing loss, trouble swallowing and voice change. Negative for congestion, dental problem, drooling, facial swelling and mouth sores.    Eyes: Negative for photophobia and visual disturbance.   Respiratory: Positive for cough, shortness of breath and wheezing. Negative for choking and chest tightness.    Cardiovascular: Negative for chest pain, palpitations and leg swelling.   Gastrointestinal: Negative for abdominal distention, abdominal pain, constipation, diarrhea, nausea and vomiting.   Endocrine: Negative for cold intolerance  and heat intolerance.   Genitourinary: Negative for difficulty urinating, dysuria and flank pain.   Musculoskeletal: Positive for gait problem. Negative for arthralgias, back pain, joint swelling and myalgias.   Skin: Negative for color change, pallor, rash and wound.   Neurological: Positive for speech difficulty, weakness and numbness. Negative for dizziness, tremors, syncope, facial asymmetry, light-headedness and headaches.   Hematological: Negative for adenopathy. Bruises/bleeds easily.   Psychiatric/Behavioral: Negative for agitation, behavioral problems and confusion. The patient is not nervous/anxious.         All other systems reviewed and are negative.     Personal History     Past Medical History:   Diagnosis Date   • Alcohol abuse    • Aneurysm of thoracic aorta (CMS/HCC)    • Anxiety    • Ascending aortic aneurysm (CMS/HCC)     4.8 to 4.9 cm    • Atherosclerotic heart disease    • Atrial fibrillation (CMS/HCC)    • Blood thinned due to long-term anticoagulant use    • Body mass index (BMI) 20.0-20.9, adult    • CAD (coronary artery disease)    • Cancer (CMS/HCC)     skin   • Cardiac pacemaker    • Cellulitis    • Chronic bronchitis (CMS/HCC)    • Chronic cough    • COPD (chronic obstructive pulmonary disease) (CMS/HCC)    • Depression    • Difficulty breathing    • Emphysema lung (CMS/HCC)    • Encounter for long-term current use of medication    • Encounter for screening for malignant neoplasm of prostate    • Fatigue    • Fingertip amputation    • TAMIKO (generalized anxiety disorder)    • History of alcohol abuse    • History of cardiac catheterization    • History of chest x-ray 02/11/2014    Chronic interstitial changes seen throughout the lung fields w/ no radiographic evidence of acute parenchymal disease.No definite LT-sided rib fracture present.   • History of chest x-ray 01/10/2014    No acute cardiopulmonary process.Dual lead LT subclavian pacemaker is in place.Aortic ectasia. Hyperinflation of  lungs, suggesting COPD   • History of chest x-ray 10/01/2012    Ill-defined RT middle lobe opacity which likley reflects a pneumonia. FU to radiographic resolution is recommended.   • History of Doppler echocardiogram    • History of echocardiogram 09/26/2013    EF 55-60%. Mod concentric LVH. Abnormal LT VT diastolic filling is observed, consistent w/ impaired relaxation.Mild MR/TR. Trace GA. RVSP 44 mmHg.   • History of stress test     ECG performed   • HTN (hypertension)    • HX: long term anticoagulant use     Blood thinned due to long-term anticoagulant use (V58.61) (Z79.01)   • Hypercholesterolemia    • LVH (left ventricular hypertrophy)    • Mitral and aortic valve disease    • Myocardial infarction (CMS/HCC)    • Nonvenomous insect bite of multiple sites    • Onychomycosis    • Pacemaker at end of battery life    • Pneumonia    • Pneumothorax    • Pre-syncope    • Pulmonary nodule    • Rib pain on left side    • Sick sinus syndrome (CMS/HCC)    • SOB (shortness of breath)    • Upper respiratory infection    • Vitamin D deficiency        Past Surgical History:   Procedure Laterality Date   • AMPUTATION  08/31/2015    metacarpal and index finger   • CARDIAC PACEMAKER PLACEMENT  06/01/2008   • CATARACT EXTRACTION     • CORONARY STENT PLACEMENT      History of Cath Placement Of Stent 1  · Coronary stents   • EYE SURGERY     • SKIN CANCER EXCISION         Family History: family history includes Coronary artery disease in an other family member; Diabetes in an other family member; Hypertension in an other family member. Otherwise pertinent FHx was reviewed and unremarkable.     Social History:  reports that he quit smoking about 12 years ago. He has a 135.00 pack-year smoking history. He has never used smokeless tobacco. He reports that he does not drink alcohol or use drugs.  Social History     Social History Narrative   • Not on file       Medications:  Available home medication information reviewed.  Medications  Prior to Admission   Medication Sig Dispense Refill Last Dose   • azelastine (ASTELIN) 0.1 % nasal spray 2 sprays into the nostril(s) as directed by provider 2 (Two) Times a Day. 1 each 3 Taking   • B Complex Vitamins (VITAMIN B COMPLEX PO) Take 1 tablet by mouth Daily.   Taking   • budesonide-formoterol (SYMBICORT) 80-4.5 MCG/ACT inhaler Inhale 2 puffs 2 (Two) Times a Day. 3 inhaler 1 Taking   • dilTIAZem (CARDIZEM) 120 MG tablet Take 1 tablet by mouth Daily. 90 tablet 3 Taking   • dronedarone (MULTAQ) 400 MG tablet Take 1 tablet by mouth 2 (Two) Times a Day. 180 tablet 1 Taking   • fluorometholone (FML) 0.1 % ophthalmic suspension INSTILL 1 DROP INTO EACH EYE THREE TIMES DAILY  2 Taking   • guaiFENesin (MUCINEX) 600 MG 12 hr tablet Take 1 tablet by mouth Every 12 (Twelve) Hours. (Patient taking differently: Take 600 mg by mouth As Needed.) 10 tablet 0 Taking   • hydrochlorothiazide (HYDRODIURIL) 25 MG tablet Take 1 tablet by mouth Daily. 90 tablet 3 Taking   • ipratropium (ATROVENT) 0.02 % nebulizer solution Take 2.5 mL by nebulization 4 (Four) Times a Day As Needed for Wheezing or Shortness of Air. 300 mL 11 Taking   • Multiple Vitamin (MULTI-VITAMINS PO) Take  by mouth.   Taking   • omeprazole (priLOSEC) 40 MG capsule Take 1 capsule by mouth Daily. 30 capsule 2    • OXYGEN-HELIUM IN Oxygen; Patient Sig: Oxygen patient is to get home oxygen through  company of his choice at 2L/Min, as well as portable oxygen at the same rate.; 1; 0; 22-Mar-2016; Active   Taking   • primidone (MYSOLINE) 50 MG tablet Take 1 tablet by mouth 2 (Two) Times a Day. 60 tablet 5 Taking   • Respiratory Therapy Supplies (FLUTTER) device 1 each Every 6 (Six) Hours While Awake. 1 each 0 Taking   • RESTASIS 0.05 % ophthalmic emulsion    Taking   • rivaroxaban (XARELTO) 20 MG tablet Take 1 tablet by mouth Daily. 30 tablet 12 Taking   • roflumilast (DALIRESP) 500 MCG tablet tablet Take 1 tablet by mouth Daily. 90 tablet 1 Taking   • sertraline  (ZOLOFT) 50 MG tablet Take 1 tablet by mouth Daily. 90 tablet 3 Taking   • tamsulosin (FLOMAX) 0.4 MG capsule 24 hr capsule Take 1 capsule by mouth Every Night. 30 capsule 11 Taking   • Tetrahydrozoline-Zn Sulfate (ALLERGY RELIEF EYE DROPS OP) Apply 1 drop to eye(s) as directed by provider 2 (Two) Times a Day As Needed.   Taking   • tiotropium bromide monohydrate (SPIRIVA RESPIMAT) 2.5 MCG/ACT aerosol solution inhaler Inhale 2 puffs Daily. 1 inhaler 5 Taking   • VENTOLIN  (90 Base) MCG/ACT inhaler Inhale 2 puffs Every 6 (Six) Hours As Needed for Wheezing. 1 inhaler 3 Taking       Allergies   Allergen Reactions   • Doxycycline Shortness Of Breath       Objective   Objective     Vital Signs:   Temp:  [98.8 °F (37.1 °C)-100.6 °F (38.1 °C)] 100.6 °F (38.1 °C)  Heart Rate:  [78-92] 78  Resp:  [18-20] 18  BP: (109-139)/(59-83) 135/64   Total (NIH Stroke Scale): 4    Physical Exam   Constitutional: Awake, alert, chronically ill   Eyes: PERRLA, sclerae anicteric, no conjunctival injection  HENT: NCAT, mucous membranes moist  Neck: Supple, no thyromegaly, no lymphadenopathy, trachea midline  Respiratory: Bibasilar rhonchi with exp wheezing, moderately labored respirations on 5LNC (baseline)/ barrel chested   Cardiovascular: Paced, no murmurs, rubs, or gallops, palpable pedal pulses bilaterally  Gastrointestinal: Positive bowel sounds, soft, nontender, nondistended  Musculoskeletal: No bilateral ankle edema, no clubbing or cyanosis to extremities  Psychiatric: Appropriate affect, cooperative, anxious   Neurologic: Oriented x 3, LUE weakness, Cranial Nerves grossly intact to confrontation, speech garbled   Skin: No rashes, multiple stages of bruising on upper extremities     Results Reviewed:  I have personally reviewed current lab and radiology data.    Results from last 7 days   Lab Units 01/02/20  1344   WBC 10*3/mm3 9.75   HEMOGLOBIN g/dL 9.4*   HEMATOCRIT % 32.7*   PLATELETS 10*3/mm3 166   INR  2.13*     Results  from last 7 days   Lab Units 01/02/20  1344   SODIUM mmol/L 139   POTASSIUM mmol/L 3.7   CHLORIDE mmol/L 96*   CO2 mmol/L 28.1   BUN mg/dL 13   CREATININE mg/dL 0.76   GLUCOSE mg/dL 193*   CALCIUM mg/dL 9.1   ALT (SGPT) U/L 11   AST (SGOT) U/L 23   TROPONIN T ng/mL <0.010     Estimated Creatinine Clearance: 76.5 mL/min (by C-G formula based on SCr of 0.76 mg/dL).  Brief Urine Lab Results     None        Imaging Results (Last 24 Hours)     ** No results found for the last 24 hours. **             Assessment/Plan   Assessment & Plan     Active Hospital Problems    Diagnosis POA   • Muscle weakness of left upper extremity [M62.81] Unknown   • Aphasia [R47.01] Unknown   • COPD exacerbation (CMS/[facility]) [J44.1] Yes   • Aneurysm of thoracic aorta (CMS/[facility]) [I71.2] Yes   • Coronary arteriosclerosis in native artery [I25.10] Yes   • Paroxysmal atrial fibrillation (CMS/[facility]) [I48.0] Yes   • Sick sinus syndrome (CMS/[facility]) [I49.5] Yes       Initiate ischemic stroke protocol  Cannot have MRI per wife  CTA head and neck tonight  Will possibly need carotid duplex in AM pending CTA results-- NOT ordered yet    Will repeat CT without contrast in AM   Allow permissive HTN, monitor and call provider if SBP > 150 due to multiple aneurysms   CT perfusion tonight   Continue home meds  PT/OT/SLP evaluation  Neurology consult in AM       DVT prophylaxis:  Xarelto    CODE STATUS:    Code Status and Medical Interventions:   Ordered at: 01/02/20 1916     Limited Support to NOT Include:    Intubation    Cardioversion/Defibrillation     Level Of Support Discussed With:    Patient     Code Status:    No CPR     Medical Interventions (Level of Support Prior to Arrest):    Limited       Admission Status:  I believe this patient meets INPATIENT status due to CVA.  I feel patient’s risk for adverse outcomes and need for care warrant INPATIENT evaluation and I predict the patient’s care encounter to likely last beyond 2 midnights.    Electronically  signed by Stacey Padilla, DAVID, 01/02/20, 6:21 PM.      Attending   Admission Attestation       I have seen and examined the patient, performing an independent face-to-face diagnostic evaluation with plan of care reviewed and developed with the advanced practice clinician (APC).      Brief Summary Statement:   Alessandro Mendiola is a 79 y.o. male sent from [facility] with left sided weakness/numbness. Per patient and family this started suddenly at 1200 today. At that time was numb on left side, unable to use COPD inhalers effectively, and had difficulty speaking. These symptoms are still present. Patient has known a fib and reports taking xarelto as instructed. Upon arrival to floor was febrile to 100.6 and had episode of vomiting. Has no infectious complaints at this time.    Remainder of detailed HPI is as noted by APC and has been reviewed and/or edited by me for completeness.    Attending Physical Exam:  Constitutional: Awake, alert  Eyes: PERRLA, sclerae anicteric, no conjunctival injection  HENT: NCAT, mucous membranes moist  Neck: Supple, no thyromegaly, no lymphadenopathy, trachea midline  Respiratory: Clear to auscultation bilaterally, nonlabored respirations   Cardiovascular: RRR, no murmurs, rubs, or gallops, palpable pedal pulses bilaterally  Gastrointestinal: Positive bowel sounds, soft, nontender, nondistended  Musculoskeletal: No bilateral ankle edema, no clubbing or cyanosis to extremities  Psychiatric: Appropriate affect, cooperative  Neurologic: Oriented x 3, diminished sensation on left, mild dysarthria, mild left facial droop.  Skin: No rashes    CT head OSH personally reviewed without acute disease. Agree with interpretation.    Brief Assessment/Plan :    78 y/o male with a fib on xarelto presenting from [facility] with left sided weakness, dysarthria concerning for CVA. Also found to be febrile here upon arrival.  --Start asa suppository now. Resume xarelto and lipitor when appropriate (failed bedside  swallow.)  --Proceed with CT perfusion, CTA head and neck now. Cannot undergo MRI due to PPM. Echocardiogram tomorrow along with neurology eval, PT/OT, SLP.  --Blood cultures, cxr, u/a, flu swab for fever. No abx for now.     See detailed assessment and plan developed with APC which I have reviewed and/or edited for completeness.    Electronically signed by Georgina Pompa II, DO, 01/02/20, 7:37 PM.                Electronically signed by Georgina Pompa II, DO at 01/02/20 1952

## 2020-01-09 NOTE — PROGRESS NOTES
Continued Stay Note  [facility]     Patient Name: Alessandro Mendiola  MRN: [id number]  Today's Date: 1/9/2020    Admit Date: 1/2/2020    Discharge Plan     Row Name 01/09/20 1157       Plan    Plan  Possibly Inpatient Hospice    Patient/Family in Agreement with Plan  yes    Plan Comments  Spoke with wife at bedside and they are leaning towards comfort measures with Hopsice. Family is meeting with Hospice today. CM will continue to follow.    Final Discharge Disposition Code  30 - still a patient        Discharge Codes    No documentation.             Flaco Starks RN

## 2020-01-09 NOTE — PROGRESS NOTES
"                  Clinical Nutrition     Reason for Visit:   Follow-up protocol    Patient Name: Alessandro Mendiola  YOB: 1940  MRN: [id number]  Date of Encounter: 01/09/20 8:48 AM  Admission date: 1/2/2020    Comments: Patient to be seen per follow up protocol. Noted, 0% x 6 meals per PO intake documentation. Patient with worsening status per documentation. Per MD note 1/8 \"Wife says he would not want a feeding tube.  Wife says he would not want to be on respiratory support or life support.\" Hospice consulted. RRT called, per APRN family wish for comfort care at this time. Clinical nutrition to sign off as aggressive nutrition care not in line with GOC at this time.    Will Continue to follow per protocol    Maria Luisa Paz RDN, LD  Time Spent: 15 minutes    "

## 2020-01-10 LAB
BACTERIA SPEC AEROBE CULT: NORMAL
BACTERIA SPEC AEROBE CULT: NORMAL

## 2020-01-10 NOTE — SIGNIFICANT NOTE
Exam confirms with auscultation zero audible heart tones and zero audible respirations. Mr.Kenneth Mendiola was pronounced dead at 2315 1/9/20.  MD notified by Patient's RN.    Josef Beckford RN  Clinical House Supervisor  1/9/2020 11:44 PM

## 2020-01-10 NOTE — NURSING NOTE
Patient was made hospice today, multiple family members have been at bedside during this shift. Patient has remained unresponsive, at approximately 2310 upon entering the room the patient had no audible heart tones and no respirations. Charge nurse was notified, who then notified house and then the physician. IV's and elkins was removed per myself and the charge nurse.

## 2020-01-13 NOTE — DISCHARGE SUMMARY
Date of Death:  1/9/2020  Time of Death:  2315    Presenting Problem/History of Present Illness    Endocarditis        Hospital Course    80 yo M with end-stage COPD (home oxygen 4-5L), PAF on Xarelto, CAD with stents, SSS s/p PPM, AAA, TAA, and HTN.  Admitted to [facility] on 1/2/2020 as a transfer from [facility] due to concern for stroke.  Presented w/ LUE weakness and aphasia.  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     MARLON never obtained due to respiratory issues.  Was placed on HFNC on 1/6, then escalated to BiPAP support.  1/7/2020 MRI brain w/o contrast showed multifocal acute or subacute infarcts.  By 1/8, patient continued to need high level respiratory support and was no longer awake or responsive.  Basically no PO intake in the last 2 days.  Palliative Care consulted and discussed poor prognosis with family.  At that time, family wished to continue respiratory support alternating between HFNC and BiPAP.  Low dose opiate and benzo were added to help control dyspnea, restlessness, and anxiety.  Last night, an RRT was called due to patient's respiratory distress with oxygen sats in the 70s despite HFNC.  Patient was placed back on BiPAP and hypoxia resolved but family has decided to pursue full comfort care going forward.  Desire DNR, no intubation, no feeding tube, no further labs or imaging.     At baseline, patient has been dependent in all ADLs and only moving from bed to chair primarily.  Was not able to stand for any bathing, wife giving sponge bath at home. Has experienced a 30 lb weight loss over the last several months and experiencing perisistent nausea.     Covisit made with Hospice SW.  Patient seen with wife, son, and son's family (wife and 2 sons).  Patient occasionally pulls leg  up or taps foot but doesn't seem restless to family.  Has appeared comfortable, though wife worries about BiPAP mask bruising patient's face.  Not awake, not opening eyes.  No grimacing or groaning.    Social History:  Social History     Tobacco Use   • Smoking status: Former Smoker     Packs/day: 3.00     Years: 45.00     Pack years: 135.00     Last attempt to quit: 2008     Years since quittin.0   • Smokeless tobacco: Never Used   Substance Use Topics   • Alcohol use: No         Consults:   Consults     Date and Time Order Name Status Description    2020 Inpatient Palliative Care MD Consult Completed     2020 1624 Inpatient Cardiology Consult Completed     2020 0745 Inpatient Infectious Diseases Consult Completed     1/3/2020 0030 Inpatient Neurology Consult Stroke Completed           Exam confirms with auscultation zero audible heart tones and zero audible respirations. Mr.Kenneth Mendiola was pronounced dead at 2315 20.  MD notified by Patient's RN.     Josef Beckford RN  Clinical House Supervisor  2020 11:44 PM      DAVID Knight  20  11:46 AM

## 2020-01-15 NOTE — DISCHARGE SUMMARY
[facility] Medicine Services  DISCHARGE TO INPATIENT HOSPICE    Patient Name: Alessandro Mendiola  : 1940  MRN: [id number]    Date of Admission: 2020  Date of Discharge:  2020 (Afternoon)  Primary Care Physician: Silvio Jacobson MD    Consults     Date and Time Order Name Status Description    2020 Inpatient Palliative Care MD Consult Completed     2020 1624 Inpatient Cardiology Consult Completed     2020 0745 Inpatient Infectious Diseases Consult Completed     1/3/2020 0030 Inpatient Neurology Consult Stroke Completed         DAVID Lyon - Palliative Care  Isra Kohli MD - Cardiology  Piyush Elliott MD - Infectious Diseases  Fern Patel MD - Neurology    Hospital Course     Presenting Problem:   Aneurysm of thoracic aorta (CMS/HCC) [I71.2]  Muscle weakness of left upper extremity [M62.81]  Muscle weakness of left upper extremity [M62.81]    Active Hospital Problems    Diagnosis  POA   • **Muscle weakness of left upper extremity [M62.81]  Clinically Undetermined   • Aphasia [R47.01]  Clinically Undetermined   • COPD exacerbation (CMS/HCC) [J44.1]  Yes   • 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          Hospital Course:  Alessandro Mendiola is a 79 y.o. male who was seen in the ER in [place], [place] at [facility].  He presented with stroke symptoms and was referred to our stroke center for higher level of care.  He had coronary disease with stents and had SSS with a pacemaker.  Additionally he had PAF and was on chronic anticoagulation.  His outside CT showed no acute infarct but there was concerns for endocarditis and stroke so he was evaluated here.  MRI brain here showed multiple acute or subacute  infarcts.  He was planned for [facility] but due delicate and long standing respiratory issues this was deferred for safety.  Ultimately he started decline with poor oral intake and poor respiratory status and him needing intermittent BiPAP.  Ultimately, his goals were not to be on life support and not to be on tube feeding and goals of care were discussed.  His status continued to decline and family opted for inpatient hospice and comfort measures.    Day of Discharge     HPI:  Wife and son in room today.  I had a long discussion in room with wife and son prior to transition to inpatient hospice.  The wife did not want him to suffer.  The son was holding on to hope for recovery.  Hospice team visited before and after my visit it appeared.    ROS:     Unable to assess    Vital Signs:         Physical Exam:   n/a    Discharge Details     Discharge Disposition:  Transfer care to inpatient Hospice at [facility]    Time Spent on Discharge:  49  minutes    Electronically signed by Mario Donato MD, 01/14/20, 10:32 PM.

## 2022-08-13 NOTE — MBS/VFSS/FEES
"Goal Outcome Evaluation:    8/12/22 2027-1509      VSS on RA, oriented to self, angst at times-yelling out for \"Leo\" frequently. Refusing tele when told it is ordered. Otherwise neuro intact  x unable to heel/shin 2/2 obese, lakhwinder LE numbness at baseline. C/O R back pain from wound vac which is to 125mmhg, CDI. Tylenol scheduled and Oxycodone given x 1 with effect. Attempting q2h turns w lift but she will refuse some of them. Repo legs. Lakhwinder LE patty/red. Skin red/patty; cleaned and lotions/powders applied. Purewick- skin reddened. Cleaned/powders applied. Slept on/off.                " Acute Care - Speech Language Pathology Initial Evaluation  [facility]   Cognitive-Communication Evaluation  Clinical Swallow Evaluation  & Modified Barium Swallow Study (MBS)     Patient Name: Alessandro Mendiola  : 1940  MRN: [id number]  Today's Date: 1/3/2020               Admit Date: 2020     Visit Dx:    ICD-10-CM ICD-9-CM   1. Muscle weakness of left upper extremity M62.81 728.87   2. Impaired mobility and ADLs Z74.09 799.89   3. Oropharyngeal dysphagia R13.12 787.22   4. Dysarthria R47.1 784.51     Patient Active Problem List   Diagnosis   • Aneurysm of thoracic aorta (CMS/HCC)   • Coronary arteriosclerosis in native artery   • Paroxysmal atrial fibrillation (CMS/HCC)   • Sick sinus syndrome (CMS/HCC)   • Vitamin D deficiency   • COPD exacerbation (CMS/HCC)   • HTN (hypertension)   • Mood disorder (CMS/HCC)   • Simple chronic bronchitis (CMS/HCC)   • Muscle weakness of left upper extremity   • Aphasia     Past Medical History:   Diagnosis Date   • Alcohol abuse    • Aneurysm of thoracic aorta (CMS/HCC)    • Anxiety    • Ascending aortic aneurysm (CMS/HCC)     4.8 to 4.9 cm    • Atherosclerotic heart disease    • Atrial fibrillation (CMS/HCC)    • Blood thinned due to long-term anticoagulant use    • Body mass index (BMI) 20.0-20.9, adult    • CAD (coronary artery disease)    • Cancer (CMS/HCC)     skin   • Cardiac pacemaker    • Cellulitis    • Chronic bronchitis (CMS/HCC)    • Chronic cough    • COPD (chronic obstructive pulmonary disease) (CMS/HCC)    • Depression    • Difficulty breathing    • Emphysema lung (CMS/HCC)    • Encounter for long-term current use of medication    • Encounter for screening for malignant neoplasm of prostate    • Fatigue    • Fingertip amputation    • TAMIKO (generalized anxiety disorder)    • History of alcohol abuse    • History of cardiac catheterization    • History of chest x-ray 2014    Chronic interstitial changes seen throughout the lung fields w/ no  radiographic evidence of acute parenchymal disease.No definite LT-sided rib fracture present.   • History of chest x-ray 01/10/2014    No acute cardiopulmonary process.Dual lead LT subclavian pacemaker is in place.Aortic ectasia. Hyperinflation of lungs, suggesting COPD   • History of chest x-ray 10/01/2012    Ill-defined RT middle lobe opacity which likley reflects a pneumonia. FU to radiographic resolution is recommended.   • History of Doppler echocardiogram    • History of echocardiogram 09/26/2013    EF 55-60%. Mod concentric LVH. Abnormal LT VT diastolic filling is observed, consistent w/ impaired relaxation.Mild MR/TR. Trace AZ. RVSP 44 mmHg.   • History of stress test     ECG performed   • HTN (hypertension)    • HX: long term anticoagulant use     Blood thinned due to long-term anticoagulant use (V58.61) (Z79.01)   • Hypercholesterolemia    • LVH (left ventricular hypertrophy)    • Mitral and aortic valve disease    • Myocardial infarction (CMS/HCC)    • Nonvenomous insect bite of multiple sites    • Onychomycosis    • Pacemaker at end of battery life    • Pneumonia    • Pneumothorax    • Pre-syncope    • Pulmonary nodule    • Rib pain on left side    • Sick sinus syndrome (CMS/HCC)    • SOB (shortness of breath)    • Upper respiratory infection    • Vitamin D deficiency      Past Surgical History:   Procedure Laterality Date   • AMPUTATION  08/31/2015    metacarpal and index finger   • CARDIAC PACEMAKER PLACEMENT  06/01/2008   • CATARACT EXTRACTION     • CORONARY STENT PLACEMENT      History of Cath Placement Of Stent 1  · Coronary stents   • EYE SURGERY     • SKIN CANCER EXCISION          SLP EVALUATION (last 72 hours)      SLP SLC Evaluation     Row Name 01/03/20 0900                   Communication Assessment/Intervention    Document Type  evaluation  -AC        Subjective Information  no complaints  -AC        Patient Observations  alert;cooperative  -AC        Patient Effort  good  -AC            General Information    Patient Profile Reviewed  yes  -AC        Pertinent History Of Current Problem  78yo adm w/ LUE wknss, speech difficulty, r/o CVA. So far, work-up including CT Head and CT Cereb Perf has been negative for stroke. Neuro consult pending. CXR: cardiomegaly, COPD, no active dz. Hx aneurysm, alc abuse, chronic bronchitis, pna 5x over last yr (most recently in Nov 2019 per wife).   -AC        Precautions/Limitations, Vision  WFL;for purposes of eval  -AC        Precautions/Limitations, Hearing  WFL;for purposes of eval  -AC        Prior Level of Function-Communication  WFL;other (see comments) SOA while speaking 2' COPD per pt  -AC        Plans/Goals Discussed with  patient;spouse/S.O.;agreed upon  -AC        Barriers to Rehab  none identified  -AC        Patient's Goals for Discharge  return to home  -AC        Family Goals for Discharge  family did not state  -AC           Pain Scale: Numbers Pre/Post-Treatment    Pain Scale: Numbers, Pretreatment  0/10 - no pain  -AC        Pain Scale: Numbers, Post-Treatment  0/10 - no pain  -AC           Comprehension Assessment/Intervention    Comprehension Assessment/Intervention  Auditory Comprehension  -AC           Auditory Comprehension Assessment/Intervention    Auditory Comprehension (Communication)  WFL;other (see comments) for basic tasks  -AC        Answers Questions (Communication)  WFL;simple;personal;yes/no  -AC        Able to Follow Commands (Communication)  WFL;1-step  -AC        Narrative Discourse  WFL;conversational level  -AC           Expression Assessment/Intervention    Expression Assessment/Intervention  verbal expression  -AC           Verbal Expression Assessment/Intervention    Verbal Expression  WFL;other (see comments) for basic tasks  -AC        Automatic Speech (Communication)  WFL;response to greeting  -AC        Repetition  WFL;words  -AC        Conversational Discourse/Fluency  WFL  -AC        Verbal Expression, Comment   Seemingly WFL, though pt reported some word finding difficulty - further higher-level assesment needed.  -AC           Motor Speech Assessment/Intervention    Motor Speech Function  mild impairment;moderate impairment  -AC        Characteristics Consistent with Dysarthria  decreased intensity;decreased articulation  -AC        Motor Speech, Comment  SOA r/t COPD affected pt's vocal intensity/speech intelligibility, as well.  -AC           Cognitive Assessment Intervention- SLP    Cognitive Function (Cognition)  unable/difficult to assess;other (see comments) 2' time constraints  -AC           SLP Clinical Impressions    SLP Diagnosis  Mild-moderate dysarthria. Suspect at least partially affected by SOA. Further cognitive-linguistic assessment needed.  -AC        Rehab Potential/Prognosis  good  -        SLC Criteria for Skilled Therapy Interventions Met  yes  -AC        Functional Impact  difficulty communicating wants, needs;difficulty communicating in an emergency;difficulty in expressing complex messages;restrictions in personal and social life  -AC           Recommendations    Therapy Frequency (SLP SLC)  5 days per week  -AC        Predicted Duration Therapy Intervention (Days)  until discharge  -AC        Anticipated Dischage Disposition  anticipate therapy at next level of care;inpatient rehabilitation facility  -          User Key  (r) = Recorded By, (t) = Taken By, (c) = Cosigned By    Initials Name Effective Dates     Sherice Ash, MS CCC-SLP 07/27/17 -              EDUCATION  The patient has been educated in the following areas:     Cognitive Impairment Communication Impairment.    SLP Recommendation and Plan  SLP Diagnosis: Mild-moderate dysarthria. Suspect at least partially affected by SOA. Further cognitive-linguistic assessment needed.     Swallow Criteria for Skilled Therapeutic Interventions Met: demonstrates skilled criteria  SLC Criteria for Skilled Therapy Interventions Met:  yes  Anticipated Dischage Disposition: anticipate therapy at next level of care, inpatient rehabilitation facility  Therapy Frequency (Swallow): 5 days per week  Predicted Duration Therapy Intervention (Days): until discharge    Plan of Care Reviewed With: patient, spouse      SLP GOALS     Row Name 01/03/20 0845             Oral Nutrition/Hydration Goal 1 (SLP)    Oral Nutrition/Hydration Goal 1, SLP  LTG: Pt will return to regular diet & thin liquid and tolerate w/o s/sxs aspiration w/ 100% acc w/o cues.  -AC      Time Frame (Oral Nutrition/Hydration Goal 1, SLP)  by discharge  -AC         Oral Nutrition/Hydration Goal 2 (SLP)    Oral Nutrition/Hydration Goal 2, SLP  Pt will tolerate trials of soft solids & honey-thick liquids w/o s/sxs aspiration w/ 100% acc w/o cues.  -AC      Time Frame (Oral Nutrition/Hydration Goal 2, SLP)  short term goal (STG)  -AC         Oral Nutrition/Hydration Goal (SLP)    Oral Nutrition/Hydration Goal, SLP  Pt will tolerate therapeutic trials of ice/thin H2O w/o s/sxs aspiration or distress w/ 70% acc w/o cues.  -AC      Time Frame (Oral Nutrition/Hydration Goal, SLP)  short term goal (STG)  -AC         Lingual Strengthening Goal 1 (SLP)    Activity (Lingual Strengthening Goal 1, SLP)  increase tongue back strength  -AC      Increase Tongue Back Strength  lingual resistance exercises  -AC      [place]/Accuracy (Lingual Strengthening Goal 1, SLP)  with minimal cues (75-90% accuracy)  -AC      Time Frame (Lingual Strengthening Goal 1, SLP)  short term goal (STG)  -AC         Pharyngeal Strengthening Exercise Goal 1 (SLP)    Activity (Pharyngeal Strengthening Goal 1, SLP)  increase timing  -AC      Increase Timing  prepping - 3 second prep or suck swallow or 3-step swallow  -AC      [place]/Accuracy (Pharyngeal Strengthening Goal 1, SLP)  with minimal cues (75-90% accuracy)  -AC      Time Frame (Pharyngeal Strengthening Goal 1, SLP)  short term goal (STG)  -AC          Respiratory Support Goal (SLP)    Improve Respiratory Support Goal (SLP)  Pt will use shorter length of utterance per breath (2-3 words per breath) for improved speech intelligibility at sentence level w/ 80% acc w/ min cues.  -AC      Time Frame (Respiratory Support Goal, SLP)  short term goal (STG)  -AC         Articulation Goal 1 (SLP)    Improve Articulation Goal 1 (SLP)  by over-articulating at word level;80%;with minimal cues (75-90%)  -AC      Time Frame (Articulation Goal 1, SLP)  short term goal (STG)  -AC         Additional Goal 1 (SLP)    Additional Goal 1, SLP  LTG: Pt will improve cognitive-communication skills in order to participate in care in hospital setting w/ 100% acc w/ min cues.  -AC      Time Frame (Additional Goal 1, SLP)  by discharge  -AC        User Key  (r) = Recorded By, (t) = Taken By, (c) = Cosigned By    Initials Name Provider Type    Sherice Dodson MS CCC-SLP Speech and Language Pathologist                  Time Calculation:     Time Calculation- SLP     Row Name 20 1345             Time Calculation- SLP    SLP Start Time  0845  -AC      SLP Received On  20  -        User Key  (r) = Recorded By, (t) = Taken By, (c) = Cosigned By    Initials Name Provider Type    Sherice Dodson MS CCC-SLP Speech and Language Pathologist          Therapy Charges for Today     Code Description Service Date Service Provider Modifiers Qty    94824993238 HC ST EVAL ORAL PHARYNG SWALLOW 3 1/3/2020 Sherice Ash MS CCC-SLP GN 1    38371494855 HC ST EVAL SPEECH AND PROD W LANG  2 1/3/2020 Sherice Ash MS CCC-SLP GN 1    26065151397 HC ST MOTION FLUORO EVAL SWALLOW 4 1/3/2020 Sherice Ash MS CCC-SLP GN 1                     Sherice Ash MS CCC-SLP  1/3/2020 and Acute Care - Speech Language Pathology   Swallow Initial Evaluation  Cy     Patient Name: Alessandro Mendiola  : 1940  MRN: [id number]  Today's Date: 1/3/2020               Admit Date: 2020    Visit Dx:      ICD-10-CM ICD-9-CM   1. Muscle weakness of left upper extremity M62.81 728.87   2. Impaired mobility and ADLs Z74.09 799.89   3. Oropharyngeal dysphagia R13.12 787.22   4. Dysarthria R47.1 784.51     Patient Active Problem List   Diagnosis   • Aneurysm of thoracic aorta (CMS/HCC)   • Coronary arteriosclerosis in native artery   • Paroxysmal atrial fibrillation (CMS/HCC)   • Sick sinus syndrome (CMS/HCC)   • Vitamin D deficiency   • COPD exacerbation (CMS/HCC)   • HTN (hypertension)   • Mood disorder (CMS/HCC)   • Simple chronic bronchitis (CMS/HCC)   • Muscle weakness of left upper extremity   • Aphasia     Past Medical History:   Diagnosis Date   • Alcohol abuse    • Aneurysm of thoracic aorta (CMS/HCC)    • Anxiety    • Ascending aortic aneurysm (CMS/HCC)     4.8 to 4.9 cm    • Atherosclerotic heart disease    • Atrial fibrillation (CMS/HCC)    • Blood thinned due to long-term anticoagulant use    • Body mass index (BMI) 20.0-20.9, adult    • CAD (coronary artery disease)    • Cancer (CMS/HCC)     skin   • Cardiac pacemaker    • Cellulitis    • Chronic bronchitis (CMS/HCC)    • Chronic cough    • COPD (chronic obstructive pulmonary disease) (CMS/HCC)    • Depression    • Difficulty breathing    • Emphysema lung (CMS/HCC)    • Encounter for long-term current use of medication    • Encounter for screening for malignant neoplasm of prostate    • Fatigue    • Fingertip amputation    • TAMIKO (generalized anxiety disorder)    • History of alcohol abuse    • History of cardiac catheterization    • History of chest x-ray 02/11/2014    Chronic interstitial changes seen throughout the lung fields w/ no radiographic evidence of acute parenchymal disease.No definite LT-sided rib fracture present.   • History of chest x-ray 01/10/2014    No acute cardiopulmonary process.Dual lead LT subclavian pacemaker is in place.Aortic ectasia. Hyperinflation of lungs, suggesting COPD   • History of chest x-ray 10/01/2012    Ill-defined RT  middle lobe opacity which likley reflects a pneumonia. FU to radiographic resolution is recommended.   • History of Doppler echocardiogram    • History of echocardiogram 09/26/2013    EF 55-60%. Mod concentric LVH. Abnormal LT VT diastolic filling is observed, consistent w/ impaired relaxation.Mild MR/TR. Trace NC. RVSP 44 mmHg.   • History of stress test     ECG performed   • HTN (hypertension)    • HX: long term anticoagulant use     Blood thinned due to long-term anticoagulant use (V58.61) (Z79.01)   • Hypercholesterolemia    • LVH (left ventricular hypertrophy)    • Mitral and aortic valve disease    • Myocardial infarction (CMS/HCC)    • Nonvenomous insect bite of multiple sites    • Onychomycosis    • Pacemaker at end of battery life    • Pneumonia    • Pneumothorax    • Pre-syncope    • Pulmonary nodule    • Rib pain on left side    • Sick sinus syndrome (CMS/HCC)    • SOB (shortness of breath)    • Upper respiratory infection    • Vitamin D deficiency      Past Surgical History:   Procedure Laterality Date   • AMPUTATION  08/31/2015    metacarpal and index finger   • CARDIAC PACEMAKER PLACEMENT  06/01/2008   • CATARACT EXTRACTION     • CORONARY STENT PLACEMENT      History of Cath Placement Of Stent 1  · Coronary stents   • EYE SURGERY     • SKIN CANCER EXCISION          SWALLOW EVALUATION (last 72 hours)      SLP Adult Swallow Evaluation     Row Name 01/03/20 0845                   Rehab Evaluation    Document Type  evaluation  -AC        Subjective Information  no complaints  -AC        Patient Observations  alert;cooperative  -AC        Patient/Family Observations  Pt's spouse present.  -AC        Patient Effort  good  -AC           General Information    Patient Profile Reviewed  yes  -AC        Pertinent History Of Current Problem  78yo adm w/ LUE wknss, speech difficulty, r/o CVA. So far, work-up including CT Head and CT Cereb Perf has been negative for stroke. Neuro consult pending. CXR: cardiomegaly,  COPD, no active dz. Hx aneurysm, alc abuse, chronic bronchitis, pna 5x over last yr (most recently in Nov 2019 per wife).   -AC        Current Method of Nutrition  NPO  -AC        Precautions/Limitations, Vision  WFL;for purposes of eval  -AC        Precautions/Limitations, Hearing  WFL;for purposes of eval  -AC        Prior Level of Function-Communication  WFL;other (see comments) some SOA when speaking 2' COPD  -AC        Prior Level of Function-Swallowing  no diet consistency restrictions  -AC        Plans/Goals Discussed with  patient;spouse/S.O.;agreed upon  -AC        Barriers to Rehab  none identified  -AC        Patient's Goals for Discharge  return to PO diet  -AC        Family Goals for Discharge  patient able to return to PO diet  -AC           Pain Assessment    Additional Documentation  Pain Scale: Numbers Pre/Post-Treatment (Group)  -AC           Pain Scale: Numbers Pre/Post-Treatment    Pain Scale: Numbers, Pretreatment  0/10 - no pain  -AC        Pain Scale: Numbers, Post-Treatment  0/10 - no pain  -AC           Oral Motor and Function    Dentition Assessment  upper dentures/partial in place;lower dentures/partial in place  -AC        Secretion Management  WNL/WFL  -AC        Mucosal Quality  dry  -AC        Volitional Swallow  WFL  -AC        Volitional Cough  WFL  -AC           Oral Musculature and Cranial Nerve Assessment    Oral Motor General Assessment  oral labial or buccal impairment  -AC        Oral Labial or Buccal Impairment, Detail, Cranial Nerve VII (Facial):  left labial droop  -AC           General Eating/Swallowing Observations    Respiratory Support Currently in Use  nasal cannula  -AC        Eating/Swallowing Skills  fed by SLP;self-fed;rate is too fast;other (see comments) pt took very large consecutive drinks  -AC        Positioning During Eating  upright 90 degree;upright in bed  -AC        Utensils Used  spoon;cup;straw  -AC        Consistencies Trialed  thin  liquids;nectar/syrup-thick liquids;pureed;regular textures  -AC           Respiratory    Respiratory Status  increase in respiratory rate;during swallowing/eating  -AC        Respiratory Pattern  decrease control/incoordination of breathing swallow  -AC           Clinical Swallow Eval    Oral Prep Phase  WFL  -AC        Oral Transit  WFL  -AC        Oral Residue  WFL  -AC        Pharyngeal Phase  suspected pharyngeal impairment  -AC           Pharyngeal Phase Concerns    Pharyngeal Phase Concerns  cough  -AC        Cough  regular consistencies  -AC        Pharyngeal Phase Concerns, Comment  Noted intermittent coughing @ baseline & t/o eval--difficult to determine if r/t swallowing. Immediate hard coughing episode while masticating/swallowing solid cracker. Rec'd NPO x meds crushed w/ puree until swallowing further assessed via MBS. Please see MBS results below.  -AC           MBS/VFSS    Utensils Used  spoon;cup;straw  -AC        Consistencies Trialed  thin liquids;nectar/syrup-thick liquids;honey-thick liquids;pudding thick;regular textures  -AC           MBS/VFSS Interpretation    Oral Prep Phase  WFL  -AC        Oral Transit Phase  impaired  -AC        Oral Residue  WFL  -AC           Oral Transit Phase    Impaired Oral Transit Phase  increased A-P transit time;premature spillage of liquids into pharynx  -AC        Increased A-P Transit Time  pudding/puree;regular textures;discoordination of lingual movement  -AC        Premature Spillage of Liquids into Pharynx  thin liquids;nectar-thick liquids;secondary to reduced lingual control  -AC           Initiation of Pharyngeal Swallow    Initiation of Pharyngeal Swallow  bolus in pyriform sinuses  -AC        Pharyngeal Phase  impaired pharyngeal phase of swallowing  -AC        Penetration Before the Swallow  thin liquids;nectar-thick liquids;secondary to reduced back of tongue control;secondary to delayed swallow initiation or mistiming  -AC        Aspiration Before  the Swallow  nectar-thick liquids;secondary to reduced back of tongue control;secondary to delayed swallow initiation or mistiming;other (see comments) when chin tucked  -AC        Response to Penetration  deep;no response  -AC        Response to Aspiration  no response, silent aspiration  -AC        Rosenbek's Scale  thin:;5--->level 5;nectar:;8--->level 8;honey:;pudding/puree:;regular textures:;1--->level 1  -AC        Attempted Compensatory Maneuvers  bolus size;bolus presentation style;chin tuck  -AC        Response to Attempted Compensatory Maneuvers  did not prevent penetration;did not prevent aspiration  -AC        Pharyngeal Phase, Comment  There was deep penetration to the level of the TVFs w/ thin and nectar-thick liquids before the swallow. Pt did not appear to sense penetrated material and it did not spontaneously clear laryngeal vestibule. Suspect aspiration of vestibular residue inevitable (pt cued to cough to clear residue during study). Attempted chin tuck; however, this resulted in aspiration of nectar-thick liquid before the swallow. Aspiration was SILENT. No penetration/aspiration appreciated w/ honey-thick liquid, pudding, or solid. No significant pharyngeal residue. Given hx COPD and recurrent pna, suspect dysphagia may be acute-on-chronic.   -           Clinical Impression    SLP Swallowing Diagnosis  mild;oral dysfunction;moderate;pharyngeal dysfunction  -        Functional Impact  risk of aspiration/pneumonia  -        Rehab Potential/Prognosis, Swallowing  good, to achieve stated therapy goals  -        Swallow Criteria for Skilled Therapeutic Interventions Met  demonstrates skilled criteria  -           Recommendations    Therapy Frequency (Swallow)  5 days per week  -        Predicted Duration Therapy Intervention (Days)  until discharge  -        SLP Diet Recommendation  mechanical soft with no mixed consistencies;honey thick liquids  -        Recommended Precautions and  Strategies  upright posture during/after eating;small bites of food and sips of liquid;no straw;other (see comments) monitor for resp fatigue & take breaks PRN, NO CHIN TUCK  -        SLP Rec. for Method of Medication Administration  meds whole;meds crushed;with pudding or applesauce;as tolerated  -        Monitor for Signs of Aspiration  notify SLP if any concerns  -        Anticipated Dischage Disposition  anticipate therapy at next level of care;inpatient rehabilitation facility  -          User Key  (r) = Recorded By, (t) = Taken By, (c) = Cosigned By    Initials Name Effective Dates     Sherice Ash, MS CCC-SLP 07/27/17 -           EDUCATION  The patient has been educated in the following areas:   Dysphagia (Swallowing Impairment) Oral Care/Hydration Modified Diet Instruction.    SLP Recommendation and Plan  SLP Swallowing Diagnosis: mild, oral dysfunction, moderate, pharyngeal dysfunction  SLP Diet Recommendation: mechanical soft with no mixed consistencies, honey thick liquids  Recommended Precautions and Strategies: upright posture during/after eating, small bites of food and sips of liquid, no straw, other (see comments)(monitor for resp fatigue & take breaks PRN, NO CHIN TUCK)  SLP Rec. for Method of Medication Administration: meds whole, meds crushed, with pudding or applesauce, as tolerated     Monitor for Signs of Aspiration: notify SLP if any concerns     Swallow Criteria for Skilled Therapeutic Interventions Met: demonstrates skilled criteria  Anticipated Dischage Disposition: anticipate therapy at next level of care, inpatient rehabilitation facility  Rehab Potential/Prognosis, Swallowing: good, to achieve stated therapy goals  Therapy Frequency (Swallow): 5 days per week  Predicted Duration Therapy Intervention (Days): until discharge       Plan of Care Reviewed With: patient, spouse    SLP GOALS     Row Name 01/03/20 0842             Oral Nutrition/Hydration Goal 1 (SLP)    Oral  Nutrition/Hydration Goal 1, SLP  LTG: Pt will return to regular diet & thin liquid and tolerate w/o s/sxs aspiration w/ 100% acc w/o cues.  -AC      Time Frame (Oral Nutrition/Hydration Goal 1, SLP)  by discharge  -AC         Oral Nutrition/Hydration Goal 2 (SLP)    Oral Nutrition/Hydration Goal 2, SLP  Pt will tolerate trials of soft solids & honey-thick liquids w/o s/sxs aspiration w/ 100% acc w/o cues.  -AC      Time Frame (Oral Nutrition/Hydration Goal 2, SLP)  short term goal (STG)  -AC         Oral Nutrition/Hydration Goal (SLP)    Oral Nutrition/Hydration Goal, SLP  Pt will tolerate therapeutic trials of ice/thin H2O w/o s/sxs aspiration or distress w/ 70% acc w/o cues.  -AC      Time Frame (Oral Nutrition/Hydration Goal, SLP)  short term goal (STG)  -AC         Lingual Strengthening Goal 1 (SLP)    Activity (Lingual Strengthening Goal 1, SLP)  increase tongue back strength  -AC      Increase Tongue Back Strength  lingual resistance exercises  -AC      [place]/Accuracy (Lingual Strengthening Goal 1, SLP)  with minimal cues (75-90% accuracy)  -AC      Time Frame (Lingual Strengthening Goal 1, SLP)  short term goal (STG)  -AC         Pharyngeal Strengthening Exercise Goal 1 (SLP)    Activity (Pharyngeal Strengthening Goal 1, SLP)  increase timing  -AC      Increase Timing  prepping - 3 second prep or suck swallow or 3-step swallow  -AC      [place]/Accuracy (Pharyngeal Strengthening Goal 1, SLP)  with minimal cues (75-90% accuracy)  -AC      Time Frame (Pharyngeal Strengthening Goal 1, SLP)  short term goal (STG)  -AC         Respiratory Support Goal (SLP)    Improve Respiratory Support Goal (SLP)  Pt will use shorter length of utterance per breath (2-3 words per breath) for improved speech intelligibility at sentence level w/ 80% acc w/ min cues.  -AC      Time Frame (Respiratory Support Goal, SLP)  short term goal (STG)  -AC         Articulation Goal 1 (SLP)    Improve Articulation Goal 1 (SLP)  by  over-articulating at word level;80%;with minimal cues (75-90%)  -AC      Time Frame (Articulation Goal 1, SLP)  short term goal (STG)  -AC         Additional Goal 1 (SLP)    Additional Goal 1, SLP  LTG: Pt will improve cognitive-communication skills in order to participate in care in hospital setting w/ 100% acc w/ min cues.  -AC      Time Frame (Additional Goal 1, SLP)  by discharge  -AC        User Key  (r) = Recorded By, (t) = Taken By, (c) = Cosigned By    Initials Name Provider Type    Sherice Dodson MS CCC-SLP Speech and Language Pathologist             Time Calculation:   Time Calculation- SLP     Row Name 01/03/20 1345             Time Calculation- SLP    SLP Start Time  0845  -AC      SLP Received On  01/03/20  -AC        User Key  (r) = Recorded By, (t) = Taken By, (c) = Cosigned By    Initials Name Provider Type    Sherice Dodson MS CCC-SLP Speech and Language Pathologist          Therapy Charges for Today     Code Description Service Date Service Provider Modifiers Qty    19077271123 HC ST EVAL ORAL PHARYNG SWALLOW 3 1/3/2020 Sherice Ash MS CCC-SLP GN 1    [id number] HC ST EVAL SPEECH AND PROD W LANG  2 1/3/2020 Sherice Ash MS CCC-SLP GN 1    [id number] HC ST MOTION FLUORO EVAL SWALLOW 4 1/3/2020 Sherice Ash MS CCC-SLP GN 1               Sherice Ash MS CCC-SLP  1/3/2020

## 2023-04-13 NOTE — TELEPHONE ENCOUNTER
SPOKE WITH PATIENT - HE IS AWARE THAT DR. ELDER IS OUT UNTIL Wednesday. HE WOULD PREFER NOT TO COME IN TO BE SEEN UNLESS HIS EYE GETS REALLY BAD.    DR. ELDER GAVE HIM MEDICATION BUT HIS EYE IS NOT ANY BETTER. PATIENT IS AWARE THAT I WILL TALK TO DR. ELDER Wednesday BUT IF HE GETS WORSE HE SHOULD BE SEEN.   
No

## 2025-01-21 NOTE — TELEPHONE ENCOUNTER
Is this ok?   Last office visit: 9/5/2024   Protocol: fail  Requested medication(s) are due for refill today: yes  Requested medication(s) are on the active medication list same strength, form, dose/ sig: yes  Requested medication(s) are managed by provider: yes  Patient has already received a courtsey refill: no     NOV: 1/21/2025  Last Labs: 9/5/2024  Asked to Return: 12/5/2024